# Patient Record
Sex: MALE | Race: WHITE | NOT HISPANIC OR LATINO | Employment: OTHER | URBAN - METROPOLITAN AREA
[De-identification: names, ages, dates, MRNs, and addresses within clinical notes are randomized per-mention and may not be internally consistent; named-entity substitution may affect disease eponyms.]

---

## 2018-08-10 ENCOUNTER — SURGERY - HEALTHEAST (OUTPATIENT)
Dept: CARDIOLOGY | Facility: CLINIC | Age: 73
End: 2018-08-10

## 2018-08-10 ASSESSMENT — MIFFLIN-ST. JEOR
SCORE: 2409.14
SCORE: 2405.96

## 2018-08-11 ASSESSMENT — MIFFLIN-ST. JEOR: SCORE: 2409.14

## 2018-08-12 ASSESSMENT — MIFFLIN-ST. JEOR: SCORE: 2404.15

## 2018-08-14 ASSESSMENT — MIFFLIN-ST. JEOR: SCORE: 2358.79

## 2018-08-15 ASSESSMENT — MIFFLIN-ST. JEOR: SCORE: 2360.15

## 2018-08-16 ENCOUNTER — ANESTHESIA - HEALTHEAST (OUTPATIENT)
Dept: CARDIOLOGY | Facility: CLINIC | Age: 73
End: 2018-08-16

## 2018-08-16 ASSESSMENT — MIFFLIN-ST. JEOR: SCORE: 2351.53

## 2018-08-17 ASSESSMENT — MIFFLIN-ST. JEOR: SCORE: 2347.9

## 2018-08-22 ENCOUNTER — OFFICE VISIT - HEALTHEAST (OUTPATIENT)
Dept: CARDIOLOGY | Facility: CLINIC | Age: 73
End: 2018-08-22

## 2018-08-22 DIAGNOSIS — I50.22 CHRONIC SYSTOLIC HEART FAILURE (H): ICD-10-CM

## 2018-08-22 DIAGNOSIS — E66.01 MORBID OBESITY (H): ICD-10-CM

## 2018-08-22 DIAGNOSIS — G47.33 OSA (OBSTRUCTIVE SLEEP APNEA): ICD-10-CM

## 2018-08-22 DIAGNOSIS — I48.92 PAROXYSMAL ATRIAL FLUTTER (H): ICD-10-CM

## 2018-08-22 DIAGNOSIS — I25.10 CORONARY ARTERY DISEASE INVOLVING NATIVE CORONARY ARTERY OF NATIVE HEART WITHOUT ANGINA PECTORIS: ICD-10-CM

## 2018-08-22 RX ORDER — PERPHENAZINE/AMITRIPTYLINE HCL 4MG-10MG
3 TABLET ORAL DAILY
Status: SHIPPED | COMMUNITY
Start: 2018-08-22 | End: 2023-11-10

## 2018-08-22 ASSESSMENT — MIFFLIN-ST. JEOR: SCORE: 2329.3

## 2018-08-29 ENCOUNTER — OFFICE VISIT - HEALTHEAST (OUTPATIENT)
Dept: CARDIOLOGY | Facility: CLINIC | Age: 73
End: 2018-08-29

## 2018-08-29 DIAGNOSIS — I50.22 CHRONIC SYSTOLIC HEART FAILURE (H): ICD-10-CM

## 2018-08-29 DIAGNOSIS — I50.21 ACUTE SYSTOLIC CHF (CONGESTIVE HEART FAILURE) (H): ICD-10-CM

## 2018-08-29 DIAGNOSIS — I48.92 NEW ONSET ATRIAL FLUTTER (H): ICD-10-CM

## 2018-08-29 DIAGNOSIS — I25.5 ISCHEMIC CARDIOMYOPATHY: ICD-10-CM

## 2018-08-29 DIAGNOSIS — G47.33 OSA ON CPAP: ICD-10-CM

## 2018-08-29 LAB
ANION GAP SERPL CALCULATED.3IONS-SCNC: 11 MMOL/L (ref 5–18)
BUN SERPL-MCNC: 32 MG/DL (ref 8–28)
CALCIUM SERPL-MCNC: 9.4 MG/DL (ref 8.5–10.5)
CHLORIDE BLD-SCNC: 102 MMOL/L (ref 98–107)
CO2 SERPL-SCNC: 23 MMOL/L (ref 22–31)
CREAT SERPL-MCNC: 1.34 MG/DL (ref 0.7–1.3)
GFR SERPL CREATININE-BSD FRML MDRD: 53 ML/MIN/1.73M2
GLUCOSE BLD-MCNC: 114 MG/DL (ref 70–125)
POTASSIUM BLD-SCNC: 5.3 MMOL/L (ref 3.5–5)
SODIUM SERPL-SCNC: 136 MMOL/L (ref 136–145)

## 2018-08-30 ENCOUNTER — COMMUNICATION - HEALTHEAST (OUTPATIENT)
Dept: CARDIOLOGY | Facility: CLINIC | Age: 73
End: 2018-08-30

## 2018-08-30 ENCOUNTER — AMBULATORY - HEALTHEAST (OUTPATIENT)
Dept: CARDIOLOGY | Facility: CLINIC | Age: 73
End: 2018-08-30

## 2018-08-30 DIAGNOSIS — I48.92 PAROXYSMAL ATRIAL FLUTTER (H): ICD-10-CM

## 2018-08-30 DIAGNOSIS — I47.10 SUPRAVENTRICULAR TACHYCARDIA (H): ICD-10-CM

## 2018-08-30 DIAGNOSIS — I25.5 ISCHEMIC CARDIOMYOPATHY: ICD-10-CM

## 2018-08-30 DIAGNOSIS — I25.10 CORONARY ARTERY DISEASE INVOLVING NATIVE CORONARY ARTERY OF NATIVE HEART WITHOUT ANGINA PECTORIS: ICD-10-CM

## 2018-08-30 DIAGNOSIS — I50.22 CHRONIC SYSTOLIC HEART FAILURE (H): ICD-10-CM

## 2018-08-31 ENCOUNTER — COMMUNICATION - HEALTHEAST (OUTPATIENT)
Dept: CARDIOLOGY | Facility: CLINIC | Age: 73
End: 2018-08-31

## 2018-09-05 ENCOUNTER — AMBULATORY - HEALTHEAST (OUTPATIENT)
Dept: CARDIOLOGY | Facility: CLINIC | Age: 73
End: 2018-09-05

## 2018-09-05 DIAGNOSIS — I25.5 ISCHEMIC CARDIOMYOPATHY: ICD-10-CM

## 2018-09-05 LAB
ANION GAP SERPL CALCULATED.3IONS-SCNC: 10 MMOL/L (ref 5–18)
BUN SERPL-MCNC: 39 MG/DL (ref 8–28)
CALCIUM SERPL-MCNC: 9.6 MG/DL (ref 8.5–10.5)
CHLORIDE BLD-SCNC: 107 MMOL/L (ref 98–107)
CO2 SERPL-SCNC: 21 MMOL/L (ref 22–31)
CREAT SERPL-MCNC: 1.37 MG/DL (ref 0.7–1.3)
GFR SERPL CREATININE-BSD FRML MDRD: 51 ML/MIN/1.73M2
GLUCOSE BLD-MCNC: 90 MG/DL (ref 70–125)
POTASSIUM BLD-SCNC: 5.1 MMOL/L (ref 3.5–5)
SODIUM SERPL-SCNC: 138 MMOL/L (ref 136–145)

## 2018-09-06 ENCOUNTER — AMBULATORY - HEALTHEAST (OUTPATIENT)
Dept: CARDIOLOGY | Facility: CLINIC | Age: 73
End: 2018-09-06

## 2018-09-11 ENCOUNTER — COMMUNICATION - HEALTHEAST (OUTPATIENT)
Dept: CARDIOLOGY | Facility: CLINIC | Age: 73
End: 2018-09-11

## 2018-09-14 ENCOUNTER — COMMUNICATION - HEALTHEAST (OUTPATIENT)
Dept: CARDIOLOGY | Facility: CLINIC | Age: 73
End: 2018-09-14

## 2018-09-19 ENCOUNTER — COMMUNICATION - HEALTHEAST (OUTPATIENT)
Dept: ADMINISTRATIVE | Facility: CLINIC | Age: 73
End: 2018-09-19

## 2018-09-20 ENCOUNTER — COMMUNICATION - HEALTHEAST (OUTPATIENT)
Dept: CARDIOLOGY | Facility: CLINIC | Age: 73
End: 2018-09-20

## 2018-09-24 ENCOUNTER — AMBULATORY - HEALTHEAST (OUTPATIENT)
Dept: CARDIOLOGY | Facility: CLINIC | Age: 73
End: 2018-09-24

## 2018-09-24 DIAGNOSIS — Z79.899 LONG TERM USE OF DRUG: ICD-10-CM

## 2018-10-08 ENCOUNTER — OFFICE VISIT - HEALTHEAST (OUTPATIENT)
Dept: CARDIOLOGY | Facility: CLINIC | Age: 73
End: 2018-10-08

## 2018-10-08 DIAGNOSIS — I47.10 SUPRAVENTRICULAR TACHYCARDIA (H): ICD-10-CM

## 2018-10-08 DIAGNOSIS — I48.92 ATRIAL FLUTTER (H): ICD-10-CM

## 2018-10-08 DIAGNOSIS — Z79.899 LONG TERM USE OF DRUG: ICD-10-CM

## 2018-10-08 DIAGNOSIS — I25.5 ISCHEMIC CARDIOMYOPATHY: ICD-10-CM

## 2018-10-08 DIAGNOSIS — I50.20 HEART FAILURE WITH REDUCED EJECTION FRACTION (H): ICD-10-CM

## 2018-10-08 LAB
ALT SERPL W P-5'-P-CCNC: 32 U/L (ref 0–45)
TSH SERPL DL<=0.005 MIU/L-ACNC: 0.88 UIU/ML (ref 0.3–5)

## 2018-10-08 ASSESSMENT — MIFFLIN-ST. JEOR: SCORE: 2243.12

## 2018-10-11 LAB
AMIODARONE SERPL-MCNC: 0.6 UG/ML (ref 0.5–2)
DESETHYLAMIODARONE SERPL-MCNC: 0.5 UG/ML

## 2018-11-02 ENCOUNTER — AMBULATORY - HEALTHEAST (OUTPATIENT)
Dept: CARDIOLOGY | Facility: CLINIC | Age: 73
End: 2018-11-02

## 2018-11-02 ENCOUNTER — OFFICE VISIT - HEALTHEAST (OUTPATIENT)
Dept: CARDIOLOGY | Facility: CLINIC | Age: 73
End: 2018-11-02

## 2018-11-02 DIAGNOSIS — G47.33 OSA ON CPAP: ICD-10-CM

## 2018-11-02 DIAGNOSIS — I50.20 HEART FAILURE WITH REDUCED EJECTION FRACTION (H): ICD-10-CM

## 2018-11-02 DIAGNOSIS — I48.92 ATRIAL FLUTTER (H): ICD-10-CM

## 2018-11-02 DIAGNOSIS — I50.21 ACUTE SYSTOLIC CHF (CONGESTIVE HEART FAILURE) (H): ICD-10-CM

## 2018-11-02 DIAGNOSIS — I25.5 ISCHEMIC CARDIOMYOPATHY: ICD-10-CM

## 2018-11-02 LAB
ANION GAP SERPL CALCULATED.3IONS-SCNC: 11 MMOL/L (ref 5–18)
BUN SERPL-MCNC: 21 MG/DL (ref 8–28)
CALCIUM SERPL-MCNC: 9.1 MG/DL (ref 8.5–10.5)
CHLORIDE BLD-SCNC: 102 MMOL/L (ref 98–107)
CO2 SERPL-SCNC: 26 MMOL/L (ref 22–31)
CREAT SERPL-MCNC: 1.08 MG/DL (ref 0.7–1.3)
GFR SERPL CREATININE-BSD FRML MDRD: >60 ML/MIN/1.73M2
GLUCOSE BLD-MCNC: 73 MG/DL (ref 70–125)
POTASSIUM BLD-SCNC: 4.7 MMOL/L (ref 3.5–5)
SODIUM SERPL-SCNC: 139 MMOL/L (ref 136–145)

## 2018-11-02 ASSESSMENT — MIFFLIN-ST. JEOR: SCORE: 2238.58

## 2018-11-05 ENCOUNTER — COMMUNICATION - HEALTHEAST (OUTPATIENT)
Dept: CARDIOLOGY | Facility: CLINIC | Age: 73
End: 2018-11-05

## 2018-11-15 ENCOUNTER — AMBULATORY - HEALTHEAST (OUTPATIENT)
Dept: CARDIOLOGY | Facility: CLINIC | Age: 73
End: 2018-11-15

## 2018-11-15 DIAGNOSIS — Z79.899 LONG TERM USE OF DRUG: ICD-10-CM

## 2018-11-30 ENCOUNTER — HOSPITAL ENCOUNTER (OUTPATIENT)
Dept: CARDIOLOGY | Facility: HOSPITAL | Age: 73
Discharge: HOME | End: 2018-11-30
Attending: INTERNAL MEDICINE

## 2018-11-30 DIAGNOSIS — I50.22 CHRONIC SYSTOLIC HEART FAILURE (H): ICD-10-CM

## 2018-11-30 DIAGNOSIS — I25.10 CORONARY ARTERY DISEASE INVOLVING NATIVE CORONARY ARTERY OF NATIVE HEART WITHOUT ANGINA PECTORIS: ICD-10-CM

## 2018-11-30 LAB
AORTIC ROOT: 4.2 CM
AORTIC VALVE MEAN VELOCITY: 113 CM/S
AV DIMENSIONLESS INDEX VTI: 0.7
AV MEAN GRADIENT: 6 MMHG
AV PEAK GRADIENT: 11.2 MMHG
AV VALVE AREA: 4 CM2
AV VELOCITY RATIO: 0.6
BSA FOR ECHO PROCEDURE: 2.71 M2
CV ECHO HEIGHT: 70 IN
CV ECHO WEIGHT: 329 LBS
DOP CALC AO PEAK VEL: 167 CM/S
DOP CALC AO VTI: 31.3 CM
DOP CALC LVOT AREA: 5.72 CM2
DOP CALC LVOT DIAMETER: 2.7 CM
DOP CALC LVOT PEAK VEL: 99 CM/S
DOP CALC LVOT STROKE VOLUME: 125.3 CM3
DOP CALCLVOT PEAK VEL VTI: 21.9 CM
ECHO EJECTION FRACTION ESTIMATED: 40 %
EJECTION FRACTION: 53 % (ref 55–75)
FRACTIONAL SHORTENING: 15.2 % (ref 28–44)
INTERVENTRICULAR SEPTUM IN END DIASTOLE: 1.23 CM (ref 0.6–1)
IVS/PW RATIO: 0.8
LA AREA 1: 31 CM2
LA AREA 2: 33 CM2
LEFT ATRIUM LENGTH: 6.6 CM
LEFT ATRIUM SIZE: 4.7 CM
LEFT ATRIUM VOLUME INDEX: 48.6 ML/M2
LEFT ATRIUM VOLUME: 131.8 ML
LEFT VENTRICLE CARDIAC INDEX: 2.8 L/MIN/M2
LEFT VENTRICLE CARDIAC OUTPUT: 7.5 L/MIN
LEFT VENTRICLE DIASTOLIC VOLUME INDEX: 57.9 CM3/M2 (ref 34–74)
LEFT VENTRICLE DIASTOLIC VOLUME: 157 CM3 (ref 62–150)
LEFT VENTRICLE HEART RATE: 60 BPM
LEFT VENTRICLE MASS INDEX: 126 G/M2
LEFT VENTRICLE SYSTOLIC VOLUME INDEX: 27.1 CM3/M2 (ref 11–31)
LEFT VENTRICLE SYSTOLIC VOLUME: 73.5 CM3 (ref 21–61)
LEFT VENTRICULAR INTERNAL DIMENSION IN DIASTOLE: 5.72 CM (ref 4.2–5.8)
LEFT VENTRICULAR INTERNAL DIMENSION IN SYSTOLE: 4.85 CM (ref 2.5–4)
LEFT VENTRICULAR MASS: 341.6 G
LEFT VENTRICULAR OUTFLOW TRACT MEAN GRADIENT: 3 MMHG
LEFT VENTRICULAR OUTFLOW TRACT MEAN VELOCITY: 77.2 CM/S
LEFT VENTRICULAR OUTFLOW TRACT PEAK GRADIENT: 4 MMHG
LEFT VENTRICULAR POSTERIOR WALL IN END DIASTOLE: 1.47 CM (ref 0.6–1)
LV STROKE VOLUME INDEX: 46.2 ML/M2
MITRAL VALVE E/A RATIO: 0.7
MV AVERAGE E/E' RATIO: 10 CM/S
MV DECELERATION TIME: 254 MS
MV E'TISSUE VEL-LAT: 6.77 CM/S
MV E'TISSUE VEL-MED: 5.61 CM/S
MV LATERAL E/E' RATIO: 9.1
MV MEDIAL E/E' RATIO: 11
MV PEAK A VELOCITY: 85.4 CM/S
MV PEAK E VELOCITY: 61.6 CM/S
NUC REST DIASTOLIC VOLUME INDEX: 5264 LBS
NUC REST SYSTOLIC VOLUME INDEX: 70 IN
PR MAX PG: 4 MMHG
PR PEAK VELOCITY: 103 CM/S
TRICUSPID VALVE ANULAR PLANE SYSTOLIC EXCURSION: 2.3 CM

## 2018-11-30 ASSESSMENT — MIFFLIN-ST. JEOR: SCORE: 2238.58

## 2018-12-03 ENCOUNTER — OFFICE VISIT - HEALTHEAST (OUTPATIENT)
Dept: CARDIOLOGY | Facility: CLINIC | Age: 73
End: 2018-12-03

## 2018-12-03 DIAGNOSIS — I42.8 OTHER CARDIOMYOPATHY (H): ICD-10-CM

## 2018-12-03 DIAGNOSIS — I48.92 PAROXYSMAL ATRIAL FLUTTER (H): ICD-10-CM

## 2018-12-03 DIAGNOSIS — I50.22 CHRONIC SYSTOLIC HEART FAILURE (H): ICD-10-CM

## 2018-12-03 DIAGNOSIS — I25.10 CORONARY ARTERY DISEASE INVOLVING NATIVE CORONARY ARTERY OF NATIVE HEART WITHOUT ANGINA PECTORIS: ICD-10-CM

## 2018-12-03 DIAGNOSIS — I48.92 ATRIAL FLUTTER (H): ICD-10-CM

## 2018-12-03 DIAGNOSIS — E66.01 MORBID OBESITY (H): ICD-10-CM

## 2018-12-03 RX ORDER — LISINOPRIL 5 MG/1
5 TABLET ORAL AT BEDTIME
Qty: 90 TABLET | Refills: 3 | Status: SHIPPED | OUTPATIENT
Start: 2018-12-03 | End: 2023-09-04

## 2018-12-03 ASSESSMENT — MIFFLIN-ST. JEOR: SCORE: 2229.51

## 2018-12-04 ENCOUNTER — COMMUNICATION - HEALTHEAST (OUTPATIENT)
Dept: CARDIOLOGY | Facility: CLINIC | Age: 73
End: 2018-12-04

## 2018-12-04 ENCOUNTER — COMMUNICATION - HEALTHEAST (OUTPATIENT)
Dept: SLEEP MEDICINE | Facility: CLINIC | Age: 73
End: 2018-12-04

## 2018-12-04 ENCOUNTER — AMBULATORY - HEALTHEAST (OUTPATIENT)
Dept: SLEEP MEDICINE | Facility: CLINIC | Age: 73
End: 2018-12-04

## 2018-12-04 DIAGNOSIS — G47.33 OSA (OBSTRUCTIVE SLEEP APNEA): ICD-10-CM

## 2018-12-04 DIAGNOSIS — E66.9 OBESITY: ICD-10-CM

## 2018-12-06 ENCOUNTER — COMMUNICATION - HEALTHEAST (OUTPATIENT)
Dept: CARDIOLOGY | Facility: CLINIC | Age: 73
End: 2018-12-06

## 2018-12-11 ENCOUNTER — COMMUNICATION - HEALTHEAST (OUTPATIENT)
Dept: CARDIOLOGY | Facility: CLINIC | Age: 73
End: 2018-12-11

## 2018-12-11 ENCOUNTER — AMBULATORY - HEALTHEAST (OUTPATIENT)
Dept: SLEEP MEDICINE | Facility: CLINIC | Age: 73
End: 2018-12-11

## 2018-12-11 DIAGNOSIS — E66.9 OBESITY: ICD-10-CM

## 2018-12-11 DIAGNOSIS — R06.83 SNORING: ICD-10-CM

## 2018-12-11 DIAGNOSIS — G47.33 OSA (OBSTRUCTIVE SLEEP APNEA): ICD-10-CM

## 2019-01-18 ENCOUNTER — RECORDS - HEALTHEAST (OUTPATIENT)
Dept: SLEEP MEDICINE | Facility: CLINIC | Age: 74
End: 2019-01-18

## 2019-01-18 DIAGNOSIS — R06.83 SNORING: ICD-10-CM

## 2019-01-18 DIAGNOSIS — G47.33 OBSTRUCTIVE SLEEP APNEA (ADULT) (PEDIATRIC): ICD-10-CM

## 2019-01-18 DIAGNOSIS — E66.9 OBESITY, UNSPECIFIED: ICD-10-CM

## 2019-01-21 ENCOUNTER — COMMUNICATION - HEALTHEAST (OUTPATIENT)
Dept: SLEEP MEDICINE | Facility: CLINIC | Age: 74
End: 2019-01-21

## 2019-02-01 ENCOUNTER — COMMUNICATION - HEALTHEAST (OUTPATIENT)
Dept: SLEEP MEDICINE | Facility: CLINIC | Age: 74
End: 2019-02-01

## 2019-02-19 ENCOUNTER — OFFICE VISIT - HEALTHEAST (OUTPATIENT)
Dept: SLEEP MEDICINE | Facility: CLINIC | Age: 74
End: 2019-02-19

## 2019-02-19 ENCOUNTER — AMBULATORY - HEALTHEAST (OUTPATIENT)
Dept: SLEEP MEDICINE | Facility: CLINIC | Age: 74
End: 2019-02-19

## 2019-02-19 DIAGNOSIS — G47.10 HYPERSOMNIA: ICD-10-CM

## 2019-02-19 DIAGNOSIS — G47.8 SLEEP DYSFUNCTION WITH SLEEP STAGE DISTURBANCE: ICD-10-CM

## 2019-02-19 DIAGNOSIS — G47.33 OBSTRUCTIVE SLEEP APNEA: ICD-10-CM

## 2019-02-19 DIAGNOSIS — E66.813 CLASS 3 SEVERE OBESITY DUE TO EXCESS CALORIES WITH BODY MASS INDEX (BMI) OF 45.0 TO 49.9 IN ADULT, UNSPECIFIED WHETHER SERIOUS COMORBIDITY PRESENT (H): ICD-10-CM

## 2019-02-19 DIAGNOSIS — E66.01 CLASS 3 SEVERE OBESITY DUE TO EXCESS CALORIES WITH BODY MASS INDEX (BMI) OF 45.0 TO 49.9 IN ADULT, UNSPECIFIED WHETHER SERIOUS COMORBIDITY PRESENT (H): ICD-10-CM

## 2019-02-19 ASSESSMENT — MIFFLIN-ST. JEOR: SCORE: 2206.83

## 2019-03-05 ENCOUNTER — AMBULATORY - HEALTHEAST (OUTPATIENT)
Dept: SLEEP MEDICINE | Facility: CLINIC | Age: 74
End: 2019-03-05

## 2019-03-12 ENCOUNTER — COMMUNICATION - HEALTHEAST (OUTPATIENT)
Dept: CARDIOLOGY | Facility: CLINIC | Age: 74
End: 2019-03-12

## 2019-03-12 DIAGNOSIS — I48.92 PAROXYSMAL ATRIAL FLUTTER (H): ICD-10-CM

## 2019-03-12 DIAGNOSIS — I50.21 ACUTE SYSTOLIC CHF (CONGESTIVE HEART FAILURE) (H): ICD-10-CM

## 2019-03-12 DIAGNOSIS — I48.92 ATRIAL FLUTTER (H): ICD-10-CM

## 2019-03-21 ENCOUNTER — AMBULATORY - HEALTHEAST (OUTPATIENT)
Dept: CARDIOLOGY | Facility: CLINIC | Age: 74
End: 2019-03-21

## 2019-04-02 ENCOUNTER — COMMUNICATION - HEALTHEAST (OUTPATIENT)
Dept: CARDIOLOGY | Facility: CLINIC | Age: 74
End: 2019-04-02

## 2019-04-02 DIAGNOSIS — I25.10 CORONARY ARTERY DISEASE INVOLVING NATIVE CORONARY ARTERY OF NATIVE HEART WITHOUT ANGINA PECTORIS: ICD-10-CM

## 2019-04-02 DIAGNOSIS — I50.22 CHRONIC SYSTOLIC HEART FAILURE (H): ICD-10-CM

## 2019-04-02 RX ORDER — ATORVASTATIN CALCIUM 40 MG/1
40 TABLET, FILM COATED ORAL AT BEDTIME
Qty: 90 TABLET | Refills: 1 | Status: SHIPPED | OUTPATIENT
Start: 2019-04-02

## 2019-04-09 ENCOUNTER — COMMUNICATION - HEALTHEAST (OUTPATIENT)
Dept: ADMINISTRATIVE | Facility: CLINIC | Age: 74
End: 2019-04-09

## 2019-04-22 ENCOUNTER — OFFICE VISIT - HEALTHEAST (OUTPATIENT)
Dept: SLEEP MEDICINE | Facility: CLINIC | Age: 74
End: 2019-04-22

## 2019-04-22 DIAGNOSIS — G47.8 SLEEP DYSFUNCTION WITH SLEEP STAGE DISTURBANCE: ICD-10-CM

## 2019-04-22 DIAGNOSIS — G47.33 OBSTRUCTIVE SLEEP APNEA: ICD-10-CM

## 2019-04-22 ASSESSMENT — MIFFLIN-ST. JEOR: SCORE: 2202.3

## 2019-05-17 ENCOUNTER — AMBULATORY - HEALTHEAST (OUTPATIENT)
Dept: CARDIOLOGY | Facility: CLINIC | Age: 74
End: 2019-05-17

## 2019-05-17 DIAGNOSIS — Z79.899 LONG TERM USE OF DRUG: ICD-10-CM

## 2019-05-17 LAB
ALT SERPL W P-5'-P-CCNC: 23 U/L (ref 0–45)
TSH SERPL DL<=0.005 MIU/L-ACNC: 0.33 UIU/ML (ref 0.3–5)

## 2019-05-20 ENCOUNTER — AMBULATORY - HEALTHEAST (OUTPATIENT)
Dept: CARDIOLOGY | Facility: CLINIC | Age: 74
End: 2019-05-20

## 2019-05-20 LAB
AMIODARONE SERPL-MCNC: 0.6 UG/ML (ref 0.5–2)
DESETHYLAMIODARONE SERPL-MCNC: 0.6 UG/ML

## 2019-06-10 ENCOUNTER — COMMUNICATION - HEALTHEAST (OUTPATIENT)
Dept: CARDIOLOGY | Facility: CLINIC | Age: 74
End: 2019-06-10

## 2019-06-24 ENCOUNTER — OFFICE VISIT - HEALTHEAST (OUTPATIENT)
Dept: CARDIOLOGY | Facility: CLINIC | Age: 74
End: 2019-06-24

## 2019-06-24 DIAGNOSIS — G47.33 OSA (OBSTRUCTIVE SLEEP APNEA): ICD-10-CM

## 2019-06-24 DIAGNOSIS — Z79.899 ENCOUNTER FOR MONITORING AMIODARONE THERAPY: ICD-10-CM

## 2019-06-24 DIAGNOSIS — Z51.81 ENCOUNTER FOR MONITORING AMIODARONE THERAPY: ICD-10-CM

## 2019-06-24 DIAGNOSIS — I48.92 ATRIAL FLUTTER (H): ICD-10-CM

## 2019-06-24 DIAGNOSIS — I50.22 CHRONIC SYSTOLIC HEART FAILURE (H): ICD-10-CM

## 2019-06-24 DIAGNOSIS — E66.01 MORBID OBESITY (H): ICD-10-CM

## 2019-06-24 ASSESSMENT — MIFFLIN-ST. JEOR: SCORE: 2192.76

## 2019-06-25 ENCOUNTER — COMMUNICATION - HEALTHEAST (OUTPATIENT)
Dept: CARDIOLOGY | Facility: CLINIC | Age: 74
End: 2019-06-25

## 2019-06-25 DIAGNOSIS — I48.91 A-FIB (H): ICD-10-CM

## 2019-06-28 ENCOUNTER — COMMUNICATION - HEALTHEAST (OUTPATIENT)
Dept: CARDIOLOGY | Facility: CLINIC | Age: 74
End: 2019-06-28

## 2019-06-28 DIAGNOSIS — I50.22 CHRONIC SYSTOLIC HEART FAILURE (H): ICD-10-CM

## 2019-07-05 ENCOUNTER — COMMUNICATION - HEALTHEAST (OUTPATIENT)
Dept: CARDIOLOGY | Facility: CLINIC | Age: 74
End: 2019-07-05

## 2019-07-26 ENCOUNTER — COMMUNICATION - HEALTHEAST (OUTPATIENT)
Dept: CARDIOLOGY | Facility: CLINIC | Age: 74
End: 2019-07-26

## 2019-09-11 ENCOUNTER — COMMUNICATION - HEALTHEAST (OUTPATIENT)
Dept: CARDIOLOGY | Facility: CLINIC | Age: 74
End: 2019-09-11

## 2019-09-11 DIAGNOSIS — I50.21 ACUTE SYSTOLIC CHF (CONGESTIVE HEART FAILURE) (H): ICD-10-CM

## 2019-10-03 ENCOUNTER — COMMUNICATION - HEALTHEAST (OUTPATIENT)
Dept: CARDIOLOGY | Facility: CLINIC | Age: 74
End: 2019-10-03

## 2019-10-03 DIAGNOSIS — I50.22 CHRONIC SYSTOLIC HEART FAILURE (H): ICD-10-CM

## 2019-10-03 RX ORDER — METOPROLOL SUCCINATE 50 MG/1
25 TABLET, EXTENDED RELEASE ORAL DAILY
Qty: 90 TABLET | Refills: 1 | Status: ON HOLD | OUTPATIENT
Start: 2019-10-03 | End: 2023-12-01

## 2019-11-14 ENCOUNTER — AMBULATORY - HEALTHEAST (OUTPATIENT)
Dept: CARDIOLOGY | Facility: CLINIC | Age: 74
End: 2019-11-14

## 2019-11-14 ENCOUNTER — COMMUNICATION - HEALTHEAST (OUTPATIENT)
Dept: CARDIOLOGY | Facility: CLINIC | Age: 74
End: 2019-11-14

## 2019-11-14 DIAGNOSIS — I48.91 A-FIB (H): ICD-10-CM

## 2019-11-14 DIAGNOSIS — I50.21 ACUTE SYSTOLIC CHF (CONGESTIVE HEART FAILURE) (H): ICD-10-CM

## 2019-11-14 DIAGNOSIS — Z79.899 LONG TERM USE OF DRUG: ICD-10-CM

## 2019-11-14 RX ORDER — AMIODARONE HYDROCHLORIDE 200 MG/1
100 TABLET ORAL DAILY
Qty: 45 TABLET | Refills: 0 | Status: SHIPPED | OUTPATIENT
Start: 2019-11-14

## 2019-11-18 ENCOUNTER — AMBULATORY - HEALTHEAST (OUTPATIENT)
Dept: CARDIOLOGY | Facility: CLINIC | Age: 74
End: 2019-11-18

## 2019-12-03 ENCOUNTER — COMMUNICATION - HEALTHEAST (OUTPATIENT)
Dept: CARDIOLOGY | Facility: CLINIC | Age: 74
End: 2019-12-03

## 2019-12-03 DIAGNOSIS — I48.92 PAROXYSMAL ATRIAL FLUTTER (H): ICD-10-CM

## 2019-12-03 RX ORDER — APIXABAN 5 MG/1
TABLET, FILM COATED ORAL
Qty: 180 TABLET | Refills: 0 | Status: SHIPPED | OUTPATIENT
Start: 2019-12-03

## 2019-12-09 ENCOUNTER — AMBULATORY - HEALTHEAST (OUTPATIENT)
Dept: CARDIOLOGY | Facility: CLINIC | Age: 74
End: 2019-12-09

## 2019-12-09 DIAGNOSIS — Z79.899 LONG TERM USE OF DRUG: ICD-10-CM

## 2019-12-09 LAB
ALT SERPL W P-5'-P-CCNC: 23 U/L (ref 0–45)
TSH SERPL DL<=0.005 MIU/L-ACNC: <0.01 UIU/ML (ref 0.3–5)

## 2019-12-22 ENCOUNTER — COMMUNICATION - HEALTHEAST (OUTPATIENT)
Dept: CARDIOLOGY | Facility: CLINIC | Age: 74
End: 2019-12-22

## 2019-12-22 DIAGNOSIS — I50.21 ACUTE SYSTOLIC CHF (CONGESTIVE HEART FAILURE) (H): ICD-10-CM

## 2019-12-23 RX ORDER — FUROSEMIDE 20 MG
TABLET ORAL
Qty: 100 TABLET | Refills: 0 | Status: ON HOLD | OUTPATIENT
Start: 2019-12-23 | End: 2023-11-20

## 2020-01-27 ENCOUNTER — OFFICE VISIT - HEALTHEAST (OUTPATIENT)
Dept: CARDIOLOGY | Facility: CLINIC | Age: 75
End: 2020-01-27

## 2020-01-27 DIAGNOSIS — G47.33 OSA (OBSTRUCTIVE SLEEP APNEA): ICD-10-CM

## 2020-01-27 DIAGNOSIS — I42.8 OTHER CARDIOMYOPATHY (H): ICD-10-CM

## 2020-01-27 DIAGNOSIS — E66.01 MORBID OBESITY (H): ICD-10-CM

## 2020-01-27 DIAGNOSIS — I50.22 CHRONIC SYSTOLIC HEART FAILURE (H): ICD-10-CM

## 2020-01-27 DIAGNOSIS — I48.3 TYPICAL ATRIAL FLUTTER (H): ICD-10-CM

## 2020-01-27 DIAGNOSIS — I25.10 CORONARY ARTERY DISEASE INVOLVING NATIVE CORONARY ARTERY OF NATIVE HEART WITHOUT ANGINA PECTORIS: ICD-10-CM

## 2020-04-29 ENCOUNTER — AMBULATORY - HEALTHEAST (OUTPATIENT)
Dept: CARDIOLOGY | Facility: CLINIC | Age: 75
End: 2020-04-29

## 2020-07-14 ENCOUNTER — AMBULATORY - HEALTHEAST (OUTPATIENT)
Dept: CARDIOLOGY | Facility: CLINIC | Age: 75
End: 2020-07-14

## 2020-10-21 ENCOUNTER — RECORDS - HEALTHEAST (OUTPATIENT)
Dept: LAB | Facility: CLINIC | Age: 75
End: 2020-10-21

## 2020-10-22 ENCOUNTER — RECORDS - HEALTHEAST (OUTPATIENT)
Dept: LAB | Facility: CLINIC | Age: 75
End: 2020-10-22

## 2020-10-22 LAB
ANION GAP SERPL CALCULATED.3IONS-SCNC: 11 MMOL/L (ref 5–18)
BUN SERPL-MCNC: 28 MG/DL (ref 8–28)
CALCIUM SERPL-MCNC: 9.1 MG/DL (ref 8.5–10.5)
CHLORIDE BLD-SCNC: 102 MMOL/L (ref 98–107)
CO2 SERPL-SCNC: 24 MMOL/L (ref 22–31)
CREAT SERPL-MCNC: 1.08 MG/DL (ref 0.7–1.3)
GFR SERPL CREATININE-BSD FRML MDRD: >60 ML/MIN/1.73M2
GLUCOSE BLD-MCNC: 102 MG/DL (ref 70–125)
HGB BLD-MCNC: 12.1 G/DL (ref 14–18)
POTASSIUM BLD-SCNC: 4.8 MMOL/L (ref 3.5–5)
SODIUM SERPL-SCNC: 137 MMOL/L (ref 136–145)

## 2020-10-23 LAB
ANION GAP SERPL CALCULATED.3IONS-SCNC: 7 MMOL/L (ref 5–18)
BUN SERPL-MCNC: 26 MG/DL (ref 8–28)
CALCIUM SERPL-MCNC: 9 MG/DL (ref 8.5–10.5)
CHLORIDE BLD-SCNC: 103 MMOL/L (ref 98–107)
CO2 SERPL-SCNC: 29 MMOL/L (ref 22–31)
CREAT SERPL-MCNC: 1.09 MG/DL (ref 0.7–1.3)
ERYTHROCYTE [DISTWIDTH] IN BLOOD BY AUTOMATED COUNT: 13.5 % (ref 11–14.5)
GFR SERPL CREATININE-BSD FRML MDRD: >60 ML/MIN/1.73M2
GLUCOSE BLD-MCNC: 76 MG/DL (ref 70–125)
HCT VFR BLD AUTO: 37.4 % (ref 40–54)
HGB BLD-MCNC: 12 G/DL (ref 14–18)
MCH RBC QN AUTO: 33.1 PG (ref 27–34)
MCHC RBC AUTO-ENTMCNC: 32.1 G/DL (ref 32–36)
MCV RBC AUTO: 103 FL (ref 80–100)
PLATELET # BLD AUTO: 162 THOU/UL (ref 140–440)
PMV BLD AUTO: 10.9 FL (ref 8.5–12.5)
POTASSIUM BLD-SCNC: 4.3 MMOL/L (ref 3.5–5)
RBC # BLD AUTO: 3.63 MILL/UL (ref 4.4–6.2)
SODIUM SERPL-SCNC: 139 MMOL/L (ref 136–145)
WBC: 6.3 THOU/UL (ref 4–11)

## 2020-11-01 ENCOUNTER — RECORDS - HEALTHEAST (OUTPATIENT)
Dept: LAB | Facility: CLINIC | Age: 75
End: 2020-11-01

## 2020-11-02 LAB
ANION GAP SERPL CALCULATED.3IONS-SCNC: 8 MMOL/L (ref 5–18)
BUN SERPL-MCNC: 21 MG/DL (ref 8–28)
CALCIUM SERPL-MCNC: 9.4 MG/DL (ref 8.5–10.5)
CHLORIDE BLD-SCNC: 105 MMOL/L (ref 98–107)
CO2 SERPL-SCNC: 26 MMOL/L (ref 22–31)
CREAT SERPL-MCNC: 0.99 MG/DL (ref 0.7–1.3)
ERYTHROCYTE [DISTWIDTH] IN BLOOD BY AUTOMATED COUNT: 13.6 % (ref 11–14.5)
GFR SERPL CREATININE-BSD FRML MDRD: >60 ML/MIN/1.73M2
GLUCOSE BLD-MCNC: 96 MG/DL (ref 70–125)
HCT VFR BLD AUTO: 39.9 % (ref 40–54)
HGB BLD-MCNC: 12.8 G/DL (ref 14–18)
MCH RBC QN AUTO: 33 PG (ref 27–34)
MCHC RBC AUTO-ENTMCNC: 32.1 G/DL (ref 32–36)
MCV RBC AUTO: 103 FL (ref 80–100)
PLATELET # BLD AUTO: 212 THOU/UL (ref 140–440)
PMV BLD AUTO: 10.4 FL (ref 8.5–12.5)
POTASSIUM BLD-SCNC: 4.6 MMOL/L (ref 3.5–5)
RBC # BLD AUTO: 3.88 MILL/UL (ref 4.4–6.2)
SODIUM SERPL-SCNC: 139 MMOL/L (ref 136–145)
WBC: 4.8 THOU/UL (ref 4–11)

## 2020-12-07 ENCOUNTER — AMBULATORY - HEALTHEAST (OUTPATIENT)
Dept: CARDIOLOGY | Facility: CLINIC | Age: 75
End: 2020-12-07

## 2020-12-07 DIAGNOSIS — Z79.899 LONG TERM USE OF DRUG: ICD-10-CM

## 2021-02-12 ENCOUNTER — OFFICE VISIT - HEALTHEAST (OUTPATIENT)
Dept: CARDIOLOGY | Facility: CLINIC | Age: 76
End: 2021-02-12

## 2021-02-12 DIAGNOSIS — I50.22 CHRONIC SYSTOLIC HEART FAILURE (H): ICD-10-CM

## 2021-02-12 DIAGNOSIS — I89.0 LYMPHEDEMA OF BOTH LOWER EXTREMITIES: ICD-10-CM

## 2021-02-12 DIAGNOSIS — I48.3 TYPICAL ATRIAL FLUTTER (H): ICD-10-CM

## 2021-02-12 DIAGNOSIS — E66.01 MORBID OBESITY (H): ICD-10-CM

## 2021-02-12 RX ORDER — ACETAMINOPHEN 500 MG
1000 TABLET ORAL 3 TIMES DAILY
Status: SHIPPED | COMMUNITY
Start: 2020-10-19

## 2021-02-23 ENCOUNTER — COMMUNICATION - HEALTHEAST (OUTPATIENT)
Dept: CARDIOLOGY | Facility: CLINIC | Age: 76
End: 2021-02-23

## 2021-03-11 ENCOUNTER — AMBULATORY - HEALTHEAST (OUTPATIENT)
Dept: CARDIOLOGY | Facility: CLINIC | Age: 76
End: 2021-03-11

## 2021-03-18 ENCOUNTER — COMMUNICATION - HEALTHEAST (OUTPATIENT)
Dept: CARDIOLOGY | Facility: CLINIC | Age: 76
End: 2021-03-18

## 2021-03-19 ENCOUNTER — OFFICE VISIT - HEALTHEAST (OUTPATIENT)
Dept: CARDIOLOGY | Facility: CLINIC | Age: 76
End: 2021-03-19

## 2021-03-19 DIAGNOSIS — E66.01 MORBID OBESITY (H): ICD-10-CM

## 2021-03-19 DIAGNOSIS — I25.10 CORONARY ARTERY DISEASE INVOLVING NATIVE CORONARY ARTERY OF NATIVE HEART WITHOUT ANGINA PECTORIS: ICD-10-CM

## 2021-03-19 DIAGNOSIS — G47.33 OSA (OBSTRUCTIVE SLEEP APNEA): ICD-10-CM

## 2021-03-19 DIAGNOSIS — I89.0 LYMPHEDEMA OF BOTH LOWER EXTREMITIES: ICD-10-CM

## 2021-03-19 DIAGNOSIS — I50.22 CHRONIC SYSTOLIC HEART FAILURE (H): ICD-10-CM

## 2021-03-19 ASSESSMENT — MIFFLIN-ST. JEOR: SCORE: 2120.26

## 2021-04-27 ENCOUNTER — OFFICE VISIT - HEALTHEAST (OUTPATIENT)
Dept: GERIATRICS | Facility: CLINIC | Age: 76
End: 2021-04-27

## 2021-04-27 DIAGNOSIS — Z96.659 HISTORY OF REVISION OF TOTAL KNEE ARTHROPLASTY: ICD-10-CM

## 2021-04-27 DIAGNOSIS — I25.118 ATHEROSCLEROTIC HEART DISEASE OF NATIVE CORONARY ARTERY WITH OTHER FORMS OF ANGINA PECTORIS (H): ICD-10-CM

## 2021-04-27 DIAGNOSIS — I50.20 HEART FAILURE WITH REDUCED EJECTION FRACTION (H): ICD-10-CM

## 2021-04-27 DIAGNOSIS — E66.01 MORBID OBESITY (H): ICD-10-CM

## 2021-04-27 DIAGNOSIS — I48.3 TYPICAL ATRIAL FLUTTER (H): ICD-10-CM

## 2021-04-27 ASSESSMENT — MIFFLIN-ST. JEOR: SCORE: 2077.56

## 2021-04-30 ENCOUNTER — RECORDS - HEALTHEAST (OUTPATIENT)
Dept: LAB | Facility: CLINIC | Age: 76
End: 2021-04-30

## 2021-04-30 LAB
SARS-COV-2 PCR COMMENT: NORMAL
SARS-COV-2 RNA SPEC QL NAA+PROBE: NEGATIVE
SARS-COV-2 VIRUS SPECIMEN SOURCE: NORMAL

## 2021-05-03 RX ORDER — GABAPENTIN 100 MG/1
100 CAPSULE ORAL
Status: SHIPPED | COMMUNITY
Start: 2021-04-20 | End: 2023-02-27

## 2021-05-03 RX ORDER — PRAMIPEXOLE DIHYDROCHLORIDE 0.25 MG/1
0.25 TABLET ORAL AT BEDTIME
Status: SHIPPED | COMMUNITY
Start: 2021-04-24

## 2021-05-03 RX ORDER — POLYETHYLENE GLYCOL 3350 17 G/17G
17 POWDER, FOR SOLUTION ORAL DAILY PRN
Status: SHIPPED | COMMUNITY
Start: 2021-04-24

## 2021-05-04 ENCOUNTER — OFFICE VISIT - HEALTHEAST (OUTPATIENT)
Dept: GERIATRICS | Facility: CLINIC | Age: 76
End: 2021-05-04

## 2021-05-04 DIAGNOSIS — E66.01 MORBID OBESITY (H): ICD-10-CM

## 2021-05-04 DIAGNOSIS — Z96.659 HISTORY OF REVISION OF TOTAL KNEE ARTHROPLASTY: ICD-10-CM

## 2021-05-04 DIAGNOSIS — I50.20 HEART FAILURE WITH REDUCED EJECTION FRACTION (H): ICD-10-CM

## 2021-05-04 ASSESSMENT — MIFFLIN-ST. JEOR: SCORE: 2070.31

## 2021-05-06 ENCOUNTER — RECORDS - HEALTHEAST (OUTPATIENT)
Dept: LAB | Facility: CLINIC | Age: 76
End: 2021-05-06

## 2021-05-11 ENCOUNTER — OFFICE VISIT - HEALTHEAST (OUTPATIENT)
Dept: GERIATRICS | Facility: CLINIC | Age: 76
End: 2021-05-11

## 2021-05-11 ENCOUNTER — RECORDS - HEALTHEAST (OUTPATIENT)
Dept: ADMINISTRATIVE | Facility: OTHER | Age: 76
End: 2021-05-11

## 2021-05-11 DIAGNOSIS — E66.01 MORBID OBESITY (H): ICD-10-CM

## 2021-05-11 DIAGNOSIS — I47.10 SUPRAVENTRICULAR TACHYCARDIA (H): ICD-10-CM

## 2021-05-11 DIAGNOSIS — G47.33 OSA ON CPAP: ICD-10-CM

## 2021-05-11 DIAGNOSIS — I50.20 HEART FAILURE WITH REDUCED EJECTION FRACTION (H): ICD-10-CM

## 2021-05-11 DIAGNOSIS — I10 ESSENTIAL HYPERTENSION: ICD-10-CM

## 2021-05-11 DIAGNOSIS — G25.81 RLS (RESTLESS LEGS SYNDROME): ICD-10-CM

## 2021-05-11 DIAGNOSIS — R53.81 PHYSICAL DECONDITIONING: ICD-10-CM

## 2021-05-11 DIAGNOSIS — I25.5 ISCHEMIC CARDIOMYOPATHY: ICD-10-CM

## 2021-05-11 DIAGNOSIS — I48.3 TYPICAL ATRIAL FLUTTER (H): ICD-10-CM

## 2021-05-11 DIAGNOSIS — Z96.659 HISTORY OF REVISION OF TOTAL KNEE ARTHROPLASTY: ICD-10-CM

## 2021-05-11 DIAGNOSIS — I25.118 CORONARY ARTERY DISEASE OF NATIVE ARTERY OF NATIVE HEART WITH STABLE ANGINA PECTORIS (H): ICD-10-CM

## 2021-05-11 ASSESSMENT — MIFFLIN-ST. JEOR: SCORE: 2067.58

## 2021-05-14 ENCOUNTER — AMBULATORY - HEALTHEAST (OUTPATIENT)
Dept: GERIATRICS | Facility: CLINIC | Age: 76
End: 2021-05-14

## 2021-05-17 ENCOUNTER — MEDICAL CORRESPONDENCE (OUTPATIENT)
Dept: HEALTH INFORMATION MANAGEMENT | Facility: CLINIC | Age: 76
End: 2021-05-17

## 2021-05-27 VITALS
OXYGEN SATURATION: 97 % | TEMPERATURE: 98 F | RESPIRATION RATE: 18 BRPM | DIASTOLIC BLOOD PRESSURE: 57 MMHG | HEART RATE: 48 BPM | SYSTOLIC BLOOD PRESSURE: 92 MMHG

## 2021-05-27 VITALS — WEIGHT: 294.8 LBS | BODY MASS INDEX: 43.53 KG/M2

## 2021-05-27 VITALS — HEIGHT: 69 IN | BODY MASS INDEX: 43.62 KG/M2

## 2021-05-28 NOTE — PROGRESS NOTES
Dear Lisseth Dodson MD  [address] [place] [address],    Thank you for the opportunity to participate in the care of Gorge Berkowitz Jr..     He is a 74 y.o. y/o male patient who comes to the sleep medicine clinic for follow up.  This is the patient's first clinical visit since starting CPAP therapy.  He reports that his sleep quality and energy level have slightly increased.  He would like to talk with our durable medical  about mask options if possible the end of his visit with me.    Compliance Download data for 30 days:  Pressure setting: APAP 14-20 CWP  Residual AHI: 6.8 events per hour  Leak: Large  Compliance: 100%  Mask Tolerance: Good  Skin irritation: None      Past Medical History:   Diagnosis Date     Atrial fibrillation (H)      Cardiomyopathy (H)     ef 20%     CHF (congestive heart failure) (H)      Morbid obesity (H)      ROSALVA (obstructive sleep apnea)        Past Surgical History:   Procedure Laterality Date     CARDIAC CATHETERIZATION  08/10/2018    and right heart cath     CARDIOVERSION  08/16/2018    successful     CV CORONARY ANGIOGRAM N/A 8/10/2018    Procedure: Coronary Angiogram;  Surgeon: Ralph Powers MD;  Location: [facility];  Service:      CV LEFT HEART CATHETERIZATION WO LEFT VETRICULOGRAM Left 8/10/2018    Procedure: Left Heart Catheterization Without Left Ventriculogram;  Surgeon: Ralph Powers MD;  Location: [facility];  Service:      CV RIGHT HEART CATH N/A 8/10/2018    Procedure: Right Heart Catheterization;  Surgeon: Ralph Powers MD;  Location: [facility];  Service:        Social History     Socioeconomic History     Marital status:      Spouse name: Not on file     Number of children: Not on file     Years of education: Not on file     Highest education level: Not on file   Occupational History     Not on file   Social Needs     Financial resource strain: Not on file     Food  insecurity:     Worry: Not on file     Inability: Not on file     Transportation needs:     Medical: Not on file     Non-medical: Not on file   Tobacco Use     Smoking status: Former Smoker     Last attempt to quit: 2005     Years since quittin.3     Smokeless tobacco: Never Used   Substance and Sexual Activity     Alcohol use: No     Comment: former alcoholic- quit      Drug use: No     Sexual activity: Not on file   Lifestyle     Physical activity:     Days per week: Not on file     Minutes per session: Not on file     Stress: Not on file   Relationships     Social connections:     Talks on phone: Not on file     Gets together: Not on file     Attends Mosque service: Not on file     Active member of club or organization: Not on file     Attends meetings of clubs or organizations: Not on file     Relationship status: Not on file     Intimate partner violence:     Fear of current or ex partner: Not on file     Emotionally abused: Not on file     Physically abused: Not on file     Forced sexual activity: Not on file   Other Topics Concern     Not on file   Social History Narrative     Not on file         Current Outpatient Medications   Medication Sig Dispense Refill     amiodarone (PACERONE) 200 MG tablet Take 1 tablet (200 mg total) by mouth daily. 90 tablet 2     apixaban (ELIQUIS) 5 mg Tab tablet Take 1 tablet (5 mg total) by mouth 2 (two) times a day. 180 tablet 2     atorvastatin (LIPITOR) 40 MG tablet Take 1 tablet (40 mg total) by mouth at bedtime. 90 tablet 1     co-enzyme Q-10 30 mg capsule Take 60 mg by mouth 2 (two) times a day.        furosemide (LASIX) 20 MG tablet Take 20 mg daily and 20 mg as needed at night for any swelling 100 tablet 2     glucosamine-chondroitin 500-400 mg cap Take 1 capsule by mouth 3 (three) times a day.       krill-om3-dha-epa-om6-lip-astx (KRILL OIL, OMEGA 3 AND 6,) 1,500-165-67.5 mg cap Take 3 tablets by mouth daily.       lisinopril (PRINIVIL,ZESTRIL) 5 MG  "tablet Take 1 tablet (5 mg total) by mouth at bedtime. 90 tablet 3     metoprolol succinate (TOPROL-XL) 50 MG 24 hr tablet Take 1 tablet (50 mg total) by mouth daily. 90 tablet 1     No current facility-administered medications for this visit.        No Known Allergies    Epworths Sleepiness Scale 2/19/2019 4/22/2019   Sitting and reading 2 0   Watching TV 2 2   Sitting, inactive in a public place (e.g. a theatre or a meeting) 3 1   As a passenger in a car for an hour without a break 2 0   Lying down to rest in the afternoon when circumstances permit 2 3   Sitting and talking to someone 0 0   Sitting quietly after a lunch without alcohol 1 0   In a car, while stopped for a few minutes in traffic 1 0   Total score 13 6       Physical Exam:  /58   Pulse (!) 50   Ht 5' 10\" (1.778 m)   Wt (!) 321 lb (145.6 kg)   SpO2 97%   BMI 46.06 kg/m    BMI:Body mass index is 46.06 kg/m .   GEN: NAD, obese  Psych: normal mood, normal affect    Labs/Studies:     I reviewed the efficacy and compliance report from his device. Data summarized on the HPI and the CPAP compliance flow sheet.     Lab Results   Component Value Date    WBC 9.5 08/08/2018    HGB 14.8 08/08/2018    HCT 44.5 08/08/2018    MCV 96 08/08/2018     08/08/2018         Chemistry        Component Value Date/Time     11/02/2018 1408    K 4.7 11/02/2018 1408     11/02/2018 1408    CO2 26 11/02/2018 1408    BUN 21 11/02/2018 1408    CREATININE 1.08 11/02/2018 1408    GLU 73 11/02/2018 1408        Component Value Date/Time    CALCIUM 9.1 11/02/2018 1408    ALKPHOS 62 08/08/2018 0713    AST 26 08/08/2018 0713    ALT 32 10/08/2018 1551    BILITOT 2.2 (H) 08/08/2018 0713            No results found for: FERRITIN         Assessment and Plan:  In summary Gorge Berkowitz JrLinda is a 74 y.o. year old male who is here for follow-up.    1.  Obstructive sleep apnea on CPAP  I congratulated the patient on his excellent CPAP usage.  I will keep him on the " same pressure settings as per his request.  I will also refer him to speak with our durable medical  after his visit with me.  2.  Other sleep disturbance     Patient verbalized understanding of these issues, agrees with the plan and all questions were answered today. Patient was given an opportuntity to voice any other symptoms or concerns not listed above. Patient did not have any other symptoms or concerns.      Patient told to return in 24 months.     Sumeet Becker DO  Board Certified in Internal Medicine and Sleep Medicine  [facility].    (Note created with Dragon voice recognition and unintended spelling errors and word substitutions may occur)

## 2021-05-29 NOTE — TELEPHONE ENCOUNTER
Contacted patient today to discuss his question regarding Amiodarone renewal. He is at the point where he needs to get a refill  And did not want to get a 90 day supply if he is going to be taken off of it at his next appt.  Would like some insight to this if possible.     Advised that Dr. BOBBY Johansen would be asked to give his opinion on this matter      Dr. Johansen, thoughts?    sk/RN

## 2021-05-29 NOTE — TELEPHONE ENCOUNTER
Would continue the amiodarone, but if he strongly desires to stop then I can discuss it with him when he sees me later this month.    JESSIE

## 2021-05-29 NOTE — TELEPHONE ENCOUNTER
Patient notified of response by Dr. BOBBY Johansen regarding Amiodarone rx. He will discuss at his upcoming appointment if he is to continue or not.  sk/RN

## 2021-05-29 NOTE — PATIENT INSTRUCTIONS - HE
Below is a list of instructions we discussed today in clinic:   1. Decrease your amiodarone to 100 mg once daily  2. Check your pulse once per day and call me if you have a rapid pulse rate becomes fast (more than 100 beats per minute).  3. Continue all other medications as prescribed.  4. You should have pulmonary function tests to monitor lung function with the amiodarone. You can schedule this at your convenience.  5. Follow up in another 6 months or sooner if needed.    You should receive a phone call from this office informing you of test or procedure results within 3 business days of the test being performed.  If you do not hear from our office with the test results within 1 week please do not hesitate to call asking for these results.     It was a pleasure to meet with you today in clinic.  Please do not hesitate to call the [facility] clinic with any questions or concerns at [phone].    Sincerely,     Tra Johansen MD  Non-invasive Cardiology  [facility]

## 2021-05-29 NOTE — TELEPHONE ENCOUNTER
----- Message from Vinod Grimes sent at 6/10/2019  2:45 PM CDT -----  Contact: Lance Gutierrez phone call:    Caller: Lance   Primary cardiologist: Dr. BOBBY Johansen  Detailed reason for call: Patient is wanting to know if he will be taken off of Amiodarone before he refills it  New or active symptoms? Active  Best phone number: [phone]  Best time to contact: anytime  Ok to leave a detailed message? yes  Device? no    Additional Info:

## 2021-05-30 NOTE — TELEPHONE ENCOUNTER
Patient decreased his dose of Amiodarone to 100mg daily at last visit. He states that he obtained a pulse oximeter and now has been monitoring his heart rate.   It is running  Between 49-52 most times without acceleration as Dr. BOBBY Johansen was concerned that it might.   Patient was advised that the medication Amiodarone can stay in his system for up to several weeks after decreasing dose or stopping and to continue to monitor for changes and report any new concerns.        FYI only to Dr BOBBY ugalde/RN

## 2021-05-30 NOTE — TELEPHONE ENCOUNTER
----- Message from Jose Pollard sent at 7/5/2019  9:15 AM CDT -----  Contact: Pt  General phone call:    Caller: Pt    Primary cardiologist: Dr BOBBY Johansen    Detailed reason for call: Pt was in recently --- There was a Change in his AMIODARONE - Pt wanted a call back to discuss this -    Also - Pt wanted to have his AVS (after visit summary) from his recent Office Visit to be sent to his address - please    New or active symptoms? na  Best phone number: home  Best time to contact: any  Ok to leave a detailed message? yes  Device? no    Additional Info:

## 2021-05-30 NOTE — TELEPHONE ENCOUNTER
Received rejection letter from pt insurance for 100 mg Amiodarone tabs. Will change to 200 mg tab at 1/2 tab a day. Pharm notified and they will run it. Pt called and was made aware of tab size change and dose. Pt agrees with plan.

## 2021-05-30 NOTE — TELEPHONE ENCOUNTER
Reached out to pt regarding possible LAAO consult.  Pt would like a week to consider, ask for a call back next week,

## 2021-05-30 NOTE — TELEPHONE ENCOUNTER
Spoke with pt again at his request to call back after he had some time to think possible LAAO implant over.  He has decided that he is not interested in pursing at this time.

## 2021-06-01 VITALS — WEIGHT: 315 LBS | BODY MASS INDEX: 45.1 KG/M2 | HEIGHT: 70 IN

## 2021-06-02 VITALS — HEIGHT: 70 IN | WEIGHT: 315 LBS | BODY MASS INDEX: 45.1 KG/M2

## 2021-06-02 VITALS — WEIGHT: 315 LBS | BODY MASS INDEX: 49.22 KG/M2

## 2021-06-02 VITALS — WEIGHT: 315 LBS | BODY MASS INDEX: 45.1 KG/M2 | HEIGHT: 70 IN

## 2021-06-02 NOTE — TELEPHONE ENCOUNTER
----- Message from Anette Gunter sent at 10/3/2019  2:23 PM CDT -----  General phone call:  PATIENT HAS QUESTIONS REGARDING HIS MEDICATIONS AND PULSE AND BLOOD PRESSURE   AMIO WAS REDUSED, WAS TOLD TO CALL WHEN CHECKING PULSE IF IT LOWERS, TODAY IT WAS 47 AND 46.  ALSO BLOOD WORK WAS DONE, CREATININE WAS AT 1.26, A HAIR HIGH.  PLEASE CALL  Caller: PATIENT  Primary cardiologist: DR BOBBY NOBLES  Detailed reason for call: SEE ABOVE  New or active symptoms? SEE ABOVE  Best phone number: [phone]  Best time to contact: ANY TIME  Ok to leave a detailed message? YES  Device? NO    Additional Info:

## 2021-06-02 NOTE — TELEPHONE ENCOUNTER
Gorge was contacted today to discuss his concerns regarding his current HR and B/P readings.    Gorge reduced his Amiodarone to 100mg daily following his 6/24/19 clinic visit with Dr. BOBBY Johansen.   He was advised at that time to monitor for increases in his HR , noting that this medication takes a few weeks to filter out of his system and when it does he may have less suppression of the HR.    He has been monitoring for this, and to his surprise his HR is not going up, but rather it is going down to his measure. 46-49 bpm by pulse oximetry. He feels fine, not overly fatigued or tired, no dizziness noted.    His b/p has been 96/50 range and again no symptoms of syncope, lightheadedness or dizziness.     He was recently seen by PCP Dr. Scott at [facility], for his Physical exam and labs. He had lipids done and BMP, which he noted the Creat to be a hair higher at 1.26 concerning him.     He would like to reduce the Amiodarone further to 50mg daily at this time and would like your opinion. He knows that it would not be available in that dose tablet, but would need to get 100mg tabs and break them in half. But he prefers this option over stopping completely at this point.  Gorge has f/u in Dec /2019 with Dr. BOBBY Johansen, any new orders/recommendations at this time? sk/RN

## 2021-06-02 NOTE — TELEPHONE ENCOUNTER
Patient contacted with recommendations as per Dr. BOBBY Johansen to reduce Metoprolol Succinate to 25mg daily dose from 50mg daily. He verbalized understanding of this plan. He will f/u with me in 2 wks with an update on his HR and b/p readings in response to this change. sk/RN

## 2021-06-02 NOTE — TELEPHONE ENCOUNTER
Instead of decreasing amiodarone would decrease metoprolol succinate to 25 mg daily. This will have a greater impact on improving his heart rate and blood pressure.  BOBBY Johansen

## 2021-06-02 NOTE — TELEPHONE ENCOUNTER
Gorge was contacted today to review his b/p readings and HR trends over the past two weeks. Since reducing his Metoprolol to 25mg daily. He did not have specific records in his possession, but did state to me that his HR was much improved in the 59-61 range.  And the b/p is fine at 103/60 to 112/54   Ranges. He is feeling somewhat tired, feels that this is more due to the changes in weather not anything else.Denies dizziness.  His weight is going down, currently is at 313# and he is trying to keep going down with diet changes.     FYI to Dr.J Haily ugalde/RN

## 2021-06-03 VITALS — HEIGHT: 70 IN | BODY MASS INDEX: 45.1 KG/M2 | WEIGHT: 315 LBS

## 2021-06-03 VITALS — HEIGHT: 70 IN | WEIGHT: 315 LBS | BODY MASS INDEX: 45.1 KG/M2

## 2021-06-15 NOTE — TELEPHONE ENCOUNTER
Weight logs sent via US mail. Patient updated. -ejb    ----- Message from FRANCY Smith sent at 2/22/2021  1:14 PM CST -----  Regarding: EJSSIE PATIENT  General phone call:    Caller: PATIENT    Primary cardiologist: JESSIE     Detailed reason for call: PATIENT WOULD LIKE YOU TO CALL HIM, HE WANTS SOME WEIGHT LOG BOOKS.    Best phone number: [phone]    Best time to contact: ANY    Ok to leave a detailed message? YES    Device? NO    Additional Info:

## 2021-06-16 PROBLEM — S86.812A TRAUMATIC MEDIAL RETINACULAR TEAR OF LEFT KNEE: Status: ACTIVE | Noted: 2021-04-21

## 2021-06-16 PROBLEM — I48.92 ATRIAL FLUTTER (H): Status: ACTIVE | Noted: 2018-10-08

## 2021-06-16 PROBLEM — I25.5 ISCHEMIC CARDIOMYOPATHY: Status: ACTIVE | Noted: 2018-08-15

## 2021-06-16 PROBLEM — G25.81 RLS (RESTLESS LEGS SYNDROME): Status: ACTIVE | Noted: 2021-04-20

## 2021-06-16 PROBLEM — I47.10 SUPRAVENTRICULAR TACHYCARDIA (H): Status: ACTIVE | Noted: 2018-08-07

## 2021-06-16 PROBLEM — I25.118 CORONARY ARTERY DISEASE OF NATIVE ARTERY OF NATIVE HEART WITH STABLE ANGINA PECTORIS (H): Status: ACTIVE | Noted: 2018-08-15

## 2021-06-16 PROBLEM — I50.22 CHRONIC SYSTOLIC CONGESTIVE HEART FAILURE (H): Status: ACTIVE | Noted: 2018-10-08

## 2021-06-16 PROBLEM — E78.2 MIXED HYPERLIPIDEMIA: Status: ACTIVE | Noted: 2018-08-07

## 2021-06-16 PROBLEM — M25.361 INSTABILITY OF RIGHT KNEE JOINT: Status: ACTIVE | Noted: 2021-04-21

## 2021-06-16 PROBLEM — D53.9 MACROCYTIC ANEMIA: Status: ACTIVE | Noted: 2020-10-13

## 2021-06-16 PROBLEM — Z96.659 HISTORY OF REVISION OF TOTAL KNEE ARTHROPLASTY: Status: ACTIVE | Noted: 2021-05-03

## 2021-06-17 NOTE — PATIENT INSTRUCTIONS - HE
"Patient Instructions by Sumeet Becker DO at 4/22/2019  8:00 AM     Author: Sumeet Becker DO Service: -- Author Type: Physician    Filed: 4/22/2019  8:27 AM Encounter Date: 4/22/2019 Status: Signed    : Sumeet Becker DO (Physician)         SLEEP HYGIENE    Sleep only as much as you need to feel rested and then get out of bed   Keep a regular sleep schedule   Avoid forcing sleep   Exercise regularly for at least 20 minutes, preferably 4 to 5 hours before bedtime   Avoid caffeinated beverages after lunch   Avoid alcohol near bedtime: no \"night cap\"   Avoid smoking, especially in the evening   Do not go to bed hungry   Adjust bedroom environment   Avoid prolonged use of light-emitting screens before bedtime    Deal with your worries before bedtime              "

## 2021-06-17 NOTE — PROGRESS NOTES
"[facility]    Facility:   [facility] [[id number]]   Code Status: FULL CODE      CHIEF COMPLAINT/REASON FOR VISIT:  Chief Complaint   Patient presents with     Problem Visit     decrease pain meds, oxy        HISTORY:      HPI: Gorge is a 76 y.o. male with a history of recent total knee arthroplasty, was hospitalized at [facility] from 4/21 to 4/24 for left knee reticular tear repair.   Also with history of CHF with LVEF 40%, he was recently seen by an health cardiology in mid March with stable assessment and stable EF.  He does have presumed ROSALVA however he is unable to get his CPAP machine here because set up is too complicated for him to repeat he becomes agitated when I asked this question as he is \"already explained to all the nurses \".  He is using O2 via nasal cannula at night to help compensate for this.  He is anticoagulated on Eliquis for A. Fib/flutter.  Also on amiodarone, metoprolol 25 once a day, lisinopril 5 mg daily, furosemide.  We will put him on daily weights.  Atorvastatin for CAD/HLD.  He is complaining of constipation so we will start MiraLAX and senna S.   Restless leg syndrome he has Mirapex at bedtime.  Gabapentin at bedtime for osteoarthritis and radiating pain.     Today: Reporting some mild improvement in constipation. Also with adequate pain control; not using oxycodone so we'll discontinue that which he is okay with. Doing well with o2 via NC while unable to have CPAP in TCU. Attending therapy. No evidence of fluid overload. Denies chest pain, shortness of breath, dysuria.     Past Medical History:   Diagnosis Date     Atrial fibrillation (H)      Cardiomyopathy (H)     ef 20%     CHF (congestive heart failure) (H)      Morbid obesity (H)      ROSALVA (obstructive sleep apnea)              No family history on file.  Social History     Socioeconomic History     Marital status:      Spouse name: Not on file     Number of children: Not on " file     Years of education: Not on file     Highest education level: Not on file   Occupational History     Not on file   Social Needs     Financial resource strain: Not on file     Food insecurity     Worry: Not on file     Inability: Not on file     Transportation needs     Medical: Not on file     Non-medical: Not on file   Tobacco Use     Smoking status: Former Smoker     Quit date: 2005     Years since quittin.3     Smokeless tobacco: Never Used   Substance and Sexual Activity     Alcohol use: No     Comment: former alcoholic- quit      Drug use: No     Sexual activity: Not on file   Lifestyle     Physical activity     Days per week: Not on file     Minutes per session: Not on file     Stress: Not on file   Relationships     Social connections     Talks on phone: Not on file     Gets together: Not on file     Attends Orthodox service: Not on file     Active member of club or organization: Not on file     Attends meetings of clubs or organizations: Not on file     Relationship status: Not on file     Intimate partner violence     Fear of current or ex partner: Not on file     Emotionally abused: Not on file     Physically abused: Not on file     Forced sexual activity: Not on file   Other Topics Concern     Not on file   Social History Narrative     Not on file         Review of Systems   Constitutional: Negative.    HENT: Negative.    Respiratory: Negative for chest tightness, shortness of breath and wheezing.    Cardiovascular: Positive for leg swelling. Negative for chest pain.   Gastrointestinal: Negative.    Genitourinary: Negative.    Musculoskeletal: Positive for arthralgias and gait problem.   Skin: Negative.    Neurological: Negative for tremors, weakness and numbness.   Hematological: Negative.    Psychiatric/Behavioral: Positive for agitation. Negative for behavioral problems and decreased concentration.       Vitals:    21 1325   BP: 118/71   Pulse: (!) 56   Resp: 18   Temp: 97.2  " F (36.2  C)   SpO2: 97%   Weight: (!) 295 lb 6.4 oz (134 kg)   Height: 5' 9\" (1.753 m)       Physical Exam  Constitutional:       Appearance: He is obese. He is not ill-appearing.   HENT:      Head: Atraumatic.      Nose: No congestion.   Eyes:      General: No scleral icterus.  Cardiovascular:      Rate and Rhythm: Normal rate and regular rhythm.      Heart sounds: Murmur present.   Pulmonary:      Breath sounds: Normal breath sounds. No wheezing.   Abdominal:      General: There is no distension.      Palpations: Abdomen is soft.   Musculoskeletal:      Right lower leg: Edema present.      Left lower leg: Edema present.      Comments: Knee brace to left knee.  Elevated in wheelchair.     Neurological:      General: No focal deficit present.      Mental Status: He is alert and oriented to person, place, and time.      Gait: Gait abnormal.   Psychiatric:         Behavior: Behavior normal.           LABS:   REVIEWED    ASSESSMENT:       ICD-10-CM    1. History of revision of total knee arthroplasty  Z96.659    2. Morbid obesity (H)  E66.01    3. Heart failure with reduced ejection fraction (H)  I50.20        PLAN:      Left TKA: Hemoglobin at discharge 12.7.  -Discontinue oxycodone.   -Ice 20 minutes 4 times daily.  -Tylenol 4 times daily.  -Follow-up with surgeon as indicated.  -PT and OT.  -Gabapentin 100 milligrams p.o. nightly.    CAD  LVEF 40%  Hypertension   atrial flutter  CHF: lipids in March WNL  -elaquis 5mg   -Continue amiodarone.   -Continue metoprolol.  -Continue lisinopril 5 mg daily.  -Continue Lasix 20 mg daily.  -Daily weights in the TCU.    Restless leg syndrome:  -Mirapex.    Presumed ROSALVA on CPAP: Does not have CPAP level in the TCU due to difficulty transporting.  -Okay for O2 via nasal cannula 1 to 2 L at at bedtime.    Electronically signed by: Lynette Hayes CNP  "

## 2021-06-17 NOTE — PATIENT INSTRUCTIONS - HE
Patient Instructions by Tra Johansen MD at 1/27/2020  3:50 PM     Author: Tra Johansen MD Service: -- Author Type: Physician    Filed: 1/27/2020  4:19 PM Encounter Date: 1/27/2020 Status: Addendum    : Tra Johansen MD (Physician)    Related Notes: Original Note by Tra Johansen MD (Physician) filed at 1/27/2020  4:14 PM       Below is a list of instructions we discussed today in clinic:   1. I am very pleased to see that you are continuing to lose weight. Keep up the good work!  2. Continue to work on your weight loss with healthy diet. You will continue to have progress by adding in more exercise. The more you do, the better.  3. Continue all medications as prescribed.  4. Come back again in about a year or sooner if needed.     It was a pleasure to meet with you today in clinic.  Please do not hesitate to call the [facility] clinic with any questions or concerns at [phone].    Sincerely,

## 2021-06-17 NOTE — PATIENT INSTRUCTIONS - HE
"Patient Instructions by Sumeet Becker DO at 2/19/2019  8:00 AM     Author: Sumeet Becker DO Service: -- Author Type: Physician    Filed: 2/19/2019  8:25 AM Encounter Date: 2/19/2019 Status: Addendum    : Sumeet Becker DO (Physician)    Related Notes: Original Note by Sumeet Becker DO (Physician) filed at 2/19/2019  8:23 AM         What is sleep apnea?    Sleep apnea is a condition that makes you stop breathing for short periods while you are asleep. There are 2 types of sleep apnea. One is called \"obstructive sleep apnea,\" and the other is called \"central sleep apnea.\"  In obstructive sleep apnea, you stop breathing because your throat narrows or closes (figure 1). In central sleep apnea, you stop breathing because your brain does not send the right signals to your muscles to make you breathe. When people talk about sleep apnea, they are usually referring to obstructive sleep apnea, which is what this article is about.  People with sleep apnea do not know that they stop breathing when they are asleep. But they do sometimes wake up startled or gasping for breath. They also often hear from loved ones that they snore.  What are the symptoms of sleep apnea? -- The main symptoms of sleep apnea are loud snoring, tiredness, and daytime sleepiness. Other symptoms can include:  ?Restless sleep  ?Waking up choking or gasping  ?Morning headaches, dry mouth, or sore throat  ?Waking up often to urinate  ?Waking up feeling unrested or groggy  ?Trouble thinking clearly or remembering things  Some people with sleep apnea don't have symptoms, or they don't know they have them. They might figure that it's normal to be tired or to snore a lot.  Should I see a doctor or nurse? -- Yes. If you think you might have sleep apnea, see your doctor.  Is there a test for sleep apnea? -- Yes. If your doctor or nurse suspects you have sleep apnea, he or she might send you for a \"sleep study.\" Sleep studies can sometimes be " "done at home, but they are usually done in a sleep lab. For the study, you spend the night in the lab, and you are hooked up to different machines that monitor your heart rate, breathing, and other body functions. The results of the test will tell your doctor or nurse if you have the disorder.  Is there anything I can do on my own to help my sleep apnea? -- Yes. Here are some things that might help:  ?Stay off your back when sleeping. (This is not always practical, because people cannot control their position while asleep. Plus, it only helps some people.)  ?Lose weight, if you are overweight  ?Avoid alcohol, because it can make sleep apnea worse  How is sleep apnea treated? -- The most effective treatment for sleep apnea is a device that keeps your airway open while you sleep. Treatment with this device is called \"continuous positive airway pressure,\" or CPAP. People getting CPAP wear a face mask at night that keeps them breathing (figure 2).  If your doctor or nurse recommends a CPAP machine, try to be patient about using it. The mask might seem uncomfortable to wear at first, and the machine might seem noisy, but using the machine can really pay off. People with sleep apnea who use a CPAP machine feel more rested and generally feel better.  There is also another device that you wear in your mouth called an \"oral appliance\" or \"mandibular advancement device.\" It also helps keep your airway open while you sleep.  In rare cases, when nothing else helps, doctors recommend surgery to keep the airway open. Surgery to do this is not always effective, and even when it is, the problem can come back.  Is sleep apnea dangerous? -- It can be. People with sleep apnea do not get good-quality sleep, so they are often tired and not alert. This puts them at risk for car accidents and other types of accidents. Plus, studies show that people with sleep apnea are more likely than others to have high blood pressure, heart attacks, " and other serious heart problems. In people with severe sleep apnea, getting treated (for example, with a CPAP machine) can help prevent some of these problems.    GRAPHICS  Airway in a person with sleep apnea    Normally when a person sleeps, the airway remains open, and air can pass from the nose and mouth to the lungs. In a person with sleep apnea, parts of the throat and mouth drop into the airway and block off the flow of air. This can cause loud snoring and interrupt breathing for short periods.  Graphic 60421 Version 5.0    Continuous positive airway pressure (CPAP) for sleep apnea    The CPAP mask gently blows air into your nose while you sleep. It puts just enough pressure on your airway to keep it from closing. The mask in this picture fits over just the nose. Other CPAP devices have masks that fit over the nose and mouth.    Equipment Instructions    We will process your PAP order and send it to a Durable Medical Equipment (DME) provider.    The medical equipment company should call you within 7 days.  If you have not heard from the company, please contact them to see if they received your order and are planning to call you.    Please call us at [phone] if you are unable to contact the medical equipment company or if they do not have the order.    If you are starting a new PAP machine, please call us after you use it the first night to let us know how it went. This call also helps us know that you received your equipment and that everything is ready. Please use our central phone number [phone]    Contact information for [employer] company:    [employer] Tel: [phone]    Please bring your machine, mask, hose and power cord on the next clinical visit with me. Thank you.

## 2021-06-17 NOTE — PROGRESS NOTES
"[facility]    Facility:   [facility] [[id number]]   Code Status: FULL CODE      CHIEF COMPLAINT/REASON FOR VISIT:  Chief Complaint   Patient presents with     Problem Visit     constipation       HISTORY:      HPI: Gorge is a 76 y.o. male with a history of recent total knee arthroplasty, was hospitalized at [facility] from 4/21 to 4/24 for left knee reticular tear repair.   Also with history of CHF with LVEF 40%, he was recently seen by an health cardiology in mid March with stable assessment and stable EF.  He does have presumed ROSALVA however he is unable to get his CPAP machine here because set up is too complicated for him to repeat he becomes agitated when I asked this question as he is \"already explained to all the nurses \".  He is using O2 via nasal cannula at night to help compensate for this.  He is anticoagulated on Eliquis for A. Fib/flutter.  Also on amiodarone, metoprolol 25 once a day, lisinopril 5 mg daily, furosemide.  We will put him on daily weights.  Atorvastatin for CAD/HLD.  He is complaining of constipation today so we will start MiraLAX and senna S.   Restless leg syndrome he has Mirapex at bedtime.  Gabapentin at bedtime for osteoarthritis and radiating pain.    He is reporting some increased knee pain and is having difficulty getting comfortable in the chair, he did have therapy adjust his wheelchair.  I suggested ice and continue Tylenol.  He does not want to take any oxycodone if he does not have to due to history of alcoholism however he does want to have it available just in case the pain is unbearable.    Past Medical History:   Diagnosis Date     Atrial fibrillation (H)      Cardiomyopathy (H)     ef 20%     CHF (congestive heart failure) (H)      Morbid obesity (H)      ROSALVA (obstructive sleep apnea)              No family history on file.  Social History     Socioeconomic History     Marital status:      Spouse name: Not on file     " Number of children: Not on file     Years of education: Not on file     Highest education level: Not on file   Occupational History     Not on file   Social Needs     Financial resource strain: Not on file     Food insecurity     Worry: Not on file     Inability: Not on file     Transportation needs     Medical: Not on file     Non-medical: Not on file   Tobacco Use     Smoking status: Former Smoker     Quit date: 2005     Years since quittin.3     Smokeless tobacco: Never Used   Substance and Sexual Activity     Alcohol use: No     Comment: former alcoholic- quit      Drug use: No     Sexual activity: Not on file   Lifestyle     Physical activity     Days per week: Not on file     Minutes per session: Not on file     Stress: Not on file   Relationships     Social connections     Talks on phone: Not on file     Gets together: Not on file     Attends Congregation service: Not on file     Active member of club or organization: Not on file     Attends meetings of clubs or organizations: Not on file     Relationship status: Not on file     Intimate partner violence     Fear of current or ex partner: Not on file     Emotionally abused: Not on file     Physically abused: Not on file     Forced sexual activity: Not on file   Other Topics Concern     Not on file   Social History Narrative     Not on file         Review of Systems   Constitutional: Negative.    HENT: Negative.    Respiratory: Negative for chest tightness, shortness of breath and wheezing.    Cardiovascular: Positive for leg swelling. Negative for chest pain.   Gastrointestinal: Negative.    Genitourinary: Negative.    Musculoskeletal: Positive for arthralgias and gait problem.   Skin: Negative.    Neurological: Negative for tremors, weakness and numbness.   Hematological: Negative.    Psychiatric/Behavioral: Positive for agitation. Negative for behavioral problems and decreased concentration.       Vitals:    21 1307   BP: 109/66   Pulse: (!)  "58   Resp: 18   Temp: 98.4  F (36.9  C)   SpO2: 98%   Weight: (!) 297 lb (134.7 kg)   Height: 5' 9\" (1.753 m)       Physical Exam  Constitutional:       Appearance: He is obese. He is not ill-appearing.   HENT:      Head: Atraumatic.      Nose: No congestion.   Eyes:      General: No scleral icterus.  Cardiovascular:      Rate and Rhythm: Normal rate and regular rhythm.      Heart sounds: Murmur present.   Pulmonary:      Breath sounds: Normal breath sounds. No wheezing.   Abdominal:      General: There is no distension.      Palpations: Abdomen is soft.   Musculoskeletal:      Right lower leg: Edema present.      Left lower leg: Edema present.      Comments: Knee brace to left knee.  Elevated in wheelchair.     Neurological:      General: No focal deficit present.      Mental Status: He is alert and oriented to person, place, and time.      Gait: Gait abnormal.   Psychiatric:         Behavior: Behavior normal.           LABS:   REVIEWED    ASSESSMENT:       ICD-10-CM    1. Atherosclerotic heart disease of native coronary artery with other forms of angina pectoris (H)  I25.118    2. Morbid obesity (H)  E66.01    3. Heart failure with reduced ejection fraction (H)  I50.20    4. Typical atrial flutter (H)  I48.3    5. History of revision of total knee arthroplasty  Z96.659        PLAN:      Left TKA: Hemoglobin at discharge 12.7.  -Ice 20 minutes 4 times daily.  -Continue oxycodone as needed, using infrequently.  -Tylenol 4 times daily.  -Follow-up with surgeon as indicated.  -PT and OT.  -Gabapentin 10 milligrams p.o. nightly.    CAD  LVEF 40%  Hypertension   atrial flutter  CHF: lipids in March WNL  -elaquis 5mg   -Continue amiodarone.   -Continue metoprolol.  -Continue lisinopril 5 mg daily.  -Continue Lasix 20 mg daily.  -Daily weights in the TCU.    Restless leg syndrome:  -Mirapex.    Presumed ROSALVA on CPAP: Does not have CPAP level in the TCU due to difficulty transporting.  -Okay for O2 via nasal cannula 1 to 2 " L at at bedtime.    > 35 minutes with greater than 50% time spent face-to-face with patient and nursing discussing above plan, coordination of care for pain control, orthopedic follow-up, monitoring heart failure and TCU.    Electronically signed by: Lynette Hayes CNP

## 2021-06-18 NOTE — PATIENT INSTRUCTIONS - HE
Patient Instructions by Tra Johansen MD at 3/19/2021  1:30 PM     Author: Tra Johansen MD Service: -- Author Type: Physician    Filed: 3/19/2021  1:54 PM Encounter Date: 3/19/2021 Status: Addendum    : Tra Johansen MD (Physician)    Related Notes: Original Note by Tra Johansen MD (Physician) filed at 3/19/2021  1:49 PM       It was a pleasure to meet with you today.      Below is a summary of your visit.   1. Your heart function seems to be stable at this time.  2. Continue your medications as prescribed  3. Talk to Dr. Scott about your depression and whether you need to get treated for this.  4. Talk to your orthopedist about your chronic knee pain.   5. Follow up with me in 1 year or sooner if needed.    Please do not hesitate to call the [facility] clinic with any questions or concerns at [phone]. You can also reach my nurse, Nereida, during normal business hours at [phone].    Sincerely,

## 2021-06-19 NOTE — ANESTHESIA CARE TRANSFER NOTE
Last vitals:   Vitals:    08/16/18 1420   BP:    Pulse:    Resp:    Temp:    SpO2: 92%     Patient's level of consciousness is drowsy  Spontaneous respirations: yes  Maintains airway independently: yes  Dentition unchanged: yes  Oropharynx: oropharynx clear of all foreign objects    QCDR Measures:  ASA# 20 - Surgical No Data Recorded  PQRS# 430 - Adult PONV Prevention: 4558F - Pt received => 2 anti-emetic agents (different classes) preop & intraop  ASA# 8 - Peds PONV Prevention: NA - Not pediatric patient, not GA or 2 or more risk factors NOT present  PQRS# 424 - Jacquelyn-op Temp Management: NA - MAC anesthesia or case < 60 minutes  PQRS# 426 - PACU Transfer Protocol: - Transfer of care checklist used  ASA# 14 - Acute Post-op Pain: ASA14B - Patient did NOT experience pain >= 7 out of 10

## 2021-06-19 NOTE — ANESTHESIA PREPROCEDURE EVALUATION
Anesthesia Evaluation      Patient summary reviewed   No history of anesthetic complications     Airway   Mallampati: III  Neck ROM: full   Pulmonary - normal exam    breath sounds clear to auscultation  (+) sleep apnea on CPAP, ,   (-) not a smoker (Former)                         Cardiovascular   (+) CAD, dysrhythmias (a.flutter, a.fib), CHF, cardiomyopathy,     (-) angina, murmur  ECG reviewed  Rhythm: regular  Rate: abnormal,    no murmur      Neuro/Psych - negative ROS     Endo/Other    (+) obesity (BMI 53),      GI/Hepatic/Renal - negative ROS      Other findings: Echo 8/14/18  Summary    1. Limited echocardiogram.  2. The left ventricle is moderately enlarged. Left ventricular systolic performance is severely reduced. The ejection fraction is estimated to be 20%.   3. There is severe global reduction in left ventricular systolic performance.   4. There is mild right ventricular enlargement with moderate to severe reduction in right ventricular systolic performance.     When compared to the prior real-time echocardiogram dated 8 August 2018, there has been little appreciable interval change. Specifically, it is this reader's impression that left ventricular systolic performance appears similar on both studies (is this writer's impression that the ejection fraction may have been slightly t overestimated on the prior examination).      Measurements           Dental    (+) poor dentition, lower dentures and upper dentures    Comment: Upper and lower partials                       Anesthesia Plan  Planned anesthetic: total IV anesthesia and general mask  Preoxygenation with Ambu  Oral airway available  ASA 4   Induction: intravenous   Anesthetic plan and risks discussed with: patient    Post-op plan: routine recovery

## 2021-06-19 NOTE — LETTER
Letter by Tra Johansen MD at      Author: Tra Johansen MD Service: -- Author Type: --    Filed:  Encounter Date: 4/9/2019 Status: (Other)         Gorge Berkowitz Jr.  [address] [place] [address]      April 9, 2019      Dear Gorge,    This letter is to remind you that you will be due for your follow up appointment with Dr. Tra Johansen. To help ensure you are in the best health possible, a regular follow-up with your cardiologist is essential.     Please call our Patient Scheduling Line at [phone] to schedule your appointment at your earliest convenience.  If you have recently scheduled an appointment, please disregard this letter.    We look forward to seeing you again. As always, we are available at the number  above for any questions or concerns you may have.      Sincerely,     The Physicians and Staff of [facility]

## 2021-06-19 NOTE — ANESTHESIA POSTPROCEDURE EVALUATION
Patient: Gorge Berkowitz .  * No procedures listed *  Anesthesia type: general    [place] [facility] [place]  Last vitals:   Vitals:    08/16/18 1451   BP: (!) 84/54   Pulse:    Resp:    Temp:    SpO2:      Post vital signs: stable  Level of consciousness: awake and responds to simple questions  Post-anesthesia pain: pain controlled  Post-anesthesia nausea and vomiting: no  Pulmonary: unassisted  Cardiovascular: stable  Hydration: adequate  Anesthetic events: no    QCDR Measures:  ASA# 11 - Jacquelyn-op Cardiac Arrest: ASA11B - Patient did NOT experience unanticipated cardiac arrest  ASA# 12 - Jacquelyn-op Mortality Rate: ASA12B - Patient did NOT die  ASA# 13 - PACU Re-Intubation Rate: NA - No ETT / LMA used for case  ASA# 10 - Composite Anes Safety: ASA10A - No serious adverse event    Additional Notes:

## 2021-06-21 NOTE — LETTER
"Letter by Sherry Grover NP at      Author: Sherry Grover NP Service: -- Author Type: --    Filed:  Encounter Date: 5/11/2021 Status: (Other)         [facility]  [address] [place] [address]                                  May 20, 2021    Patient: Gorge Berkowitz Jr.   MR Number: [id number]   YOB: 1945   Date of Visit: 5/11/2021     Dear Dr. Welsh:    Thank you for referring Gorge Berkowitz to me for evaluation. Below are the relevant portions of my assessment and plan of care.    If you have questions, please do not hesitate to call me. I look forward to following Gorge along with you.    Sincerely,        Sherry Grover NP          CC  No Recipients  Sherry Grover NP  5/20/2021  4:21 PM  Sign when Signing Visit  [facility]    Facility:   [facility] [[id number]]   Code Status: FULL CODE  PCP: Lisseth Scott MD   Phone: [phone]   Fax: [phone]      CHIEF COMPLAINT/REASON FOR VISIT:  Chief Complaint   Patient presents with   ? Discharge Summary       HISTORY COURSE:  Per Admission visit by Mayte Hayes CNP on 4/27/21:   HPI: Gorge is a 76 y.o. male with a history of recent total knee arthroplasty, was hospitalized at [facility] from 4/21 to 4/24 for left knee reticular tear repair.   Also with history of CHF with LVEF 40%, he was recently seen by an health cardiology in mid March with stable assessment and stable EF.  He does have presumed ROSALVA however he is unable to get his CPAP machine here because set up is too complicated for him to repeat he becomes agitated when I asked this question as he is \"already explained to all the nurses \".  He is using O2 via nasal cannula at night to help compensate for this.  He is anticoagulated on Eliquis for A. Fib/flutter.  Also on amiodarone, metoprolol 25 once a day, lisinopril 5 mg daily, furosemide.  We will put him on daily weights.  Atorvastatin " for CAD/HLD.  He is complaining of constipation today so we will start MiraLAX and senna S.   Restless leg syndrome he has Mirapex at bedtime.  Gabapentin at bedtime for osteoarthritis and radiating pain.    He is reporting some increased knee pain and is having difficulty getting comfortable in the chair, he did have therapy adjust his wheelchair.  I suggested ice and continue Tylenol.  He does not want to take any oxycodone if he does not have to due to history of alcoholism however he does want to have it available just in case the pain is unbearable.      ICD-10-CM    1. History of revision of total knee arthroplasty  Z96.659 Acute - unstable, improving. Resident denies pain today. Pain managed on regimen of PRN Tylenol. Continue pain management as ordered - follow-up with Ortho per their recommendations.    2. Morbid obesity (H)  E66.01 Chronic - unstable. Patient continues to be overweight 2/2 lifestyle choices and comorbid conditions - ongoing education per PCP re:diet and weight loss.     Body mass index is 43.53 kg/m .     3. Heart failure with reduced ejection fraction (H)  I50.20 Chronic - stable. No active s/sx of CHF noted - managed on regimen of Metoprolol and Lasix. Current weight   Vitals:    05/11/21 0929   Weight: (!) 294 lb 12.8 oz (133.7 kg)   Admission weight - 297.0lbs; chronic edema remains present - he has been noted to be running low Bps and low Hrs as noted below. Will change Metoprolol today to Tartrate and decrease it to 12.5mg PO two times a day.     4. Coronary artery disease of native artery of native heart with stable angina pectoris (H)  I25.118 Chronic - stable. Managed on regimen of Lipitor and Krill Oil - continue medications as ordered. Follow-up with PCP/Cards for ongoing monitoring and management.   5. Ischemic cardiomyopathy  I25.5 Chronic - stable. No acute concerns. Managed on regimen as noted - continue medications as ordered with follow-up with cards per their  "recommendations   6. Typical atrial flutter (H)  I48.3 Chronic - unstable. Episodes of bradycardia as noted - on Metoprolol (with changes as noted    7. Supraventricular tachycardia (H)  I47.1 above) along with Amiodarone - continue medications as noted; follow-up with Cards per their recommendations.   8. Essential hypertension  I10 Chronic - over controlled. Patient with noted hypotension as below. Managed on Lisinopril, Lasix and Metoprolol - changes in Metoprolol as noted above; will also discontinue Lisinopril at this time as well. Follow-up with PCP/Cards for ongoing monitoring and management.     9. RLS (restless legs syndrome)  G25.81 Chronic - stable. Managed on regimen of Mirapex with no acute concerns or complaints noted today. Follow-up with PCP for ongoing monitoring/management.   10. ROSALVA on CPAP  G47.33 Chronic - stable. No acute concerns -     Z99.89 Continues to use CPAP as ordered. Follow-up PRN.   11. Physical deconditioning  R53.81 Acute - unstable, improving. PT/OT completed in TCU to safe baseline for discharge. Plan for patient to discharge to home with ongoing therapies per home care. Follow-up with PCP for future orders as needed.       ROS:  10 point ROS of systems including Constitutional, Eyes, Respiratory, Cardiovascular, Gastroenterology, Genitourinary, Integumentary, Musculoskeletal, Psychiatric were all negative except for pertinent positives noted in my HPI.    EXAM:  Vitals:    05/11/21 0929   BP: 92/57   Pulse: (!) 48   Resp: 18   Temp: 98  F (36.7  C)   SpO2: 97%   Weight: (!) 294 lb 12.8 oz (133.7 kg)   Height: 5' 9\" (1.753 m)     GENERAL APPEARANCE:  Alert, in no distress, appears healthy, oriented, morbidly obese, cooperative, middle-aged male resting in bed  EYES:  EOM, conjunctivae, lids, pupils and irises normal, wears glasses  RESP:  respiratory effort and palpation of chest normal, lungs clear to auscultation , no respiratory distress, diminished breath sounds throughout " 2/2 body habitus  CV:  Palpation and auscultation of heart done , regular rate and rhythm, no murmur, rub, or gallop, +2 pedal pulses, peripheral edema 1+ in BLE  ABDOMEN:  normal bowel sounds, soft, nontender, no hepatosplenomegaly or other masses  M/S:   Gait and station abnormal - JUANCARLOS 2/2 patient being in bed  Digits and nails abnormal - arthritic changes present  SKIN:  Inspection of skin and subcutaneous tissue baseline, Palpation of skin and subcutaneous tissue baseline  NEURO:   Cranial nerves 2-12 are normal tested and grossly at patient's baseline  PSYCH:  oriented X 3, normal insight, judgement and memory, affect and mood normal    MEDICATION LIST:  Current Outpatient Medications   Medication Sig   ? acetaminophen (TYLENOL) 500 MG tablet Take 1,000 mg by mouth every 4 (four) hours as needed.   ? amiodarone (PACERONE) 200 MG tablet Take 0.5 tablets (100 mg total) by mouth daily.   ? atorvastatin (LIPITOR) 40 MG tablet Take 1 tablet (40 mg total) by mouth at bedtime.   ? co-enzyme Q-10 30 mg capsule Take 60 mg by mouth daily.    ? ELIQUIS 5 mg Tab tablet Take 1 tablet (5 mg total) by mouth 2 (two) times a day.   ? furosemide (LASIX) 20 MG tablet take one tablet by mouth daily and take one tablet by mouth as needed at night for any swelling (Patient taking differently: Take 20 mg by mouth 2 (two) times a day at 9am and 6pm. )   ? gabapentin (NEURONTIN) 100 MG capsule Take 100 mg by mouth.   ? glucosamine-chondroitin 500-400 mg cap Take 1 capsule by mouth daily.    ? krill-om3-dha-epa-om6-lip-astx (KRILL OIL, OMEGA 3 AND 6,) 1,500-165-67.5 mg cap Take 3 capsules by mouth daily.    ? lisinopril (PRINIVIL,ZESTRIL) 5 MG tablet Take 1 tablet (5 mg total) by mouth at bedtime.   ? metoprolol succinate (TOPROL-XL) 50 MG 24 hr tablet Take 0.5 tablets (25 mg total) by mouth daily.   ? omega-3 fatty acids-fish oil 360-1,200 mg cap Take 3 capsules by mouth.   ? polyethylene glycol (GLYCOLAX) 17 gram/dose powder Take 17 g  by mouth.   ? pramipexole (MIRAPEX) 0.25 MG tablet Take 0.25mg (1 tab) by mouth 2-3 hours before bedtime   ? senna (SENOKOT) 8.6 mg tablet Take 8.6 mg by mouth.       DISCHARGE DIAGNOSIS:  No diagnosis found.    MEDICAL EQUIPMENT NEEDS:  None    Documentation of Face-to-Face and Certification for Home Health Services   Patient: Gorge Berkowitz Jr.  YOB: 1945  MR Number: [id number]  Today's Date: 05/11/2021    I certify that patient: Gorge Berkowitz Jr. is under my care and that I, or a nurse practitioner or physician's assistant working with me, had a face-to-face encounter that meets the physician face-to-face encounter requirements with this patient on: 05/11/2021.    This encounter with the patient was in whole, or in part, for the following medical condition, which is the primary reason for home health care: The primary encounter diagnosis was History of revision of total knee arthroplasty. Diagnoses of Morbid obesity (H), Heart failure with reduced ejection fraction (H), Coronary artery disease of native artery of native heart with stable angina pectoris (H), Ischemic cardiomyopathy, Typical atrial flutter (H), Supraventricular tachycardia (H), Essential hypertension, RLS (restless legs syndrome), ROSALVA on CPAP, and Physical deconditioning were also pertinent to this visit.    I certify that, based on my findings, the following services are medically necessary home health services: Nursing, Occupational Therapy, and Physical Therapy    My clinical findings support the need for the above services because: Nurse is needed: For complex aftercare of surgical procedures because the patient needs instruction and cannot perform care on their own due to: medical complexity 2/2 above noted diagnoses; Occupational Therapy Services are needed to assess and treat cognitive ability and address ADL safety due to impairment in physical abilities 2/2 above noted diagnoses; and Physical Therapy Services are  needed to assess and treat the following functional impairments: physical limitations 2/2 above noted diagnoses.    Further, I certify that my clinical findings support that this patient is homebound (i.e. absences from home require considerable and taxing effort and are for medical reasons or Sabianist services or infrequently or of short duration when for other reasons) because: Is unable to walk greater than 175 feet without rest.    Based on the above findings. I certify that this patient is confined to the home and needs intermittent skilled nursing care, physical therapy and/or speech therapy.  The patient is under my care, and I have initiated the establishment of the plan of care.  This patient will be followed by a physician who will periodically review the plan of care.  Physician/Provider to provide follow up care: Lisseth Scott MD    Responsible Medicare certified PEC Physician: Dr Keri Conklin MD  Physician Signature: See electronic signature associated with these discharge orders.  Date: 05/11/2021    Schedule follow up visit with primary care provider within 7 days to reestablish care.      TOTAL DISCHARGE TIME:   Greater than 30 minutes    Electronically signed by:   MAGALYS Orr, [facility]  [place] Office: [address] [place] [address]   [place] Cell: [phone]  [place] Fax: [phone]    [facility] Office: [address] [place] [address]  [facility] Phone: [phone]  [facility] Voicemail: [phone]

## 2021-06-21 NOTE — LETTER
"Letter by Lynette Hayes CNP at      Author: Lynette Hayes CNP Service: -- Author Type: --    Filed:  Encounter Date: 5/4/2021 Status: (Other)         [facility]  [address] [place] [address]                                  May 14, 2021    Patient: Gorge Berkowitz Jr.   MR Number: [id number]   YOB: 1945   Date of Visit: 5/4/2021     Dear Dr. Welsh:    Thank you for referring Gorge Berkowitz to me for evaluation. Below are the relevant portions of my assessment and plan of care.    If you have questions, please do not hesitate to call me. I look forward to following Gorge along with you.    Sincerely,        Lynette Hayes CNP          CC  No Recipients  Lynette Hayes CNP  5/14/2021 10:03 AM  Sign when Signing Visit  [facility]    Facility:   [facility] [[id number]]   Code Status: FULL CODE      CHIEF COMPLAINT/REASON FOR VISIT:  Chief Complaint   Patient presents with   ? Problem Visit     decrease pain meds, oxy        HISTORY:      HPI: Gorge is a 76 y.o. male with a history of recent total knee arthroplasty, was hospitalized at [facility] from 4/21 to 4/24 for left knee reticular tear repair.   Also with history of CHF with LVEF 40%, he was recently seen by an health cardiology in mid March with stable assessment and stable EF.  He does have presumed ROSALVA however he is unable to get his CPAP machine here because set up is too complicated for him to repeat he becomes agitated when I asked this question as he is \"already explained to all the nurses \".  He is using O2 via nasal cannula at night to help compensate for this.  He is anticoagulated on Eliquis for A. Fib/flutter.  Also on amiodarone, metoprolol 25 once a day, lisinopril 5 mg daily, furosemide.  We will put him on daily weights.  Atorvastatin for CAD/HLD.  He is complaining of constipation so we will start MiraLAX " and senna S.   Restless leg syndrome he has Mirapex at bedtime.  Gabapentin at bedtime for osteoarthritis and radiating pain.     Today: Reporting some mild improvement in constipation. Also with adequate pain control; not using oxycodone so we'll discontinue that which he is okay with. Doing well with o2 via NC while unable to have CPAP in TCU. Attending therapy. No evidence of fluid overload. Denies chest pain, shortness of breath, dysuria.     Past Medical History:   Diagnosis Date   ? Atrial fibrillation (H)    ? Cardiomyopathy (H)     ef 20%   ? CHF (congestive heart failure) (H)    ? Morbid obesity (H)    ? ROSALVA (obstructive sleep apnea)              No family history on file.  Social History     Socioeconomic History   ? Marital status:      Spouse name: Not on file   ? Number of children: Not on file   ? Years of education: Not on file   ? Highest education level: Not on file   Occupational History   ? Not on file   Social Needs   ? Financial resource strain: Not on file   ? Food insecurity     Worry: Not on file     Inability: Not on file   ? Transportation needs     Medical: Not on file     Non-medical: Not on file   Tobacco Use   ? Smoking status: Former Smoker     Quit date: 2005     Years since quittin.3   ? Smokeless tobacco: Never Used   Substance and Sexual Activity   ? Alcohol use: No     Comment: former alcoholic- quit    ? Drug use: No   ? Sexual activity: Not on file   Lifestyle   ? Physical activity     Days per week: Not on file     Minutes per session: Not on file   ? Stress: Not on file   Relationships   ? Social connections     Talks on phone: Not on file     Gets together: Not on file     Attends Sikh service: Not on file     Active member of club or organization: Not on file     Attends meetings of clubs or organizations: Not on file     Relationship status: Not on file   ? Intimate partner violence     Fear of current or ex partner: Not on file     Emotionally  "abused: Not on file     Physically abused: Not on file     Forced sexual activity: Not on file   Other Topics Concern   ? Not on file   Social History Narrative   ? Not on file         Review of Systems   Constitutional: Negative.    HENT: Negative.    Respiratory: Negative for chest tightness, shortness of breath and wheezing.    Cardiovascular: Positive for leg swelling. Negative for chest pain.   Gastrointestinal: Negative.    Genitourinary: Negative.    Musculoskeletal: Positive for arthralgias and gait problem.   Skin: Negative.    Neurological: Negative for tremors, weakness and numbness.   Hematological: Negative.    Psychiatric/Behavioral: Positive for agitation. Negative for behavioral problems and decreased concentration.       Vitals:    05/04/21 1325   BP: 118/71   Pulse: (!) 56   Resp: 18   Temp: 97.2  F (36.2  C)   SpO2: 97%   Weight: (!) 295 lb 6.4 oz (134 kg)   Height: 5' 9\" (1.753 m)       Physical Exam  Constitutional:       Appearance: He is obese. He is not ill-appearing.   HENT:      Head: Atraumatic.      Nose: No congestion.   Eyes:      General: No scleral icterus.  Cardiovascular:      Rate and Rhythm: Normal rate and regular rhythm.      Heart sounds: Murmur present.   Pulmonary:      Breath sounds: Normal breath sounds. No wheezing.   Abdominal:      General: There is no distension.      Palpations: Abdomen is soft.   Musculoskeletal:      Right lower leg: Edema present.      Left lower leg: Edema present.      Comments: Knee brace to left knee.  Elevated in wheelchair.     Neurological:      General: No focal deficit present.      Mental Status: He is alert and oriented to person, place, and time.      Gait: Gait abnormal.   Psychiatric:         Behavior: Behavior normal.           LABS:   REVIEWED    ASSESSMENT:       ICD-10-CM    1. History of revision of total knee arthroplasty  Z96.659    2. Morbid obesity (H)  E66.01    3. Heart failure with reduced ejection fraction (H)  I50.20  "       PLAN:      Left TKA: Hemoglobin at discharge 12.7.  -Discontinue oxycodone.   -Ice 20 minutes 4 times daily.  -Tylenol 4 times daily.  -Follow-up with surgeon as indicated.  -PT and OT.  -Gabapentin 100 milligrams p.o. nightly.    CAD  LVEF 40%  Hypertension   atrial flutter  CHF: lipids in March WNL  -elaquis 5mg   -Continue amiodarone.   -Continue metoprolol.  -Continue lisinopril 5 mg daily.  -Continue Lasix 20 mg daily.  -Daily weights in the TCU.    Restless leg syndrome:  -Mirapex.    Presumed ROSALVA on CPAP: Does not have CPAP level in the TCU due to difficulty transporting.  -Okay for O2 via nasal cannula 1 to 2 L at at bedtime.    Electronically signed by: Lynette Hayes CNP

## 2021-06-21 NOTE — LETTER
"Letter by Lynette Hayes CNP at      Author: Lynette Hayes CNP Service: -- Author Type: --    Filed:  Encounter Date: 4/27/2021 Status: (Other)         [facility]  [address] [place] [address]                                  May 3, 2021    Patient: Gorge Berkowitz Jr.   MR Number: [id number]   YOB: 1945   Date of Visit: 4/27/2021     Dear Dr. Wlesh:    Thank you for referring Gorge Berkowitz to me for evaluation. Below are the relevant portions of my assessment and plan of care.    If you have questions, please do not hesitate to call me. I look forward to following Gorge along with you.    Sincerely,        Lynette Hayes CNP          CC  No Recipients  Lynette Hayes CNP  5/3/2021 11:12 AM  Sign when Signing Visit  [facility]    Facility:   [facility] [[id number]]   Code Status: FULL CODE      CHIEF COMPLAINT/REASON FOR VISIT:  Chief Complaint   Patient presents with   ? Problem Visit     constipation       HISTORY:      HPI: Gorge is a 76 y.o. male with a history of recent total knee arthroplasty, was hospitalized at [facility] from 4/21 to 4/24 for left knee reticular tear repair.   Also with history of CHF with LVEF 40%, he was recently seen by an health cardiology in mid March with stable assessment and stable EF.  He does have presumed ROSALVA however he is unable to get his CPAP machine here because set up is too complicated for him to repeat he becomes agitated when I asked this question as he is \"already explained to all the nurses \".  He is using O2 via nasal cannula at night to help compensate for this.  He is anticoagulated on Eliquis for A. Fib/flutter.  Also on amiodarone, metoprolol 25 once a day, lisinopril 5 mg daily, furosemide.  We will put him on daily weights.  Atorvastatin for CAD/HLD.  He is complaining of constipation today so we will start MiraLAX and " crescencio S.   Restless leg syndrome he has Mirapex at bedtime.  Gabapentin at bedtime for osteoarthritis and radiating pain.    He is reporting some increased knee pain and is having difficulty getting comfortable in the chair, he did have therapy adjust his wheelchair.  I suggested ice and continue Tylenol.  He does not want to take any oxycodone if he does not have to due to history of alcoholism however he does want to have it available just in case the pain is unbearable.    Past Medical History:   Diagnosis Date   ? Atrial fibrillation (H)    ? Cardiomyopathy (H)     ef 20%   ? CHF (congestive heart failure) (H)    ? Morbid obesity (H)    ? ROSALVA (obstructive sleep apnea)              No family history on file.  Social History     Socioeconomic History   ? Marital status:      Spouse name: Not on file   ? Number of children: Not on file   ? Years of education: Not on file   ? Highest education level: Not on file   Occupational History   ? Not on file   Social Needs   ? Financial resource strain: Not on file   ? Food insecurity     Worry: Not on file     Inability: Not on file   ? Transportation needs     Medical: Not on file     Non-medical: Not on file   Tobacco Use   ? Smoking status: Former Smoker     Quit date: 2005     Years since quittin.3   ? Smokeless tobacco: Never Used   Substance and Sexual Activity   ? Alcohol use: No     Comment: former alcoholic- quit    ? Drug use: No   ? Sexual activity: Not on file   Lifestyle   ? Physical activity     Days per week: Not on file     Minutes per session: Not on file   ? Stress: Not on file   Relationships   ? Social connections     Talks on phone: Not on file     Gets together: Not on file     Attends [employer] service: Not on file     Active member of club or organization: Not on file     Attends meetings of clubs or organizations: Not on file     Relationship status: Not on file   ? Intimate partner violence     Fear of current or ex partner:  "Not on file     Emotionally abused: Not on file     Physically abused: Not on file     Forced sexual activity: Not on file   Other Topics Concern   ? Not on file   Social History Narrative   ? Not on file         Review of Systems   Constitutional: Negative.    HENT: Negative.    Respiratory: Negative for chest tightness, shortness of breath and wheezing.    Cardiovascular: Positive for leg swelling. Negative for chest pain.   Gastrointestinal: Negative.    Genitourinary: Negative.    Musculoskeletal: Positive for arthralgias and gait problem.   Skin: Negative.    Neurological: Negative for tremors, weakness and numbness.   Hematological: Negative.    Psychiatric/Behavioral: Positive for agitation. Negative for behavioral problems and decreased concentration.       Vitals:    04/27/21 1307   BP: 109/66   Pulse: (!) 58   Resp: 18   Temp: 98.4  F (36.9  C)   SpO2: 98%   Weight: (!) 297 lb (134.7 kg)   Height: 5' 9\" (1.753 m)       Physical Exam  Constitutional:       Appearance: He is obese. He is not ill-appearing.   HENT:      Head: Atraumatic.      Nose: No congestion.   Eyes:      General: No scleral icterus.  Cardiovascular:      Rate and Rhythm: Normal rate and regular rhythm.      Heart sounds: Murmur present.   Pulmonary:      Breath sounds: Normal breath sounds. No wheezing.   Abdominal:      General: There is no distension.      Palpations: Abdomen is soft.   Musculoskeletal:      Right lower leg: Edema present.      Left lower leg: Edema present.      Comments: Knee brace to left knee.  Elevated in wheelchair.     Neurological:      General: No focal deficit present.      Mental Status: He is alert and oriented to person, place, and time.      Gait: Gait abnormal.   Psychiatric:         Behavior: Behavior normal.           LABS:   REVIEWED    ASSESSMENT:       ICD-10-CM    1. Atherosclerotic heart disease of native coronary artery with other forms of angina pectoris (H)  I25.118    2. Morbid obesity (H)  " E66.01    3. Heart failure with reduced ejection fraction (H)  I50.20    4. Typical atrial flutter (H)  I48.3    5. History of revision of total knee arthroplasty  Z96.659        PLAN:      Left TKA: Hemoglobin at discharge 12.7.  -Ice 20 minutes 4 times daily.  -Continue oxycodone as needed, using infrequently.  -Tylenol 4 times daily.  -Follow-up with surgeon as indicated.  -PT and OT.  -Gabapentin 10 milligrams p.o. nightly.    CAD  LVEF 40%  Hypertension   atrial flutter  CHF: lipids in March WNL  -elaquis 5mg   -Continue amiodarone.   -Continue metoprolol.  -Continue lisinopril 5 mg daily.  -Continue Lasix 20 mg daily.  -Daily weights in the TCU.    Restless leg syndrome:  -Mirapex.    Presumed ROSALVA on CPAP: Does not have CPAP level in the TCU due to difficulty transporting.  -Okay for O2 via nasal cannula 1 to 2 L at at bedtime.    > 35 minutes with greater than 50% time spent face-to-face with patient and nursing discussing above plan, coordination of care for pain control, orthopedic follow-up, monitoring heart failure and TCU.    Electronically signed by: Lynette Hayes, CNP

## 2021-06-22 NOTE — PROGRESS NOTES
Order for sleep study received and reviewed.    We will reach out to ordering provider to clarify order since a office visit note mentions that patient already has a CPAP device.

## 2021-06-22 NOTE — PROGRESS NOTES
Reviewed notes and information in chart.  Prior diagnosis of Obstructive Sleep Apnea with unknown severity and presumed on CPAP 19 cmH2O.  Most recent EF at 40% (11/30/2018) suggesting significant possibility of comorbid central sleep apnea.    Patient should stop CPAP 3 days prior to Polysomnography and he would benefit from standard split protocol to watch for central sleep apnea.      Not a candidate for Adaptive Servo-Ventilation.    Order for sleep study received, reviewed, and approved.   Luis Au MD  1:02 PM, 12/11/2018

## 2021-06-23 NOTE — TELEPHONE ENCOUNTER
Dr Becker,    Patient  Left voice mail today regarding his results for his Sleep Study on 1/18/19 .    He understood  Results would be given in about 2 weeks.      He stated he hasn't received them in the mail or a phone call.    Patient contact:  [phone]     Thank you.

## 2021-06-23 NOTE — TELEPHONE ENCOUNTER
Overnight polysomnography was reviewed.   The patient had severe obstructive sleep apnea and periodic limb movement sleep. I will wait until his upcoming appointment to discuss results and treatment options.     Thank you for allowing me to participate in the care of this patient. If you have any questions or concerns, please feel free to call me.

## 2021-06-23 NOTE — TELEPHONE ENCOUNTER
I spoke with the patient and reviewed the results of the sleep study with the patient. He would like to get the CPAP ASAP. He has not seen a sleep physician in our facility as of yet.     Please allow this patient to get schedule to see me or Dr. Au sooner than March. If you approve, please let the schedulers know. Thank you.

## 2021-06-23 NOTE — TELEPHONE ENCOUNTER
Dr.Chiang Pennington called back and he said that he is not able to wait until March to get a CPAP. I checked to see if there was availability before his consult and there wasn't any. He was wondering if you would be able to get him a prescription for a CPAP before he comes in for his consult. Would you be able to give him a call back at [phone]? Thank you.

## 2021-06-24 NOTE — PROGRESS NOTES
Dear Dr. Prescott, Key ARGUELLO, Cnp  [address] [place] [address]    Thank you for the opportunity to participate in the care of Mr. Gorge Berkowitz Jr..    He is a 74 y.o. male who comes to the clinic for the transfer of care of his obstructive sleep apnea.  The patient states that he was diagnosed with obstructive sleep apnea sometime ago in 1998.  He was prescribed a CPAP machine and have been doing well for a long time until the machine started breaking down.  He now has to use tape to secure the CPAP machine with the humidifier unit.  Patient was also recently diagnosed with heart failure.  The patient's cardiologist ordered a direct sleep study on 01/18/19 which revealed that he still has severe obstructive sleep apnea with an apnea hypopnea index of 110.7 events per hour with the lowest O2 sat of 82%.  The patient also had periodic limb movement sleep.  The patient also was recently diagnosed with heart failure and is interested in establishing care so he can get a new CPAP machine as soon as possible.  Without CPAP therapy he continues to feel tired upon awakening.  The patient's review of system is otherwise unremarkable.     Ideal Sleep-Wake Cycle(devoid of societal pressure):    Patient would try to initiate sleep at around midnight with a sleep latency of 20 minutes. The patient would have 3 nocturnal awakenings. Final wake up time is around 8 AM.    Past Medical History  Past Medical History:   Diagnosis Date     Atrial fibrillation (H)      Cardiomyopathy (H)     ef 20%     CHF (congestive heart failure) (H)      Morbid obesity (H)      ROSALVA (obstructive sleep apnea)         Past Surgical History  Past Surgical History:   Procedure Laterality Date     CARDIAC CATHETERIZATION  08/10/2018    and right heart cath     CARDIOVERSION  08/16/2018    successful     CV CORONARY ANGIOGRAM N/A 8/10/2018    Procedure: Coronary Angiogram;  Surgeon: Ralph Powers MD;  Location: [facility]  Pt calling to report a lymph node in her left armpit X 9 days. It started as a blackhead that was removed but lump persists. Big as a pencil eraser. Does not hurt. No fevers, night sweats, swelling, new pain.  She had imaging at [facility] last week. Report obtained. She will be seen in clinic later this morning with labs.    Lab;  Service:      CV LEFT HEART CATHETERIZATION WO LEFT VETRICULOGRAM Left 8/10/2018    Procedure: Left Heart Catheterization Without Left Ventriculogram;  Surgeon: Ralph Powers MD;  Location: [facility];  Service:      CV RIGHT HEART CATH N/A 8/10/2018    Procedure: Right Heart Catheterization;  Surgeon: Ralph Powers MD;  Location: [facility];  Service:         Meds  Current Outpatient Medications   Medication Sig Dispense Refill     amiodarone (PACERONE) 200 MG tablet Take 1 tablet (200 mg total) by mouth daily. 90 tablet 3     apixaban (ELIQUIS) 5 mg Tab tablet Take 1 tablet (5 mg total) by mouth 2 (two) times a day. 180 tablet 3     atorvastatin (LIPITOR) 40 MG tablet Take 1 tablet (40 mg total) by mouth at bedtime. 90 tablet 3     co-enzyme Q-10 30 mg capsule Take 60 mg by mouth 2 (two) times a day.        furosemide (LASIX) 20 MG tablet Take 20 mg daily and 20 mg as needed at night for any swelling 90 tablet 3     glucosamine-chondroitin 500-400 mg cap Take 1 capsule by mouth 3 (three) times a day.       krill-om3-dha-epa-om6-lip-astx (KRILL OIL, OMEGA 3 AND 6,) 1,500-165-67.5 mg cap Take 3 tablets by mouth daily.       lisinopril (PRINIVIL,ZESTRIL) 5 MG tablet Take 1 tablet (5 mg total) by mouth at bedtime. 90 tablet 3     metoprolol succinate (TOPROL-XL) 50 MG 24 hr tablet Take 1 tablet (50 mg total) by mouth daily. 90 tablet 3     No current facility-administered medications for this visit.         Allergies  Patient has no known allergies.     Social History  Social History     Socioeconomic History     Marital status:      Spouse name: Not on file     Number of children: Not on file     Years of education: Not on file     Highest education level: Not on file   Social Needs     Financial resource strain: Not on file     Food insecurity - worry: Not on file     Food insecurity - inability: Not on file     Transportation needs - medical: Not on file      Transportation needs - non-medical: Not on file   Occupational History     Not on file   Tobacco Use     Smoking status: Former Smoker     Last attempt to quit: 2005     Years since quittin.1     Smokeless tobacco: Never Used   Substance and Sexual Activity     Alcohol use: No     Comment: former alcoholic- quit      Drug use: No     Sexual activity: Not on file   Other Topics Concern     Not on file   Social History Narrative     Not on file        Family History  No family history on file.     Review of Systems:  Constitutional: Negative except as noted in HPI.   Eyes: Negative except as noted in HPI.   ENT: Negative except as noted in HPI.   Cardiovascular: Negative except as noted in HPI.   Respiratory: Negative except as noted in HPI.   Gastrointestinal: Negative except as noted in HPI.   Genitourinary: Negative except as noted in HPI.   Musculoskeletal: Negative except as noted in HPI.   Integumentary: Negative except as noted in HPI.   Neurological: Negative except as noted in HPI.   Psychiatric: Negative except as noted in HPI.   Endocrine: Negative except as noted in HPI.   Hematologic/Lymphatic: Negative except as noted in HPI.      STOP BANG 2019   Do you snore loudly (louder than talking or loud enough to be heard through closed doors)? 1   Do you often feel tired, fatigued, or sleepy during daytime? 1   Has anyone observed you stop breathing in your sleep? 1   Do you have or are you being treated for high blood pressure? 0   BMI more than 35 kg/m2 1   Age over 50 years old? 1   Neck circumference greater than 16 inches? 1   Gender male? 1   Total Score 7     Epworths Sleepiness Scale 2019   Sitting and reading 2   Watching TV 2   Sitting, inactive in a public place (e.g. a theatre or a meeting) 3   As a passenger in a car for an hour without a break 2   Lying down to rest in the afternoon when circumstances permit 2   Sitting and talking to someone 0   Sitting quietly after a  "lunch without alcohol 1   In a car, while stopped for a few minutes in traffic 1   Total score 13     Rooming 2/19/2019   Usual bedtime 11pm   Sleep Latency 15 minutes   Awakenings 3 times   Wake Up Time 7am   Weekends varies   Energy Drinks 0   Coffee 0   Cola 0   Difficulty falling asleep Yes   Difficulty staying asleep No   Excessive daytime tiredness Yes   Excessive daytime sleepiness Yes   Dozing off while driving No   Shift Worker No   Sleep Walking? No   Sleep Talking? No   Kicking or punching? Yes   Restless legs symptoms No       Physical Exam:  /66   Pulse 66   Ht 5' 10\" (1.778 m)   Wt (!) 322 lb (146.1 kg)   SpO2 96%   BMI 46.20 kg/m    BMI:Body mass index is 46.2 kg/m .   GEN: NAD, morbidly obese  Head: Normocephalic.  EYES: PERRLA, EOMI  ENT: Oropharynx is clear, mallampatti class 4+ airway.   Nasal mucosa is moist without erythema  Neck : Thyroid is within normal limits.  Neck circumference 19.75 inches  CV: Regular rate and rhythm, S1 & S2 positive.  LUNGS: Bilateral breathsounds heard.   ABDOMEN: Positive bowel sounds in all quadrants, soft, no rebound or guarding  MUSCULOSKELETAL: Bilateral trace leg swelling  SKIN: warm, dry, no rashes  Neurological: Alert, oriented to time, place, and person.  Psych: normal mood, normal affect     Labs/Studies:     Lab Results   Component Value Date    WBC 9.5 08/08/2018    HGB 14.8 08/08/2018    HCT 44.5 08/08/2018    MCV 96 08/08/2018     08/08/2018         Chemistry        Component Value Date/Time     11/02/2018 1408    K 4.7 11/02/2018 1408     11/02/2018 1408    CO2 26 11/02/2018 1408    BUN 21 11/02/2018 1408    CREATININE 1.08 11/02/2018 1408    GLU 73 11/02/2018 1408        Component Value Date/Time    CALCIUM 9.1 11/02/2018 1408    ALKPHOS 62 08/08/2018 0713    AST 26 08/08/2018 0713    ALT 32 10/08/2018 1551    BILITOT 2.2 (H) 08/08/2018 0713            No results found for: FERRITIN  Lab Results   Component Value Date    " TSH 0.88 10/08/2018         Assessment and Plan:  In summary Gorge Berkowitz Jr. is a 74 y.o. year old male here for review of his sleep study.  1.  Obstructive sleep apnea  I reviewed the results of sleep study with the patient in detail.  I also reviewed the results of sleep study with patient in detail.  I would prefer the patient return to the sleep center to get a titration study due to the fact that we ran out of time to find the optimal pressure setting.  However the patient declined and wanted to get another CPAP machine as soon as possible.  Since the patient does not have any preference with regards to durable medical equipment company will use [employer] as a [employer] medical equipment company.  2.  Hypersomnia  3.  Other sleep disturbance  4.  Morbid obesity    Patient verbalized understanding of these issues, agrees with the plan and all questions were answered today. Patient was given an opportuntity to voice any other symptoms or concerns not listed above. Patient did not have any other symptoms or concerns.      Return to clinic in 6 weeks and bring his new machine with him.     Sumeet Becker DO  Board Certified in Internal Medicine and Sleep Medicine  [facility].    (Note created with Dragon voice recognition and unintended spelling errors and word substitutions may occur)

## 2021-06-24 NOTE — PROGRESS NOTES
Patient was offered choice of vendor and chose [facility].  Patient Gorge Campbellcodey [employer]. was set up at [facility] on 3/4/19. Patient received a Resmed [id number] Auto. Pressures were set at 14-20.   Patient s ramp is 7 cm H2O for off and FLEX/EPR is EPR 1.  Patient received a Resmed Mask name: Airtouch F20  FFM Size Lg, heated tubing and heated humidifier.    Pacee Her

## 2021-06-24 NOTE — PROGRESS NOTES
Order for Durable Medical Equipment was processed and equipment ordered.     DME provider: [facility]    Date Faxed: 2/19/19    Ordering Provider: DR. DONOHUE    Equipment ordered: APAP    Fax Number: IN HOUSE [id number]

## 2021-06-26 NOTE — PROGRESS NOTES
Progress Notes by Tra Johansen MD at 8/22/2018  2:50 PM     Author: Tra Johansen MD Service: -- Author Type: Physician    Filed: 8/22/2018  3:23 PM Encounter Date: 8/22/2018 Status: Signed    : Tra Johansen MD (Physician)           Click to link to [facility] HEART CARE NOTE    Thank you, Dr. Conroy Primary Care Provider, for asking the [facility] Heart Care team to see Mr. Gorge Berkowitz Jr. to evaluate Follow-up cardiac issues.      Assessment/Recommendations   Assessment:    1. Atrial flutter - s/p cardioversion -   2. Cardiomyopathy - mixed etiology with contributions of ischemia and tachycardia from atrial flutter.  Most recent LVEF is 25%.  3. Chronic systolic congestive heart failure  4. Coronary artery disease  5. Morbid obesity  6. Obstructive sleep apnea - pt has not seen a sleep physician and is using a CPAP from 1992.    Plan:  1. Increase lisinopril to 5 mg daily  2. He can discontinue amiodarone after 1 month of therapy.  3. Continue eliquis  4. Continue metoprolol succinate, spironolactone, and atorvastatin  5. Continue furosemide  6. Continue dietary changes and efforts to lose weight.   7. Follow up in heart failure clinic on 8/29 as scheduled  8. Follow up with me in 3 months with an echo prior.           History of Present Illness   Mr. Gorge Berkowitz Jr. is a 73 y.o. male with a significant past history of atrial flutter, systolic heart failure, obesity who presents for follow-up of his cardiovascular disease.    I initially met this patient in the hospital when he presented with acute decapitated heart failure and atrial flutter with rapid ventricular response.  He was evaluated with a right and left heart catheterization which demonstrated elevated filling pressures on the right heart cath and coronary artery disease involving the LAD and RCA territories.  The RCA had proximal 100% stenosis with collaterals to a small PDA branch and the LAD  was mild to moderately stenosed in the proximal and mid segments with severe stenosis in the distal segment.  After an initial attempt at rate control he was ultimately cardioverted successfully back to normal sinus rhythm. He was dischargedon 8/17.    He is feeling well today. His weight continues to decline. He is following a lower calorie and 2000 mg Na restricted diet.  He has had no recurrent chest pain and his breathing has markedly improved since his hospitalization.  He has not had recurrent palpitations.    Other than noted above, Mr. Berkowitz denies any chest pain/pressure/tightness, shortness of breath at rest or with exertion, light headedness/dizziness, pre-syncope, syncope, lower extremity swelling, palpitations, paroxysmal nocturnal dyspnea (PND), or orthopnea.     Cardiac Problems and Cardiac Diagnostics     Most Recent Cardiac testing:  ECG dated 8/17/18 (personaly reviewed and interpreted): sinus rhythm with old anterior and inferior infarct patterns.    ECHO (report reviewed):  Echo results:   Results for orders placed during the hospital encounter of 08/10/18   Echo Transesophageal With Bubble Study [ECH12] 08/16/2018    Narrative 1.  No visualized thrombus is identified in left atrium appendage.  2.  Left atrium is moderately enlarged.  3.  Left ventricle is moderately enlarged with severe global hypokinesis.    The estimated left ventricular ejection fraction is 25%.  4.  Mildly dilated  right ventricle with moderately reduced right   ventricular systolic performance.  5.  Mild mitral valve and the tricuspid valve regurgitation.           Cardiac cath: from 8/9/18 demonstrated  RHC:   RA 15mmHg  PA mean 30mmHg  PCWP 24mmHg  LVEDP 27mmHg     CO 3.36 by thermo and 4.8 by santino  PA sat 61% and AO 94%     Angiography via left radial (noted loop left arm straightened with wire)  LM large and normal  LAD mid 40-50%; distal 60-70% and far distal 70-80%  Circ mild dz  RCA prox 100% with collaterals to  small PDA branch     Imp/plan:  1. CM due to both ischemia and tachycardia - stop diltiazem given CM, raise metoprolol 50mg two times a day (change to succinate) titrate up for rate control, addition sprironolactone/furosemide for gentle diuresis, Treat ROSALVA (presumed present)  2. Afib/flutter - rate control, anticoagulation given age and CHF.       Medications  Allergies   Current Outpatient Prescriptions   Medication Sig Dispense Refill   ? amiodarone (PACERONE) 200 MG tablet Take 1 tablet (200 mg total) by mouth 2 (two) times a day. 60 tablet 0   ? apixaban (ELIQUIS) 5 mg Tab tablet Take 1 tablet (5 mg total) by mouth 2 (two) times a day. 60 tablet 0   ? atorvastatin (LIPITOR) 40 MG tablet Take 1 tablet (40 mg total) by mouth at bedtime. 30 tablet 0   ? furosemide (LASIX) 20 MG tablet Take 1 tablet (20 mg total) by mouth daily. 30 tablet 0   ? glucosamine-chondroitin 500-400 mg cap Take 1 capsule by mouth 3 (three) times a day.     ? krill-om3-dha-epa-om6-lip-astx (KRILL OIL, OMEGA 3 AND 6,) 1,500-165-67.5 mg cap Take 3 tablets by mouth daily.     ? lisinopril (PRINIVIL,ZESTRIL) 2.5 MG tablet Take 1 tablet (2.5 mg total) by mouth at bedtime. 30 tablet 0   ? metoprolol succinate (TOPROL-XL) 25 MG Take 1 tablet (25 mg total) by mouth daily. 30 tablet 0   ? spironolactone (ALDACTONE) 25 MG tablet Take 0.5 tablets (12.5 mg total) by mouth daily. 30 tablet 0     No current facility-administered medications for this visit.       No Known Allergies     Physical Examination Review of Systems   Vitals:    08/22/18 1434   BP: 128/72   Pulse: 72   Resp: 20     Body mass index is 50.08 kg/(m^2).  Wt Readings from Last 3 Encounters:   08/22/18 (!) 349 lb (158.3 kg)   08/17/18 (!) 353 lb 1.6 oz (160.2 kg)   08/10/18 (!) 365 lb 9.6 oz (165.8 kg)       General Appearance:   Pleasant male, appears stated age. No acute distress, obese body habitus   ENT/Mouth: membranes moist, no apparent gingival bleeding.      EYES:  no scleral  icterus, normal conjunctivae   Neck: no carotid bruits. No anterior cervical lymphadenopaty   Respiratory:   lungs are clear to auscultation, no rales or wheezing, equal chest wall expansion    Cardiovascular:   Regular rhythm, normal rate. distant first and second heart sounds with no murmurs, rubs, or gallops; the carotid, radial and posterior tibial pulses are intact, Jugular venous pressure difficult to assess due to neck size, minimal lower exremity edema bilaterally    Abdomen/GI:  no organomegaly, masses, bruits, or tenderness; bowel sounds are present   Extremities: no cyanosis or clubbing   Skin: no xanthelasma, warm.    Heme/lymph/ Immunology No apparent bleeding noted.   Neurologic: Alert and oriented. normal gait, no tremors     Psychiatric: Pleasant, calm, appropriate affect.    A complete 10 system review of systems was performed and is negative except as mentioned in the HPI or below:  General: WNL  Eyes: WNL  Ears/Nose/Throat: WNL  Lungs: WNL  Heart: WNL  Stomach: WNL  Bladder: Frequent Urination at Night  Muscle/Joints: Joint Pain  Skin: WNL  Nervous System: WNL  Mental Health: WNL     Blood: WNL       Past History   Past Medical History:   Past Medical History:   Diagnosis Date   ? Atrial fibrillation (H)    ? Cardiomyopathy (H)     ef 20%   ? CHF (congestive heart failure) (H)    ? Morbid obesity (H)    ? ROSALVA (obstructive sleep apnea)        Past Surgical History:   Past Surgical History:   Procedure Laterality Date   ? CARDIAC CATHETERIZATION  08/10/2018    and right heart cath   ? CARDIOVERSION  08/16/2018    successful   ? CV CORONARY ANGIOGRAM N/A 8/10/2018    Procedure: Coronary Angiogram;  Surgeon: Ralph Powers MD;  Location: [facility] Cath Lab;  Service:    ? CV LEFT HEART CATHETERIZATION WO LEFT VETRICULOGRAM Left 8/10/2018    Procedure: Left Heart Catheterization Without Left Ventriculogram;  Surgeon: Ralph Powers MD;  Location: [facility] Cath Lab;  Service:    ?  CV RIGHT HEART CATH N/A 8/10/2018    Procedure: Right Heart Catheterization;  Surgeon: Ralph Powers MD;  Location: [facility] Cath Lab;  Service:        Family History:   Father may have had coronary artery disease.    Social History:   Social History     Social History   ? Marital status:      Spouse name: N/A   ? Number of children: N/A   ? Years of education: N/A     Occupational History   ? Not on file.     Social History Main Topics   ? Smoking status: Former Smoker     Quit date: 1/1/2005   ? Smokeless tobacco: Never Used   ? Alcohol use No      Comment: former alcoholic- quit 2003   ? Drug use: No   ? Sexual activity: Not on file     Other Topics Concern   ? Not on file     Social History Narrative              Lab Results    Chemistry/lipid CBC Cardiac Enzymes/BNP/TSH/INR   Lab Results   Component Value Date    CHOL 167 08/08/2018    HDL 45 08/08/2018    LDLCALC 110 08/08/2018    TRIG 59 08/08/2018    CREATININE 1.15 08/17/2018    BUN 29 (H) 08/17/2018    K 4.6 08/17/2018    K 4.5 08/17/2018     (L) 08/17/2018     08/17/2018    CO2 20 (L) 08/17/2018    Lab Results   Component Value Date    WBC 9.5 08/08/2018    HGB 14.8 08/08/2018    HCT 44.5 08/08/2018    MCV 96 08/08/2018     08/08/2018    Lab Results   Component Value Date    TROPONINI 0.02 08/08/2018     (H) 08/08/2018    TSH 1.07 08/07/2018    INR 1.16 (H) 08/07/2018          Tra Johansen MD  Non-invasive Cardiology  Critical access hospital

## 2021-06-26 NOTE — PROGRESS NOTES
Progress Notes by Key Prescott CNP at 8/29/2018  1:30 PM     Author: Key Prescott CNP Service: -- Author Type: Nurse Practitioner    Filed: 8/29/2018  2:41 PM Encounter Date: 8/29/2018 Status: Signed    : Key Prescott CNP (Nurse Practitioner)           Click to link to [facility] HEART CARE NOTE      Assessment/Recommendations   Assessment:    1. Ischemic cardiomyopathy with systolic dysfunction, NYHA class III: Well compensated. Likely tachy mediated also with new atrial flutter in the hospital.  He continues to have dyspnea on exertion and fatigue.  He continues to lose weight since discharge.  He states his lower extremity edema has improved.  We discussed monitoring heart failure symptoms, following a low-sodium diet, monitoring daily weights, and her failure treatment.  Dr. Johansen recommended an echocardiogram in December to reassess heart function.  He refused to meet with the nurse clinician for further heart failure education.    2.  Paroxysmal atrial flutter: Normal sinus rhythm today he had a cardioversion that was successful on August 16, 2018.  He is on Eliquis for anticoagulation.    3.  Obstructive sleep apnea: He states he had a sleep study in 1992 and has worn the same CPAP mask since then.  I recommended a sleep referral for follow-up.    Plan:  1.   Heart failure medications:  - Beta blocker therapy with metroprolol succinate increased to 37.5 mg daily  - ACEI therapy with lisinopril 5 mg daily  - Aldosterone blocker therapy with spironolactone 12.5 mg daily.    - Diuretic therapy with furosemide 20 mg daily  2.  BMP pending  3.  Continue daily weights and low-sodium diet  4.  Sleep referral made    Gorge Berkowitz Jr. will follow up with Dr. Johansen in December and in the heart failure clinic in 3 weeks.     History of Present Illness    Gorge Berkowitz Jr. is seen at [facility] heart failure clinic today for  post-hospitalization follow-up.  He was hospitalized at [facility] from August 7 - August 10 with atrial fibrillation with rapid ventricular response and dyspnea on exertion.  He was transferred to [facility] August 10 and discharged August 17.  He had a cardioversion on August 16, 2018 which was successful and converted to normal sinus rhythm.  He received IV diuretics during hospitalization and lost 13 pounds.  He also underwent a coronary angiogram on August 10, 2018 which showed 100% blockage in the RCA with collaterals.  He also had mild to moderate blockage in his LAD.  No intervention was performed.  The most recent evaluation of His ejection fraction was 25% from an  echo on 8/16/2018.  It is thought that his new cardiomyopathy was tachycardia mediated along with ischemic.  Past medical history is also significant for coronary artery disease, atrial fibrillation, atrial flutter, dyslipidemia and obstructive sleep apnea.      Since being discharged from the hospital, Gorge feels that he is improving. He continues to have fatigue, dyspnea on exertion and lower extremity edema. He denies orthopnea or PND.  He denies lightheadedness, shortness of breath, orthopnea, PND, palpitations, chest pain and abdominal fullness/bloating.      Gorge Campbellcodey Lux.s weight at discharge was 353 pounds.  He is monitoring home weights which have decreased from 355 to currently 346 pounds. He is following a low sodium diet.        ECHO 8/16/2018:   Summary   1.  No visualized thrombus is identified in left atrium appendage.  2.  Left atrium is moderately enlarged.  3.  Left ventricle is moderately enlarged with severe global hypokinesis.  The estimated left ventricular ejection fraction is 25%.  4.  Mildly dilated  right ventricle with moderately reduced right ventricular systolic performance.  5.  Mild mitral valve and the tricuspid valve regurgitation.          Physical Examination Review of Systems   Vitals:    08/29/18  1340   BP: 103/58   Pulse: 66   Resp: 16     Body mass index is 49.22 kg/(m^2).  Wt Readings from Last 3 Encounters:   08/29/18 (!) 343 lb (155.6 kg)   08/22/18 (!) 349 lb (158.3 kg)   08/17/18 (!) 353 lb 1.6 oz (160.2 kg)       General Appearance:     Alert, cooperative and in no acute distress.   ENT/Mouth: membranes moist, no oral lesions or bleeding gums.      EYES:  no scleral icterus, normal conjunctivae   Neck: no carotid bruits or thyromegaly   Chest/Lungs:   lungs are clear to auscultation, no rales or wheezing, respirations unlabored   Cardiovascular:   Regular. Distant heart tones. Normal first and second heart sounds with no murmurs, rubs, or gallops; the carotid, radial and posterior tibial pulses are intact, JVP is difficult to assess due to the patient's obesity and body habitus, mild to moderate edema bilateral lower extremities    Abdomen:  Soft, nontender, nondistended, bowel sounds present   Extremities: no cyanosis or clubbing   Skin: warm, dry.    Neurologic: mood and affect are appropriate, alert and oriented x3      General: Night Sweats, Weight Loss  Eyes: WNL  Ears/Nose/Throat: WNL  Lungs: WNL  Heart: WNL  Stomach: WNL  Bladder: Frequent Urination at Night  Muscle/Joints: Joint Pain  Skin: WNL  Nervous System: WNL  Mental Health: Confusion     Blood: WNL     Medical History  Surgical History Family History Social History   Past Medical History:   Diagnosis Date   ? Atrial fibrillation (H)    ? Cardiomyopathy (H)     ef 20%   ? CHF (congestive heart failure) (H)    ? Morbid obesity (H)    ? ROSALVA (obstructive sleep apnea)     Past Surgical History:   Procedure Laterality Date   ? CARDIAC CATHETERIZATION  08/10/2018    and right heart cath   ? CARDIOVERSION  08/16/2018    successful   ? CV CORONARY ANGIOGRAM N/A 8/10/2018    Procedure: Coronary Angiogram;  Surgeon: Ralph Powers MD;  Location: [facility] Cath Lab;  Service:    ? CV LEFT HEART CATHETERIZATION WO LEFT VETRICULOGRAM Left  8/10/2018    Procedure: Left Heart Catheterization Without Left Ventriculogram;  Surgeon: Ralph Powers MD;  Location: [facility] Cath Lab;  Service:    ? CV RIGHT HEART CATH N/A 8/10/2018    Procedure: Right Heart Catheterization;  Surgeon: Ralph Powers MD;  Location: [facility] Cath Lab;  Service:     History reviewed. No pertinent family history. Social History     Social History   ? Marital status:      Spouse name: N/A   ? Number of children: N/A   ? Years of education: N/A     Occupational History   ? Not on file.     Social History Main Topics   ? Smoking status: Former Smoker     Quit date: 1/1/2005   ? Smokeless tobacco: Never Used   ? Alcohol use No      Comment: former alcoholic- quit 2003   ? Drug use: No   ? Sexual activity: Not on file     Other Topics Concern   ? Not on file     Social History Narrative          Medications  Allergies   Current Outpatient Prescriptions   Medication Sig Dispense Refill   ? amiodarone (PACERONE) 200 MG tablet Take 1 tablet (200 mg total) by mouth 2 (two) times a day. 60 tablet 0   ? apixaban (ELIQUIS) 5 mg Tab tablet Take 1 tablet (5 mg total) by mouth 2 (two) times a day. 60 tablet 11   ? atorvastatin (LIPITOR) 40 MG tablet Take 1 tablet (40 mg total) by mouth at bedtime. 30 tablet 11   ? co-enzyme Q-10 30 mg capsule Take 30 mg by mouth 3 (three) times a day.     ? furosemide (LASIX) 20 MG tablet Take 1 tablet (20 mg total) by mouth daily. 90 tablet 3   ? glucosamine-chondroitin 500-400 mg cap Take 1 capsule by mouth 3 (three) times a day.     ? krill-om3-dha-epa-om6-lip-astx (KRILL OIL, OMEGA 3 AND 6,) 1,500-165-67.5 mg cap Take 3 tablets by mouth daily.     ? lisinopril (PRINIVIL,ZESTRIL) 5 MG tablet Take 1 tablet (5 mg total) by mouth at bedtime. 30 tablet 11   ? metoprolol succinate (TOPROL-XL) 25 MG Take 1.5 tablets (37.5 mg total) by mouth daily. 135 tablet 3   ? spironolactone (ALDACTONE) 25 MG tablet Take 0.5 tablets (12.5 mg total)  by mouth daily. 30 tablet 11     No current facility-administered medications for this visit.       No Known Allergies      Lab Results    Chemistry CBC BNP   Lab Results   Component Value Date    CREATININE 1.15 08/17/2018    BUN 29 (H) 08/17/2018     (L) 08/17/2018    K 4.6 08/17/2018    K 4.5 08/17/2018     08/17/2018    CO2 20 (L) 08/17/2018     Creatinine (mg/dL)   Date Value   08/17/2018 1.15   08/15/2018 0.94   08/14/2018 0.87   08/10/2018 1.18    Lab Results   Component Value Date    WBC 9.5 08/08/2018    HGB 14.8 08/08/2018    HCT 44.5 08/08/2018    MCV 96 08/08/2018     08/08/2018    Lab Results   Component Value Date     (H) 08/08/2018     BNP (pg/mL)   Date Value   08/08/2018 799 (H)   08/07/2018 469 (H)          30 minutes were spent with the patient with greater than 50% spent on education and counseling.      Key Prescott, [facility] [place] [facility]   Heart Failure Clinic

## 2021-06-26 NOTE — PROGRESS NOTES
Progress Notes by Key Prescott CNP at 11/2/2018  1:30 PM     Author: Key Prescott CNP Service: -- Author Type: Nurse Practitioner    Filed: 11/2/2018  2:16 PM Encounter Date: 11/2/2018 Status: Signed    : Key Prescott CNP (Nurse Practitioner)           Click to link to [facility] HEART [facility] NOTE      Assessment/Recommendations   Assessment:    1. Ischemic cardiomyopathy with systolic dysfunction, NYHA class II: Compensated.  He continues to have mild fatigue.  He denies dyspnea on exertion or lower extremity edema.  His weights have remained stable.  He continues to follow a low-sodium diet.    2.  Paroxysmal atrial flutter: He had a successful cardioversion on August 16, 2018.  He is a regular rhythm today.  He continues to take Eliquis.  He is taking amiodarone 200 mg daily.    Plan:  1.   Heart failure medications:  - Beta blocker therapy with metroprolol succinate 37.5 mg daily  - ACEI therapy with lisinopril 5 mg daily  - Diuretic therapy with furosemide 20 mg daily  2.  Unable to titrate medications due to hypotension.  3.  Scheduled for echocardiogram November 30 to reassess heart function  4.  Continue daily weights and low-sodium diet    Gorge Berkowitz Jr. will follow up with Dr Johansen December 3 and in the heart failure clinic as needed.     History of Present Illness    Mr. Gorge Berkowitz Jr. is a 73 y.o. male seen at [facility] heart failure clinic today for continued follow-up.  He follows up for ischemic cardiomyopathy with systolic dysfunction.  He had an echocardiogram August 16, 2018 which showed ejection fraction of 25%.  He has a past medical history significant for coronary artery disease, hyperlipidemia, paroxysmal atrial flutter, and obstructive sleep apnea.  He had a coronary angiogram August 10, 2018 which showed 100% blockage of the RCA with collaterals.  He had mild to moderate blockage in his LAD.  No intervention  was done.    Today, he states his insurance will be changing the beginning of the year.  He states that I am not part of his preferred providers on the list and will not be able to see me.  He will be able to continue to see Dr. Johansen.    Today, he continues to have mild fatigue.  He denies dyspnea exertion, orthopnea, or PND.  He denies any abdominal bloating or lower extremity edema.  He denies lightheadedness, shortness of breath, dyspnea on exertion, orthopnea, PND, palpitations, chest pain, abdominal fullness/bloating and lower extremity edema.      He is monitoring home weights which are stable between 325-328 pounds.  He is following a low sodium diet.         Physical Examination Review of Systems   Vitals:    11/02/18 1328   BP: 90/60   Pulse: 60   Resp: 16     Body mass index is 47.21 kg/(m^2).  Wt Readings from Last 3 Encounters:   11/02/18 (!) 329 lb (149.2 kg)   10/08/18 (!) 330 lb (149.7 kg)   08/29/18 (!) 343 lb (155.6 kg)       General Appearance:     Alert, cooperative and in no acute distress.   ENT/Mouth: membranes moist, no oral lesions or bleeding gums.      EYES:  no scleral icterus, normal conjunctivae   Neck: no carotid bruits or thyromegaly   Chest/Lungs:   lungs are clear to auscultation, no rales or wheezing, respirations unlabored   Cardiovascular:   Regular. Distant heart tones. Normal first and second heart sounds with no murmurs, rubs, or gallops; the carotid, radial and posterior tibial pulses are intact, JVP is difficult to assess due to the patient's obesity and body habitus, no edema    Abdomen:  Obese, soft, nontender, nondistended, bowel sounds present   Extremities: no cyanosis or clubbing   Skin: warm, dry.    Neurologic: mood and affect are appropriate, alert and oriented x3      General: WNL  Eyes: WNL  Ears/Nose/Throat: WNL  Lungs: WNL  Heart: WNL  Stomach: WNL  Bladder: WNL  Muscle/Joints: WNL  Skin: WNL  Nervous System: WNL  Mental Health: WNL     Blood: WNL     Medical  History  Surgical History Family History Social History   Past Medical History:   Diagnosis Date   ? Atrial fibrillation (H)    ? Cardiomyopathy (H)     ef 20%   ? CHF (congestive heart failure) (H)    ? Morbid obesity (H)    ? ROSALVA (obstructive sleep apnea)     Past Surgical History:   Procedure Laterality Date   ? CARDIAC CATHETERIZATION  08/10/2018    and right heart cath   ? CARDIOVERSION  08/16/2018    successful   ? CV CORONARY ANGIOGRAM N/A 8/10/2018    Procedure: Coronary Angiogram;  Surgeon: Ralph Powers MD;  Location: [facility] Cath Lab;  Service:    ? CV LEFT HEART CATHETERIZATION WO LEFT VETRICULOGRAM Left 8/10/2018    Procedure: Left Heart Catheterization Without Left Ventriculogram;  Surgeon: Ralph Powers MD;  Location: [facility] Cath Lab;  Service:    ? CV RIGHT HEART CATH N/A 8/10/2018    Procedure: Right Heart Catheterization;  Surgeon: Ralph Powers MD;  Location: [facility] Cath Lab;  Service:     History reviewed. No pertinent family history. Social History     Social History   ? Marital status:      Spouse name: N/A   ? Number of children: N/A   ? Years of education: N/A     Occupational History   ? Not on file.     Social History Main Topics   ? Smoking status: Former Smoker     Quit date: 1/1/2005   ? Smokeless tobacco: Never Used   ? Alcohol use No      Comment: former alcoholic- quit 2003   ? Drug use: No   ? Sexual activity: Not on file     Other Topics Concern   ? Not on file     Social History Narrative          Medications  Allergies   Current Outpatient Prescriptions   Medication Sig Dispense Refill   ? amiodarone (PACERONE) 200 MG tablet Take 1 tablet (200 mg total) by mouth daily. 180 tablet 3   ? apixaban (ELIQUIS) 5 mg Tab tablet Take 1 tablet (5 mg total) by mouth 2 (two) times a day. 180 tablet 3   ? atorvastatin (LIPITOR) 40 MG tablet Take 1 tablet (40 mg total) by mouth at bedtime. 90 tablet 3   ? co-enzyme Q-10 30 mg capsule Take 60 mg  by mouth 2 (two) times a day.      ? furosemide (LASIX) 20 MG tablet Take 20 mg daily and 20 mg as needed at night for any swelling 90 tablet 3   ? glucosamine-chondroitin 500-400 mg cap Take 1 capsule by mouth 3 (three) times a day.     ? krill-om3-dha-epa-om6-lip-astx (KRILL OIL, OMEGA 3 AND 6,) 1,500-165-67.5 mg cap Take 3 tablets by mouth daily.     ? lisinopril (PRINIVIL,ZESTRIL) 5 MG tablet Take 1 tablet (5 mg total) by mouth at bedtime. 90 tablet 3   ? metoprolol succinate (TOPROL-XL) 25 MG Take 1.5 tablets (37.5 mg total) by mouth daily. 135 tablet 3     No current facility-administered medications for this visit.       No Known Allergies      Lab Results    Chemistry CBC BNP   Lab Results   Component Value Date    CREATININE 1.37 (H) 09/05/2018    BUN 39 (H) 09/05/2018     09/05/2018    K 5.1 (H) 09/05/2018     09/05/2018    CO2 21 (L) 09/05/2018     Creatinine (mg/dL)   Date Value   09/05/2018 1.37 (H)   08/29/2018 1.34 (H)   08/17/2018 1.15   08/15/2018 0.94    Lab Results   Component Value Date    WBC 9.5 08/08/2018    HGB 14.8 08/08/2018    HCT 44.5 08/08/2018    MCV 96 08/08/2018     08/08/2018    Lab Results   Component Value Date     (H) 08/08/2018     BNP (pg/mL)   Date Value   08/08/2018 799 (H)   08/07/2018 469 (H)          25 minutes were spent with the patient with greater than 50% spent on education and counseling.      Key Prescott, [facility] Heart [facility]   Heart Failure Clinic

## 2021-06-26 NOTE — PROGRESS NOTES
Progress Notes by Tra Johansen MD at 12/3/2018  1:30 PM     Author: Tra Johansen MD Service: -- Author Type: Physician    Filed: 12/3/2018  2:10 PM Encounter Date: 12/3/2018 Status: Signed    : Tra Johansen MD (Physician)           Click to link to [facility] Heart [facility] HEART CARE NOTE    Thank you, Dr. Lisseth Scott MD, for asking the [facility] Heart Care team to see . Gorge Campbellcodey Lux. to evaluate Follow-up (heart failure, atrial flutter).      Assessment/Recommendations   Assessment:    1. Paroxysmal atrial flutter - in sinus rhythm today.  2. Chronic systolic heart failure - currently appears compensated today with stable weights.  3. Combined ischemic and tachycardia mediated cardiomyopathy - LVEF 40% on recent echo, improved from 25% previously.  4. Coronary artery disease with  of the proximal RCA filled via collaterals. Moderate disease throughout the LAD with >70% stenosis in the far distal LAD. Stable without angina.  5. Morbid obesity - uncontrolled.  6. Obstructive sleep apnea - pt is using 25 year-old CPAP equipment and is asking if he can have a sleep study arranged without having the pre-visit in the sleep clinic due to transportation issues.     [id number] Score    Date Calculated:  12/3/2018  1:14 PM   [id number] Score:  5        Plan:  1. Continue medications as prescribed. Metoprolol was increased to 50 mg daily.  2. Continue low sodium diet  3. Will inquire with a sleep lab to see if he can have a sleep study without the pre-study screen since he already has a diagnosis of ROSALVA, but hasn't been evaluated in many years.    Billing today was based on time. Total visit time of 40  minutes of direct patient contact with greater than 50% of this time devoted to counseling and/or coordinating care consisting of discussing diagnostic results, discussion about prognosis, risk and benefits of management options, importance of compliance with  treatment options, patient education, and discussing medications.           History of Present Illness   Mr. Gorge Berkowitz Jr. is a 73 y.o. male with a significant past history of morbid obesity, coronary artery disease, paroxysmal atrial flutter and tachycardia mediated cardiomyopathy with heart failrue who presents for follow-up of his cardiovascular disease.    I initially saw Mr. Berkowitz when he was hospitalized in August 2018 for new onset heart failure. He was noted to be in atrial flutter and with an LVEF of 25%. Coronary angiography was performed and demonstrated significant coronary artery disease, but not in a territory to explain his LV dysfunction.  He was cardioverted about a week later and started on amiodarone.  He has been seen in our heart failure and afib clinics and has been fairly stable overall. He recently had an echocardiogram repeated that demonstrated improvement in his LV function to 40%.    He is feeling generally well today. He reports that he is anticipating significant increases in his medical costs next year due to changes in his insurance plan. His weight has been stable. He has not had his sleep study and cites transportation issues. He is asking whether he can have a sleep study without seeing the sleep specialist first.   He denies recurrent symptoms of atrial flutter or fibrillation.    Other than noted above, Mr. Berkowitz denies any chest pain/pressure/tightness, shortness of breath at rest or with exertion, light headedness/dizziness, pre-syncope, syncope, lower extremity swelling, palpitations, paroxysmal nocturnal dyspnea (PND), or orthopnea.     Cardiac Problems and Cardiac Diagnostics   Cardiac Problem List:  1. 8/9/18 - Presented with atrial flutter and heart failure. LVEF 25%. Angiogram showed CAD but not significant enough to explain LVEF. Medical management pursued.  2. 8/16/18 - KEVIN guided cardioversion. Started on amiodarone.  3. 11/30/18 - LVEF improved to  40%.    Most Recent Cardiac testing:  ECG dated 8/17/18 (personaly reviewed and interpreted): sinus rhythm with old inferior infarct. Old anterior infarct. Low voltage.     ECHO (report reviewed):  Echo results:   Results for orders placed during the hospital encounter of 11/30/18   Echo Complete [ECH10] 11/30/2018    Narrative   When compared to the previous study dated 8/16/2018, there are changes   noted. Left ventricular systolic function has improved.    Left ventricle ejection fraction is moderately decreased. The estimated   left ventricular ejection fraction is 40%.    Normal left ventricular size.    Normal right ventricular size and systolic function.    Left atrial volume is moderately increased.    The aortic root is mildly dilated.        Cardiac cath: from 8/10/18 demonstrated    Prox RCA to Dist RCA lesion 100% stenosed.    Mid LAD lesion 45% stenosed.    Dist LAD lesion 65% stenosed.    Estimated blood loss was <20 ml.    The LM vessel was large.    The left circumflex was moderate.     Pt with BMI 52 with afib/flutter and EF 25%     RHC:   RA 15mmHg  PA mean 30mmHg  PCWP 24mmHg  LVEDP 27mmHg     CO 3.36 by thermo and 4.8 by santino  PA sat 61% and AO 94%     Angiography via left radial (noted loop left arm straightened with wire)  LM large and normal  LAD mid 40-50%; distal 60-70% and far distal 70-80%  Circ mild dz  RCA prox 100% with collaterals to small PDA branch     Imp/plan:  1. CM due to both ischemia and tachycardia - stop diltiazem given CM, raise metoprolol 50mg two times a day (change to succinate) titrate up for rate control, addition sprironolactone/furosemide for gentle diuresis, Treat ROSALVA (presumed present)  2. Afib/flutter - rate control, anticoagulation given age and CHF.              Medications  Allergies   Current Outpatient Medications   Medication Sig Dispense Refill   ? apixaban (ELIQUIS) 5 mg Tab tablet Take 1 tablet (5 mg total) by mouth 2 (two) times a day. 180 tablet 3   ?  atorvastatin (LIPITOR) 40 MG tablet Take 1 tablet (40 mg total) by mouth at bedtime. 90 tablet 3   ? co-enzyme Q-10 30 mg capsule Take 60 mg by mouth 2 (two) times a day.      ? furosemide (LASIX) 20 MG tablet Take 20 mg daily and 20 mg as needed at night for any swelling 90 tablet 3   ? glucosamine-chondroitin 500-400 mg cap Take 1 capsule by mouth 3 (three) times a day.     ? krill-om3-dha-epa-om6-lip-astx (KRILL OIL, OMEGA 3 AND 6,) 1,500-165-67.5 mg cap Take 3 tablets by mouth daily.     ? lisinopril (PRINIVIL,ZESTRIL) 5 MG tablet Take 1 tablet (5 mg total) by mouth at bedtime. 90 tablet 3   ? metoprolol succinate (TOPROL-XL) 25 MG Take 1.5 tablets (37.5 mg total) by mouth daily. 135 tablet 3   ? amiodarone (PACERONE) 200 MG tablet Take 1 tablet (200 mg total) by mouth daily. 180 tablet 3     No current facility-administered medications for this visit.       No Known Allergies     Physical Examination Review of Systems   There were no vitals filed for this visit.  Body mass index is 46.92 kg/m .  Wt Readings from Last 3 Encounters:   12/03/18 (!) 327 lb (148.3 kg)   11/30/18 (!) 329 lb (149.2 kg)   11/02/18 (!) 329 lb (149.2 kg)       General Appearance:   Pleasant male, appears stated age. no acute distress, obese body habitus   ENT/Mouth: membranes moist, no apparent gingival bleeding.      EYES:  no scleral icterus, normal conjunctivae   Neck: no carotid bruits. No anterior cervical lymphadenopaty   Respiratory:   lungs are clear to auscultation, no rales or wheezing, equal chest wall expansion    Cardiovascular:   Regular rhythm, normal rate. Normal first and second heart sounds with no murmurs, rubs, or gallops; the carotid, radial and posterior tibial pulses are intact, Jugular venous pressure normal, trace edema bilaterally    Abdomen/GI:  no organomegaly, masses, bruits, or tenderness; bowel sounds are present   Extremities: no cyanosis or clubbing   Skin: no xanthelasma, warm.    Heme/lymph/ Immunology  No apparent bleeding noted.   Neurologic: Alert and oriented. normal gait, no tremors     Psychiatric: Pleasant, calm, appropriate affect.    A complete 10 system review of systems was performed and is negative except as mentioned in the HPI or below:  General: Weight Gain  Eyes: WNL  Ears/Nose/Throat: WNL  Lungs: WNL  Heart: WNL  Stomach: Constipation  Bladder: Frequent Urination at Night  Muscle/Joints: WNL  Skin: WNL  Nervous System: WNL  Mental Health: WNL     Blood: WNL       Past History   Past Medical History:   Past Medical History:   Diagnosis Date   ? Atrial fibrillation (H)    ? Cardiomyopathy (H)     ef 20%   ? CHF (congestive heart failure) (H)    ? Morbid obesity (H)    ? ROSALVA (obstructive sleep apnea)        Past Surgical History:   Past Surgical History:   Procedure Laterality Date   ? CARDIAC CATHETERIZATION  08/10/2018    and right heart cath   ? CARDIOVERSION  08/16/2018    successful   ? CV CORONARY ANGIOGRAM N/A 8/10/2018    Procedure: Coronary Angiogram;  Surgeon: Ralph Powers MD;  Location: [facility] Cath Lab;  Service:    ? CV LEFT HEART CATHETERIZATION WO LEFT VETRICULOGRAM Left 8/10/2018    Procedure: Left Heart Catheterization Without Left Ventriculogram;  Surgeon: Ralph Powers MD;  Location: [facility] Cath Lab;  Service:    ? CV RIGHT HEART CATH N/A 8/10/2018    Procedure: Right Heart Catheterization;  Surgeon: Ralph Powers MD;  Location: [facility] Cath Lab;  Service:        Family History: History reviewed. No pertinent family history.     Social History:   Social History     Socioeconomic History   ? Marital status:      Spouse name: Not on file   ? Number of children: Not on file   ? Years of education: Not on file   ? Highest education level: Not on file   Social Needs   ? Financial resource strain: Not on file   ? Food insecurity - worry: Not on file   ? Food insecurity - inability: Not on file   ? Transportation needs - medical: Not on file    ? Transportation needs - non-medical: Not on file   Occupational History   ? Not on file   Tobacco Use   ? Smoking status: Former Smoker     Last attempt to quit: 2005     Years since quittin.9   ? Smokeless tobacco: Never Used   Substance and Sexual Activity   ? Alcohol use: No     Comment: former alcoholic- quit    ? Drug use: No   ? Sexual activity: Not on file   Other Topics Concern   ? Not on file   Social History Narrative   ? Not on file              Lab Results    Chemistry/lipid CBC Cardiac Enzymes/BNP/TSH/INR   Lab Results   Component Value Date    CHOL 167 2018    HDL 45 2018    LDLCALC 110 2018    TRIG 59 2018    CREATININE 1.08 2018    BUN 21 2018    K 4.7 2018     2018     2018    CO2 26 2018    Lab Results   Component Value Date    WBC 9.5 2018    HGB 14.8 2018    HCT 44.5 2018    MCV 96 2018     2018    Lab Results   Component Value Date    TROPONINI 0.02 2018     (H) 2018    TSH 0.88 10/08/2018    INR 1.16 (H) 2018          Tra Johansen MD  Non-invasive Cardiology  [facility]

## 2021-06-27 NOTE — PROGRESS NOTES
Progress Notes by Tra Johansen MD at 6/24/2019  4:10 PM     Author: Tra Johansen MD Service: -- Author Type: Physician    Filed: 6/24/2019  4:48 PM Encounter Date: 6/24/2019 Status: Signed    : Tra Johansen MD (Physician)           Click to link to [facility]     [facility] HEART CARE NOTE    Thank you, Lisseth Dodson MD, for asking the [facility] team to see Mr. Gorge Berkowitz Jr. to  Follow-up cardiomyopathy and atrial flutter.      Assessment/Recommendations   Assessment:    1. Paroxysmal atrial flutter - in sinus rhythm today.   2. Chronic systolic heart failure - currently appears compensated today with stable weights.  3. Combined ischemic and tachycardia mediated cardiomyopathy - LVEF 40% on recent echo, improved from 25% previously.  4. Coronary artery disease with  of the proximal RCA filled via collaterals. Moderate disease throughout the LAD with >70% stenosis in the far distal LAD. Stable without angina.  5. Morbid obesity - uncontrolled. Slowly losing some weight.  6. anticoagulation with Eliquis - tolerating but expensive and causing a financial burden. He is interested in discussing WatchmanLAA occlusion.  7. Obstructive sleep apnea - complaint with CPAP    [id number] Score    Date Calculated:  12/3/2018  1:14 PM   [id number] Score:  5        Plan:  1. Decrease amiodarone to 100 mg daily  2. Schedule PFTs  3. Refer to [facility] LAAO clinic  4. Discussed weight loss.  5. Follow up in 6 months or sooner if needed.           History of Present Illness   Mr. Gorge Berkowitz Jr. is a 74 y.o. male with a significant past history of morbid obesity, coronary artery disease, paroxysmal atrial flutter and tachycardia mediated cardiomyopathy with heart failure who presents for follow-up of his cardiovascular disease.    I initially saw Mr. Berkowitz when he was hospitalized in August 2018 for new onset heart failure. He was noted to be in atrial  flutter and with an LVEF of 25%. Coronary angiography was performed and demonstrated significant coronary artery disease, but not in a territory to explain his LV dysfunction.  He was cardioverted about a week later and started on amiodarone.  He has been seen in our heart failure and afib clinics and has been fairly stable overall. He recently had an echocardiogram repeated that demonstrated improvement in his LV function to 40%. He was referred to sleep medicine and received a new CPAP which he has been using regularly.    I last saw Mr. Berkowitz about 6 months ago. Today he reports feeling generally well. He tries to eat healthy and avoid salt. He is losing about 1lb per month. Has difficulties exercising due to arthritis. No new cardiorespiratory symptoms.    Other than noted above, Mr. Berkowitz denies any chest pain/pressure/tightness, shortness of breath at rest or with exertion, light headedness/dizziness, pre-syncope, syncope, lower extremity swelling, palpitations, paroxysmal nocturnal dyspnea (PND), or orthopnea.     Cardiac Problems and Cardiac Diagnostics   Cardiac Problem List:  1. 8/9/18 - Presented with atrial flutter and heart failure. LVEF 25%. Angiogram showed CAD but not significant enough to explain LVEF. Medical management pursued.  2. 8/16/18 - KEVIN guided cardioversion. Started on amiodarone.  3. 11/30/18 - LVEF improved to 40%.     Most Recent Cardiac testing:  ECG dated 8/17/18 (personaly reviewed and interpreted): sinus rhythm with old inferior infarct. Old anterior infarct. Low voltage.     ECHO (report reviewed):  Echo results:   Results for orders placed during the hospital encounter of 11/30/18   Echo Complete [ECH10] 11/30/2018    Narrative   When compared to the previous study dated 8/16/2018, there are changes   noted. Left ventricular systolic function has improved.    Left ventricle ejection fraction is moderately decreased. The estimated   left ventricular ejection fraction is  40%.    Normal left ventricular size.    Normal right ventricular size and systolic function.    Left atrial volume is moderately increased.    The aortic root is mildly dilated.        Cardiac cath: from 8/10/18 demonstrated    Prox RCA to Dist RCA lesion 100% stenosed.    Mid LAD lesion 45% stenosed.    Dist LAD lesion 65% stenosed.    Estimated blood loss was <20 ml.    The LM vessel was large.    The left circumflex was moderate.     Pt with BMI 52 with afib/flutter and EF 25%     RHC:   RA 15mmHg  PA mean 30mmHg  PCWP 24mmHg  LVEDP 27mmHg     CO 3.36 by thermo and 4.8 by santino  PA sat 61% and AO 94%     Angiography via left radial (noted loop left arm straightened with wire)  LM large and normal  LAD mid 40-50%; distal 60-70% and far distal 70-80%  Circ mild dz  RCA prox 100% with collaterals to small PDA branch     Imp/plan:  1. CM due to both ischemia and tachycardia - stop diltiazem given CM, raise metoprolol 50mg two times a day (change to succinate) titrate up for rate control, addition sprironolactone/furosemide for gentle diuresis, Treat ROSALVA (presumed present)  2. Afib/flutter - rate control, anticoagulation given age and CHF.              Medications  Allergies   Current Outpatient Medications   Medication Sig Dispense Refill   ? amiodarone (PACERONE) 200 MG tablet Take 1 tablet (200 mg total) by mouth daily. 90 tablet 2   ? apixaban (ELIQUIS) 5 mg Tab tablet Take 1 tablet (5 mg total) by mouth 2 (two) times a day. 180 tablet 2   ? atorvastatin (LIPITOR) 40 MG tablet Take 1 tablet (40 mg total) by mouth at bedtime. 90 tablet 1   ? co-enzyme Q-10 30 mg capsule Take 60 mg by mouth 2 (two) times a day.      ? furosemide (LASIX) 20 MG tablet Take 20 mg daily and 20 mg as needed at night for any swelling 100 tablet 2   ? glucosamine-chondroitin 500-400 mg cap Take 1 capsule by mouth 3 (three) times a day.     ? krill-om3-dha-epa-om6-lip-astx (KRILL OIL, OMEGA 3 AND 6,) 1,500-165-67.5 mg cap Take 3 tablets by  mouth daily.     ? lisinopril (PRINIVIL,ZESTRIL) 5 MG tablet Take 1 tablet (5 mg total) by mouth at bedtime. 90 tablet 3   ? metoprolol succinate (TOPROL-XL) 50 MG 24 hr tablet Take 1 tablet (50 mg total) by mouth daily. 90 tablet 1     No current facility-administered medications for this visit.       No Known Allergies     Physical Examination Review of Systems   Vitals:    06/24/19 1606   BP: (!) 86/58   Pulse: 60   Resp: 16     Body mass index is 46.33 kg/m .  Wt Readings from Last 3 Encounters:   06/24/19 (!) 320 lb (145.2 kg)   04/22/19 (!) 321 lb (145.6 kg)   02/19/19 (!) 322 lb (146.1 kg)       General Appearance:   Pleasant male, appears stated age. no acute distress, obese body habitus   ENT/Mouth: membranes moist, no apparent gingival bleeding.      EYES:  no scleral icterus, normal conjunctivae   Neck: supple   Respiratory:   lungs are clear to auscultation, no rales or wheezing, equal chest wall expansion    Cardiovascular:   Regular rhythm, normal rate. Normal first and second heart sounds with no murmurs, rubs, or gallops; Jugular venous pressure normal, trace edema bilaterally    Abdomen/GI:  Soft, non-tender   Extremities: no cyanosis or clubbing   Skin: no xanthelasma, warm.    Heme/lymph/ Immunology No apparent bleeding noted.   Neurologic: Alert and oriented. normal gait, no tremors     Psychiatric: Pleasant, calm, appropriate affect.    A complete 10 system review of systems was performed and is negative except as mentioned in the HPI or below:  General: WNL  Eyes: WNL  Ears/Nose/Throat: WNL  Lungs: WNL  Heart: WNL  Stomach: WNL  Bladder: WNL  Muscle/Joints: WNL  Skin: WNL  Nervous System: WNL  Mental Health: WNL     Blood: WNL       Past History   Past Medical History:   Past Medical History:   Diagnosis Date   ? Atrial fibrillation (H)    ? Cardiomyopathy (H)     ef 20%   ? CHF (congestive heart failure) (H)    ? Morbid obesity (H)    ? ROSALVA (obstructive sleep apnea)        Past Surgical  History:   Past Surgical History:   Procedure Laterality Date   ? CARDIAC CATHETERIZATION  08/10/2018    and right heart cath   ? CARDIOVERSION  2018    successful   ? CV CORONARY ANGIOGRAM N/A 8/10/2018    Procedure: Coronary Angiogram;  Surgeon: Ralph Powers MD;  Location: [facility] Cath Lab;  Service:    ? CV LEFT HEART CATHETERIZATION WO LEFT VETRICULOGRAM Left 8/10/2018    Procedure: Left Heart Catheterization Without Left Ventriculogram;  Surgeon: Ralph Powers MD;  Location: [facility] Cath Lab;  Service:    ? CV RIGHT HEART CATH N/A 8/10/2018    Procedure: Right Heart Catheterization;  Surgeon: Ralph Powers MD;  Location: [facility] Cath Lab;  Service:        Family History: No family history on file.     Social History:   Social History     Socioeconomic History   ? Marital status:      Spouse name: Not on file   ? Number of children: Not on file   ? Years of education: Not on file   ? Highest education level: Not on file   Occupational History   ? Not on file   Social Needs   ? Financial resource strain: Not on file   ? Food insecurity:     Worry: Not on file     Inability: Not on file   ? Transportation needs:     Medical: Not on file     Non-medical: Not on file   Tobacco Use   ? Smoking status: Former Smoker     Last attempt to quit: 2005     Years since quittin.4   ? Smokeless tobacco: Never Used   Substance and Sexual Activity   ? Alcohol use: No     Comment: former alcoholic- quit    ? Drug use: No   ? Sexual activity: Not on file   Lifestyle   ? Physical activity:     Days per week: Not on file     Minutes per session: Not on file   ? Stress: Not on file   Relationships   ? Social connections:     Talks on phone: Not on file     Gets together: Not on file     Attends Latter day service: Not on file     Active member of club or organization: Not on file     Attends meetings of clubs or organizations: Not on file     Relationship status: Not  on file   ? Intimate partner violence:     Fear of current or ex partner: Not on file     Emotionally abused: Not on file     Physically abused: Not on file     Forced sexual activity: Not on file   Other Topics Concern   ? Not on file   Social History Narrative   ? Not on file              Lab Results    Chemistry/lipid CBC Cardiac Enzymes/BNP/TSH/INR   Lab Results   Component Value Date    CHOL 167 08/08/2018    HDL 45 08/08/2018    LDLCALC 110 08/08/2018    TRIG 59 08/08/2018    CREATININE 1.08 11/02/2018    BUN 21 11/02/2018    K 4.7 11/02/2018     11/02/2018     11/02/2018    CO2 26 11/02/2018    Lab Results   Component Value Date    WBC 9.5 08/08/2018    HGB 14.8 08/08/2018    HCT 44.5 08/08/2018    MCV 96 08/08/2018     08/08/2018    Lab Results   Component Value Date    TROPONINI 0.02 08/08/2018     (H) 08/08/2018    TSH 0.33 05/17/2019    INR 1.16 (H) 08/07/2018          Tra Johansen MD  Non-invasive Cardiology  [facility]

## 2021-06-28 NOTE — PROGRESS NOTES
Progress Notes by Tra Johansen MD at 1/27/2020  3:50 PM     Author: Tra Johansen MD Service: -- Author Type: Physician    Filed: 1/27/2020  4:26 PM Encounter Date: 1/27/2020 Status: Signed    : Tra Johansen MD (Physician)           Thank you, Lisseth Dodson MD, for asking the [facility] team to see Mr. Gorge Berkowitz Jr. to Follow-up his cardiac disease.      Assessment/Recommendations   Assessment:    1. Paroxysmal atrial flutter - currently in sinus rhythm.  2. Chronic systolic heart failure - compensated on exam today.  3. Combined ischemic and tachycardia mediated cardiomyopathy - LVEF 40% on recent echo, improved from 25% previously.  4. Coronary artery disease with  of the proximal RCA filled via collaterals. Moderate disease throughout the LAD with >70% stenosis in the far distal LAD. Stable without angina.  5. Morbid obesity - working on losing weight and is having some success. I encouraged continued efforts at weight loss.  6. anticoagulation with Eliquis - tolerating Eliquis. He ultimately decided to not proceed with Watchman LUIS closure in July 2019  7. Obstructive sleep apnea - complaint with CPAP    Plan:  1. Continue medications as prescribed.  2. Encouraged continued efforts at weight loss through diet and advised he add in more exercise as tolerated.  3. Follow up in about a year or sooner if needed.         History of Present Illness   Mr. Gorge Berkowitz Jr. is a 74 y.o. male with a significant past history of morbid obesity, coronary artery disease, paroxysmal atrial flutter and tachycardia mediated cardiomyopathy with heart failure who presents for follow-up of his cardiovascular disease.     I initially saw Mr. Berkowitz when he was hospitalized in August 2018 for new onset heart failure. He was noted to be in atrial flutter and with an LVEF of 25%. Coronary angiography was performed and demonstrated significant coronary artery disease,  but not in a territory to explain his LV dysfunction.  He was cardioverted about a week later and started on amiodarone.  He has been seen in our heart failure and afib clinics and has been fairly stable overall. He recently had an echocardiogram repeated that demonstrated improvement in his LV function to 40%. He was referred to sleep medicine and received a new CPAP which he has been using regularly.    Today, Gorge reports feeling generally well. He has lost 20 lbs since I last saw him. He continues to attempt to lose weight. He does not get regular exercise but plans to start doing more in the near future. He does not report any new or worsening cardiorespiratory symptoms.    Other than noted above, Mr. Berkowitz denies any chest pain/pressure/tightness, shortness of breath at rest or with exertion, light headedness/dizziness, pre-syncope, syncope, lower extremity swelling, palpitations, paroxysmal nocturnal dyspnea (PND), or orthopnea.     Cardiac Problems and Cardiac Diagnostics     Cardiac Problem List:  1. 8/9/18 - Presented with atrial flutter and heart failure. LVEF 25%. Angiogram showed CAD but not significant enough to explain LVEF. Medical management pursued.  2. 8/16/18 - KEVIN guided cardioversion. Started on amiodarone.  3. 11/30/18 - LVEF improved to 40%.     Most Recent Cardiac testing:    ECHO (report reviewed):  Echo results:   Results for orders placed during the hospital encounter of 11/30/18   Echo Complete [ECH10] 11/30/2018    Narrative   When compared to the previous study dated 8/16/2018, there are changes   noted. Left ventricular systolic function has improved.    Left ventricle ejection fraction is moderately decreased. The estimated   left ventricular ejection fraction is 40%.    Normal left ventricular size.    Normal right ventricular size and systolic function.    Left atrial volume is moderately increased.    The aortic root is mildly dilated.        Cardiac cath: from 8/10/18  demonstrated    Prox RCA to Dist RCA lesion 100% stenosed.    Mid LAD lesion 45% stenosed.    Dist LAD lesion 65% stenosed.    Estimated blood loss was <20 ml.    The LM vessel was large.    The left circumflex was moderate.     Pt with BMI 52 with afib/flutter and EF 25%     RHC:   RA 15mmHg  PA mean 30mmHg  PCWP 24mmHg  LVEDP 27mmHg     CO 3.36 by thermo and 4.8 by santino  PA sat 61% and AO 94%     Angiography via left radial (noted loop left arm straightened with wire)  LM large and normal  LAD mid 40-50%; distal 60-70% and far distal 70-80%  Circ mild dz  RCA prox 100% with collaterals to small PDA branch     Imp/plan:  1. CM due to both ischemia and tachycardia - stop diltiazem given CM, raise metoprolol 50mg two times a day (change to succinate) titrate up for rate control, addition sprironolactone/furosemide for gentle diuresis, Treat ROSALVA (presumed present)  2. Afib/flutter - rate control, anticoagulation given age and CHF.         Medications  Allergies   Current Outpatient Medications   Medication Sig Dispense Refill   ? amiodarone (PACERONE) 200 MG tablet Take 0.5 tablets (100 mg total) by mouth daily. 45 tablet 0   ? atorvastatin (LIPITOR) 40 MG tablet Take 1 tablet (40 mg total) by mouth at bedtime. 90 tablet 1   ? co-enzyme Q-10 30 mg capsule Take 60 mg by mouth 2 (two) times a day.      ? ELIQUIS 5 mg Tab tablet Take 1 tablet (5 mg total) by mouth 2 (two) times a day. 180 tablet 0   ? furosemide (LASIX) 20 MG tablet take one tablet by mouth daily and take one tablet by mouth as needed at night for any swelling (Patient taking differently: 2 (two) times a day at 9am and 6pm. ) 100 tablet 0   ? glucosamine-chondroitin 500-400 mg cap Take 1 capsule by mouth 3 (three) times a day.     ? krill-om3-dha-epa-om6-lip-astx (KRILL OIL, OMEGA 3 AND 6,) 1,500-165-67.5 mg cap Take 3 tablets by mouth daily.     ? lisinopril (PRINIVIL,ZESTRIL) 5 MG tablet Take 1 tablet (5 mg total) by mouth at bedtime. 90 tablet 3   ?  metoprolol succinate (TOPROL-XL) 50 MG 24 hr tablet Take 0.5 tablets (25 mg total) by mouth daily. 90 tablet 1     No current facility-administered medications for this visit.       No Known Allergies     Physical Examination Review of Systems   Vitals:    01/27/20 1552   BP: 102/58   Pulse: 66   Resp: 20   SpO2: 96%     Body mass index is 45.21 kg/m .  Wt Readings from Last 3 Encounters:   01/27/20 (!) 312 lb 4 oz (141.6 kg)   06/24/19 (!) 320 lb (145.2 kg)   04/22/19 (!) 321 lb (145.6 kg)       General Appearance:   Pleasant male, appears stated age. no acute distress, obese body habitus   ENT/Mouth: membranes moist, no apparent gingival bleeding.      EYES:  no scleral icterus, normal conjunctivae   Neck: supple   Respiratory:   lungs are clear to auscultation, no rales or wheezing, equal chest wall expansion    Cardiovascular:   Regular rhythm, normal rate. Normal first and second heart sounds with no murmurs, rubs, or gallops; Jugular venous pressure normal, no edema bilaterally    Abdomen/GI:  Soft, non-tender   Extremities: no cyanosis or clubbing   Skin: Chronic venous stasis changes of BLE noted.    Heme/lymph/ Immunology No apparent bleeding noted.   Neurologic: Alert and oriented. normal gait, no tremors     Psychiatric: Pleasant, calm, appropriate affect.    A complete 10 system review of systems was performed and is negative except as mentioned in the HPI or below:  General: Weight Loss  Eyes: WNL  Ears/Nose/Throat: WNL  Lungs: WNL  Heart: WNL  Stomach: WNL  Bladder: Frequent Urination at Night  Muscle/Joints: WNL  Skin: WNL  Nervous System: WNL  Mental Health: WNL     Blood: WNL       Past History   Past Medical History:   Past Medical History:   Diagnosis Date   ? Atrial fibrillation (H)    ? Cardiomyopathy (H)     ef 20%   ? CHF (congestive heart failure) (H)    ? Morbid obesity (H)    ? ROSALVA (obstructive sleep apnea)        Past Surgical History:   Past Surgical History:   Procedure Laterality Date    ? CARDIAC CATHETERIZATION  08/10/2018    and right heart cath   ? CARDIOVERSION  08/16/2018    successful   ? CV CORONARY ANGIOGRAM N/A 8/10/2018    Procedure: Coronary Angiogram;  Surgeon: Ralph Powers MD;  Location: [facility] Cath Lab;  Service:    ? CV LEFT HEART CATHETERIZATION WO LEFT VETRICULOGRAM Left 8/10/2018    Procedure: Left Heart Catheterization Without Left Ventriculogram;  Surgeon: Ralph Powers MD;  Location: [facility] Cath Lab;  Service:    ? CV RIGHT HEART CATH N/A 8/10/2018    Procedure: Right Heart Catheterization;  Surgeon: Ralph Powers MD;  Location: [facility] Cath Lab;  Service:        Family History: History reviewed. No pertinent family history. reviewed. Family history not contributory to presenting chief complaint    Social History:   Social History     Socioeconomic History   ? Marital status:      Spouse name: Not on file   ? Number of children: Not on file   ? Years of education: Not on file   ? Highest education level: Not on file   Occupational History   ? Not on file   Social Needs   ? Financial resource strain: Not on file   ? Food insecurity:     Worry: Not on file     Inability: Not on file   ? Transportation needs:     Medical: Not on file     Non-medical: Not on file   Tobacco Use   ? Smoking status: Former Smoker     Last attempt to quit: 1/1/2005     Years since quitting: 15.0   ? Smokeless tobacco: Never Used   Substance and Sexual Activity   ? Alcohol use: No     Comment: former alcoholic- quit 2003   ? Drug use: No   ? Sexual activity: Not on file   Lifestyle   ? Physical activity:     Days per week: Not on file     Minutes per session: Not on file   ? Stress: Not on file   Relationships   ? Social connections:     Talks on phone: Not on file     Gets together: Not on file     Attends Bahai service: Not on file     Active member of club or organization: Not on file     Attends meetings of clubs or organizations: Not on file      Relationship status: Not on file   ? Intimate partner violence:     Fear of current or ex partner: Not on file     Emotionally abused: Not on file     Physically abused: Not on file     Forced sexual activity: Not on file   Other Topics Concern   ? Not on file   Social History Narrative   ? Not on file              Lab Results    Chemistry/lipid CBC Cardiac Enzymes/BNP/TSH/INR   Lab Results   Component Value Date    CHOL 167 08/08/2018    HDL 45 08/08/2018    LDLCALC 110 08/08/2018    TRIG 59 08/08/2018    CREATININE 1.08 11/02/2018    BUN 21 11/02/2018    K 4.7 11/02/2018     11/02/2018     11/02/2018    CO2 26 11/02/2018    Lab Results   Component Value Date    WBC 9.5 08/08/2018    HGB 14.8 08/08/2018    HCT 44.5 08/08/2018    MCV 96 08/08/2018     08/08/2018    Lab Results   Component Value Date    TROPONINI 0.02 08/08/2018     (H) 08/08/2018    TSH <0.01 (L) 12/09/2019    INR 1.16 (H) 08/07/2018

## 2021-06-30 NOTE — PROGRESS NOTES
Progress Notes by Tra Johansen MD at 3/19/2021  1:30 PM     Author: Tra Johansen MD Service: -- Author Type: Physician    Filed: 3/19/2021  1:59 PM Encounter Date: 3/19/2021 Status: Signed    : Tra Johansen MD (Physician)           Thank you, Lisseth Dodson MD, for asking the [facility] team to see Mr. Gorge Berkowitz Jr. to Follow-up his cardiac disease.      Assessment/Recommendations   Assessment:    Paroxysmal atrial flutter - currently in sinus rhythm.  Chronic systolic heart failure - appears compensated today.  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 Moderate disease throughout the LAD with >70% stenosis in the far distal LAD. Stable without angina.  Morbid obesity - uncontrolled. Activity is limited by arthritic pain.  anticoagulation with Eliquis - tolerating Eliquis. He ultimately decided to not proceed with Watchman LUIS closure in July 2019  Obstructive sleep apnea - complaint with CPAP    Plan:  Continue medications as prescribed and without changes  Encouraged activity as tolerated, understanding he has significant limitations with chronic knee pain.   Follow up in 1 year or sooner if needed.         History of Present Illness   Mr. Gorge Berkowitz Jr. is a 76 y.o. male with a significant past history of morbid obesity, coronary artery disease, paroxysmal atrial flutter and tachycardia mediated cardiomyopathy with heart failure who presents for follow-up of his cardiovascular disease.     I initially saw Mr. Berkowitz when he was hospitalized in August 2018 for new onset heart failure. He was noted to be in atrial flutter and with an LVEF of 25%. Coronary angiography was performed and demonstrated significant coronary artery disease, but not in a territory to explain his LV dysfunction.  He was cardioverted about a week later and started on  amiodarone. He had an echocardiogram repeated in 11/2018 that demonstrated improvement in his LV function to 40%. He was referred to sleep medicine and received a new CPAP which he has been using regularly.    About a month ago during a phone visit, Mr. Berkowitz reported progressive LE edema. He was referred for lymphedema management by PT. He reports having lost about 10 lbs in the past month. No shortness of breath. Wearing tubigrip stockings daily with improvement in LE edema. Most of his issues center around chronic knee pain from arthritis.    Other than noted above, Mr. Berkowitz denies any chest pain/pressure/tightness, shortness of breath at rest or with exertion, light headedness/dizziness, pre-syncope, syncope, lower extremity swelling, palpitations, paroxysmal nocturnal dyspnea (PND), or orthopnea.     Cardiac Problems and Cardiac Diagnostics     Cardiac Problem List:  8/9/18 - Presented with atrial flutter and heart failure. LVEF 25%. Angiogram showed CAD but not significant enough to explain LVEF. Medical management pursued.  8/16/18 - KEVIN guided cardioversion. Started on amiodarone.  11/30/18 - LVEF improved to 40%.     Most Recent Cardiac testing:    ECHO (report reviewed):  Echo results:   Results for orders placed during the hospital encounter of 11/30/18   Echo Complete [ECH10] 11/30/2018    Narrative   When compared to the previous study dated 8/16/2018, there are changes   noted. Left ventricular systolic function has improved.    Left ventricle ejection fraction is moderately decreased. The estimated   left ventricular ejection fraction is 40%.    Normal left ventricular size.    Normal right ventricular size and systolic function.    Left atrial volume is moderately increased.    The aortic root is mildly dilated.        Cardiac cath: from 8/10/18 demonstrated  Prox RCA to Dist RCA lesion 100% stenosed.  Mid LAD lesion 45% stenosed.  Dist LAD lesion 65% stenosed.  Estimated blood loss was <20  ml.  The LM vessel was large.  The left circumflex was moderate.     Pt with BMI 52 with afib/flutter and EF 25%     RHC:   RA 15mmHg  PA mean 30mmHg  PCWP 24mmHg  LVEDP 27mmHg     CO 3.36 by thermo and 4.8 by santino  PA sat 61% and AO 94%     Angiography via left radial (noted loop left arm straightened with wire)  LM large and normal  LAD mid 40-50%; distal 60-70% and far distal 70-80%  Circ mild dz  RCA prox 100% with collaterals to small PDA branch     Imp/plan:  1. CM due to both ischemia and tachycardia - stop diltiazem given CM, raise metoprolol 50mg two times a day (change to succinate) titrate up for rate control, addition sprironolactone/furosemide for gentle diuresis, Treat ROSALVA (presumed present)  2. Afib/flutter - rate control, anticoagulation given age and CHF.         Medications  Allergies   Current Outpatient Medications   Medication Sig Dispense Refill     acetaminophen (TYLENOL) 500 MG tablet Take 1,000 mg by mouth every 4 (four) hours as needed.       amiodarone (PACERONE) 200 MG tablet Take 0.5 tablets (100 mg total) by mouth daily. 45 tablet 0     atorvastatin (LIPITOR) 40 MG tablet Take 1 tablet (40 mg total) by mouth at bedtime. 90 tablet 1     co-enzyme Q-10 30 mg capsule Take 60 mg by mouth daily.        ELIQUIS 5 mg Tab tablet Take 1 tablet (5 mg total) by mouth 2 (two) times a day. 180 tablet 0     furosemide (LASIX) 20 MG tablet take one tablet by mouth daily and take one tablet by mouth as needed at night for any swelling (Patient taking differently: Take 20 mg by mouth 2 (two) times a day at 9am and 6pm. ) 100 tablet 0     glucosamine-chondroitin 500-400 mg cap Take 1 capsule by mouth daily.        krill-om3-dha-epa-om6-lip-astx (KRILL OIL, OMEGA 3 AND 6,) 1,500-165-67.5 mg cap Take 3 capsules by mouth daily.        lisinopril (PRINIVIL,ZESTRIL) 5 MG tablet Take 1 tablet (5 mg total) by mouth at bedtime. 90 tablet 3     metoprolol succinate (TOPROL-XL) 50 MG 24 hr tablet Take 0.5 tablets  (25 mg total) by mouth daily. 90 tablet 1     psyllium (METAMUCIL) Pack packet Take 1 packet by mouth daily as needed.       No current facility-administered medications for this visit.       No Known Allergies     Physical Examination Review of Systems   Vitals:    03/19/21 1335   BP: 100/50   Pulse: 66   Resp: 14     Body mass index is 44.01 kg/m .  Wt Readings from Last 3 Encounters:   03/19/21 (!) 304 lb (137.9 kg)   01/27/20 (!) 312 lb 4 oz (141.6 kg)   06/24/19 (!) 320 lb (145.2 kg)       General Appearance:   Pleasant male, appears stated age. no acute distress, obese body habitus   ENT/Mouth: membranes moist, no apparent gingival bleeding.      EYES:  no scleral icterus, normal conjunctivae   Neck: supple   Respiratory:   lungs are clear to auscultation, no rales or wheezing, equal chest wall expansion    Cardiovascular:   Regular rhythm, normal rate. Normal first and second heart sounds with no murmurs, rubs, or gallops; Jugular venous pressure normal, no edema bilaterally    Abdomen/GI:  Soft, non-tender   Extremities: no cyanosis or clubbing   Skin: Chronic venous stasis changes of BLE noted.    Heme/lymph/ Immunology No apparent bleeding noted.   Neurologic: Alert and oriented. normal gait, no tremors     Psychiatric: Pleasant, calm, appropriate affect.    A complete 10 system review of systems was performed and is negative except as mentioned in the HPI or below:  General: Night Sweats, Weight Loss  Eyes: WNL  Ears/Nose/Throat: WNL  Lungs: WNL  Heart: Leg Swelling  Stomach: WNL  Bladder: WNL  Muscle/Joints: WNL  Skin: WNL  Nervous System: Daytime Sleepiness  Mental Health: Depression     Blood: WNL       Past History   Past Medical History:   Past Medical History:   Diagnosis Date     Atrial fibrillation (H)      Cardiomyopathy (H)     ef 20%     CHF (congestive heart failure) (H)      Morbid obesity (H)      ROSALVA (obstructive sleep apnea)        Past Surgical History:   Past Surgical History:    Procedure Laterality Date     CARDIAC CATHETERIZATION  08/10/2018    and right heart cath     CARDIOVERSION  2018    successful     CV CORONARY ANGIOGRAM N/A 8/10/2018    Procedure: Coronary Angiogram;  Surgeon: Ralph Powers MD;  Location: [facility] Lab;  Service:      CV LEFT HEART CATHETERIZATION WO LEFT VETRICULOGRAM Left 8/10/2018    Procedure: Left Heart Catheterization Without Left Ventriculogram;  Surgeon: Ralph Powers MD;  Location: [facility] Cath Lab;  Service:      CV RIGHT HEART CATH N/A 8/10/2018    Procedure: Right Heart Catheterization;  Surgeon: Ralph Powers MD;  Location: [facility] Lab;  Service:        Family History: History reviewed. No pertinent family history. reviewed. Family history not contributory to presenting chief complaint    Social History:   Social History     Socioeconomic History     Marital status:      Spouse name: Not on file     Number of children: Not on file     Years of education: Not on file     Highest education level: Not on file   Occupational History     Not on file   Social Needs     Financial resource strain: Not on file     Food insecurity     Worry: Not on file     Inability: Not on file     Transportation needs     Medical: Not on file     Non-medical: Not on file   Tobacco Use     Smoking status: Former Smoker     Quit date: 2005     Years since quittin.2     Smokeless tobacco: Never Used   Substance and Sexual Activity     Alcohol use: No     Comment: former alcoholic- quit      Drug use: No     Sexual activity: Not on file   Lifestyle     Physical activity     Days per week: Not on file     Minutes per session: Not on file     Stress: Not on file   Relationships     Social connections     Talks on phone: Not on file     Gets together: Not on file     Attends Anabaptism service: Not on file     Active member of club or organization: Not on file     Attends meetings of clubs or organizations:  Not on file     Relationship status: Not on file     Intimate partner violence     Fear of current or ex partner: Not on file     Emotionally abused: Not on file     Physically abused: Not on file     Forced sexual activity: Not on file   Other Topics Concern     Not on file   Social History Narrative     Not on file              Lab Results    Chemistry/lipid CBC Cardiac Enzymes/BNP/TSH/INR   Lab Results   Component Value Date    CHOL 167 08/08/2018    HDL 45 08/08/2018    LDLCALC 110 08/08/2018    TRIG 59 08/08/2018    CREATININE 0.99 11/02/2020    BUN 21 11/02/2020    K 4.6 11/02/2020     11/02/2020     11/02/2020    CO2 26 11/02/2020    Lab Results   Component Value Date    WBC 4.8 11/02/2020    HGB 12.8 (L) 11/02/2020    HCT 39.9 (L) 11/02/2020     (H) 11/02/2020     11/02/2020    Lab Results   Component Value Date    TROPONINI 0.02 08/08/2018     (H) 08/08/2018    TSH <0.01 (L) 12/09/2019    INR 1.16 (H) 08/07/2018

## 2021-06-30 NOTE — PROGRESS NOTES
"Progress Notes by Tra Johansen MD at 2/12/2021  2:30 PM     Author: rTa Johansen MD Service: -- Author Type: Physician    Filed: 2/12/2021  4:36 PM Encounter Date: 2/12/2021 Status: Signed    : Tra Johansen MD (Physician)           The patient has been notified of following:     \"This telephone visit will be conducted via a call between you and your physician/provider. We have found that certain health care needs can be provided without the need for a physical exam.  This service lets us provide the care you need with a phone conversation.  If a prescription is necessary we can send it directly to your pharmacy.  If lab work is needed we can place an order for that and you can then stop by our lab to have the test done at a later time. If during the course of the call the physician/provider feels a telephone visit is not appropriate, you will not be charged for this service.\" Verbal consent has been obtained for this service by care team member:         HEART CARE PHONE ENCOUNTER        The patient has chosen to have the visit conducted as a telephone visit, to reduce risk of exposure given the current status of Coronavirus in our community. This telephone visit is being conducted via a call between the patient and physician/provider. Health care needs are being provided without a physical exam.     Assessment/Recommendations   Assessment:    Chronic systolic congestive heart failure. Might be a little decompensated due to the history of worsening LE edema, or the LE edema may be more due to chronic venous insufficiency and lymphedema from his obesity.  Lymphedema of both lower extremities with reported poor wound healing and occasional ulcer formation.  Coronary artery disease - stable without reported angina  Tachycardia mediated cardiomyopathy - last LVEF of 40% in 2019.  Morbid obesity - uncontrolled. Weight remains around 300-310 lbs.    Plan:  Continue medications as " prescribed  Refer to PT/OT for lymphedema management of his bilateral lower extremities.  Follow up in 4-6 weeks in HF clinic - in person to assess LE edema. He also likely needs surveillance lab work for chronic amiodarone therapy.  Follow up with me in 6 months.    I have reviewed the note as documented.  This accurately captures the substance of my conversation with the patient.    Total time of call between patient and provider was 14 minutes   Start Time: 1430   Stop Time: 1444       History of Present Illness/Subjective    Gorge Berkowitz Jr. is a 76 y.o. male who is being evaluated via a billable telephone visit.      Mr. Berkowitz developed new onset congestive heart failure in August 2018 which was due to atrial flutter.  He had a nonischemic cardiomyopathy with an LVEF 25%.  He does have coronary artery disease but not substantial enough to explain a decline in LV function.  He was cardioverted back to normal rhythm and is on amiodarone for rhythm control.  He is also taking Eliquis for stroke prevention for his A. fib.  He has chronic lower extremity edema that he says has worsened over the past several weeks to months.  He will occasionally get wounds that take quite a while to heal.  He finds it difficult to wrap his legs or have someone come in to change the wrapping for him because he is unable to do it himself.  He also is a difficult to elevate his legs to a point to try and mobilize the fluid to drain it from his lower extremities.  He does take furosemide twice daily and notes a robust urinary response with each dose.  He denies significant dyspnea on exertion or anginal chest pain.  No PND, orthopnea, or presyncope.  His weight has been stable around 306 pounds but has noticed an increase to 310 pounds today however he notes that he has not had a bowel movement in approximately 4 or 5 days.    I have reviewed and updated the patient's Past Medical History, Social History, Family History and  Medication List.     Physical Examination not performed given phone encounter Review of Systems                                                Medical History  Surgical History Family History Social History   Past Medical History:   Diagnosis Date     Atrial fibrillation (H)      Cardiomyopathy (H)     ef 20%     CHF (congestive heart failure) (H)      Morbid obesity (H)      ROSALVA (obstructive sleep apnea)     Past Surgical History:   Procedure Laterality Date     CARDIAC CATHETERIZATION  08/10/2018    and right heart cath     CARDIOVERSION  2018    successful     CV CORONARY ANGIOGRAM N/A 8/10/2018    Procedure: Coronary Angiogram;  Surgeon: Ralph Powers MD;  Location: [facility] Lab;  Service:      CV LEFT HEART CATHETERIZATION WO LEFT VETRICULOGRAM Left 8/10/2018    Procedure: Left Heart Catheterization Without Left Ventriculogram;  Surgeon: Ralph Powers MD;  Location: [facility] Lab;  Service:      CV RIGHT HEART CATH N/A 8/10/2018    Procedure: Right Heart Catheterization;  Surgeon: Ralph Powers MD;  Location: [facility] Lab;  Service:     No family history on file. Social History     Socioeconomic History     Marital status:      Spouse name: Not on file     Number of children: Not on file     Years of education: Not on file     Highest education level: Not on file   Occupational History     Not on file   Social Needs     Financial resource strain: Not on file     Food insecurity     Worry: Not on file     Inability: Not on file     Transportation needs     Medical: Not on file     Non-medical: Not on file   Tobacco Use     Smoking status: Former Smoker     Quit date: 2005     Years since quittin.1     Smokeless tobacco: Never Used   Substance and Sexual Activity     Alcohol use: No     Comment: former alcoholic- quit      Drug use: No     Sexual activity: Not on file   Lifestyle     Physical activity     Days per week: Not on file      Minutes per session: Not on file     Stress: Not on file   Relationships     Social connections     Talks on phone: Not on file     Gets together: Not on file     Attends Church service: Not on file     Active member of club or organization: Not on file     Attends meetings of clubs or organizations: Not on file     Relationship status: Not on file     Intimate partner violence     Fear of current or ex partner: Not on file     Emotionally abused: Not on file     Physically abused: Not on file     Forced sexual activity: Not on file   Other Topics Concern     Not on file   Social History Narrative     Not on file          Medications  Allergies   Current Outpatient Medications   Medication Sig Dispense Refill     acetaminophen (TYLENOL) 500 MG tablet Take 1,000 mg by mouth every 4 (four) hours as needed.       amiodarone (PACERONE) 200 MG tablet Take 0.5 tablets (100 mg total) by mouth daily. 45 tablet 0     atorvastatin (LIPITOR) 40 MG tablet Take 1 tablet (40 mg total) by mouth at bedtime. 90 tablet 1     co-enzyme Q-10 30 mg capsule Take 60 mg by mouth daily.        ELIQUIS 5 mg Tab tablet Take 1 tablet (5 mg total) by mouth 2 (two) times a day. 180 tablet 0     furosemide (LASIX) 20 MG tablet take one tablet by mouth daily and take one tablet by mouth as needed at night for any swelling (Patient taking differently: Take 20 mg by mouth 2 (two) times a day at 9am and 6pm. ) 100 tablet 0     glucosamine-chondroitin 500-400 mg cap Take 1 capsule by mouth daily.        krill-om3-dha-epa-om6-lip-astx (KRILL OIL, OMEGA 3 AND 6,) 1,500-165-67.5 mg cap Take 3 capsules by mouth daily.        lisinopril (PRINIVIL,ZESTRIL) 5 MG tablet Take 1 tablet (5 mg total) by mouth at bedtime. 90 tablet 3     metoprolol succinate (TOPROL-XL) 50 MG 24 hr tablet Take 0.5 tablets (25 mg total) by mouth daily. 90 tablet 1     psyllium (METAMUCIL) Pack packet Take 1 packet by mouth daily as needed.       No current facility-administered  medications for this visit.     No Known Allergies      Lab Results    Chemistry/lipid CBC Cardiac Enzymes/BNP/TSH/INR   Lab Results   Component Value Date    CHOL 167 08/08/2018    HDL 45 08/08/2018    LDLCALC 110 08/08/2018    TRIG 59 08/08/2018    CREATININE 0.99 11/02/2020    BUN 21 11/02/2020    K 4.6 11/02/2020     11/02/2020     11/02/2020    CO2 26 11/02/2020    Lab Results   Component Value Date    WBC 4.8 11/02/2020    HGB 12.8 (L) 11/02/2020    HCT 39.9 (L) 11/02/2020     (H) 11/02/2020     11/02/2020    Lab Results   Component Value Date    TROPONINI 0.02 08/08/2018     (H) 08/08/2018    TSH <0.01 (L) 12/09/2019    INR 1.16 (H) 08/07/2018

## 2022-01-18 VITALS
HEART RATE: 66 BPM | OXYGEN SATURATION: 96 % | DIASTOLIC BLOOD PRESSURE: 58 MMHG | WEIGHT: 312.25 LBS | SYSTOLIC BLOOD PRESSURE: 102 MMHG | RESPIRATION RATE: 20 BRPM | BODY MASS INDEX: 45.21 KG/M2

## 2022-01-18 VITALS
WEIGHT: 297 LBS | TEMPERATURE: 98.4 F | HEIGHT: 69 IN | OXYGEN SATURATION: 98 % | HEART RATE: 58 BPM | RESPIRATION RATE: 18 BRPM | DIASTOLIC BLOOD PRESSURE: 66 MMHG | SYSTOLIC BLOOD PRESSURE: 109 MMHG | BODY MASS INDEX: 43.99 KG/M2

## 2022-01-18 VITALS
OXYGEN SATURATION: 97 % | WEIGHT: 295.4 LBS | BODY MASS INDEX: 43.75 KG/M2 | RESPIRATION RATE: 18 BRPM | HEART RATE: 48 BPM | SYSTOLIC BLOOD PRESSURE: 92 MMHG | RESPIRATION RATE: 18 BRPM | DIASTOLIC BLOOD PRESSURE: 71 MMHG | TEMPERATURE: 98 F | BODY MASS INDEX: 43.66 KG/M2 | DIASTOLIC BLOOD PRESSURE: 57 MMHG | SYSTOLIC BLOOD PRESSURE: 118 MMHG | HEART RATE: 56 BPM | OXYGEN SATURATION: 97 % | WEIGHT: 294.8 LBS | TEMPERATURE: 97.2 F | HEIGHT: 69 IN | HEIGHT: 69 IN

## 2022-01-18 VITALS
DIASTOLIC BLOOD PRESSURE: 50 MMHG | RESPIRATION RATE: 14 BRPM | HEIGHT: 70 IN | SYSTOLIC BLOOD PRESSURE: 100 MMHG | BODY MASS INDEX: 43.52 KG/M2 | WEIGHT: 304 LBS | HEART RATE: 66 BPM

## 2022-01-27 ENCOUNTER — TELEPHONE (OUTPATIENT)
Dept: CARDIOLOGY | Facility: CLINIC | Age: 77
End: 2022-01-27
Payer: COMMERCIAL

## 2022-01-27 NOTE — TELEPHONE ENCOUNTER
Pt calling in received lab letter for his amiodarone  Pt calling telling me he cannot come in knees are bad will not get his lab work unless a doctor wants to come to his home to get it done  Also informed me he spoke to his own Dr Dr Scott who told pt he did not need labs are good   I called clinic and they told me no labs done  I reviewed our protocol for amio labs with pt told he I will check with him in late spring if he is feeling better and able I will send letter for them to be done     I

## 2022-03-21 DIAGNOSIS — G47.33 OBSTRUCTIVE SLEEP APNEA (ADULT) (PEDIATRIC): Primary | ICD-10-CM

## 2022-10-18 ENCOUNTER — APPOINTMENT (OUTPATIENT)
Dept: CT IMAGING | Facility: HOSPITAL | Age: 77
End: 2022-10-18
Attending: EMERGENCY MEDICINE
Payer: COMMERCIAL

## 2022-10-18 ENCOUNTER — HOSPITAL ENCOUNTER (EMERGENCY)
Facility: HOSPITAL | Age: 77
Discharge: HOME | End: 2022-10-19
Attending: EMERGENCY MEDICINE | Admitting: EMERGENCY MEDICINE
Payer: COMMERCIAL

## 2022-10-18 ENCOUNTER — APPOINTMENT (OUTPATIENT)
Dept: RADIOLOGY | Facility: HOSPITAL | Age: 77
End: 2022-10-18
Attending: EMERGENCY MEDICINE
Payer: COMMERCIAL

## 2022-10-18 VITALS
WEIGHT: 285 LBS | SYSTOLIC BLOOD PRESSURE: 111 MMHG | HEART RATE: 94 BPM | OXYGEN SATURATION: 98 % | BODY MASS INDEX: 42.09 KG/M2 | RESPIRATION RATE: 18 BRPM | TEMPERATURE: 98.1 F | DIASTOLIC BLOOD PRESSURE: 79 MMHG

## 2022-10-18 DIAGNOSIS — M54.50 BILATERAL LOW BACK PAIN WITHOUT SCIATICA, UNSPECIFIED CHRONICITY: ICD-10-CM

## 2022-10-18 DIAGNOSIS — M25.562 PAIN IN BOTH KNEES, UNSPECIFIED CHRONICITY: ICD-10-CM

## 2022-10-18 DIAGNOSIS — K64.9 HEMORRHOIDS, UNSPECIFIED HEMORRHOID TYPE: ICD-10-CM

## 2022-10-18 DIAGNOSIS — E27.8 ADRENAL MASS (H): ICD-10-CM

## 2022-10-18 DIAGNOSIS — I71.40 ABDOMINAL AORTIC ANEURYSM (H): ICD-10-CM
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
## 2022-10-18 DIAGNOSIS — M25.561 PAIN IN BOTH KNEES, UNSPECIFIED CHRONICITY: ICD-10-CM

## 2022-10-18 LAB
ABO/RH(D): NORMAL
ALBUMIN SERPL BCG-MCNC: 3.7 G/DL (ref 3.5–5.2)
ALP SERPL-CCNC: 90 U/L (ref 40–129)
ALT SERPL W P-5'-P-CCNC: 16 U/L (ref 10–50)
ANION GAP SERPL CALCULATED.3IONS-SCNC: 15 MMOL/L (ref 7–15)
ANTIBODY SCREEN: NEGATIVE
APTT PPP: 30 SECONDS (ref 22–38)
AST SERPL W P-5'-P-CCNC: 20 U/L (ref 10–50)
BILIRUB DIRECT SERPL-MCNC: <0.2 MG/DL (ref 0–0.3)
BILIRUB SERPL-MCNC: 0.5 MG/DL
BUN SERPL-MCNC: 22.2 MG/DL (ref 8–23)
CALCIUM SERPL-MCNC: 8.9 MG/DL (ref 8.8–10.2)
CHLORIDE SERPL-SCNC: 105 MMOL/L (ref 98–107)
CREAT SERPL-MCNC: 1.13 MG/DL (ref 0.67–1.17)
DEPRECATED HCO3 PLAS-SCNC: 21 MMOL/L (ref 22–29)
ERYTHROCYTE [DISTWIDTH] IN BLOOD BY AUTOMATED COUNT: 14.4 % (ref 10–15)
GFR SERPL CREATININE-BSD FRML MDRD: 68 ML/MIN/1.73M2
GLUCOSE SERPL-MCNC: 92 MG/DL (ref 70–99)
HCT VFR BLD AUTO: 38 % (ref 40–53)
HGB BLD-MCNC: 12.5 G/DL (ref 13.3–17.7)
INR PPP: 1.25 (ref 0.85–1.15)
MAGNESIUM SERPL-MCNC: 2 MG/DL (ref 1.7–2.3)
MCH RBC QN AUTO: 33.9 PG (ref 26.5–33)
MCHC RBC AUTO-ENTMCNC: 32.9 G/DL (ref 31.5–36.5)
MCV RBC AUTO: 103 FL (ref 78–100)
PLATELET # BLD AUTO: 138 10E3/UL (ref 150–450)
POTASSIUM SERPL-SCNC: 4 MMOL/L (ref 3.4–5.3)
PROT SERPL-MCNC: 7 G/DL (ref 6.4–8.3)
RBC # BLD AUTO: 3.69 10E6/UL (ref 4.4–5.9)
SODIUM SERPL-SCNC: 141 MMOL/L (ref 136–145)
SPECIMEN EXPIRATION DATE: NORMAL
WBC # BLD AUTO: 6.1 10E3/UL (ref 4–11)

## 2022-10-18 PROCEDURE — 86850 RBC ANTIBODY SCREEN: CPT | Performed by: EMERGENCY MEDICINE

## 2022-10-18 PROCEDURE — 258N000003 HC RX IP 258 OP 636: Performed by: EMERGENCY MEDICINE

## 2022-10-18 PROCEDURE — 250N000011 HC RX IP 250 OP 636: Performed by: EMERGENCY MEDICINE

## 2022-10-18 PROCEDURE — 73562 X-RAY EXAM OF KNEE 3: CPT | Mod: LT

## 2022-10-18 PROCEDURE — 80053 COMPREHEN METABOLIC PANEL: CPT | Performed by: EMERGENCY MEDICINE

## 2022-10-18 PROCEDURE — 85014 HEMATOCRIT: CPT | Performed by: EMERGENCY MEDICINE

## 2022-10-18 PROCEDURE — 83735 ASSAY OF MAGNESIUM: CPT | Performed by: EMERGENCY MEDICINE

## 2022-10-18 PROCEDURE — 99285 EMERGENCY DEPT VISIT HI MDM: CPT | Mod: 25

## 2022-10-18 PROCEDURE — 96361 HYDRATE IV INFUSION ADD-ON: CPT

## 2022-10-18 PROCEDURE — 85610 PROTHROMBIN TIME: CPT | Performed by: EMERGENCY MEDICINE

## 2022-10-18 PROCEDURE — 82248 BILIRUBIN DIRECT: CPT | Performed by: EMERGENCY MEDICINE

## 2022-10-18 PROCEDURE — 86901 BLOOD TYPING SEROLOGIC RH(D): CPT | Performed by: EMERGENCY MEDICINE

## 2022-10-18 PROCEDURE — 36415 COLL VENOUS BLD VENIPUNCTURE: CPT | Performed by: EMERGENCY MEDICINE

## 2022-10-18 PROCEDURE — 74177 CT ABD & PELVIS W/CONTRAST: CPT

## 2022-10-18 PROCEDURE — 250N000013 HC RX MED GY IP 250 OP 250 PS 637: Performed by: EMERGENCY MEDICINE

## 2022-10-18 PROCEDURE — 85730 THROMBOPLASTIN TIME PARTIAL: CPT | Performed by: EMERGENCY MEDICINE

## 2022-10-18 PROCEDURE — 96360 HYDRATION IV INFUSION INIT: CPT | Mod: 59

## 2022-10-18 PROCEDURE — 72131 CT LUMBAR SPINE W/O DYE: CPT

## 2022-10-18 RX ORDER — IOPAMIDOL 755 MG/ML
100 INJECTION, SOLUTION INTRAVASCULAR ONCE
Status: COMPLETED | OUTPATIENT
Start: 2022-10-18 | End: 2022-10-18

## 2022-10-18 RX ORDER — IBUPROFEN 600 MG/1
600 TABLET, FILM COATED ORAL ONCE
Status: COMPLETED | OUTPATIENT
Start: 2022-10-18 | End: 2022-10-18

## 2022-10-18 RX ORDER — ACETAMINOPHEN 325 MG/1
975 TABLET ORAL ONCE
Status: COMPLETED | OUTPATIENT
Start: 2022-10-18 | End: 2022-10-18

## 2022-10-18 RX ADMIN — IOPAMIDOL 100 ML: 755 INJECTION, SOLUTION INTRAVENOUS at 15:21

## 2022-10-18 RX ADMIN — SODIUM CHLORIDE 1000 ML: 9 INJECTION, SOLUTION INTRAVENOUS at 14:39

## 2022-10-18 RX ADMIN — IBUPROFEN 600 MG: 600 TABLET, FILM COATED ORAL at 16:26

## 2022-10-18 RX ADMIN — ACETAMINOPHEN 975 MG: 325 TABLET, FILM COATED ORAL at 16:26

## 2022-10-18 ASSESSMENT — ENCOUNTER SYMPTOMS
FACIAL ASYMMETRY: 0
COUGH: 0
SHORTNESS OF BREATH: 0
FEVER: 0
ARTHRALGIAS: 1
DIZZINESS: 0
ABDOMINAL PAIN: 0
ANAL BLEEDING: 1
HEMATURIA: 0
HEADACHES: 0
RECTAL PAIN: 0
DYSURIA: 0
WEAKNESS: 0
CONFUSION: 0
BACK PAIN: 1

## 2022-10-18 ASSESSMENT — ACTIVITIES OF DAILY LIVING (ADL)
ADLS_ACUITY_SCORE: 37

## 2022-10-18 NOTE — ED PROVIDER NOTES
Emergency Department Midlevel Supervisory Note     I personally saw the patient and performed a substantive portion of the visit including all aspects of the medical decision making.    Impression:  1. Fall, initial encounter    2. Pain in both knees, unspecified chronicity    3. Bilateral low back pain without sciatica, unspecified chronicity    4. Abdominal aortic aneurysm    5. Adrenal mass (H)    6. Hemorrhoids, unspecified hemorrhoid type            MDM / ED Course:  77-year-old male presented to the ED for evaluation of rectal bleeding that has been ongoing for last 10 days as well as a fall that occurred today where he injured his left knee and back.  The patient denies any associated abdominal pain or rectal pain with the bleeding.  In the ED the patient was noted to mildly hypotensive with a blood pressure of 94/50.  He was also slightly tachycardic with a heart rate of 112.  Reviewing the patient's record shows that his systolic pressures typically in the 90s at baseline.  The patient did not appear to be in any obvious distress or discomfort at the time of my evaluation.  The physical exam performed by the PA revealed a few internal hemorrhoids.    CBC including hemoglobin were normal.  CMP was also unremarkable.  CT scan of the abdomen shows an adrenal nodule for which an outpatient dedicated CT scan was recommended.  No other abnormalities were noted on the abdominal CT scan.      CT scan of the lumbar spine as well as x-rays of the left knee did not show evidence of acute traumatic injury.      After receiving ibuprofen and Tylenol here in the ED for pain control she was able to ambulate without difficulty or assistance.  The patient was then transferred back home by EMS.  The patient was instructed to follow-up with his primary care physician for reevaluation or to return back to ED sooner for any worsening rectal bleeding, repeat falls, abdominal pain, fevers, or any other new or concerning  symptoms      2:44 PM Enedina Zhou PA-C staffed patient with me. I agree with their assessment and plan of management, and I will see the patient.  3:30 PM I met with the patient to introduce myself, gather additional history, perform my initial exam, and discuss the plan.     PPE: Provider wore gloves, N95 mask, eye protection.    Brief HPI:     Gorge Berkowitz Jr. is a 77 year old male who presents for evaluation of rectal bleeding and mechanical fall.      Patient states being unable to lift his left leg this morning going up the stairs. Patient ambulates with the aid of a cane. He notes that after some trying to lift his left leg his hand eventually slipped on his  on the cane causing the patient to suffer a mechanical fall landing in a seated position. He denies having hit his head or suffered any loss of consciousness. He is on blood thinners. Patient states he was unable to get up on his own and needed assistance from EMS. Patient endorses left knee pain with movement but denies any other pain.      Per patient he additionally is concerned stating he has been experiencing rectal bleeding for the past 10 days. Patient notes the blood is bright red in color with occasional clots and he reports a history of internal hemorrhoids. Patient states the blood is not mixed with stool and he experienced some constipation a few days ago. He notes now being able to pass gas and some blood but denies any stool. He does report having had this in the past but states it has never bleed for this long. He denies any rectal pain or pain with bearing down.    I, Nadja Finley, am serving as a scribe to document services personally performed by Dr. Roverto Dye, based on my observations and the provider's statements to me.   I, Dr. Roverto Dye, attest that Nadja Finley was acting in a scribe capacity, has observed my performance of the services and has documented them in accordance with my direction.    Brief Physical  Exam:  Constitutional:  Alert, in no acute distress  GI: Soft, nondistended, nontender, no palpable masses, no rebound, no guarding.     Labs and Imaging:  Results for orders placed or performed during the hospital encounter of 10/18/22   Lumbar spine CT w/o contrast    Impression    IMPRESSION:  1.  No acute fracture or posttraumatic subluxation.  2.  Chronic appearing L2 superior endplate compression deformity.  3.  Moderate to severe spinal canal stenosis at L4-L5.  4.  No additional high-grade spinal canal or neural foraminal stenosis.   XR Knee Left 3 Views    Impression    IMPRESSION: Postoperative changes total knee arthroplasty and patellar resurfacing on the left. Components appear well-seated. No significant effusion. The right knee is negative for fracture but there is significant degenerative narrowing of the medial   compartment with near bone-on-bone contact. Additional degenerative change right patellofemoral compartment. No effusion.   CT Abdomen Pelvis w Contrast    Impression    IMPRESSION:     1.  No acute abnormality or specific cause of rectal bleeding identified.    2.  Incidental 2.1 cm right adrenal gland nodule. If prior imaging is not available for comparison, recommend adrenal CT for further evaluation per guidelines below, on a nonemergent basis.    3.  Bilobed infrarenal abdominal aortic aneurysm, measuring up to 3.3 cm. Follow-up guidelines below.    4.  See separately dictated lumbar spine CT for additional findings within the lumbar spine.    REFERENCE:  Management of Incidental Adrenal Masses: A White Paper of the ACR Incidental Findings Committee. J Am Stas Radiol 2017;14:8829-5081.    ? 1 cm and < 4 cm:     No prior imaging and no history of cancer:  1-2 cm: probably benign. Consider 12-month CT adrenal follow-up.  >2cm, <4 cm: Adrenal CT.    No prior imaging and history of cancer: Adrenal CT    Prior Imaging:  Stable for 1 year: Benign. No follow-up.  New or enlarging:  --Adrenal  CT or resection if no history of cancer.  --PET/CT or biopsy if positive history of cancer.    REFERENCE:  [facility] practice guidelines on the care of patients with an abdominal aortic aneurysm. Katie MUÑIZ. J Vasc Surg, 2018. PMID: [id number].    Aorta size: 3.0 cm to 3.9 cm: Surveillance imaging at 3-year intervals.    XR Knee Right 3 Views    Impression    IMPRESSION: Postoperative changes total knee arthroplasty and patellar resurfacing on the left. Components appear well-seated. No significant effusion. The right knee is negative for fracture but there is significant degenerative narrowing of the medial   compartment with near bone-on-bone contact. Additional degenerative change right patellofemoral compartment. No effusion.   CBC (+ platelets, no diff)   Result Value Ref Range    WBC Count 6.1 4.0 - 11.0 10e3/uL    RBC Count 3.69 (L) 4.40 - 5.90 10e6/uL    Hemoglobin 12.5 (L) 13.3 - 17.7 g/dL    Hematocrit 38.0 (L) 40.0 - 53.0 %     (H) 78 - 100 fL    MCH 33.9 (H) 26.5 - 33.0 pg    MCHC 32.9 31.5 - 36.5 g/dL    RDW 14.4 10.0 - 15.0 %    Platelet Count 138 (L) 150 - 450 10e3/uL   Basic metabolic panel   Result Value Ref Range    Sodium 141 136 - 145 mmol/L    Potassium 4.0 3.4 - 5.3 mmol/L    Chloride 105 98 - 107 mmol/L    Carbon Dioxide (CO2) 21 (L) 22 - 29 mmol/L    Anion Gap 15 7 - 15 mmol/L    Urea Nitrogen 22.2 8.0 - 23.0 mg/dL    Creatinine 1.13 0.67 - 1.17 mg/dL    Calcium 8.9 8.8 - 10.2 mg/dL    Glucose 92 70 - 99 mg/dL    GFR Estimate 68 >60 mL/min/1.73m2   Hepatic function panel   Result Value Ref Range    Protein Total 7.0 6.4 - 8.3 g/dL    Albumin 3.7 3.5 - 5.2 g/dL    Bilirubin Total 0.5 <=1.2 mg/dL    Alkaline Phosphatase 90 40 - 129 U/L    AST 20 10 - 50 U/L    ALT 16 10 - 50 U/L    Bilirubin Direct <0.20 0.00 - 0.30 mg/dL   Result Value Ref Range    Magnesium 2.0 1.7 - 2.3 mg/dL   Result Value Ref Range    aPTT 30 22 - 38 Seconds   Result Value Ref Range    INR 1.25 (H) 0.85 - 1.15   Adult Type  and Screen   Result Value Ref Range    ABO/RH(D) O POS     Antibody Screen Negative Negative    SPECIMEN EXPIRATION DATE [id number]      I have reviewed the relevant laboratory and radiology studies.      Dr. Roverto Dye  [facility] EMERGENCY DEPARTMENT  [address] [place] [id number]  [phone]     Roverto Dye DO  10/18/22 2010

## 2022-10-18 NOTE — ED TRIAGE NOTES
Pt presents to [facility] ED alone from home via ems for weakness. Pt was waiting for ride outside to go to clinic appt. Pt has been having BRBPR for the past 10 days and was going to see his doc today. Pt is on thinners. Pt's ride did not show up while going inside he felt weak and fell landing on his bottom denies hitting head or LOC.

## 2022-10-18 NOTE — ED PROVIDER NOTES
EMERGENCY DEPARTMENT ENCOUNTER      NAME: Gorge Berkowitz Jr.  AGE: 77 year old male  YOB: 1945  MRN: [id number]  EVALUATION DATE & TIME: 10/18/2022  1:32 PM    PCP: Lisseth Scott    ED PROVIDER: Enedina Zhou PA-C      Chief Complaint   Patient presents with     Generalized Weakness     Rectal Bleeding         FINAL IMPRESSION:  1. Fall, initial encounter    2. Pain in both knees, unspecified chronicity    3. Bilateral low back pain without sciatica, unspecified chronicity    4. Bright red blood per rectum        MEDICAL DECISION MAKING:    Medical Decision Making    Supplemental history from: N/A    External Record(s) Reviewed: N/A    Differential Diagnosis: See MDM charting for differential considered.     I performed an independent interpretation of the: N/A    Discussed with radiology regarding test interpretation: N/A    Discussion of management with another provider: N/A    The following testing was considered but ultimately not selected: None     I considered prescription management with: N/A    The patient's care impacted: Chronic Pain and Hyperlipidemia    Consideration of Admission/Observation: N/A - Patient admitted or discharged without consideration for admission    Care significantly affected by Social Determinants of Health including: N/A    Pertinent Labs & Imaging studies reviewed. (See chart for details)  77 year old male presents to the Emergency Department for evaluation after a fall.  The past 10 days the patient has had bright red blood per rectum and today was supposed to have an appointment with his primary care provider however, after he got out of his house and down to the driveway and waited there for over 20 minutes his ride never showed up.  As he was going back into his house he lifted his left leg into the house but had difficulty lifting his right leg into the house and ultimately ended up falling, twisting his left leg and landing on his buttocks.  He was unable  to get up, called EMS and he was brought to the emergency department.  On my evaluation, the patient was tachycardic at 112 but otherwise vitally stable.  Examination with tenderness to palpation of bilateral knees without any significant swelling, ecchymosis and knee joint was stable with varus/valgus testing and anterior/posterior drawer testing.  He did have 5 out of 5 strength and sensation in bilateral upper and lower extremities.  He did have some midline lumbar spine tenderness but no other back tenderness to palpation.  Differential diagnosis included GI bleed, hemorrhoids, anemia, electrolyte abnormalities, stroke, syncope.    The patient reports this was purely a mechanical fall.  He has bilateral knee pain and stiffness and has difficulty ambulating at home.  He reports that he does not go up and down his stairs very often or leave his house and that lifting his legs is difficult for him all the time.  He did not have any weakness, headache, vision changes, speech difficulties, facial droop syncope, chest pain, difficulty reading or any other concerning symptoms that would make me think that cardiac, pulmonary or neurologic cause of his fall.  He did complain of increased knee pain after the fall and I did obtain x-rays of the bilateral knees which did not show any significant effusions or acute fractures.  Is also having some increased lower back pain after falling onto his buttocks and CT of the lumbar spine was performed and did not show any acute fractures however, did show chronic appearing L2 superior endplate compression deformity and moderate to severe spinal stenosis of L4-L5.  With his complaint of right red blood per rectum I did obtain CBC, BMP, LFTs, magnesium, type and screen, PTT and INR.  CBC did show a slightly low hemoglobin of 12.5 but this was largely unchanged from 1 year ago at 12.8 and improved from another lab draw of 12 around that same time.  BMP without any significant  derangements.  LFTs without any significant derangements.  Magnesium was normal at 2.0.  PTT was normal at 30.  INR was slightly elevated at 1.25 which is not significantly changed from 4 years ago at 1.16.  With his bleeding and report of increased back pain I did obtain a CT of the abdomen pelvis with contrast which did not show any acute abnormality causing his rectal bleeding.  He did have an incidental 2.1 cm right adrenal gland nodule that should be evaluated on a nonemergent basis.  He also had a bilobed infrarenal abdominal aortic aneurysm measuring up to 3.3 cm and I recommend follow-up with vascular surgery for this and referral was placed.  Patient was informed of all results and the plan of care.  He was having some worsening of his knee pain and I did give him Tylenol and ibuprofen here in the emergency department.  He was able to ambulate with a walker here which does use a cane chronically at home.  He did feel comfortable going home and following up closely with his primary care provider.  All questions were answered to the best my ability and he was discharged  the emergency department via EMS transport in stable condition.      ED COURSE:  2:04 PM I met with the patient, obtained history, performed an initial exam, and discussed options and plan for diagnostics and treatment here in the ED.  2:43 PM Staffed the case with Dr. Dye.   2:56 PM Performed rectal exam chaperoned by RN. On exam there are some external and internal hemorrhoids visualized and small amount bright red blood on digit.   5:00 PM I discussed all results and plan of care with the patient and they were agreement understanding.  All questions been to the best my ability and they were discharged in the emergency department stable condition.    At the conclusion of the encounter I discussed the results of all of the tests and the disposition. The questions were answered. The patient or family acknowledged understanding and was  agreeable with the care plan.    MEDICATIONS GIVEN IN THE EMERGENCY:  Medications   0.9% sodium chloride BOLUS (1,000 mLs Intravenous New Bag 10/18/22 1439)   iopamidol (ISOVUE-370) solution 100 mL (100 mLs Intravenous Given 10/18/22 1521)   acetaminophen (TYLENOL) tablet 975 mg (975 mg Oral Given 10/18/22 1626)   ibuprofen (ADVIL/MOTRIN) tablet 600 mg (600 mg Oral Given 10/18/22 1626)       NEW PRESCRIPTIONS STARTED AT TODAY'S ER VISIT  New Prescriptions    No medications on file            =================================================================    HPI:    Patient information was obtained from: Patient    Use of Interpretor: N/A         Gorge Berkowitz Jr. is a 77 year old male with a pertinent history of OA of bilateral knees, s/p left knee arthroplasty, CHF, CAD, AF anticoagulated on eliquis, HLD and ROSALVA who presents to this ED via EMS for evaluation of rectal bleeding and mechanical fall.     Patient presents noting having been scheduled for a doctor's appointment today for evaluation of rectal bleeding. Patient notes having gone down the stairs of his apartment to meet his ride but states his ride never came. Upon going back to his apartment patient states being unable to lift his right leg. Patient ambulates with the aid of a cane. He notes that after some trying to lift his right leg his hand eventually slipped on his  on the cane causing the patient to suffer a mechanical fall landing in a seated position. He denies having hit his head or suffered any loss of consciousness. He is on blood thinners. Patient states he was unable to get up on his own and needed assistance from EMS. Patient endorses left knee pain with movement but denies any other pain. Patient notes he is normally sedentary and states he has not been up and down those stairs for some time. He also endorses some lower back pain which he notes is baseline.     Per patient he additionally is concerned stating he has been  experiencing rectal bleeding for the past 10 days. Patient notes the blood is bright red in color with occasional clots and he reports a history of internal hemorrhoids. Patient states the blood is not mixed with stool and he experienced some constipation a few days ago. He notes now being able to pass gas and some blood but denies any stool. Patient notes his hemoglobin has been low at baseline for some time and is concerned in regards to his rectal bleeding. He does report having had this in the past but states it has never bleed for this long. He denies any rectal pain or pain with bearing down. Patient denies any weakness, dizziness, syncope, fever, chest pain, shortness of breath, abdominal pain, headache, facial droop, confusion, cough, new back pain, dysuria, hematuria as well as any other complaints at the time.        REVIEW OF SYSTEMS:  Review of Systems   Constitutional: Negative for fever.   Respiratory: Negative for cough and shortness of breath.    Cardiovascular: Negative for chest pain.   Gastrointestinal: Positive for anal bleeding (bright red blood with some clots). Negative for abdominal pain and rectal pain.   Genitourinary: Negative for dysuria and hematuria.   Musculoskeletal: Positive for arthralgias (left knee pain), back pain (baseline) and gait problem (ambulates with cane at baseline).   Neurological: Negative for dizziness, syncope, facial asymmetry, weakness and headaches.   Psychiatric/Behavioral: Negative for confusion.   All other systems reviewed and are negative.         PAST MEDICAL HISTORY:  Past Medical History:   Diagnosis Date     Atrial fibrillation (H)      Cardiomyopathy (H)     ef 20%     CHF (congestive heart failure) (H)      Morbid obesity (H)      ROSALVA (obstructive sleep apnea)        PAST SURGICAL HISTORY:  Past Surgical History:   Procedure Laterality Date     CARDIAC CATHETERIZATION  08/10/2018    and right heart cath     CARDIOVERSION  08/16/2018    successful     CV  CORONARY ANGIOGRAM N/A 8/10/2018    Procedure: Coronary Angiogram;  Surgeon: Ralph Powers MD;  Location: [facility] Cath Lab;  Service:      CV LEFT HEART CATHETERIZATION WITHOUT LEFT VENTRICULOGRAM Left 8/10/2018    Procedure: Left Heart Catheterization Without Left Ventriculogram;  Surgeon: Ralph Powers MD;  Location: [facility] Cath Lab;  Service:      CV RIGHT HEART CATHETERIZATION N/A 8/10/2018    Procedure: Right Heart Catheterization;  Surgeon: Ralph Powers MD;  Location: [facility] Cath Lab;  Service:            CURRENT MEDICATIONS:    No current facility-administered medications for this encounter.    Current Outpatient Medications:      acetaminophen (TYLENOL) 500 MG tablet, [ACETAMINOPHEN (TYLENOL) 500 MG TABLET] Take 1,000 mg by mouth every 4 (four) hours as needed., Disp: , Rfl:      amiodarone (PACERONE) 200 MG tablet, [AMIODARONE (PACERONE) 200 MG TABLET] Take 0.5 tablets (100 mg total) by mouth daily., Disp: 45 tablet, Rfl: 0     atorvastatin (LIPITOR) 40 MG tablet, [ATORVASTATIN (LIPITOR) 40 MG TABLET] Take 1 tablet (40 mg total) by mouth at bedtime., Disp: 90 tablet, Rfl: 1     co-enzyme Q-10 30 mg capsule, [CO-ENZYME Q-10 30 MG CAPSULE] Take 60 mg by mouth daily. , Disp: , Rfl:      ELIQUIS 5 mg Tab tablet, [ELIQUIS 5 MG TAB TABLET] Take 1 tablet (5 mg total) by mouth 2 (two) times a day., Disp: 180 tablet, Rfl: 0     furosemide (LASIX) 20 MG tablet, [FUROSEMIDE (LASIX) 20 MG TABLET] take one tablet by mouth daily and take one tablet by mouth as needed at night for any swelling, Disp: 100 tablet, Rfl: 0     gabapentin (NEURONTIN) 100 MG capsule, [GABAPENTIN (NEURONTIN) 100 MG CAPSULE] Take 100 mg by mouth., Disp: , Rfl:      glucosamine-chondroitin 500-400 mg cap, [GLUCOSAMINE-CHONDROITIN 500-400 MG CAP] Take 1 capsule by mouth daily. , Disp: , Rfl:      krill-om3-dha-epa-om6-lip-astx (KRILL OIL, OMEGA 3 AND 6,) 1,500-165-67.5 mg cap,  [KRILL-OM3-DHA-EPA-OM6-LIP-ASTX (KRILL OIL, OMEGA 3 AND 6,) 1,500-165-67.5 MG CAP] Take 3 capsules by mouth daily. , Disp: , Rfl:      lisinopril (PRINIVIL,ZESTRIL) 5 MG tablet, [LISINOPRIL (PRINIVIL,ZESTRIL) 5 MG TABLET] Take 1 tablet (5 mg total) by mouth at bedtime., Disp: 90 tablet, Rfl: 3     metoprolol succinate (TOPROL-XL) 50 MG 24 hr tablet, [METOPROLOL SUCCINATE (TOPROL-XL) 50 MG 24 HR TABLET] Take 0.5 tablets (25 mg total) by mouth daily., Disp: 90 tablet, Rfl: 1     omega-3 fatty acids-fish oil 360-1,200 mg cap, [OMEGA-3 FATTY ACIDS-FISH -1,200 MG CAP] Take 3 capsules by mouth., Disp: , Rfl:      polyethylene glycol (GLYCOLAX) 17 gram/dose powder, [POLYETHYLENE GLYCOL (GLYCOLAX) 17 GRAM/DOSE POWDER] Take 17 g by mouth., Disp: , Rfl:      pramipexole (MIRAPEX) 0.25 MG tablet, [PRAMIPEXOLE (MIRAPEX) 0.25 MG TABLET] Take 0.25mg (1 tab) by mouth 2-3 hours before bedtime, Disp: , Rfl:      senna (SENOKOT) 8.6 mg tablet, [SENNA (SENOKOT) 8.6 MG TABLET] Take 8.6 mg by mouth., Disp: , Rfl:       ALLERGIES:  No Known Allergies    FAMILY HISTORY:  No family history on file.    SOCIAL HISTORY:   Social History     Socioeconomic History     Marital status:    Tobacco Use     Smoking status: Former     Types: Cigarettes     Quit date: 2005     Years since quittin.8     Smokeless tobacco: Never   Substance and Sexual Activity     Alcohol use: No     Comment: Alcoholic Drinks/day: former alcoholic- quit      Drug use: No       VITALS:  Patient Vitals for the past 24 hrs:   BP Temp Temp src Pulse Resp SpO2 Weight   10/18/22 1445 103/70 -- -- 89 -- 97 % --   10/18/22 1400 95/53 -- -- 104 -- 93 % --   10/18/22 1357 91/52 -- -- 108 -- 90 % --   10/18/22 1333 94/50 98.1  F (36.7  C) Oral 112 18 92 % 129.3 kg (285 lb)       PHYSICAL EXAM    Constitutional: Well developed, Well nourished, NAD  HENT: Normocephalic, Atraumatic, Bilateral external ears normal, mask in place.  Neck: Normal range of  motion, No tenderness, Supple, No stridor.  Eyes: PERRL, EOMI, Conjunctiva normal, No discharge.   Respiratory: Normal breath sounds, No respiratory distress, No wheezing, Speaks full sentences easily. No cough.  Cardiovascular: Normal heart rate, Regular rhythm, No murmurs, No rubs, No gallops. Chest wall nontender.  GI: Soft, No tenderness, No masses, No flank tenderness. No rebound or guarding.  Musculoskeletal: Tenderness to palpation of bilateral knees without significant swelling.  Knee joints stable without any varus/valgus instability or anterior/posterior drawer instability.  2+ DP pulses. No edema. No cyanosis, No clubbing. Good range of motion in all major joints. No tenderness to palpation or major deformities noted. No tenderness of the CTLS spine.   Integument: Warm, Dry, No erythema, No rash. No petechiae.  Neurologic: 5 out of 5 strength and sensation in bilateral upper and lower extremities.  Alert & oriented x 3, Normal motor function, Normal sensory function, No focal deficits noted. Normal gait.  Psychiatric: Affect normal, Judgment normal, Mood normal. Cooperative.    LAB:  All pertinent labs reviewed and interpreted.  Recent Results (from the past 24 hour(s))   CBC (+ platelets, no diff)    Collection Time: 10/18/22  1:46 PM   Result Value Ref Range    WBC Count 6.1 4.0 - 11.0 10e3/uL    RBC Count 3.69 (L) 4.40 - 5.90 10e6/uL    Hemoglobin 12.5 (L) 13.3 - 17.7 g/dL    Hematocrit 38.0 (L) 40.0 - 53.0 %     (H) 78 - 100 fL    MCH 33.9 (H) 26.5 - 33.0 pg    MCHC 32.9 31.5 - 36.5 g/dL    RDW 14.4 10.0 - 15.0 %    Platelet Count 138 (L) 150 - 450 10e3/uL   Basic metabolic panel    Collection Time: 10/18/22  1:46 PM   Result Value Ref Range    Sodium 141 136 - 145 mmol/L    Potassium 4.0 3.4 - 5.3 mmol/L    Chloride 105 98 - 107 mmol/L    Carbon Dioxide (CO2) 21 (L) 22 - 29 mmol/L    Anion Gap 15 7 - 15 mmol/L    Urea Nitrogen 22.2 8.0 - 23.0 mg/dL    Creatinine 1.13 0.67 - 1.17 mg/dL     Calcium 8.9 8.8 - 10.2 mg/dL    Glucose 92 70 - 99 mg/dL    GFR Estimate 68 >60 mL/min/1.73m2   Hepatic function panel    Collection Time: 10/18/22  1:46 PM   Result Value Ref Range    Protein Total 7.0 6.4 - 8.3 g/dL    Albumin 3.7 3.5 - 5.2 g/dL    Bilirubin Total 0.5 <=1.2 mg/dL    Alkaline Phosphatase 90 40 - 129 U/L    AST 20 10 - 50 U/L    ALT 16 10 - 50 U/L    Bilirubin Direct <0.20 0.00 - 0.30 mg/dL   Magnesium    Collection Time: 10/18/22  1:46 PM   Result Value Ref Range    Magnesium 2.0 1.7 - 2.3 mg/dL   PTT    Collection Time: 10/18/22  1:46 PM   Result Value Ref Range    aPTT 30 22 - 38 Seconds   INR    Collection Time: 10/18/22  1:46 PM   Result Value Ref Range    INR 1.25 (H) 0.85 - 1.15   Adult Type and Screen    Collection Time: 10/18/22  1:46 PM   Result Value Ref Range    ABO/RH(D) O POS     Antibody Screen Negative Negative    SPECIMEN EXPIRATION DATE [id number]          RADIOLOGY:  Reviewed all pertinent imaging. Please see official radiology report.  XR Knee Right 3 Views   Final Result   IMPRESSION: Postoperative changes total knee arthroplasty and patellar resurfacing on the left. Components appear well-seated. No significant effusion. The right knee is negative for fracture but there is significant degenerative narrowing of the medial    compartment with near bone-on-bone contact. Additional degenerative change right patellofemoral compartment. No effusion.      XR Knee Left 3 Views   Final Result   IMPRESSION: Postoperative changes total knee arthroplasty and patellar resurfacing on the left. Components appear well-seated. No significant effusion. The right knee is negative for fracture but there is significant degenerative narrowing of the medial    compartment with near bone-on-bone contact. Additional degenerative change right patellofemoral compartment. No effusion.      CT Abdomen Pelvis w Contrast   Final Result   IMPRESSION:       1.  No acute abnormality or specific cause of  rectal bleeding identified.      2.  Incidental 2.1 cm right adrenal gland nodule. If prior imaging is not available for comparison, recommend adrenal CT for further evaluation per guidelines below, on a nonemergent basis.      3.  Bilobed infrarenal abdominal aortic aneurysm, measuring up to 3.3 cm. Follow-up guidelines below.      4.  See separately dictated lumbar spine CT for additional findings within the lumbar spine.      REFERENCE:   Management of Incidental Adrenal Masses: A White Paper of the ACR Incidental Findings Committee. J Am Stas Radiol 2017;14:8107-9818.      ? 1 cm and < 4 cm:       No prior imaging and no history of cancer:   1-2 cm: probably benign. Consider 12-month CT adrenal follow-up.   >2cm, <4 cm: Adrenal CT.      No prior imaging and history of cancer: Adrenal CT      Prior Imaging:   Stable for 1 year: Benign. No follow-up.   New or enlarging:   --Adrenal CT or resection if no history of cancer.   --PET/CT or biopsy if positive history of cancer.      REFERENCE:   SVS practice guidelines on the care of patients with an abdominal aortic aneurysm. Katie MUÑIZ. J Vasc Surg, 2018. PMID: [id number].      Aorta size: 3.0 cm to 3.9 cm: Surveillance imaging at 3-year intervals.       Lumbar spine CT w/o contrast   Final Result   IMPRESSION:   1.  No acute fracture or posttraumatic subluxation.   2.  Chronic appearing L2 superior endplate compression deformity.   3.  Moderate to severe spinal canal stenosis at L4-L5.   4.  No additional high-grade spinal canal or neural foraminal stenosis.          PROCEDURES:   None.       I, Malgorzata Gilman, am serving as a scribe to document services personally performed by Enedina Zhou PA-C based on my observation and the provider's statements to me. I, Enedina Zhou PA-C attest that Malgorzata Gilman is acting in a scribe capacity, has observed my performance of the services and has documented them in accordance with my direction.    Enedina Zhou  PATTI  Emergency Medicine  [facility]  10/18/2022     Enedina Zhou PA-C  10/18/22 1725

## 2022-10-18 NOTE — ED NOTES
Bed: [place] [address]  Expected date:   Expected time:   Means of arrival: Ambulance  Comments:  MPLW: Unable to ambulate, weak

## 2022-10-18 NOTE — DISCHARGE INSTRUCTIONS
You were seen and evaluated here in the emergency department after having a mechanical fall and complaints of bright red blood per rectum for the last 10 days.  Lab work and imaging is reassuring today.  ET of the abdomen did find some incidental findings including a mass on your adrenal gland that should be followed up with with nonemergent CT scan of the adrenal gland.  We also found a bilobed abdominal aortic aneurysm measuring 3.3 cm that also does not require urgent follow-up however, I do recommend following up with vascular surgery and referral was placed.    Did have improvement of your pain with Tylenol and ibuprofen here in the emergency department.  Recommend taking Tylenol thaws milligrams and ibuprofen 600 mg every 6 hours as needed for pain.  You are on Eliquis and I do recommend only a few days of ibuprofen to help reduce the risk of bleeding.    Return to the emergency department with any loss control bowel or bladder, numbness/tingling in the groin, leg weakness, f dizziness, syncope or any other concerning symptoms.    Otherwise it, I recommend following up with your primary care provider for recheck of symptoms.  I have given you a referral for both colorectal and vascular surgery to follow-up on other symptoms.

## 2022-10-18 NOTE — ED NOTES
Pt ambulation w/ front wheel walker. Pt was able to stand and ambulate with supervision. Pt ambulated well but expressed concerns for pain in both knees and felt he was not as steady. Requesting to still go home but have medics assist with stairs at home.

## 2022-10-19 ENCOUNTER — PATIENT OUTREACH (OUTPATIENT)
Dept: SURGERY | Facility: CLINIC | Age: 77
End: 2022-10-19

## 2022-10-19 NOTE — PROGRESS NOTES
Received a message from our referral specialist that patient would like to be scheduled for hemorrhoids however he is unable to leave his house at this time due to a fall/bad knee. He is having his knee replaced soon.  He was looking to discuss types of management for hemorrhoids/rectal bleeding. He was having rectal bleeding for 10 days. Today he had a normal BM without blood because he decided to not take his Eliquis.  He states that the ER doctor told him about a medication that he could take but he was not able to confirm what this medication was.  I told him it was probably Anusol suppositories.  He is not interested in taking a suppository at this time.  I reviewed conservative management such as increasing water intake, fiber intake and not straining when having a bowel movement.  I also advised him to follow-up with his primary care provider as he should not abruptly stop his Eliquis.    He stated he would follow-up with his PCP and call us back when he is ready to discuss hemorrhoid surgery once he has had his knee replaced.

## 2022-10-21 ENCOUNTER — TELEPHONE (OUTPATIENT)
Dept: VASCULAR SURGERY | Facility: CLINIC | Age: 77
End: 2022-10-21

## 2022-10-21 NOTE — TELEPHONE ENCOUNTER
Referral received for 3.3cm AAA. Per guidelines, pt should follow-up with surveillance in 3 years. No need for vascular follow-up at this time.    I will update the referring provider.    REBA SinhaN, RN  [facility] - P Vascular Surgery  Ph: [phone]  Fax: [phone]

## 2022-11-22 DIAGNOSIS — I48.0 PAROXYSMAL ATRIAL FIBRILLATION (H): Primary | ICD-10-CM

## 2023-02-27 ENCOUNTER — VIRTUAL VISIT (OUTPATIENT)
Dept: CARDIOLOGY | Facility: CLINIC | Age: 78
End: 2023-02-27
Payer: COMMERCIAL

## 2023-02-27 DIAGNOSIS — I48.0 PAROXYSMAL ATRIAL FIBRILLATION (H): Primary | ICD-10-CM

## 2023-02-27 DIAGNOSIS — I50.22 CHRONIC SYSTOLIC HEART FAILURE (H): ICD-10-CM

## 2023-02-27 DIAGNOSIS — G47.33 OSA (OBSTRUCTIVE SLEEP APNEA): ICD-10-CM

## 2023-02-27 DIAGNOSIS — I25.10 CORONARY ARTERY DISEASE INVOLVING NATIVE CORONARY ARTERY OF NATIVE HEART WITHOUT ANGINA PECTORIS: ICD-10-CM

## 2023-02-27 DIAGNOSIS — E66.01 MORBID OBESITY (H): ICD-10-CM

## 2023-02-27 PROCEDURE — 99214 OFFICE O/P EST MOD 30 MIN: CPT | Mod: 95 | Performed by: INTERNAL MEDICINE

## 2023-02-27 NOTE — PROGRESS NOTES
"    The patient has been notified of following:     \"This telephone visit will be conducted via a call between you and your physician/provider. We have found that certain health care needs can be provided without the need for a physical exam.  This service lets us provide the care you need with a phone conversation.  If a prescription is necessary we can send it directly to your pharmacy.  If lab work is needed we can place an order for that and you can then stop by our lab to have the test done at a later time. If during the course of the call the physician/provider feels a telephone visit is not appropriate, you will not be charged for this service.\" Verbal consent has been obtained for this service by care team member:         HEART CARE PHONE ENCOUNTER        The patient has chosen to have the visit conducted as a telephone visit, to reduce risk of exposure given the current status of Coronavirus in our community. This telephone visit is being conducted via a call between the patient and physician/provider. Health care needs are being provided without a physical exam.     Assessment/Recommendations   Assessment:    1. Paroxysmal atrial flutter - no symptoms of recurrent arrhythmia. Rhythm controlled with amiodarone.  2. Chronic systolic heart failure - sounds to be  3. 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 Coronary artery disease with  of the proximal RCA filled via collaterals. Moderate disease throughout the LAD with >70% stenosis in the far distal LAD. Stable without angina.  5. Morbid obesity - uncontrolled, though weight had declined over the past 2 years. Activity is limited by arthritic pain.  6. anticoagulation with Eliquis - tolerating Eliquis. He ultimately decided to not proceed with [facility] closure in July 2019  7. Obstructive sleep apnea - complaint with CPAP    Plan:  1. Continue medications without changes  2. Recommend checking ALT and TSH every 6 " months. Will defer this to his home health program through [facility].  3. Follow up in 1 year or sooner if needed.      Follow Up Plan:   I have reviewed the note as documented.  This accurately captures the substance of my conversation with the patient.    Total time of call between patient and provider was 12 minutes   Start Time:1300   Stop Time:1312       History of Present Illness/Subjective    Gorge Berkowitz Jr. is a 78 year old male who is being evaluated via a billable telephone visit.      Mr. Berkowitz has known heart failure with reduced LV systolic function due to a nonischemic cardiomyopathy, along with coronary artery disease, morbid obesity, and atrial fibrillation controlled with amiodarone.  He is now homebound and it receives fairly extensive home health services through the [facility] system.   the services include visits by a physician, nurse practitioner, phlebotomist, therapists, and other services as needed.  He states that he has lost an additional 20 pounds since I last saw him in clinic 2 years ago.  He reports no new cardiac symptoms.  He is homebound primarily due to orthopedic issues.    I have reviewed and updated the patient's Past Medical History, Social History, Family History and Medication List.     Physical Examination not performed given phone encounter Review of Systems                                                Medical History  Surgical History Family History Social History   Past Medical History:   Diagnosis Date     Atrial fibrillation (H)      Cardiomyopathy (H)     ef 20%     CHF (congestive heart failure) (H)      Morbid obesity (H)      ROSALVA (obstructive sleep apnea)     Past Surgical History:   Procedure Laterality Date     CARDIAC CATHETERIZATION  08/10/2018    and right heart cath     CARDIOVERSION  08/16/2018    successful     CV CORONARY ANGIOGRAM N/A 8/10/2018    Procedure: Coronary Angiogram;  Surgeon: Ralph Powers MD;  Location: [facility] Cath Lab;   Service:      CV LEFT HEART CATHETERIZATION WITHOUT LEFT VENTRICULOGRAM Left 8/10/2018    Procedure: Left Heart Catheterization Without Left Ventriculogram;  Surgeon: Ralph Powers MD;  Location: [facility] Cath Lab;  Service:      CV RIGHT HEART CATHETERIZATION N/A 8/10/2018    Procedure: Right Heart Catheterization;  Surgeon: Ralph Powers MD;  Location: [facility] Cath Lab;  Service:     No family history on file. Social History     Socioeconomic History     Marital status: Single     Spouse name: Not on file     Number of children: Not on file     Years of education: Not on file     Highest education level: Not on file   Occupational History     Not on file   Tobacco Use     Smoking status: Former     Types: Cigarettes     Quit date: 2005     Years since quittin.1     Smokeless tobacco: Never   Substance and Sexual Activity     Alcohol use: No     Comment: Alcoholic Drinks/day: former alcoholic- quit      Drug use: No     Sexual activity: Not on file   Other Topics Concern     Not on file   Social History Narrative     Not on file     Social Determinants of Health     Financial Resource Strain: Not on file   Food Insecurity: Not on file   Transportation Needs: Not on file   Physical Activity: Not on file   Stress: Not on file   Social Connections: Not on file   Intimate Partner Violence: Not on file   Housing Stability: Not on file          Medications  Allergies   Current Outpatient Medications   Medication Sig Dispense Refill     acetaminophen (TYLENOL) 500 MG tablet [ACETAMINOPHEN (TYLENOL) 500 MG TABLET] Take 1,000 mg by mouth every 4 (four) hours as needed.       amiodarone (PACERONE) 200 MG tablet [AMIODARONE (PACERONE) 200 MG TABLET] Take 0.5 tablets (100 mg total) by mouth daily. 45 tablet 0     atorvastatin (LIPITOR) 40 MG tablet [ATORVASTATIN (LIPITOR) 40 MG TABLET] Take 1 tablet (40 mg total) by mouth at bedtime. 90 tablet 1     ELIQUIS 5 mg Tab tablet [ELIQUIS 5 MG  TAB TABLET] Take 1 tablet (5 mg total) by mouth 2 (two) times a day. 180 tablet 0     furosemide (LASIX) 20 MG tablet [FUROSEMIDE (LASIX) 20 MG TABLET] take one tablet by mouth daily and take one tablet by mouth as needed at night for any swelling (Patient taking differently: Take 20 mg by mouth daily) 100 tablet 0     glucosamine-chondroitin 500-400 mg cap [GLUCOSAMINE-CHONDROITIN 500-400 MG CAP] Take 1 capsule by mouth daily.        krill-om3-dha-epa-om6-lip-astx (KRILL OIL, OMEGA 3 AND 6,) 1,500-165-67.5 mg cap [KRILL-OM3-DHA-EPA-OM6-LIP-ASTX (KRILL OIL, OMEGA 3 AND 6,) 1,500-165-67.5 MG CAP] Take 3 capsules by mouth daily.        lisinopril (PRINIVIL,ZESTRIL) 5 MG tablet [LISINOPRIL (PRINIVIL,ZESTRIL) 5 MG TABLET] Take 1 tablet (5 mg total) by mouth at bedtime. 90 tablet 3     metoprolol succinate (TOPROL-XL) 50 MG 24 hr tablet [METOPROLOL SUCCINATE (TOPROL-XL) 50 MG 24 HR TABLET] Take 0.5 tablets (25 mg total) by mouth daily. 90 tablet 1     polyethylene glycol (GLYCOLAX) 17 gram/dose powder Take 17 g by mouth as needed       pramipexole (MIRAPEX) 0.25 MG tablet [PRAMIPEXOLE (MIRAPEX) 0.25 MG TABLET] Take 0.25mg (1 tab) by mouth 2-3 hours before bedtime      No Known Allergies      Lab Results    Chemistry/lipid CBC Cardiac Enzymes/BNP/TSH/INR   Lab Results   Component Value Date    CHOL 167 08/08/2018    HDL 45 08/08/2018    TRIG 59 08/08/2018    BUN 22.2 10/18/2022     10/18/2022    CO2 21 (L) 10/18/2022    Lab Results   Component Value Date    WBC 6.1 10/18/2022    HGB 12.5 (L) 10/18/2022    HCT 38.0 (L) 10/18/2022     (H) 10/18/2022     (L) 10/18/2022    Lab Results   Component Value Date    TROPONINI 0.02 08/08/2018     (H) 08/08/2018    TSH <0.01 (L) 12/09/2019    INR 1.25 (H) 10/18/2022

## 2023-02-27 NOTE — LETTER
"2/27/2023    Lisseth Scott MD  [address] [address]    RE: Gorge Berkowitz Jr.       Dear Colleague,     I had the pleasure of seeing Gorge Berkowitz Jr. in the [facility].      The patient has been notified of following:     \"This telephone visit will be conducted via a call between you and your physician/provider. We have found that certain health care needs can be provided without the need for a physical exam.  This service lets us provide the care you need with a phone conversation.  If a prescription is necessary we can send it directly to your pharmacy.  If lab work is needed we can place an order for that and you can then stop by our lab to have the test done at a later time. If during the course of the call the physician/provider feels a telephone visit is not appropriate, you will not be charged for this service.\" Verbal consent has been obtained for this service by care team member:         HEART CARE PHONE ENCOUNTER        The patient has chosen to have the visit conducted as a telephone visit, to reduce risk of exposure given the current status of Coronavirus in our community. This telephone visit is being conducted via a call between the patient and physician/provider. Health care needs are being provided without a physical exam.     Assessment/Recommendations   Assessment:    1. Paroxysmal atrial flutter - no symptoms of recurrent arrhythmia. Rhythm controlled with amiodarone.  2. Chronic systolic heart failure - sounds to be  3. 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 Coronary artery disease with  of the proximal RCA filled via collaterals. Moderate disease throughout the LAD with >70% stenosis in the far distal LAD. Stable without angina.  5. Morbid obesity - uncontrolled, though weight had declined over the past 2 years. Activity is limited by arthritic pain.  6. anticoagulation with Eliquis - tolerating Eliquis. He " ultimately decided to not proceed with [facility] closure in July 2019  7. Obstructive sleep apnea - complaint with CPAP    Plan:  1. Continue medications without changes  2. Recommend checking ALT and TSH every 6 months. Will defer this to his home health program through [facility].  3. Follow up in 1 year or sooner if needed.      Follow Up Plan:   I have reviewed the note as documented.  This accurately captures the substance of my conversation with the patient.    Total time of call between patient and provider was 12 minutes   Start Time:1300   Stop Time:1312       History of Present Illness/Subjective    Gorge Berkowitz Jr. is a 78 year old male who is being evaluated via a billable telephone visit.      Mr. Berkowitz has known heart failure with reduced LV systolic function due to a nonischemic cardiomyopathy, along with coronary artery disease, morbid obesity, and atrial fibrillation controlled with amiodarone.  He is now homebound and it receives fairly extensive home health services through the [facility] system.   the services include visits by a physician, nurse practitioner, phlebotomist, therapists, and other services as needed.  He states that he has lost an additional 20 pounds since I last saw him in clinic 2 years ago.  He reports no new cardiac symptoms.  He is homebound primarily due to orthopedic issues.    I have reviewed and updated the patient's Past Medical History, Social History, Family History and Medication List.     Physical Examination not performed given phone encounter Review of Systems                                                Medical History  Surgical History Family History Social History   Past Medical History:   Diagnosis Date     Atrial fibrillation (H)      Cardiomyopathy (H)     ef 20%     CHF (congestive heart failure) (H)      Morbid obesity (H)      ROSALVA (obstructive sleep apnea)     Past Surgical History:   Procedure Laterality Date     CARDIAC CATHETERIZATION  08/10/2018     and right heart cath     CARDIOVERSION  2018    successful     CV CORONARY ANGIOGRAM N/A 8/10/2018    Procedure: Coronary Angiogram;  Surgeon: Ralph Powers MD;  Location: [facility] Cath Lab;  Service:      CV LEFT HEART CATHETERIZATION WITHOUT LEFT VENTRICULOGRAM Left 8/10/2018    Procedure: Left Heart Catheterization Without Left Ventriculogram;  Surgeon: Ralph Powers MD;  Location: [facility] Cath Lab;  Service:      CV RIGHT HEART CATHETERIZATION N/A 8/10/2018    Procedure: Right Heart Catheterization;  Surgeon: Ralph Powers MD;  Location: [facility] Cath Lab;  Service:     No family history on file. Social History     Socioeconomic History     Marital status: Single     Spouse name: Not on file     Number of children: Not on file     Years of education: Not on file     Highest education level: Not on file   Occupational History     Not on file   Tobacco Use     Smoking status: Former     Types: Cigarettes     Quit date: 2005     Years since quittin.1     Smokeless tobacco: Never   Substance and Sexual Activity     Alcohol use: No     Comment: Alcoholic Drinks/day: former alcoholic- quit      Drug use: No     Sexual activity: Not on file   Other Topics Concern     Not on file   Social History Narrative     Not on file     Social Determinants of Health     Financial Resource Strain: Not on file   Food Insecurity: Not on file   Transportation Needs: Not on file   Physical Activity: Not on file   Stress: Not on file   Social Connections: Not on file   Intimate Partner Violence: Not on file   Housing Stability: Not on file          Medications  Allergies   Current Outpatient Medications   Medication Sig Dispense Refill     acetaminophen (TYLENOL) 500 MG tablet [ACETAMINOPHEN (TYLENOL) 500 MG TABLET] Take 1,000 mg by mouth every 4 (four) hours as needed.       amiodarone (PACERONE) 200 MG tablet [AMIODARONE (PACERONE) 200 MG TABLET] Take 0.5 tablets (100 mg total)  by mouth daily. 45 tablet 0     atorvastatin (LIPITOR) 40 MG tablet [ATORVASTATIN (LIPITOR) 40 MG TABLET] Take 1 tablet (40 mg total) by mouth at bedtime. 90 tablet 1     ELIQUIS 5 mg Tab tablet [ELIQUIS 5 MG TAB TABLET] Take 1 tablet (5 mg total) by mouth 2 (two) times a day. 180 tablet 0     furosemide (LASIX) 20 MG tablet [FUROSEMIDE (LASIX) 20 MG TABLET] take one tablet by mouth daily and take one tablet by mouth as needed at night for any swelling (Patient taking differently: Take 20 mg by mouth daily) 100 tablet 0     glucosamine-chondroitin 500-400 mg cap [GLUCOSAMINE-CHONDROITIN 500-400 MG CAP] Take 1 capsule by mouth daily.        krill-om3-dha-epa-om6-lip-astx (KRILL OIL, OMEGA 3 AND 6,) 1,500-165-67.5 mg cap [KRILL-OM3-DHA-EPA-OM6-LIP-ASTX (KRILL OIL, OMEGA 3 AND 6,) 1,500-165-67.5 MG CAP] Take 3 capsules by mouth daily.        lisinopril (PRINIVIL,ZESTRIL) 5 MG tablet [LISINOPRIL (PRINIVIL,ZESTRIL) 5 MG TABLET] Take 1 tablet (5 mg total) by mouth at bedtime. 90 tablet 3     metoprolol succinate (TOPROL-XL) 50 MG 24 hr tablet [METOPROLOL SUCCINATE (TOPROL-XL) 50 MG 24 HR TABLET] Take 0.5 tablets (25 mg total) by mouth daily. 90 tablet 1     polyethylene glycol (GLYCOLAX) 17 gram/dose powder Take 17 g by mouth as needed       pramipexole (MIRAPEX) 0.25 MG tablet [PRAMIPEXOLE (MIRAPEX) 0.25 MG TABLET] Take 0.25mg (1 tab) by mouth 2-3 hours before bedtime      No Known Allergies      Lab Results    Chemistry/lipid CBC Cardiac Enzymes/BNP/TSH/INR   Lab Results   Component Value Date    CHOL 167 08/08/2018    HDL 45 08/08/2018    TRIG 59 08/08/2018    BUN 22.2 10/18/2022     10/18/2022    CO2 21 (L) 10/18/2022    Lab Results   Component Value Date    WBC 6.1 10/18/2022    HGB 12.5 (L) 10/18/2022    HCT 38.0 (L) 10/18/2022     (H) 10/18/2022     (L) 10/18/2022    Lab Results   Component Value Date    TROPONINI 0.02 08/08/2018     (H) 08/08/2018    TSH <0.01 (L) 12/09/2019    INR 1.25  (H) 10/18/2022                   Thank you for allowing me to participate in the care of your patient.      Sincerely,     Tra Johansen MD     [facility]  cc:   No referring provider defined for this encounter.

## 2023-09-04 ENCOUNTER — APPOINTMENT (OUTPATIENT)
Dept: RADIOLOGY | Facility: HOSPITAL | Age: 78
End: 2023-09-04
Attending: INTERNAL MEDICINE
Payer: COMMERCIAL

## 2023-09-04 ENCOUNTER — APPOINTMENT (OUTPATIENT)
Dept: CT IMAGING | Facility: HOSPITAL | Age: 78
End: 2023-09-04
Attending: FAMILY MEDICINE
Payer: COMMERCIAL

## 2023-09-04 ENCOUNTER — HOSPITAL ENCOUNTER (OUTPATIENT)
Facility: HOSPITAL | Age: 78
Setting detail: OBSERVATION
Discharge: NURSING, REHAB OR LONG-TERM CARE | End: 2023-09-07
Attending: FAMILY MEDICINE | Admitting: FAMILY MEDICINE
Payer: COMMERCIAL

## 2023-09-04 DIAGNOSIS — M25.512 ACUTE PAIN OF LEFT SHOULDER: ICD-10-CM

## 2023-09-04 DIAGNOSIS — R21 RASH: Primary | ICD-10-CM

## 2023-09-04 DIAGNOSIS — G89.4 CHRONIC PAIN DISORDER: ICD-10-CM

## 2023-09-04 DIAGNOSIS — K59.01 SLOW TRANSIT CONSTIPATION: ICD-10-CM

## 2023-09-04 DIAGNOSIS — M17.0 PRIMARY OSTEOARTHRITIS OF BOTH KNEES: ICD-10-CM

## 2023-09-04 DIAGNOSIS — R53.1 WEAKNESS GENERALIZED: ICD-10-CM

## 2023-09-04 DIAGNOSIS — I48.92 ATRIAL FLUTTER, UNSPECIFIED TYPE (H): ICD-10-CM

## 2023-09-04 DIAGNOSIS — E66.01 MORBID OBESITY (H): ICD-10-CM

## 2023-09-04 LAB
ALBUMIN SERPL BCG-MCNC: 3.5 G/DL (ref 3.5–5.2)
ALBUMIN UR-MCNC: 10 MG/DL
ALP SERPL-CCNC: 82 U/L (ref 40–129)
ALT SERPL W P-5'-P-CCNC: 17 U/L (ref 0–70)
ANION GAP SERPL CALCULATED.3IONS-SCNC: 13 MMOL/L (ref 7–15)
APPEARANCE UR: CLEAR
AST SERPL W P-5'-P-CCNC: 33 U/L (ref 0–45)
BASOPHILS # BLD AUTO: 0 10E3/UL (ref 0–0.2)
BASOPHILS NFR BLD AUTO: 0 %
BILIRUB DIRECT SERPL-MCNC: 0.22 MG/DL (ref 0–0.3)
BILIRUB SERPL-MCNC: 0.8 MG/DL
BILIRUB UR QL STRIP: NEGATIVE
BUN SERPL-MCNC: 33.1 MG/DL (ref 8–23)
CALCIUM SERPL-MCNC: 9.6 MG/DL (ref 8.8–10.2)
CHLORIDE SERPL-SCNC: 107 MMOL/L (ref 98–107)
COLOR UR AUTO: YELLOW
CREAT SERPL-MCNC: 1.22 MG/DL (ref 0.67–1.17)
DEPRECATED HCO3 PLAS-SCNC: 23 MMOL/L (ref 22–29)
EOSINOPHIL # BLD AUTO: 0 10E3/UL (ref 0–0.7)
EOSINOPHIL NFR BLD AUTO: 0 %
ERYTHROCYTE [DISTWIDTH] IN BLOOD BY AUTOMATED COUNT: 14.4 % (ref 10–15)
GFR SERPL CREATININE-BSD FRML MDRD: 61 ML/MIN/1.73M2
GLUCOSE SERPL-MCNC: 90 MG/DL (ref 70–99)
GLUCOSE UR STRIP-MCNC: NEGATIVE MG/DL
HCT VFR BLD AUTO: 41.6 % (ref 40–53)
HGB BLD-MCNC: 13.9 G/DL (ref 13.3–17.7)
HGB UR QL STRIP: NEGATIVE
HOLD SPECIMEN: NORMAL
HYALINE CASTS: 2 /LPF
IMM GRANULOCYTES # BLD: 0 10E3/UL
IMM GRANULOCYTES NFR BLD: 0 %
KETONES UR STRIP-MCNC: 10 MG/DL
LEUKOCYTE ESTERASE UR QL STRIP: NEGATIVE
LYMPHOCYTES # BLD AUTO: 1 10E3/UL (ref 0.8–5.3)
LYMPHOCYTES NFR BLD AUTO: 14 %
MAGNESIUM SERPL-MCNC: 2 MG/DL (ref 1.7–2.3)
MCH RBC QN AUTO: 34 PG (ref 26.5–33)
MCHC RBC AUTO-ENTMCNC: 33.4 G/DL (ref 31.5–36.5)
MCV RBC AUTO: 102 FL (ref 78–100)
MONOCYTES # BLD AUTO: 0.6 10E3/UL (ref 0–1.3)
MONOCYTES NFR BLD AUTO: 9 %
MUCOUS THREADS #/AREA URNS LPF: PRESENT /LPF
NEUTROPHILS # BLD AUTO: 5.3 10E3/UL (ref 1.6–8.3)
NEUTROPHILS NFR BLD AUTO: 77 %
NITRATE UR QL: NEGATIVE
NRBC # BLD AUTO: 0 10E3/UL
NRBC BLD AUTO-RTO: 0 /100
PH UR STRIP: 5 [PH] (ref 5–7)
PLATELET # BLD AUTO: 148 10E3/UL (ref 150–450)
POTASSIUM SERPL-SCNC: 4.6 MMOL/L (ref 3.4–5.3)
PROT SERPL-MCNC: 6.4 G/DL (ref 6.4–8.3)
RBC # BLD AUTO: 4.09 10E6/UL (ref 4.4–5.9)
RBC URINE: 2 /HPF
SODIUM SERPL-SCNC: 143 MMOL/L (ref 136–145)
SP GR UR STRIP: 1.03 (ref 1–1.03)
TROPONIN T SERPL HS-MCNC: 32 NG/L
TROPONIN T SERPL HS-MCNC: 37 NG/L
TSH SERPL DL<=0.005 MIU/L-ACNC: 0.7 UIU/ML (ref 0.3–4.2)
UROBILINOGEN UR STRIP-MCNC: <2 MG/DL
WBC # BLD AUTO: 7 10E3/UL (ref 4–11)
WBC URINE: <1 /HPF

## 2023-09-04 PROCEDURE — 84484 ASSAY OF TROPONIN QUANT: CPT | Performed by: FAMILY MEDICINE

## 2023-09-04 PROCEDURE — 85025 COMPLETE CBC W/AUTO DIFF WBC: CPT | Performed by: FAMILY MEDICINE

## 2023-09-04 PROCEDURE — 250N000011 HC RX IP 250 OP 636: Performed by: FAMILY MEDICINE

## 2023-09-04 PROCEDURE — 250N000013 HC RX MED GY IP 250 OP 250 PS 637: Performed by: INTERNAL MEDICINE

## 2023-09-04 PROCEDURE — 81001 URINALYSIS AUTO W/SCOPE: CPT | Performed by: FAMILY MEDICINE

## 2023-09-04 PROCEDURE — 93005 ELECTROCARDIOGRAM TRACING: CPT | Performed by: FAMILY MEDICINE

## 2023-09-04 PROCEDURE — 94660 CPAP INITIATION&MGMT: CPT

## 2023-09-04 PROCEDURE — 84443 ASSAY THYROID STIM HORMONE: CPT | Performed by: INTERNAL MEDICINE

## 2023-09-04 PROCEDURE — 999N000157 HC STATISTIC RCP TIME EA 10 MIN

## 2023-09-04 PROCEDURE — 96374 THER/PROPH/DIAG INJ IV PUSH: CPT

## 2023-09-04 PROCEDURE — 80053 COMPREHEN METABOLIC PANEL: CPT | Performed by: FAMILY MEDICINE

## 2023-09-04 PROCEDURE — 250N000013 HC RX MED GY IP 250 OP 250 PS 637: Performed by: FAMILY MEDICINE

## 2023-09-04 PROCEDURE — 82248 BILIRUBIN DIRECT: CPT | Performed by: INTERNAL MEDICINE

## 2023-09-04 PROCEDURE — G0378 HOSPITAL OBSERVATION PER HR: HCPCS

## 2023-09-04 PROCEDURE — 72131 CT LUMBAR SPINE W/O DYE: CPT

## 2023-09-04 PROCEDURE — 83735 ASSAY OF MAGNESIUM: CPT | Performed by: FAMILY MEDICINE

## 2023-09-04 PROCEDURE — 73560 X-RAY EXAM OF KNEE 1 OR 2: CPT | Mod: RT

## 2023-09-04 PROCEDURE — 70450 CT HEAD/BRAIN W/O DYE: CPT

## 2023-09-04 PROCEDURE — 36415 COLL VENOUS BLD VENIPUNCTURE: CPT | Performed by: STUDENT IN AN ORGANIZED HEALTH CARE EDUCATION/TRAINING PROGRAM

## 2023-09-04 PROCEDURE — 99285 EMERGENCY DEPT VISIT HI MDM: CPT | Mod: 25

## 2023-09-04 PROCEDURE — 73562 X-RAY EXAM OF KNEE 3: CPT | Mod: LT

## 2023-09-04 PROCEDURE — 99223 1ST HOSP IP/OBS HIGH 75: CPT | Performed by: INTERNAL MEDICINE

## 2023-09-04 PROCEDURE — 36415 COLL VENOUS BLD VENIPUNCTURE: CPT | Performed by: FAMILY MEDICINE

## 2023-09-04 RX ORDER — AMIODARONE HYDROCHLORIDE 100 MG/1
100 TABLET ORAL DAILY
Status: DISCONTINUED | OUTPATIENT
Start: 2023-09-04 | End: 2023-09-07 | Stop reason: HOSPADM

## 2023-09-04 RX ORDER — ONDANSETRON 2 MG/ML
4 INJECTION INTRAMUSCULAR; INTRAVENOUS EVERY 6 HOURS PRN
Status: DISCONTINUED | OUTPATIENT
Start: 2023-09-04 | End: 2023-09-07 | Stop reason: HOSPADM

## 2023-09-04 RX ORDER — CALCIUM CARBONATE/VITAMIN D3 600 MG-10
1 TABLET ORAL DAILY
Status: ON HOLD | COMMUNITY
End: 2023-11-20

## 2023-09-04 RX ORDER — ATORVASTATIN CALCIUM 40 MG/1
40 TABLET, FILM COATED ORAL AT BEDTIME
Status: DISCONTINUED | OUTPATIENT
Start: 2023-09-04 | End: 2023-09-07 | Stop reason: HOSPADM

## 2023-09-04 RX ORDER — OXYCODONE HYDROCHLORIDE 5 MG/1
5 TABLET ORAL ONCE
Status: COMPLETED | OUTPATIENT
Start: 2023-09-04 | End: 2023-09-04

## 2023-09-04 RX ORDER — NALOXONE HYDROCHLORIDE 0.4 MG/ML
0.4 INJECTION, SOLUTION INTRAMUSCULAR; INTRAVENOUS; SUBCUTANEOUS
Status: DISCONTINUED | OUTPATIENT
Start: 2023-09-04 | End: 2023-09-07 | Stop reason: HOSPADM

## 2023-09-04 RX ORDER — CALCIUM CARBONATE/VITAMIN D3 600 MG-10
500 TABLET ORAL DAILY
Status: DISCONTINUED | OUTPATIENT
Start: 2023-09-04 | End: 2023-09-07 | Stop reason: HOSPADM

## 2023-09-04 RX ORDER — TRAMADOL HYDROCHLORIDE 50 MG/1
50 TABLET ORAL 2 TIMES DAILY
Status: ON HOLD | COMMUNITY
Start: 2023-08-29 | End: 2023-09-07

## 2023-09-04 RX ORDER — NALOXONE HYDROCHLORIDE 0.4 MG/ML
0.2 INJECTION, SOLUTION INTRAMUSCULAR; INTRAVENOUS; SUBCUTANEOUS
Status: DISCONTINUED | OUTPATIENT
Start: 2023-09-04 | End: 2023-09-07 | Stop reason: HOSPADM

## 2023-09-04 RX ORDER — LIDOCAINE 40 MG/G
CREAM TOPICAL
Status: DISCONTINUED | OUTPATIENT
Start: 2023-09-04 | End: 2023-09-07 | Stop reason: HOSPADM

## 2023-09-04 RX ORDER — METOPROLOL SUCCINATE 25 MG/1
25 TABLET, EXTENDED RELEASE ORAL DAILY
Status: DISCONTINUED | OUTPATIENT
Start: 2023-09-04 | End: 2023-09-07 | Stop reason: HOSPADM

## 2023-09-04 RX ORDER — OXYCODONE HYDROCHLORIDE 5 MG/1
5 TABLET ORAL EVERY 4 HOURS PRN
Status: DISCONTINUED | OUTPATIENT
Start: 2023-09-04 | End: 2023-09-07 | Stop reason: HOSPADM

## 2023-09-04 RX ORDER — PRAMIPEXOLE DIHYDROCHLORIDE 0.25 MG/1
0.25 TABLET ORAL AT BEDTIME
Status: DISCONTINUED | OUTPATIENT
Start: 2023-09-04 | End: 2023-09-07 | Stop reason: HOSPADM

## 2023-09-04 RX ORDER — TRAMADOL HYDROCHLORIDE 50 MG/1
50 TABLET ORAL 2 TIMES DAILY
Status: DISCONTINUED | OUTPATIENT
Start: 2023-09-04 | End: 2023-09-07 | Stop reason: HOSPADM

## 2023-09-04 RX ORDER — FERROUS SULFATE 325(65) MG
325 TABLET ORAL DAILY
Status: DISCONTINUED | OUTPATIENT
Start: 2023-09-04 | End: 2023-09-07 | Stop reason: HOSPADM

## 2023-09-04 RX ORDER — KETOROLAC TROMETHAMINE 15 MG/ML
15 INJECTION, SOLUTION INTRAMUSCULAR; INTRAVENOUS ONCE
Status: COMPLETED | OUTPATIENT
Start: 2023-09-04 | End: 2023-09-04

## 2023-09-04 RX ORDER — POLYETHYLENE GLYCOL 3350 17 G/17G
17 POWDER, FOR SOLUTION ORAL DAILY PRN
Status: DISCONTINUED | OUTPATIENT
Start: 2023-09-04 | End: 2023-09-07 | Stop reason: HOSPADM

## 2023-09-04 RX ORDER — AMOXICILLIN 250 MG
1 CAPSULE ORAL 2 TIMES DAILY
Status: DISCONTINUED | OUTPATIENT
Start: 2023-09-04 | End: 2023-09-07 | Stop reason: HOSPADM

## 2023-09-04 RX ORDER — ONDANSETRON 4 MG/1
4 TABLET, ORALLY DISINTEGRATING ORAL EVERY 6 HOURS PRN
Status: DISCONTINUED | OUTPATIENT
Start: 2023-09-04 | End: 2023-09-07 | Stop reason: HOSPADM

## 2023-09-04 RX ORDER — FERROUS SULFATE 325(65) MG
325 TABLET, DELAYED RELEASE (ENTERIC COATED) ORAL DAILY
Status: ON HOLD | COMMUNITY
End: 2023-11-20

## 2023-09-04 RX ORDER — ACETAMINOPHEN 500 MG
1000 TABLET ORAL 4 TIMES DAILY
Status: DISCONTINUED | OUTPATIENT
Start: 2023-09-04 | End: 2023-09-07 | Stop reason: HOSPADM

## 2023-09-04 RX ADMIN — ACETAMINOPHEN 1000 MG: 500 TABLET ORAL at 12:03

## 2023-09-04 RX ADMIN — SENNOSIDES AND DOCUSATE SODIUM 1 TABLET: 50; 8.6 TABLET ORAL at 09:55

## 2023-09-04 RX ADMIN — AMIODARONE HYDROCHLORIDE 100 MG: 100 TABLET ORAL at 09:54

## 2023-09-04 RX ADMIN — KETOROLAC TROMETHAMINE 15 MG: 15 INJECTION INTRAMUSCULAR; INTRAVENOUS at 04:20

## 2023-09-04 RX ADMIN — METOPROLOL SUCCINATE 25 MG: 25 TABLET, EXTENDED RELEASE ORAL at 09:54

## 2023-09-04 RX ADMIN — ACETAMINOPHEN 1000 MG: 500 TABLET ORAL at 16:08

## 2023-09-04 RX ADMIN — OXYCODONE HYDROCHLORIDE 5 MG: 5 TABLET ORAL at 06:46

## 2023-09-04 RX ADMIN — TRAMADOL HYDROCHLORIDE 50 MG: 50 TABLET, COATED ORAL at 20:48

## 2023-09-04 RX ADMIN — ATORVASTATIN CALCIUM 40 MG: 40 TABLET, FILM COATED ORAL at 21:02

## 2023-09-04 RX ADMIN — TRAMADOL HYDROCHLORIDE 50 MG: 50 TABLET, COATED ORAL at 09:54

## 2023-09-04 RX ADMIN — ACETAMINOPHEN 1000 MG: 500 TABLET ORAL at 09:54

## 2023-09-04 RX ADMIN — SENNOSIDES AND DOCUSATE SODIUM 1 TABLET: 50; 8.6 TABLET ORAL at 20:48

## 2023-09-04 RX ADMIN — APIXABAN 5 MG: 5 TABLET, FILM COATED ORAL at 20:48

## 2023-09-04 RX ADMIN — FERROUS SULFATE TAB 325 MG (65 MG ELEMENTAL FE) 325 MG: 325 (65 FE) TAB at 09:55

## 2023-09-04 RX ADMIN — ACETAMINOPHEN 1000 MG: 500 TABLET ORAL at 20:48

## 2023-09-04 RX ADMIN — PRAMIPEXOLE DIHYDROCHLORIDE 0.25 MG: 0.25 TABLET ORAL at 21:02

## 2023-09-04 RX ADMIN — Medication 500 MCG: at 09:54

## 2023-09-04 RX ADMIN — APIXABAN 5 MG: 5 TABLET, FILM COATED ORAL at 09:55

## 2023-09-04 ASSESSMENT — ENCOUNTER SYMPTOMS
FEVER: 0
LIGHT-HEADEDNESS: 0
SHORTNESS OF BREATH: 0
BACK PAIN: 1
DIZZINESS: 0
ANAL BLEEDING: 1

## 2023-09-04 ASSESSMENT — ACTIVITIES OF DAILY LIVING (ADL)
ADLS_ACUITY_SCORE: 37
ADLS_ACUITY_SCORE: 37
ADLS_ACUITY_SCORE: 45
ADLS_ACUITY_SCORE: 45
ADLS_ACUITY_SCORE: 35
ADLS_ACUITY_SCORE: 37
ADLS_ACUITY_SCORE: 35
ADLS_ACUITY_SCORE: 35
DEPENDENT_IADLS:: CLEANING;LAUNDRY;SHOPPING;TRANSPORTATION
ADLS_ACUITY_SCORE: 45
ADLS_ACUITY_SCORE: 35

## 2023-09-04 NOTE — ED TRIAGE NOTES
Patient arrives from home via [facility].   Well known patient to medic service d/t falls and lift assists.   Medics saw patient several hours ago for a fall and patient declined coming to ED at that time. Fell again approximately one hour ago.   States he has arthritis and knees locked up causing the fall.     Patient on blood thinners. Trauma alert called and Provider at bedside.

## 2023-09-04 NOTE — H&P
[facility]    History and Physical - Hospitalist Service       Date of Admission:  9/4/2023    Assessment & Plan      Gorge Berkowitz Jr. 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  Likely multifactorial due to intravascular volume depletion (as YOUNG and ketonuria), and severe DJD.  No clear evidence of infection.  PT/OT eval, might need TCU at discharge  Severe bilateral knee pain.  Does have history of DJD and left TKA.  X-ray without clear evidence of acute traumatic injuries, consider CT imaging if pain is persistent.  Resume PTA APAP, tramadol, will use oxycodone sparingly for severe pain.  Acute kidney injury.  Likely prerenal due to intravascular volume depletion.  We will hold furosemide today, encourage oral hydration.  Recheck renal function in a.m.  Acute fracture through the left superior osteophyte at L1 level, Chronic appearing L2 compression deformity.  Patient denies any back pain at this time.  Supportive management   Paroxysmal atrial flutter.  Asymptomatic.  Takes amiodarone and also on Eliquis for CVA prevention  Combined ischemic and tachycardia mediated cardiomyopathy LVEF 40% in 2018.  Chronic HFrEF.  As above we will hold Lasix as it seems to be intravascularly depleted  ROSALVA on CPAP  Morbid obesity.  Has lost over 100 pounds  CODE STATUS: Requests full code       Diet: Combination Diet Low Saturated Fat Na <2400mg Diet  DVT Prophylaxis: DOAC  Levy Catheter: Not present  Lines: None     Cardiac Monitoring: None  Code Status: Full Code    Clinically Significant Risk Factors Present on Admission     "             # Drug Induced Coagulation Defect: home medication list includes an anticoagulant medication         # Obesity: Estimated body mass index is 38.02 kg/m  as calculated from the following:    Height as of this encounter: 1.778 m (5' 10\").    Weight as of this encounter: 120.2 kg (265 lb).              Disposition Plan      Expected Discharge Date: 09/05/2023                  Migdalia Duncan MD  Hospitalist Service  [facility]  Securely message with Sm[employer] (more info)  Text page via [employer] Paging/Directory     ______________________________________________________________________    Chief Complaint   Severe bilateral knee pain, fall    History is obtained from the patient    History of Present Illness   Gorge Berkowitz Jr. 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  He has been taking acetaminophen and tramadol for the pain.  Patient reports last night he was on the toilet for 3 hours and could not get off the toilet so he called EMS.  He was transferred to the recliner where he slept for a while.  Then he had a difficult time standing up however he went to the kitchen and his legs suddenly gave away and he fell onto his buttocks.  Patient reports he has not left his house since October 2022 as he has 7 steps to come into the house.  He had total of 4 falls over the past 1-1/2 years.  His brother lives downstairs and he has a lot of care from complex care for seniors [facility].    Patient denies feeling " dizzy or lightheaded.  Denies hitting his head or loss of consciousness.  He was having severe bilateral knee pain when he first came and and has not been able to move his left leg off the bed.  Pain improved after oxycodone.  Patient denies any recent fevers, cough, nausea, vomiting or diarrhea.      On initial evaluation in the ER afebrile, heart rate 105, normotensive.  Labs are significant for creatinine 1.22, platelets 148, high-sensitivity troponin T 32 and 37.  EKG shows a flutter with variable AV block.  CT head without evidence of acute intracranial process, does show presumed chronic microvascular ischemic changes and brain atrophy.  CT lumbar spine shows acute nondisplaced fracture through the left superior osteophyte at L1, stable L2 compression fracture.        Past Medical History    Past Medical History:   Diagnosis Date    Atrial fibrillation (H)     Cardiomyopathy (H)     ef 20%    CHF (congestive heart failure) (H)     Morbid obesity (H)     ROSALVA (obstructive sleep apnea)        Past Surgical History   Past Surgical History:   Procedure Laterality Date    CARDIAC CATHETERIZATION  08/10/2018    and right heart cath    CARDIOVERSION  08/16/2018    successful    CV CORONARY ANGIOGRAM N/A 8/10/2018    Procedure: Coronary Angiogram;  Surgeon: Ralph Powers MD;  Location: [facility] Cath Lab;  Service:     CV LEFT HEART CATHETERIZATION WITHOUT LEFT VENTRICULOGRAM Left 8/10/2018    Procedure: Left Heart Catheterization Without Left Ventriculogram;  Surgeon: Ralph Powers MD;  Location: [facility] Cath Lab;  Service:     CV RIGHT HEART CATHETERIZATION N/A 8/10/2018    Procedure: Right Heart Catheterization;  Surgeon: Ralph Powers MD;  Location: [facility] Cath Lab;  Service:        Prior to Admission Medications   Prior to Admission Medications   Prescriptions Last Dose Informant Patient Reported? Taking?   ELIQUIS 5 mg Tab tablet 9/3/2023 at pm Self No Yes   Sig: [ELIQUIS 5  MG TAB TABLET] Take 1 tablet (5 mg total) by mouth 2 (two) times a day.   acetaminophen (TYLENOL) 500 MG tablet Unknown at prn Self Yes Yes   Sig: [ACETAMINOPHEN (TYLENOL) 500 MG TABLET] Take 1,000 mg by mouth every 4 (four) hours as needed.   amiodarone (PACERONE) 200 MG tablet 9/3/2023 at am Self No Yes   Sig: [AMIODARONE (PACERONE) 200 MG TABLET] Take 0.5 tablets (100 mg total) by mouth daily.   atorvastatin (LIPITOR) 40 MG tablet 9/3/2023 at hs Self No Yes   Sig: [ATORVASTATIN (LIPITOR) 40 MG TABLET] Take 1 tablet (40 mg total) by mouth at bedtime.   calcium carbonate-vitamin D (CALTRATE) 600-10 MG-MCG per tablet 9/3/2023 at am Self Yes Yes   Sig: Take 1 tablet by mouth daily   ferrous sulfate (FE TABS) 325 (65 Fe) MG EC tablet 9/3/2023 at am Self Yes Yes   Sig: Take 325 mg by mouth daily   furosemide (LASIX) 20 MG tablet 9/3/2023 at am Self No Yes   Sig: [FUROSEMIDE (LASIX) 20 MG TABLET] take one tablet by mouth daily and take one tablet by mouth as needed at night for any swelling   Patient taking differently: Take 20 mg by mouth daily   glucosamine-chondroitin 500-400 mg cap 9/3/2023 at am Self Yes Yes   Sig: [GLUCOSAMINE-CHONDROITIN 500-400 MG CAP] Take 1 capsule by mouth daily.    krill-om3-dha-epa-om6-lip-astx (KRILL OIL, OMEGA 3 AND 6,) 1,500-165-67.5 mg cap 9/3/2023 at am Self Yes Yes   Sig: [KRILL-OM3-DHA-EPA-OM6-LIP-ASTX (KRILL OIL, OMEGA 3 AND 6,) 1,500-165-67.5 MG CAP] Take 3 capsules by mouth daily.    metoprolol succinate (TOPROL-XL) 50 MG 24 hr tablet 9/3/2023 at am Self No Yes   Sig: [METOPROLOL SUCCINATE (TOPROL-XL) 50 MG 24 HR TABLET] Take 0.5 tablets (25 mg total) by mouth daily.   polyethylene glycol (GLYCOLAX) 17 gram/dose powder Unknown at prn Self Yes Yes   Sig: Take 17 g by mouth as needed   pramipexole (MIRAPEX) 0.25 MG tablet 9/2/2023 at hs Self Yes Yes   Sig: Take 0.25 mg by mouth At Bedtime   traMADol (ULTRAM) 50 MG tablet 9/3/2023 at pm Self Yes Yes   Sig: Take 50 mg by mouth 2 times  daily   vitamin B-12 (CYANOCOBALAMIN) 500 MCG tablet 9/3/2023 at am Self Yes Yes   Sig: Take 500 mcg by mouth daily      Facility-Administered Medications: None        Review of Systems    The 10 point Review of Systems is negative other than noted in the HPI or here.       Social History   I have reviewed this patient's social history and updated it with pertinent information if needed.  Social History     Tobacco Use    Smoking status: Former     Types: Cigarettes     Quit date: 2005     Years since quittin.6    Smokeless tobacco: Never   Substance Use Topics    Alcohol use: No     Comment: Alcoholic Drinks/day: former alcoholic- quit     Drug use: No        Physical Exam   Vital Signs: Temp: 98.8  F (37.1  C) Temp src: Oral BP: 138/68 Pulse: 75   Resp: 16 SpO2: 98 % O2 Device: None (Room air)    Weight: 265 lbs 0 oz    General Appearance: Obese male in no acute distress  Respiratory: Respirations unlabored, lungs are clear bilaterally  Cardiovascular: Regular.  Normal S1-S2  GI: Abdomen obese, soft, nontender  MSK: Bilateral knees without any significant swelling or erythema  Skin: Lower extremity venous stasis dermatitis bilaterally    Medical Decision Making       75 MINUTES SPENT BY ME on the date of service doing chart review, history, exam, documentation & further activities per the note.  MANAGEMENT DISCUSSED with the following over the past 24 hours: Patient       Data     I have personally reviewed the following data over the past 24 hrs:    7.0  \   13.9   / 148 (L)     143 107 33.1 (H) /  90   4.6 23 1.22 (H) \     ALT: 17 AST: 33 AP: 82 TBILI: 0.8   ALB: 3.5 TOT PROTEIN: 6.4 LIPASE: N/A     Trop: 37 (H) BNP: N/A     TSH: 0.70 T4: N/A A1C: N/A     Imaging results reviewed over the past 24 hrs:   Recent Results (from the past 24 hour(s))   Head CT w/o contrast    Narrative    EXAM: CT HEAD W/O CONTRAST  LOCATION: [facility]  DATE: 2023    INDICATION: Traumatic  injury. Fall. Anticoagulated.  COMPARISON: None.  TECHNIQUE: Routine CT Head without IV contrast. Multiplanar reformats. Dose reduction techniques were used.    FINDINGS:  INTRACRANIAL CONTENTS: No intracranial hemorrhage, extraaxial collection, or mass effect.  No CT evidence of acute infarct. Mild to moderate presumed chronic small vessel ischemic changes. Mild to moderate generalized volume loss. No hydrocephalus.     VISUALIZED ORBITS/SINUSES/MASTOIDS: No intraorbital abnormality. Mucosal thickening primarily involving the ethmoid air cells. No middle ear or mastoid effusion.    BONES/SOFT TISSUES: No acute abnormality.      Impression    IMPRESSION:  1.  No CT evidence for acute intracranial process.  2.  Brain atrophy and presumed chronic microvascular ischemic changes as above.   Lumbar spine CT w/o contrast    Narrative    EXAM: CT LUMBAR SPINE WITHOUT CONTRAST  LOCATION: [facility]  DATE: 09/04/2023    INDICATION: Back pain after fall.  COMPARISON: Lumbar spine CT and CT abdomen and pelvis dated 10/18/2022.  TECHNIQUE: Routine CT Lumbar Spine without IV contrast. Multiplanar reformats. Dose reduction techniques were used.     FINDINGS:  VERTEBRA: Grade 1 retrolisthesis of L1 on L2 and anterolisthesis of L4 on L5. Stable vertebral body heights with a chronic L2 compression deformity. Diffuse bony demineralization is suggestive of osteoporosis. Acute-appearing nondisplaced fracture   through left superior osteophyte at L1.     CANAL/FORAMINA: Multilevel bridging anterior osteophytes in the lower thoracic spine. Degenerative changes throughout the lumbar spine, with severe spinal canal stenosis at L4-L5. Bilateral neural foraminal narrowing is greatest on the right at L5-S1.    PARASPINAL: Mild prevertebral stranding along the fracture at L1. Scattered atherosclerotic calcification. Infrarenal abdominal aortic aneurysm is grossly stable in size.      Impression    IMPRESSION:  1.   Acute appearing nondisplaced fracture through the left superior osteophyte at L1.  2.  Stable L2 compression fracture.  3.  Degenerative changes, as described.       XR Knee Port Left 3 Views    Narrative    EXAM: XR KNEE PORT LEFT 3 VIEWS, XR KNEE PORT RIGHT 1/2 VIEWS  LOCATION: [facility]  DATE: 9/4/2023    INDICATION: Fall, severe knee bilateral knee pain  COMPARISON: 10 18/22.      Impression    IMPRESSION:    There is diffuse osseous demineralization. Vascular atherosclerotic calcifications are present.    LEFT KNEE: Status post left total knee arthroplasty, in standard alignment. No periprosthetic lucency or fracture. No sizable joint effusion.    RIGHT KNEE: No acute fracture or dislocation is identified. There is a small joint effusion. There is tricompartmental osteoarthritis, most pronounced in the medial compartment where it appears advanced.   XR Knee Port Right 1/2 Views    Narrative    EXAM: XR KNEE PORT LEFT 3 VIEWS, XR KNEE PORT RIGHT 1/2 VIEWS  LOCATION: [facility]  DATE: 9/4/2023    INDICATION: Fall, severe knee bilateral knee pain  COMPARISON: 10 18/22.      Impression    IMPRESSION:    There is diffuse osseous demineralization. Vascular atherosclerotic calcifications are present.    LEFT KNEE: Status post left total knee arthroplasty, in standard alignment. No periprosthetic lucency or fracture. No sizable joint effusion.    RIGHT KNEE: No acute fracture or dislocation is identified. There is a small joint effusion. There is tricompartmental osteoarthritis, most pronounced in the medial compartment where it appears advanced.

## 2023-09-04 NOTE — ED NOTES
Bed: JNED-03  Expected date: 9/4/23  Expected time: 3:45 AM  Means of arrival:   Comments:  Ryan 78yr. M Fall

## 2023-09-04 NOTE — CONSULTS
Care Management Initial Consult    General Information  Assessment completed with: Patient,    Type of CM/SW Visit: Initial Assessment    Primary Care Provider verified and updated as needed: Yes   Readmission within the last 30 days: no previous admission in last 30 days      Reason for Consult: discharge planning, utilization management concerns  Advance Care Planning: Advance Care Planning Reviewed: no concerns identified          Communication Assessment  Patient's communication style: spoken language (English or Bilingual)             Cognitive  Cognitive/Neuro/Behavioral: motor response                      Living Environment:   People in home: sibling(s)  Lance, brother Mohit  Current living Arrangements: house      Able to return to prior arrangements: yes  Living Arrangement Comments: Lives in split level home. Patient lives in upper level, brother lives in lower level    Family/Social Support:  Care provided by: self, other (see comments) ([facility] (MD, RN home visits). [facility] 2x/wk, Paramedic Program)  Provides care for: no one  Marital Status: Single  Sibling(s), Other (specify) (Friends)          Description of Support System: Supportive, Involved    Support Assessment: Patient communicates needs well met, Adequate family and caregiver support, Adequate social supports    Current Resources:   Patient receiving home care services: Yes  Skilled Home Care Services: Skilled Nursing  Community Resources: Home Care, Housekeeping/Chore Agency, Other (see comment) ([facility] services with [facility])  Equipment currently used at home:    Supplies currently used at home: Other (CPAP supplies)    Employment/Financial:  Employment Status: retired        Financial Concerns:             Does the patient's insurance plan have a 3 day qualifying hospital stay waiver?       Lifestyle & Psychosocial Needs:  Social Determinants of Health     Tobacco Use: Medium Risk (10/18/2022)     Patient History     Smoking Tobacco Use: Former     Smokeless Tobacco Use: Never     Passive Exposure: Not on file   Alcohol Use: Not on file   Financial Resource Strain: Not on file   Food Insecurity: Not on file   Transportation Needs: Not on file   Physical Activity: Not on file   Stress: Not on file   Social Connections: Not on file   Intimate Partner Violence: Not on file   Depression: Not on file   Housing Stability: Not on file       Functional Status:  Prior to admission patient needed assistance:   Dependent ADLs:: Ambulation-walker  Dependent IADLs:: Cleaning, Laundry, Shopping, Transportation  Assesssment of Functional Status: Not at baseline with mobility    Mental Health Status:          Chemical Dependency Status:                Values/Beliefs:  Spiritual, Cultural Beliefs, Mosque Practices, Values that affect care:                 Additional Information:  Met with patient at bedside, introduced self and care management role. Patient lives with his brother in his split level home. Patient lives on the top floor, brother lives in the lower level. Patient is independent with his ADLs, ambulates using a walker.     Patient has many programs in place to help him stay in his home including the [facility] program through [facility]. Patient reports that his MD comes to the home to see him. He also receives routine home visits from nurses, lab techs, care coordinators.   Patient receives home visits 2 times a week from [facility] for 2 hours/day, 2x/week.   Patient recently started the Paramedic Program.  Patient receives once a week visits from [facility].     HATTIE discussed. PT/OT eval pending. CM to follow hospital progression, treatment team recommendations and assist with discharge planning as needed.     Will need [facility] transport with a stair chair to help get him to the top level of his split level home (7 steps).    Katerina Lo RN

## 2023-09-04 NOTE — MEDICATION SCRIBE - ADMISSION MEDICATION HISTORY
Medication Scribe Admission Medication History    Admission medication history is complete. The information provided in this note is only as accurate as the sources available at the time of the update.    Medication reconciliation/reorder completed by provider prior to medication history? No    Information Source(s): Patient via in-person    Pertinent Information:     Changes made to PTA medication list:  Added: Tramadol 50 mg tablet, Vitamin B12 tablet, Ferrous sulfate 325 mg tablet, Calcium-D3 tablet (per patient)  Deleted: Lisinopril (per patient)  Changed: None    Medication Affordability:  Not including over the counter (OTC) medications, was there a time in the past 3 months when you did not take your medications as prescribed because of cost?: No    Allergies reviewed with patient and updates made in EHR: yes    Medication History Completed By: Shy Tristan 9/4/2023 7:06 AM    Prior to Admission medications    Medication Sig Last Dose Taking? Auth Provider Long Term End Date   acetaminophen (TYLENOL) 500 MG tablet [ACETAMINOPHEN (TYLENOL) 500 MG TABLET] Take 1,000 mg by mouth every 4 (four) hours as needed. Unknown at prn Yes Provider, Historical     amiodarone (PACERONE) 200 MG tablet [AMIODARONE (PACERONE) 200 MG TABLET] Take 0.5 tablets (100 mg total) by mouth daily. 9/3/2023 at am Yes Tra Johansen MD Yes    atorvastatin (LIPITOR) 40 MG tablet [ATORVASTATIN (LIPITOR) 40 MG TABLET] Take 1 tablet (40 mg total) by mouth at bedtime. 9/3/2023 at hs Yes Tra Johansen MD Yes    ELIQUIS 5 mg Tab tablet [ELIQUIS 5 MG TAB TABLET] Take 1 tablet (5 mg total) by mouth 2 (two) times a day. 9/3/2023 at pm Yes Tra Johansen MD Yes    furosemide (LASIX) 20 MG tablet [FUROSEMIDE (LASIX) 20 MG TABLET] take one tablet by mouth daily and take one tablet by mouth as needed at night for any swelling  Patient taking differently: Take 20 mg by mouth daily 9/3/2023 at am Yes Tra Johansen MD Yes     glucosamine-chondroitin 500-400 mg cap [GLUCOSAMINE-CHONDROITIN 500-400 MG CAP] Take 1 capsule by mouth daily.  9/3/2023 at am Yes Provider, Historical     krill-om3-dha-epa-om6-lip-astx (KRILL OIL, OMEGA 3 AND 6,) 1,500-165-67.5 mg cap [KRILL-OM3-DHA-EPA-OM6-LIP-ASTX (KRILL OIL, OMEGA 3 AND 6,) 1,500-165-67.5 MG CAP] Take 3 capsules by mouth daily.  9/3/2023 at am Yes Provider, Historical     metoprolol succinate (TOPROL-XL) 50 MG 24 hr tablet [METOPROLOL SUCCINATE (TOPROL-XL) 50 MG 24 HR TABLET] Take 0.5 tablets (25 mg total) by mouth daily. 9/3/2023 at am Yes Tra Johansen MD Yes    polyethylene glycol (GLYCOLAX) 17 gram/dose powder Take 17 g by mouth as needed Unknown at prn Yes Provider, Historical     pramipexole (MIRAPEX) 0.25 MG tablet Take 0.25 mg by mouth At Bedtime 9/2/2023 at hs Yes Provider, Historical Yes    traMADol (ULTRAM) 50 MG tablet Take 50 mg by mouth 2 times daily 9/3/2023 at pm Yes Reported, Patient     vitamin B-12 (CYANOCOBALAMIN) 500 MCG tablet Take 500 mcg by mouth daily 9/3/2023 at am Yes Reported, Patient

## 2023-09-04 NOTE — ED NOTES
[facility] who transported patient to hospital state they are attempting to place patient in their [facility] program d/t patients increased needs.   Patient has not left his home since October 2022. States he is having increased trouble doing things at home and is missing several needed appointments and medical services d/t not having transportation.

## 2023-09-04 NOTE — ED NOTES
"Patient refusing straight cath for urine sample.   Attempted to give patient urinal. Patient states, \"I can't do this by myself. I would like you to hold my penis for me.\"   Writer told patient his arms and hands work and he can attempt to void on his own when he is ready.   "

## 2023-09-04 NOTE — ED PROVIDER NOTES
EMERGENCY DEPARTMENT ENCOUNTER      NAME: Gorge Berkowitz Jr.  AGE: 78 year old male  YOB: 1945  MRN: [id number]  EVALUATION DATE & TIME: 9/4/2023  3:53 AM    PCP: Lisseth Scott    ED PROVIDER: Abelardo Singleton M.D.    Chief Complaint   Patient presents with    Fall       FINAL IMPRESSION:  1. Morbid obesity (H)    2. Atrial flutter, unspecified type (H)    3. Chronic pain disorder    4. Weakness generalized        ED COURSE & MEDICAL DECISION MAKING:    Pertinent Labs & Imaging studies independently interpreted by me. (See chart for details)  3:55 AM Patient seen and examined, reviewed most recent home care visit on August 16 for home care for seniors complex medical and psychosocial needs, at that time goal of aging in place and remain dependent.  Patient presents today with generalized pain and weakness.  EMS was called earlier as patient was unable to get off the bathroom, he reports he subsequently had a fall.  Says his knees hurt, back hurts although it sounds like that preceded the fall, shoulder pain.  Denies chest pain, nausea, vomiting, diarrhea, abdominal pain, fevers, chills, shortness of breath.  On exam, vitally stable.  Patient is largely unable to move himself and requires extensive assistance although when asked to sit up actually is pushing back against the examiner instead of assisting to sit.  No external signs of trauma, chronic venous stasis changes of the lower extremities bilaterally with a small abrasion on the left lower leg but no erythema or warmth to suggest infection.  Labs and CT scan are ordered.  4:56 AM labs independently interpreted by me with elevated troponin, repeat troponin is ordered at 2 hours but this likely is chronic as patient has no anginal equivalents and normal EKG for him.  Basic panel is reassuring with stable elevated creatinine, CBC with no leukocytosis or anemia.  CT scan of the lumbar spine independently interpreted by me with degenerative  changes and spondylolisthesis which is stable from prior of October 2022.  6:39 AM repeat troponin is stable.  Patient rechecked, he is complaining of pain in his left leg although able to move this.  He says he has chronic pain in both legs and the left one is usually worse.  Long conversation about disposition.  Patient called EMS twice in the last 24 hours due to not being able to get off the toilet and then due to the fall which brought him in today.  He is not willing to attempt to ambulate at this point and further has not left his house since prior emergency department visit almost a year ago.  He has what sounds like fairly maximized home care services and still failure to thrive at home, is very resistant to the idea of assisted living placement.  Plan for admission for reevaluation and pain management.  I did review note from October 2022 in the emergency department, at that time was noted that after pain management with Tylenol and ibuprofen, patient was able to ambulate independently in the department.  This is considerably more ambulatory than he would appear to be today.  6:57 AM care discussed with Dr. Najera for admission given ongoing weakness and inability to ambulate without assist.    At the conclusion of the encounter I discussed the results of all of the tests and the disposition. The questions were answered. The patient or family acknowledged understanding and was agreeable with the care plan.     Medical Decision Making    History:  Supplemental history from: Documented in chart, if applicable  External Record(s) reviewed: Documented in chart, if applicable.    Work Up:  Chart documentation includes differential considered and any EKGs or imaging independently interpreted by provider, where specified.  In additional to work up documented, I considered the following work up: Documented in chart, if applicable.    External consultation:  Discussion of management with another provider:  "Documented in chart, if applicable    Complicating factors:  Care impacted by chronic illness: Chronic Pain, Heart Disease, and Hypertension  Care affected by social determinants of health: Access to Medical Care, Medication Noncompliance, and No Transportation for Health Care    Disposition considerations: Admit.        EKG:    Performed at: 4:33 AM  Impression: Atrial flutter with variable block  Rate: 76  Rhythm: Atrial flutter  Axis: Left  QRS Interval: 108  QTc Interval: 475  ST Changes: No acute changes  Comparison: August 2018, atrial flutter has replaced sinus rhythm    Dr. Abelardo Singleton reviewed and independently interpreted the EKG(s) documented above.    PROCEDURES:       MEDICATIONS GIVEN IN THE EMERGENCY:  Medications   ketorolac (TORADOL) injection 15 mg (15 mg Intravenous $Given 9/4/23 8850)   oxyCODONE (ROXICODONE) tablet 5 mg (5 mg Oral $Given 9/4/23 0612)       NEW PRESCRIPTIONS STARTED AT TODAY'S ER VISIT  New Prescriptions    No medications on file       =================================================================    HPI    Patient information was obtained from: Patient       Gorge Berkowitz Jr. is a 78 year old male with a pertinent history of CHF,  CAD, HTN, HLD, atrial flutter, anticoagulated on Eliquis, ROSALVA on CPAP, and morbid obesity who presents to this ED EMS for evaluation of a fall and other complaints    The patient reports his \"legs has been failing\" him and he has had \"balance problems\" which resulted in him falling in his kitchen tonight. The patient denies hitting his head. The patient now reports lower back pain, leg pain, knee pain, and shoulder pain. The patient states \"everything hurts\". The patient has been taking acetaminophen and tramadol for the pain.    The patient also reports earlier today he was on the toilet for three hours and couldn't get off the toilet without help. The patient noted there was bright red blood in the toilet.  The patient also notes he has " been unable to change his compression socks in 3 or 4 days and is worried about an infection forming.  The patient denies lightheadedness, dizziness, shortness of breath, chest pain, loss of consciousness, fever, or any other symptoms or complaints.    The patient notes he is supposed to take his Eliquis twice a day, but instead he has been taking it once a day and aspirin at night.    The patient also reports he lives alone and has not left the house in a year and a half. He notes that he has caregivers that come to his house and a brother that helps care for him.     REVIEW OF SYSTEMS   Review of Systems   Constitutional:  Negative for fever.   Respiratory:  Negative for shortness of breath.    Cardiovascular:  Negative for chest pain.   Gastrointestinal:  Positive for anal bleeding.   Musculoskeletal:  Positive for back pain.        Leg pain, knee pain, shoulder pain   Neurological:  Negative for dizziness, syncope and light-headedness.   All other systems reviewed and are negative.     All other systems reviewed and negative    PAST MEDICAL HISTORY:  Past Medical History:   Diagnosis Date    Atrial fibrillation (H)     Cardiomyopathy (H)     ef 20%    CHF (congestive heart failure) (H)     Morbid obesity (H)     ROSALVA (obstructive sleep apnea)        PAST SURGICAL HISTORY:  Past Surgical History:   Procedure Laterality Date    CARDIAC CATHETERIZATION  08/10/2018    and right heart cath    CARDIOVERSION  08/16/2018    successful    CV CORONARY ANGIOGRAM N/A 8/10/2018    Procedure: Coronary Angiogram;  Surgeon: Ralph Powers MD;  Location: [facility];  Service:     CV LEFT HEART CATHETERIZATION WITHOUT LEFT VENTRICULOGRAM Left 8/10/2018    Procedure: Left Heart Catheterization Without Left Ventriculogram;  Surgeon: Ralph Powers MD;  Location: [facility];  Service:     CV RIGHT HEART CATHETERIZATION N/A 8/10/2018    Procedure: Right Heart Catheterization;  Surgeon: Ralph Wick  "MD Gio;  Location: [facility];  Service:        CURRENT MEDICATIONS:    No current facility-administered medications for this encounter.     Current Outpatient Medications   Medication    acetaminophen (TYLENOL) 500 MG tablet    amiodarone (PACERONE) 200 MG tablet    atorvastatin (LIPITOR) 40 MG tablet    calcium carbonate-vitamin D (CALTRATE) 600-10 MG-MCG per tablet    ELIQUIS 5 mg Tab tablet    ferrous sulfate (FE TABS) 325 (65 Fe) MG EC tablet    furosemide (LASIX) 20 MG tablet    glucosamine-chondroitin 500-400 mg cap    krill-om3-dha-epa-om6-lip-astx (KRILL OIL, OMEGA 3 AND 6,) 1,500-165-67.5 mg cap    metoprolol succinate (TOPROL-XL) 50 MG 24 hr tablet    polyethylene glycol (GLYCOLAX) 17 gram/dose powder    pramipexole (MIRAPEX) 0.25 MG tablet    traMADol (ULTRAM) 50 MG tablet    vitamin B-12 (CYANOCOBALAMIN) 500 MCG tablet       ALLERGIES:  No Known Allergies    FAMILY HISTORY:  No family history on file.    SOCIAL HISTORY:   Social History     Socioeconomic History    Marital status: Single   Tobacco Use    Smoking status: Former     Types: Cigarettes     Quit date: 2005     Years since quittin.6    Smokeless tobacco: Never   Substance and Sexual Activity    Alcohol use: No     Comment: Alcoholic Drinks/day: former alcoholic- quit     Drug use: No       VITALS:  /68   Pulse 105   Temp 98.8  F (37.1  C) (Oral)   Resp 16   Ht 1.778 m (5' 10\")   Wt 120.2 kg (265 lb)   SpO2 97%   BMI 38.02 kg/m      PHYSICAL EXAM:  Physical Exam  Vitals and nursing note reviewed.   Constitutional:       Appearance: Normal appearance.   HENT:      Head: Normocephalic and atraumatic.      Right Ear: External ear normal.      Left Ear: External ear normal.      Nose: Nose normal.      Mouth/Throat:      Mouth: Mucous membranes are moist.   Eyes:      Extraocular Movements: Extraocular movements intact.      Conjunctiva/sclera: Conjunctivae normal.      Pupils: Pupils are equal, round, and " reactive to light.   Cardiovascular:      Rate and Rhythm: Normal rate and regular rhythm.   Pulmonary:      Effort: Pulmonary effort is normal.      Breath sounds: Normal breath sounds. No wheezing or rales.   Abdominal:      General: Abdomen is flat. There is no distension.      Palpations: Abdomen is soft.      Tenderness: There is no abdominal tenderness. There is no guarding.   Musculoskeletal:         General: Normal range of motion.      Cervical back: Normal range of motion and neck supple.      Right lower leg: No edema.      Left lower leg: No edema.   Lymphadenopathy:      Cervical: No cervical adenopathy.   Skin:     General: Skin is warm and dry.      Findings: Abrasion present.      Comments: Superficial abrasion to left lateral lower left   Chronic venostasis changes to calves bilaterally    Neurological:      General: No focal deficit present.      Mental Status: He is alert and oriented to person, place, and time. Mental status is at baseline.      Comments: No gross focal neurologic deficits   Psychiatric:         Mood and Affect: Mood normal.         Behavior: Behavior normal.         Thought Content: Thought content normal.          LAB:  All pertinent labs reviewed and interpreted.  Results for orders placed or performed during the hospital encounter of 09/04/23   Head CT w/o contrast    Impression    IMPRESSION:  1.  No CT evidence for acute intracranial process.  2.  Brain atrophy and presumed chronic microvascular ischemic changes as above.   Lumbar spine CT w/o contrast    Impression    IMPRESSION:  1.  Acute appearing nondisplaced fracture through the left superior osteophyte at L1.  2.  Stable L2 compression fracture.  3.  Degenerative changes, as described.       Extra Blue Top Tube   Result Value Ref Range    Hold Specimen JIC    Extra Red Top Tube   Result Value Ref Range    Hold Specimen JIC    Extra Green Top (Lithium Heparin) Tube   Result Value Ref Range    Hold Specimen JIC     Extra Purple Top Tube   Result Value Ref Range    Hold Specimen [facility]    Basic metabolic panel   Result Value Ref Range    Sodium 143 136 - 145 mmol/L    Potassium 4.6 3.4 - 5.3 mmol/L    Chloride 107 98 - 107 mmol/L    Carbon Dioxide (CO2) 23 22 - 29 mmol/L    Anion Gap 13 7 - 15 mmol/L    Urea Nitrogen 33.1 (H) 8.0 - 23.0 mg/dL    Creatinine 1.22 (H) 0.67 - 1.17 mg/dL    Calcium 9.6 8.8 - 10.2 mg/dL    Glucose 90 70 - 99 mg/dL    GFR Estimate 61 >60 mL/min/1.73m2   Result Value Ref Range    Troponin T, High Sensitivity 32 (H) <=22 ng/L   Result Value Ref Range    Magnesium 2.0 1.7 - 2.3 mg/dL   UA with Microscopic reflex to Culture    Specimen: Urine, Clean Catch   Result Value Ref Range    Color Urine Yellow Colorless, Straw, Light Yellow, Yellow    Appearance Urine Clear Clear    Glucose Urine Negative Negative mg/dL    Bilirubin Urine Negative Negative    Ketones Urine 10 (A) Negative mg/dL    Specific Gravity Urine 1.033 (H) 1.001 - 1.030    Blood Urine Negative Negative    pH Urine 5.0 5.0 - 7.0    Protein Albumin Urine 10 (A) Negative mg/dL    Urobilinogen Urine <2.0 <2.0 mg/dL    Nitrite Urine Negative Negative    Leukocyte Esterase Urine Negative Negative    Mucus Urine Present (A) None Seen /LPF    RBC Urine 2 <=2 /HPF    WBC Urine <1 <=5 /HPF    Hyaline Casts Urine 2 <=2 /LPF   CBC with platelets and differential   Result Value Ref Range    WBC Count 7.0 4.0 - 11.0 10e3/uL    RBC Count 4.09 (L) 4.40 - 5.90 10e6/uL    Hemoglobin 13.9 13.3 - 17.7 g/dL    Hematocrit 41.6 40.0 - 53.0 %     (H) 78 - 100 fL    MCH 34.0 (H) 26.5 - 33.0 pg    MCHC 33.4 31.5 - 36.5 g/dL    RDW 14.4 10.0 - 15.0 %    Platelet Count 148 (L) 150 - 450 10e3/uL    % Neutrophils 77 %    % Lymphocytes 14 %    % Monocytes 9 %    % Eosinophils 0 %    % Basophils 0 %    % Immature Granulocytes 0 %    NRBCs per 100 WBC 0 <1 /100    Absolute Neutrophils 5.3 1.6 - 8.3 10e3/uL    Absolute Lymphocytes 1.0 0.8 - 5.3 10e3/uL    Absolute  Monocytes 0.6 0.0 - 1.3 10e3/uL    Absolute Eosinophils 0.0 0.0 - 0.7 10e3/uL    Absolute Basophils 0.0 0.0 - 0.2 10e3/uL    Absolute Immature Granulocytes 0.0 <=0.4 10e3/uL    Absolute NRBCs 0.0 10e3/uL   Result Value Ref Range    Troponin T, High Sensitivity 37 (H) <=22 ng/L       RADIOLOGY:  Reviewed all pertinent imaging. Please see official radiology report.  Lumbar spine CT w/o contrast   Final Result   IMPRESSION:   1.  Acute appearing nondisplaced fracture through the left superior osteophyte at L1.   2.  Stable L2 compression fracture.   3.  Degenerative changes, as described.            Head CT w/o contrast   Final Result   IMPRESSION:   1.  No CT evidence for acute intracranial process.   2.  Brain atrophy and presumed chronic microvascular ischemic changes as above.          I, Rachana Leal, am serving as a scribe to document services personally performed by Dr. Singleton based on my observation and the provider's statements to me. I, Abelardo Singleton MD attest that Rachana Leal is acting in a scribe capacity, has observed my performance of the services and has documented them in accordance with my direction.    Abelardo Singleton M.D.  Emergency Medicine  [facility] EMERGENCY DEPARTMENT  [address] [facility] [id number] [phone]  Dept: [phone]       Abelardo Singleton MD  09/04/23 0737

## 2023-09-04 NOTE — PLAN OF CARE
Problem: Plan of Care - These are the overarching goals to be used throughout the patient stay.    Goal: Plan of Care Review  Description: The Plan of Care Review/Shift note should be completed every shift.  The Outcome Evaluation is a brief statement about your assessment that the patient is improving, declining, or no change.  This information will be displayed automatically on your shift note.  Outcome: Progressing  Flowsheets (Taken 9/4/2023 3548)  Plan of Care Reviewed With: patient   Goal Outcome Evaluation:      Plan of Care Reviewed With: patient  Pt admitted for generalized weakness and frequent fall at home. XRAY bilateral knee negative for fracture. CT Spine chronic stable L 1 &2 fracture. PT/ OT consulted. Pt alert and oriented x 4, remained Bedrest until evaluated by PT.

## 2023-09-05 ENCOUNTER — APPOINTMENT (OUTPATIENT)
Dept: PHYSICAL THERAPY | Facility: HOSPITAL | Age: 78
End: 2023-09-05
Attending: INTERNAL MEDICINE
Payer: COMMERCIAL

## 2023-09-05 ENCOUNTER — APPOINTMENT (OUTPATIENT)
Dept: OCCUPATIONAL THERAPY | Facility: HOSPITAL | Age: 78
End: 2023-09-05
Attending: INTERNAL MEDICINE
Payer: COMMERCIAL

## 2023-09-05 LAB
ANION GAP SERPL CALCULATED.3IONS-SCNC: 7 MMOL/L (ref 7–15)
BUN SERPL-MCNC: 28.3 MG/DL (ref 8–23)
CALCIUM SERPL-MCNC: 8.8 MG/DL (ref 8.8–10.2)
CHLORIDE SERPL-SCNC: 104 MMOL/L (ref 98–107)
CREAT SERPL-MCNC: 1.15 MG/DL (ref 0.67–1.17)
DEPRECATED HCO3 PLAS-SCNC: 28 MMOL/L (ref 22–29)
GFR SERPL CREATININE-BSD FRML MDRD: 66 ML/MIN/1.73M2
GLUCOSE SERPL-MCNC: 80 MG/DL (ref 70–99)
POTASSIUM SERPL-SCNC: 5 MMOL/L (ref 3.4–5.3)
SODIUM SERPL-SCNC: 139 MMOL/L (ref 136–145)

## 2023-09-05 PROCEDURE — G0378 HOSPITAL OBSERVATION PER HR: HCPCS

## 2023-09-05 PROCEDURE — 97166 OT EVAL MOD COMPLEX 45 MIN: CPT | Mod: GO

## 2023-09-05 PROCEDURE — 94660 CPAP INITIATION&MGMT: CPT

## 2023-09-05 PROCEDURE — 36415 COLL VENOUS BLD VENIPUNCTURE: CPT | Performed by: INTERNAL MEDICINE

## 2023-09-05 PROCEDURE — 97530 THERAPEUTIC ACTIVITIES: CPT | Mod: GP

## 2023-09-05 PROCEDURE — 97161 PT EVAL LOW COMPLEX 20 MIN: CPT | Mod: GP

## 2023-09-05 PROCEDURE — 99232 SBSQ HOSP IP/OBS MODERATE 35: CPT | Performed by: INTERNAL MEDICINE

## 2023-09-05 PROCEDURE — 80048 BASIC METABOLIC PNL TOTAL CA: CPT | Performed by: INTERNAL MEDICINE

## 2023-09-05 PROCEDURE — 999N000157 HC STATISTIC RCP TIME EA 10 MIN

## 2023-09-05 PROCEDURE — 250N000013 HC RX MED GY IP 250 OP 250 PS 637: Performed by: INTERNAL MEDICINE

## 2023-09-05 RX ORDER — LIDOCAINE 4 G/G
1 PATCH TOPICAL
Status: DISCONTINUED | OUTPATIENT
Start: 2023-09-05 | End: 2023-09-07 | Stop reason: HOSPADM

## 2023-09-05 RX ADMIN — ACETAMINOPHEN 1000 MG: 500 TABLET ORAL at 11:55

## 2023-09-05 RX ADMIN — TRAMADOL HYDROCHLORIDE 50 MG: 50 TABLET, COATED ORAL at 19:03

## 2023-09-05 RX ADMIN — FERROUS SULFATE TAB 325 MG (65 MG ELEMENTAL FE) 325 MG: 325 (65 FE) TAB at 07:50

## 2023-09-05 RX ADMIN — ACETAMINOPHEN 1000 MG: 500 TABLET ORAL at 07:50

## 2023-09-05 RX ADMIN — OXYCODONE HYDROCHLORIDE 5 MG: 5 TABLET ORAL at 03:12

## 2023-09-05 RX ADMIN — TRAMADOL HYDROCHLORIDE 50 MG: 50 TABLET, COATED ORAL at 07:50

## 2023-09-05 RX ADMIN — SENNOSIDES AND DOCUSATE SODIUM 1 TABLET: 50; 8.6 TABLET ORAL at 19:01

## 2023-09-05 RX ADMIN — METOPROLOL SUCCINATE 25 MG: 25 TABLET, EXTENDED RELEASE ORAL at 07:50

## 2023-09-05 RX ADMIN — APIXABAN 5 MG: 5 TABLET, FILM COATED ORAL at 19:03

## 2023-09-05 RX ADMIN — APIXABAN 5 MG: 5 TABLET, FILM COATED ORAL at 07:50

## 2023-09-05 RX ADMIN — Medication 500 MCG: at 07:50

## 2023-09-05 RX ADMIN — ATORVASTATIN CALCIUM 40 MG: 40 TABLET, FILM COATED ORAL at 21:07

## 2023-09-05 RX ADMIN — PRAMIPEXOLE DIHYDROCHLORIDE 0.25 MG: 0.25 TABLET ORAL at 22:03

## 2023-09-05 RX ADMIN — OXYCODONE HYDROCHLORIDE 5 MG: 5 TABLET ORAL at 21:07

## 2023-09-05 RX ADMIN — LIDOCAINE 1 PATCH: 4 PATCH TOPICAL at 19:05

## 2023-09-05 RX ADMIN — ACETAMINOPHEN 1000 MG: 500 TABLET ORAL at 19:01

## 2023-09-05 RX ADMIN — SENNOSIDES AND DOCUSATE SODIUM 1 TABLET: 50; 8.6 TABLET ORAL at 07:50

## 2023-09-05 RX ADMIN — AMIODARONE HYDROCHLORIDE 100 MG: 100 TABLET ORAL at 07:50

## 2023-09-05 ASSESSMENT — ACTIVITIES OF DAILY LIVING (ADL)
ADLS_ACUITY_SCORE: 45
ADLS_ACUITY_SCORE: 45
ADLS_ACUITY_SCORE: 37
ADLS_ACUITY_SCORE: 45
ADLS_ACUITY_SCORE: 37
ADLS_ACUITY_SCORE: 45
ADLS_ACUITY_SCORE: 45
ADLS_ACUITY_SCORE: 41
ADLS_ACUITY_SCORE: 45

## 2023-09-05 NOTE — PROGRESS NOTES
Physical Therapy        09/05/23 0900   Appointment Info   Signing Clinician's Name / Credentials (PT) Alivia Rincon DPT   Rehab Comments (PT) pt states he has been in bed since 4am Sunday   Quick Adds   Quick Adds Certification   Living Environment   People in Home sibling(s)   Current Living Arrangements house   Home Accessibility stairs to enter home   Number of Stairs, Main Entrance 7   Stair Railings, Main Entrance railings safe and in good condition   Transportation Anticipated agency   Living Environment Comments raised toilet surrounded by sink and tub. lift chair.   Self-Care   Fall history within last six months yes   Number of times patient has fallen within last six months 3   Activity/Exercise/Self-Care Comment brother helps with laundry. has walk-in tub but doesn't get in for fear of falls. Uses bath wipes.   General Information   Onset of Illness/Injury or Date of Surgery 09/04/23   Referring Physician Migdalia Duncan MD   Patient/Family Therapy Goals Statement (PT) none stated   Pertinent History of Current Problem (include personal factors and/or comorbidities that impact the POC) generalized weakness, falls   Existing Precautions/Restrictions fall   Cognition   Cognitive Status Comments verbose, oriented   Pain Assessment   Patient Currently in Pain Yes, see Vital Sign flowsheet   Integumentary/Edema   Integumentary/Edema Comments darkening of skin on bilateral shins   Range of Motion (ROM)   Range of Motion ROM is WFL   Strength (Manual Muscle Testing)   Strength (Manual Muscle Testing) Deficits observed during functional mobility   Bed Mobility   Comment, (Bed Mobility) maxAx2 to roll to right and left   Transfers   Comment, (Transfers) modAx2 sit to stand   Gait/Stairs (Locomotion)   Comment, (Gait/Stairs) pt unable to initiate step forward safely   Balance   Balance Comments impaired   Sensory Examination   Sensory Perception patient reports no sensory changes   Coordination   Coordination  no deficits were identified   Muscle Tone   Muscle Tone no deficits were identified   Clinical Impression   Criteria for Skilled Therapeutic Intervention Yes, treatment indicated   PT Diagnosis (PT) gait instability   Influenced by the following impairments pain, weakness, impaired balance   Functional limitations due to impairments high fall risk, limited household mobility   Clinical Presentation (PT Evaluation Complexity) Stable/Uncomplicated   Clinical Presentation Rationale presents as medically diagnosed   Clinical Decision Making (Complexity) moderate complexity   Planned Therapy Interventions (PT) bed mobility training;balance training;gait training;patient/family education;transfer training;progressive activity/exercise;strengthening;stair training;ROM (range of motion)   Anticipated Equipment Needs at Discharge (PT) walker, rolling   Risk & Benefits of therapy have been explained evaluation/treatment results reviewed;patient   PT Total Evaluation Time   PT Eval, Low Complexity Minutes (36068) 15   Therapy Certification   Start of care date 09/05/23   Certification date from 09/05/23   Certification date to 09/12/23   Medical Diagnosis weakness generalized   Physical Therapy Goals   PT Frequency Daily   PT Predicted Duration/Target Date for Goal Attainment 09/12/23   PT Goals Gait;Transfers;Bed Mobility   PT: Bed Mobility Minimal assist;Supine to/from sit   PT: Transfers Minimal assist;Sit to/from stand   PT: Gait Minimal assist;25 feet;Rolling walker   PT Discharge Planning   PT Plan GT as able with close chair follow. transfers, TE   PT Discharge Recommendation (DC Rec) Transitional Care Facility   PT Rationale for DC Rec assist of 2, does not have caregiver at home. pt may need to consider retirement for long term solution following TCU   PT Brief overview of current status able to stand with assist of 2   Total Session Time   Total Session Time (sum of timed and untimed services) 15         Pt was educated on  the role of physical therapy in their care, and indicated understanding.      Alivia Rincon, MORGAN 9/5/2023                                                                                    [facility]      OUTPATIENT PHYSICAL THERAPY EVALUATION  PLAN OF TREATMENT FOR OUTPATIENT REHABILITATION  (COMPLETE FOR INITIAL CLAIMS ONLY)  Patient's Last Name, First Name, M.I.  YOB: 1945  Gorge Berkowitz                        Provider's Name  [facility] Medical Record No.  [id number]                               Onset Date:  09/04/23   Start of Care Date:  09/05/23      Type:     _X_PT   ___OT   ___SLP Medical Diagnosis:  weakness generalized                        PT Diagnosis:  gait instability   Visits from SOC:  1   _________________________________________________________________________________  Plan of Treatment/Functional Goals    Planned Interventions: bed mobility training, balance training, gait training, patient/family education, transfer training, progressive activity/exercise, strengthening, stair training, ROM (range of motion)     Goals: See Physical Therapy Goals on Care Plan in [employer] electronic health record.    Therapy Frequency: Daily  Predicted Duration of Therapy Intervention: 09/12/23  _________________________________________________________________________________    I CERTIFY THE NEED FOR THESE SERVICES FURNISHED UNDER        THIS PLAN OF TREATMENT AND WHILE UNDER MY CARE .             Physician Signature               Date    X_____________________________________________________                  Certification date from: 09/05/23, Certification date to: 09/12/23    Referring Physician: Migdalia Duncan MD            Initial Assessment        See Physical Therapy evaluation dated 09/05/23 in Epic electronic health record.

## 2023-09-05 NOTE — PROGRESS NOTES
[facility]    Medicine Progress Note - Hospitalist Service    Date of Admission:  9/4/2023    Assessment & Plan      Gorge Berkowitz Jr. 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  Likely multifactorial due to intravascular volume depletion (has YOUNG and ketonuria), and severe DJD.  No clear evidence of infection.  PT/OT eval, would benefit from TCU   Severe bilateral knee pain.  Does have history of DJD and left TKA.  X-ray without clear evidence of acute traumatic injuries.  Continue PTA APAP, tramadol, will use oxycodone sparingly for severe pain.  Add lidocaine patch for the left shoulder  Acute kidney injury.  Likely prerenal due to intravascular volume depletion.  Improved after holding furosemide.  Continue to encourage oral hydration.    Acute fracture through the left superior osteophyte at L1 level, Chronic appearing L2 compression deformity.  Patient denies any back pain at this time.  Supportive management   Paroxysmal atrial flutter.  Asymptomatic.  Takes amiodarone and also on Eliquis for CVA prevention  Combined ischemic and tachycardia mediated cardiomyopathy LVEF 40% in 2018.  Chronic HFrEF.  As above we will hold Lasix as it seems to be intravascularly depleted  ROSALVA on CPAP  Morbid obesity.  Has lost over 100 pounds  CODE STATUS: Requests full code       Diet: Combination Diet Low Saturated Fat Na <2400mg Diet    DVT Prophylaxis: DOAC  Levy Catheter: Not present  Lines: None     Cardiac Monitoring: None  Code Status: Full Code      Clinically Significant Risk Factors Present on Admission              "  # Drug Induced Coagulation Defect: home medication list includes an anticoagulant medication      # Non-Invasive mechanical ventilation: current O2 Device: BiPAP/CPAP  # Acute hypoxic respiratory failure: continue supplemental O2 as needed     # Obesity: Estimated body mass index is 38.02 kg/m  as calculated from the following:    Height as of this encounter: 1.778 m (5' 10\").    Weight as of this encounter: 120.2 kg (265 lb).              Disposition Plan     Expected Discharge Date: 09/05/2023      Destination: home with help/services            Migdalia Duncan MD  Hospitalist Service  [facility]  Securely message with [employer] (more info)  Text page via [employer] Paging/Directory   ______________________________________________________________________    Interval History   Patient reports that knee pain is better when his legs are elevated on the pillows.  Today he complains of left shoulder pain that he fell onto yesterday.  He feels constipated however has not been able to have a bowel movement on a bedpan    Physical Exam   Vital Signs: Temp: 98  F (36.7  C) Temp src: Oral BP: 126/74 Pulse: 61   Resp: 18 SpO2: 100 % O2 Device: BiPAP/CPAP    Weight: 265 lbs 0 oz    General Appearance: Obese male in no acute distress  Respiratory: Respirations unlabored, lungs are clear  Cardiovascular: Irregular  GI: Abdomen soft and nontender  MSK: Shoulders and knees with no significant swelling or erythema      Medical Decision Making       45 MINUTES SPENT BY ME on the date of service doing chart review, history, exam, documentation & further activities per the note.  MANAGEMENT DISCUSSED with the following over the past 24 hours: Patient and RN CM       Data     I have personally reviewed the following data over the past 24 hrs:    N/A  \   N/A   / N/A     139 104 28.3 (H) /  80   5.0 28 1.15 \       Imaging results reviewed over the past 24 hrs:   No results found for this or any previous visit " (from the past 24 hour(s)).

## 2023-09-05 NOTE — PROGRESS NOTES
[facility]  OUTPATIENT OCCUPATIONAL THERAPY  EVALUATION  PLAN OF TREATMENT FOR OUTPATIENT REHABILITATION  (COMPLETE FOR INITIAL CLAIMS ONLY)  Patient's Last Name, First Name, M.I.  YOB: 1945  Gorge Berkowitz                          Provider's Name  [facility] Medical Record No.  [id number]                             Onset Date:  09/04/23   Start of Care Date:  09/05/23   Type:     ___PT   _X_OT   ___SLP Medical Diagnosis:  weakness                    OT Diagnosis:  decreased ADLs Visits from SOC:  1     See note for plan of treatment, functional goals and certification details    I CERTIFY THE NEED FOR THESE SERVICES FURNISHED UNDER        THIS PLAN OF TREATMENT AND WHILE UNDER MY CARE     (Physician co-signature of this document indicates review and certification of the therapy plan).                               09/05/23 0940   Appointment Info   Signing Clinician's Name / Credentials (OT) Mary Reyes,OTR/L   Living Environment:                         CERTIFICATION   People in Home sibling(s)  (brother)   Current Living Arrangements house   Home Accessibility stairs to enter home;stairs within home   Number of Stairs, Main Entrance 7   Stair Railings, Main Entrance railings on both sides of stairs   Number of Stairs, Within Home, Primary   (has steps to upstairs, unclear amt.)   Living Environment Comments pt lives upstairs, brother lives downstairs,   Self-Care   Current Activity Tolerance poor   Equipment Currently Used at Home other (see comments)  (has walker unclear which type, does not use shower-sponge bathes instead)   Fall history within last six months yes   Number of times patient has fallen within last six months 3   Instrumental Activities of Daily Living (IADL)   IADL Comments pt reports indep. ADLs, sponge bathes, does not drive   General Information   Onset of Illness/Injury or Date of Surgery 09/04/23,   Perla   Patient/Family Therapy Goal Statement (OT) less pain, better abilility to move/take care of ADLs   Additional Occupational Profile Info/Pertinent History of Current Problem Gorge Berkowitz Jr. is a 78 year old male with PMH of morbid obesity, paroxysmal a flutter, chronic systolic heart failure, combined ischemic and tachycardia,CAD  moderate LAD disease, ROSALVA  who presented for evaluation of lower extremity weakness and fall   Existing Precautions/Restrictions fall   Cognitive Status Examination   Orientation Status person;place;time   Visual Perception   Visual Impairment/Limitations corrective lenses full-time   Range of Motion Comprehensive   General Range of Motion   (AROM lifted in shldrs, painful,)   Strength Comprehensive (MMT)   General Manual Muscle Testing (MMT) Assessment   (WFL for ADLs)   Coordination   Upper Extremity Coordination No deficits were identified   Bed Mobility   Bed Mobility supine-sit;sit-supine   Supine-Sit Lucas (Bed Mobility) maximum assist (25% patient effort)   Sit-Supine Lucas (Bed Mobility) maximum assist (25% patient effort)   Transfers   Transfers sit-stand transfer   Sit-Stand Transfer   Sit-Stand Lucas (Transfers) minimum assist (75% patient effort)   Balance   Balance Assessment standing balance: dynamic   Balance Comments fair   Activities of Daily Living   BADL Assessment/Intervention lower body dressing   Lower Body Dressing Assessment/Training   Lucas Level (Lower Body Dressing) dependent (less than 25% patient effort)   Clinical Impression   Criteria for Skilled Therapeutic Interventions Met (OT) Yes, treatment indicated   OT Diagnosis decreased ADLs   OT Problem List-Impairments impacting ADL activity tolerance impaired;balance;pain;strength;mobility;flexibility   Assessment of Occupational Performance 3-5 Performance Deficits   Identified Performance Deficits drsg, trsfs, bed mobility   Planned Therapy Interventions (OT) ADL  retraining;strengthening;transfer training   Clinical Decision Making Complexity (OT) moderate complexity   Anticipated Equipment Needs Upon Discharge (OT)   (cont. to assess)   Risk & Benefits of therapy have been explained evaluation/treatment results reviewed;care plan/treatment goals reviewed;risks/benefits reviewed;patient;participants included   OT Total Evaluation Time   OT Eval, Moderate Complexity Minutes (36069) 8  (co eval)   OT Goals:                                           START OF CARE DATE:9/5/23, CERT FROM:9/5/23, CERT TO:912/23, DX:WEAKNESS    Therapy Frequency (OT) 5 times/wk   OT Predicted Duration/Target Date for Goal Attainment 09/12/23   OT Goals Lower Body Dressing;Transfers;Bed Mobility   OT: Lower Body Dressing Minimal assist   OT: Bed Mobility Minimal assist   OT: Transfer   (CGA)   Interventions   Interventions Quick Adds Self-Care/Home Management   Self-Care/Home Management   Self-Care/Home Mgmt/ADL, Compensatory, Meal Prep Minutes (25099) 6  (co tx)   Symptoms Noted During/After Treatment (Meal Preparation/Planning Training) increased pain;fatigue   Treatment Detail/Skilled Intervention pt agreeable to minimal activity d/t fear and pain, reassurance to pt  re: safe moving in prep for ADLs, supine>sit maxAx2 w/ multiple attempts and problem solving, vc for upright sitting at EOB, STS w/ min Ax2 w/ sidesteps to HOB w/ FWW, able to scoot toward back [facility] once sitting, rtn to supine maxAx2, pillows placed under LE's   Lower Body Dressing Training Assistance maximum assist (25% patient effort)  (to don socks)   Lower Body Dressing Training Assistance set-up required   OT Discharge Planning   OT Plan bed mobility, close trsfs, LE drsg w/ AE,   OT Discharge Recommendation (DC Rec) Transitional Care Facility   OT Rationale for DC Rec Ax2 for mobility and ADLs   OT Brief overview of current status maxAx2 bed mobility, min Ax2 standing,   Total Session Time   Timed Code Treatment  Minutes 6   Total Session Time (sum of timed and untimed services) 14

## 2023-09-05 NOTE — ED NOTES
"[facility] ED Handoff Report    ED Chief Complaint: Fall    ED Diagnosis:  (E66.01) Morbid obesity (H)    (I48.92) Atrial flutter, unspecified type (H)    (G89.4) Chronic pain disorder    (R53.1) Weakness generalized     PMH:    Past Medical History:   Diagnosis Date    Atrial fibrillation (H)     Cardiomyopathy (H)     ef 20%    CHF (congestive heart failure) (H)     Morbid obesity (H)     ROSALVA (obstructive sleep apnea)         Code Status:  Full Code     Falls Risk: Yes Band: Applied    Current Living Situation/Residence: lives in a house     Elimination Status: Continent: Yes     Activity Level: Total assist/lift    Patients Preferred Language:  English     Needed: No    Vital Signs:  BP (!) 143/88   Pulse 56   Temp 97.8  F (36.6  C) (Oral)   Resp 18   Ht 1.778 m (5' 10\")   Wt 120.2 kg (265 lb)   SpO2 97%   BMI 38.02 kg/m       Cardiac Rhythm: NA    Pain Score: 0/10    Is the Patient Confused:  No    Last Food or Drink: 09/05/23    Focused Assessment:  Pt had a fall at home when knees gave out.  Pt lives with brother on a split level.  Pt is morbidly obese with failure to thrive.    Tests Performed: Done: Labs and Imaging    Treatments Provided:  Medications and monitoring.    Family Dynamics/Concerns: No    Family Updated On Visitor Policy: Yes    Plan of Care Communicated to Family: Yes    Who Was Updated about Plan of Care: Brother over the phone.    Belongings Checklist Done and Signed by Patient: Yes    Medications sent with patient: Cpap machine    Additional Information: Pt uses bedpan to have a BM and urinal independently.  Pt calls appropriately.  ZAINA&Ox4.    RN: Latoya Marino RN   9/5/2023 5:17 PM       "

## 2023-09-05 NOTE — PROGRESS NOTES
Updated pt on plans for TCU Placement, pt is ok with plan. Pt states he hasn't left his house since Oct 2022 due to fear of falling and unable to use the 7 steps in or out of house, pt states his house is paid for and is custom made for him to live on one level. When asked about a ramp, he doesn't have one and doesn't know if he needs one since he hasn't tried using the stairs but states he did fall using the steps. CM suggested ramp or reconsideration for one level housing. Pt is ok with plan for TCU placement and asked if his insurance will cover it. CM awaiting TCU to respond.

## 2023-09-05 NOTE — PROGRESS NOTES
Update, rec'd VM from Sridevi Meade [phone], SW w/[facility] and pt is seen in his home by MD/NP and RN once monthly or every other month as needed for cares. Pt has limited in home cares and has recently hired help at home but is only a few hours a week, so he doesn't have daily support in home. He lives w/brother, independent of each other and brother does not provide personal cares. Pt lives in a split level w/stairs and has been weak and falling. Sridevi would like update and recommends TCU placement. FRIDA called Sridevi but no answer.

## 2023-09-06 ENCOUNTER — APPOINTMENT (OUTPATIENT)
Dept: PHYSICAL THERAPY | Facility: HOSPITAL | Age: 78
End: 2023-09-06
Payer: COMMERCIAL

## 2023-09-06 ENCOUNTER — APPOINTMENT (OUTPATIENT)
Dept: OCCUPATIONAL THERAPY | Facility: HOSPITAL | Age: 78
End: 2023-09-06
Payer: COMMERCIAL

## 2023-09-06 LAB
ANION GAP SERPL CALCULATED.3IONS-SCNC: 7 MMOL/L (ref 7–15)
BUN SERPL-MCNC: 19.1 MG/DL (ref 8–23)
CALCIUM SERPL-MCNC: 8.7 MG/DL (ref 8.8–10.2)
CHLORIDE SERPL-SCNC: 103 MMOL/L (ref 98–107)
CREAT SERPL-MCNC: 1.03 MG/DL (ref 0.67–1.17)
DEPRECATED HCO3 PLAS-SCNC: 27 MMOL/L (ref 22–29)
GFR SERPL CREATININE-BSD FRML MDRD: 75 ML/MIN/1.73M2
GLUCOSE SERPL-MCNC: 78 MG/DL (ref 70–99)
POTASSIUM SERPL-SCNC: 4.4 MMOL/L (ref 3.4–5.3)
SODIUM SERPL-SCNC: 137 MMOL/L (ref 136–145)

## 2023-09-06 PROCEDURE — 94660 CPAP INITIATION&MGMT: CPT

## 2023-09-06 PROCEDURE — G0378 HOSPITAL OBSERVATION PER HR: HCPCS

## 2023-09-06 PROCEDURE — 97530 THERAPEUTIC ACTIVITIES: CPT | Mod: GP

## 2023-09-06 PROCEDURE — 999N000157 HC STATISTIC RCP TIME EA 10 MIN

## 2023-09-06 PROCEDURE — 99232 SBSQ HOSP IP/OBS MODERATE 35: CPT | Performed by: INTERNAL MEDICINE

## 2023-09-06 PROCEDURE — 80048 BASIC METABOLIC PNL TOTAL CA: CPT | Performed by: INTERNAL MEDICINE

## 2023-09-06 PROCEDURE — 36415 COLL VENOUS BLD VENIPUNCTURE: CPT | Performed by: INTERNAL MEDICINE

## 2023-09-06 PROCEDURE — 250N000013 HC RX MED GY IP 250 OP 250 PS 637: Performed by: INTERNAL MEDICINE

## 2023-09-06 PROCEDURE — 97535 SELF CARE MNGMENT TRAINING: CPT | Mod: GO

## 2023-09-06 RX ORDER — FUROSEMIDE 20 MG
20 TABLET ORAL DAILY
Status: DISCONTINUED | OUTPATIENT
Start: 2023-09-07 | End: 2023-09-07 | Stop reason: HOSPADM

## 2023-09-06 RX ORDER — POLYETHYLENE GLYCOL 3350 17 G/17G
17 POWDER, FOR SOLUTION ORAL DAILY
Status: DISCONTINUED | OUTPATIENT
Start: 2023-09-06 | End: 2023-09-07 | Stop reason: HOSPADM

## 2023-09-06 RX ADMIN — LIDOCAINE 1 PATCH: 4 PATCH TOPICAL at 21:48

## 2023-09-06 RX ADMIN — PRAMIPEXOLE DIHYDROCHLORIDE 0.25 MG: 0.25 TABLET ORAL at 23:13

## 2023-09-06 RX ADMIN — SENNOSIDES AND DOCUSATE SODIUM 1 TABLET: 50; 8.6 TABLET ORAL at 08:53

## 2023-09-06 RX ADMIN — FERROUS SULFATE TAB 325 MG (65 MG ELEMENTAL FE) 325 MG: 325 (65 FE) TAB at 08:53

## 2023-09-06 RX ADMIN — AMIODARONE HYDROCHLORIDE 100 MG: 100 TABLET ORAL at 08:53

## 2023-09-06 RX ADMIN — APIXABAN 5 MG: 5 TABLET, FILM COATED ORAL at 08:53

## 2023-09-06 RX ADMIN — ACETAMINOPHEN 1000 MG: 500 TABLET ORAL at 21:48

## 2023-09-06 RX ADMIN — ACETAMINOPHEN 1000 MG: 500 TABLET ORAL at 16:26

## 2023-09-06 RX ADMIN — POLYETHYLENE GLYCOL 3350 17 G: 17 POWDER, FOR SOLUTION ORAL at 09:04

## 2023-09-06 RX ADMIN — Medication 500 MCG: at 09:04

## 2023-09-06 RX ADMIN — METOPROLOL SUCCINATE 25 MG: 25 TABLET, EXTENDED RELEASE ORAL at 08:53

## 2023-09-06 RX ADMIN — ACETAMINOPHEN 1000 MG: 500 TABLET ORAL at 11:58

## 2023-09-06 RX ADMIN — TRAMADOL HYDROCHLORIDE 50 MG: 50 TABLET, COATED ORAL at 08:51

## 2023-09-06 RX ADMIN — TRAMADOL HYDROCHLORIDE 50 MG: 50 TABLET, COATED ORAL at 21:48

## 2023-09-06 RX ADMIN — ACETAMINOPHEN 1000 MG: 500 TABLET ORAL at 08:53

## 2023-09-06 RX ADMIN — OXYCODONE HYDROCHLORIDE 5 MG: 5 TABLET ORAL at 23:01

## 2023-09-06 RX ADMIN — MICONAZOLE NITRATE: 20 POWDER TOPICAL at 11:58

## 2023-09-06 RX ADMIN — APIXABAN 5 MG: 5 TABLET, FILM COATED ORAL at 21:48

## 2023-09-06 RX ADMIN — ATORVASTATIN CALCIUM 40 MG: 40 TABLET, FILM COATED ORAL at 21:48

## 2023-09-06 ASSESSMENT — ACTIVITIES OF DAILY LIVING (ADL)
ADLS_ACUITY_SCORE: 37
ADLS_ACUITY_SCORE: 37
ADLS_ACUITY_SCORE: 38
ADLS_ACUITY_SCORE: 37
ADLS_ACUITY_SCORE: 38
ADLS_ACUITY_SCORE: 38
ADLS_ACUITY_SCORE: 37
ADLS_ACUITY_SCORE: 39
ADLS_ACUITY_SCORE: 37
ADLS_ACUITY_SCORE: 37

## 2023-09-06 NOTE — PLAN OF CARE
"Goal Outcome Evaluation:                  PRIMARY DIAGNOSIS: \"generalized pain    OUTPATIENT/OBSERVATION GOALS TO BE MET BEFORE DISCHARGE:  ADLs back to baseline: No    Activity and level of assistance: pt has been bedbound since admissions    Pain status: Pain free.    Return to near baseline physical activity: No     Discharge Planner Nurse   Safe discharge environment identified: No  Barriers to discharge: YesPT/OT eval and tcu placement at discharge       Entered by: Emelyn Kamara RN 09/06/2023 5:13 AM     Please review provider order for any additional goals.   Nurse to notify provider when observation goals have been met and patient is ready for discharge.      "

## 2023-09-06 NOTE — UTILIZATION REVIEW
Concurrent stay review; Secondary Review Determination     Under the authority of the Utilization Management Committee, the utilization review process indicated a secondary review on Gorge Berkowitz Jr..  The review outcome is based on review of the medical records, discussions with staff, and applying clinical experience noted on the date of the review.        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  Symptoms thought likely multifactorial due to volume depletion with mild YOUNG and severe DJD.  No clear evidence of infection or other pathologic etiology.  Does have acute fracture through the left superior osteophyte at L1 and chronic appearing L2 compression deformity but with no back pain.    Patient is clinically improving and there is no clear indication to change patient's status to inpatient. The severity of illness, intensity of service provided, expected LOS and risk for adverse outcome make the care appropriate for observation.    The information on this document is developed by the utilization review team in order for the business office to ensure compliance.  This only denotes the appropriateness of proper admission status and does not reflect the quality of care rendered.         The definitions of Inpatient Status and Observation Status used in making the determination above are those provided in the CMS Coverage Manual, Chapter 1 and Chapter 6, section 70.4.      Sincerely,   Yogesh Mcclendon MD  Utilization Review  Physician Advisor  [facility]

## 2023-09-06 NOTE — PLAN OF CARE
Goal Outcome Evaluation:             PRIMARY DIAGNOSIS: GENERALIZED WEAKNESS    OUTPATIENT/OBSERVATION GOALS TO BE MET BEFORE DISCHARGE  1. Orthostatic performed: N/A    2. Tolerating PO medications: Yes    3. Return to near baseline physical activity: No    4. Cleared for discharge by consultants (if involved): No    Discharge Planner Nurse   Safe discharge environment identified: No  Barriers to discharge: Yes, PT/OT to eval and tcu at discharge       Entered by: Emelyn Kamara RN 09/06/2023 2:33 AM     Please review provider order for any additional goals.   Nurse to notify provider when observation goals have been met and patient is ready for discharge.

## 2023-09-06 NOTE — PROGRESS NOTES
Care Management Follow Up    Length of Stay (days): 0    Expected Discharge Date: 09/07/2023     Concerns to be Addressed: discharge planning, utilization management     Patient plan of care discussed at interdisciplinary rounds: Yes    Anticipated Discharge Disposition: Transitional Care     Anticipated Discharge Services:    Anticipated Discharge DME:      Patient/family educated on Medicare website which has current facility and service quality ratings:    Education Provided on the Discharge Plan:    Patient/Family in Agreement with the Plan:      Referrals Placed by CM/SW:    Private pay costs discussed: Not applicable    Additional Information:  See below     Enedina Duran RN        [employer] auth submitted . Additional referral sent to TCUs    2:22 PM  Melisa from [place] accepted patient for tomorrow at [facility] pending [employer] auth     Checked [employer] portal, auth is still pending.     Attempted to update patient but he was on the bed pan and requesting assistance from the nurse

## 2023-09-06 NOTE — PROGRESS NOTES
PRIMARY DIAGNOSIS: GENERALIZED WEAKNESS    OUTPATIENT/OBSERVATION GOALS TO BE MET BEFORE DISCHARGE  1. Orthostatic performed: N/A    2. Tolerating PO medications: Yes    3. Return to near baseline physical activity: No    4. Cleared for discharge by consultants (if involved): No    Discharge Planner Nurse   Safe discharge environment identified: No  Barriers to discharge: Yes       Entered by: Carey Camacho RN 09/05/2023 10:54 PM     Please review provider order for any additional goals.   Nurse to notify provider when observation goals have been met and patient is ready for discharge.    Pts IV site was bleeding and pt also reported pain there. Writer removed pts IV access. Pt has not been receiving continuous fluids, abx's,  or IV narcotics through IV.

## 2023-09-06 NOTE — PLAN OF CARE
Problem: Plan of Care - These are the overarching goals to be used throughout the patient stay.    Goal: Plan of Care Review  Description: The Plan of Care Review/Shift note should be completed every shift.  The Outcome Evaluation is a brief statement about your assessment that the patient is improving, declining, or no change.  This information will be displayed automatically on your shift note.  Outcome: Progressing   Goal Outcome Evaluation:     Explained to pt that he would be working with PT and OT today to gain strength. Pt very anxious about getting up. Pt c/o constipation. Bowel regimen started along with prune juice.

## 2023-09-06 NOTE — PROGRESS NOTES
PRIMARY DIAGNOSIS: GENERALIZED WEAKNESS    OUTPATIENT/OBSERVATION GOALS TO BE MET BEFORE DISCHARGE  1. Orthostatic performed: N/A    2. Tolerating PO medications: Yes    3. Return to near baseline physical activity: No    4. Cleared for discharge by consultants (if involved): No    Discharge Planner Nurse   Safe discharge environment identified: No  Barriers to discharge: Yes       Entered by: Carey Camacho RN 09/05/2023 10:46 PM     Please review provider order for any additional goals.   Nurse to notify provider when observation goals have been met and patient is ready for discharge.    Pt c/o of bilateral knee pain 4 to 8/10 mostly in the left knee that radiates to Left shoulder. Lidocaine patch applied to left shoulder. Scheduled Tylenol given, Tramadol, and PRN Oxycodone. Bringing pain down to a 6/10 after the Oxycodone. Pt is bed bound and has been using the urinal and bedpan if needed. Pt reports feeling constipated with pressure and gas pain. Writer administered 1 tablet scheduled senna-docusate. A&O x4 VSS on RA.

## 2023-09-06 NOTE — PROGRESS NOTES
[facility]    Medicine Progress Note - Hospitalist Service    Date of Admission:  9/4/2023    Assessment & Plan   Gorge Berkowitz Jr. 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  Likely multifactorial due to intravascular volume depletion (had YOUNG and ketonuria on presentation), and severe DJD.  No clear evidence of infection.  PT/OT eval, would benefit from TCU   Severe bilateral knee pain.  Does have history of DJD and left TKA.  X-ray without clear evidence of acute traumatic injuries.  Continue PTA APAP, tramadol, will use oxycodone sparingly for severe pain.  Added lidocaine patch for the left shoulder which has been helpful   Acute kidney injury.  Likely prerenal due to intravascular volume depletion.  Resolved.  Resume furosemide in AM Continue to encourage oral hydration.    Acute fracture through the left superior osteophyte at L1 level, Chronic appearing L2 compression deformity.  Patient denies any back pain at this time.  Supportive management   Paroxysmal atrial flutter.  Asymptomatic.  Takes amiodarone and also on Eliquis for CVA prevention  Combined ischemic and tachycardia mediated cardiomyopathy LVEF 40% in 2018.  Chronic HFrEF.  Resume diuretics   ROSALVA on CPAP       Diet: Combination Diet Low Saturated Fat Na <2400mg Diet    DVT Prophylaxis: DOAC  Levy Catheter: Not present  Lines: None     Cardiac Monitoring: None  Code Status: Full Code      Clinically Significant Risk Factors Present on Admission               # Drug Induced Coagulation Defect: home medication list includes an anticoagulant  "medication         # Obesity: Estimated body mass index is 38.02 kg/m  as calculated from the following:    Height as of this encounter: 1.778 m (5' 10\").    Weight as of this encounter: 120.2 kg (265 lb).              Disposition Plan      Expected Discharge Date: 09/07/2023    Discharge Delays: *Medically Ready for Discharge  Insurance Authorization needed  Destination: home with help/services            Migdalia Duncan MD  Hospitalist Service  [facility]  Securely message with [employer] (more info)  Text page via [employer] Paging/Directory   ______________________________________________________________________    Interval History   No acute issues  Overall knee and left shoulder pain is better  No BM yet     Physical Exam   Vital Signs: Temp: 97.5  F (36.4  C) Temp src: Oral BP: 106/56 Pulse: 62   Resp: 19 SpO2: 97 % O2 Device: None (Room air)    Weight: 265 lbs 0 oz  General Appearance: NAD, laying in bed   Respiratory: Lungs Clear   Cardiovascular: RRR  GI: Abdomen soft and nontender, BS +    Medical Decision Making       40 MINUTES SPENT BY ME on the date of service doing chart review, history, exam, documentation & further activities per the note.  MANAGEMENT DISCUSSED with the following over the past 24 hours: patient, RN, RNCM        Data     I have personally reviewed the following data over the past 24 hrs:    N/A  \   N/A   / N/A     137 103 19.1 /  78   4.4 27 1.03 \       "

## 2023-09-07 VITALS
OXYGEN SATURATION: 97 % | TEMPERATURE: 97.9 F | BODY MASS INDEX: 37.94 KG/M2 | SYSTOLIC BLOOD PRESSURE: 113 MMHG | HEIGHT: 70 IN | RESPIRATION RATE: 20 BRPM | HEART RATE: 67 BPM | DIASTOLIC BLOOD PRESSURE: 75 MMHG | WEIGHT: 265 LBS

## 2023-09-07 PROCEDURE — G0378 HOSPITAL OBSERVATION PER HR: HCPCS

## 2023-09-07 PROCEDURE — 99239 HOSP IP/OBS DSCHRG MGMT >30: CPT | Performed by: INTERNAL MEDICINE

## 2023-09-07 PROCEDURE — 250N000013 HC RX MED GY IP 250 OP 250 PS 637: Performed by: INTERNAL MEDICINE

## 2023-09-07 RX ORDER — TRAMADOL HYDROCHLORIDE 50 MG/1
50 TABLET ORAL 2 TIMES DAILY
Qty: 30 TABLET | Refills: 0 | Status: SHIPPED | OUTPATIENT
Start: 2023-09-07 | End: 2023-11-10

## 2023-09-07 RX ORDER — LIDOCAINE 4 G/G
1 PATCH TOPICAL EVERY 24 HOURS
DISCHARGE
Start: 2023-09-07

## 2023-09-07 RX ORDER — AMOXICILLIN 250 MG
1 CAPSULE ORAL 2 TIMES DAILY
DISCHARGE
Start: 2023-09-07

## 2023-09-07 RX ADMIN — SENNOSIDES AND DOCUSATE SODIUM 1 TABLET: 50; 8.6 TABLET ORAL at 09:08

## 2023-09-07 RX ADMIN — Medication 500 MCG: at 09:13

## 2023-09-07 RX ADMIN — ACETAMINOPHEN 1000 MG: 500 TABLET ORAL at 12:59

## 2023-09-07 RX ADMIN — APIXABAN 5 MG: 5 TABLET, FILM COATED ORAL at 09:08

## 2023-09-07 RX ADMIN — FUROSEMIDE 20 MG: 20 TABLET ORAL at 09:08

## 2023-09-07 RX ADMIN — FERROUS SULFATE TAB 325 MG (65 MG ELEMENTAL FE) 325 MG: 325 (65 FE) TAB at 09:08

## 2023-09-07 RX ADMIN — ACETAMINOPHEN 1000 MG: 500 TABLET ORAL at 09:08

## 2023-09-07 RX ADMIN — MICONAZOLE NITRATE: 20 POWDER TOPICAL at 09:09

## 2023-09-07 RX ADMIN — AMIODARONE HYDROCHLORIDE 100 MG: 100 TABLET ORAL at 09:08

## 2023-09-07 RX ADMIN — METOPROLOL SUCCINATE 25 MG: 25 TABLET, EXTENDED RELEASE ORAL at 09:08

## 2023-09-07 RX ADMIN — TRAMADOL HYDROCHLORIDE 50 MG: 50 TABLET, COATED ORAL at 09:08

## 2023-09-07 RX ADMIN — POLYETHYLENE GLYCOL 3350 17 G: 17 POWDER, FOR SOLUTION ORAL at 09:08

## 2023-09-07 ASSESSMENT — ACTIVITIES OF DAILY LIVING (ADL)
ADLS_ACUITY_SCORE: 39
ADLS_ACUITY_SCORE: 39
ADLS_ACUITY_SCORE: 38
ADLS_ACUITY_SCORE: 39
ADLS_ACUITY_SCORE: 38
ADLS_ACUITY_SCORE: 39
ADLS_ACUITY_SCORE: 39

## 2023-09-07 NOTE — PROGRESS NOTES
"PRIMARY DIAGNOSIS: \"GENERIC\" NURSING  OUTPATIENT/OBSERVATION GOALS TO BE MET BEFORE DISCHARGE:  ADLs back to baseline: No    Activity and level of assistance: Up with maximum assistance. Consider SW and/or PT evaluation.     Pain status: Improved-controlled with oral pain medications.    Return to near baseline physical activity: No     Discharge Planner Nurse   Safe discharge environment identified: No  Barriers to discharge: Yes       Entered by: Anish Dasilva RN 09/07/2023 4:28 AM     Please review provider order for any additional goals.   Nurse to notify provider when observation goals have been met and patient is ready for discharge.  "

## 2023-09-07 NOTE — PROGRESS NOTES
"PRIMARY DIAGNOSIS: \"GENERIC\" NURSING  OUTPATIENT/OBSERVATION GOALS TO BE MET BEFORE DISCHARGE:  ADLs back to baseline: No    Activity and level of assistance: Up with maximum assistance. Consider SW and/or PT evaluation.     Pain status: Improved-controlled with oral pain medications.    Return to near baseline physical activity: No     Discharge Planner Nurse   Safe discharge environment identified: No  Barriers to discharge: Yes       Entered by: Anish Dasilva RN 09/07/2023 4:30 AM     Please review provider order for any additional goals.   Nurse to notify provider when observation goals have been met and patient is ready for discharge.  "

## 2023-09-07 NOTE — PROGRESS NOTES
"PRIMARY DIAGNOSIS: \"GENERIC\" NURSING  OUTPATIENT/OBSERVATION GOALS TO BE MET BEFORE DISCHARGE:  ADLs back to baseline: No    Activity and level of assistance: Up with maximum assistance. Consider SW and/or PT evaluation.     Pain status: Improved-controlled with oral pain medications.    Return to near baseline physical activity: No     Discharge Planner Nurse   Safe discharge environment identified: No  Barriers to discharge: Yes       Entered by: Anish Dasilva RN 09/07/2023 4:29 AM     Please review provider order for any additional goals.   Nurse to notify provider when observation goals have been met and patient is ready for discharge.  "

## 2023-09-07 NOTE — PLAN OF CARE
Physical Therapy Discharge Summary    Reason for therapy discharge:    Discharged to transitional care facility.    Progress towards therapy goal(s). See goals on Care Plan in [facility] electronic health record for goal details.  Goals not met.  Barriers to achieving goals:   discharge from facility.    Therapy recommendation(s):    Recommend continued therapies to improve overall strength, balance and mobility.       Demi Salmon, PT, DPT  9/7/2023

## 2023-09-07 NOTE — PLAN OF CARE
Occupational Therapy Discharge Summary    Reason for therapy discharge:    Discharged to TCU.    Progress towards therapy goal(s). See goals on Care Plan in [facility] electronic health record for goal details.  Progressing towards goals.    Therapy recommendation(s):    Recommend continued OT @ TCU.

## 2023-09-07 NOTE — DISCHARGE SUMMARY
"[facility]  Hospitalist Discharge Summary      Date of Admission:  9/4/2023  Date of Discharge:  9/7/2023  Discharging Provider: Migdalia Duncan MD  Discharge Service: Hospitalist Service    Discharge Diagnoses   Generalized weakness  Fall  Acute kidney injury  Acute left shoulder pain  Chronic bilateral knee pain  Acute fracture of the left superior osteophyte at L1  Chronic L2 compression deformity  Paroxysmal atrial flutter  Cardiomyopathy LVEF 40%  Chronic HFrEF  ROSALVA  Obesity Body mass index is 38.02 kg/m .      Clinically Significant Risk Factors     # Obesity: Estimated body mass index is 38.02 kg/m  as calculated from the following:    Height as of this encounter: 1.778 m (5' 10\").    Weight as of this encounter: 120.2 kg (265 lb).       Follow-ups Needed After Discharge   Follow-up Appointments     Follow Up and recommended labs and tests      Follow up with shelter physician.  The following labs/tests are   recommended: BMP in 1 week .            Unresulted Labs Ordered in the Past 30 Days of this Admission       No orders found from 8/5/2023 to 9/5/2023.            Discharge Disposition   Discharged to short-term care facility  Condition at discharge: Stable    Hospital Course   Gorge Berkowitz Jr. 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  Suspect fall was multifactorial due to intravascular volume depletion and DJD causing severe knee pain.  Patient was found to have acute fractures through the left superior L1 osteophyte however has not been complaining of back pain.  No acute traumatic knee injuries noted.  " Joint pain has been controlled with acetaminophen, tramadol and lidocaine patch to the left shoulder.  Acute kidney injury resolved after holding furosemide.    Patient will be discharged to TCU for ongoing PT/OT to work on regaining strength and mobility.    Consultations This Hospital Stay   PHYSICAL THERAPY ADULT IP CONSULT  OCCUPATIONAL THERAPY ADULT IP CONSULT  CARE MANAGEMENT / SOCIAL WORK IP CONSULT  PHYSICAL THERAPY ADULT IP CONSULT  OCCUPATIONAL THERAPY ADULT IP CONSULT    Code Status   Full Code    Time Spent on this Encounter   I, Migdalia Duncan MD, personally saw the patient today and spent greater than 30 minutes discharging this patient.       Migdalia Duncan MD  [address] [address]  Phone: [phone]  Fax: [phone]  ______________________________________________________________________    Physical Exam   Vital Signs: Temp: 97.9  F (36.6  C) Temp src: Oral BP: 113/75 Pulse: 67   Resp: 20 SpO2: 97 % O2 Device: None (Room air)    Weight: 265 lbs 0 oz  General Appearance: NAD, in bed  Respiratory: Patient is unlabored, lungs are clear bilaterally  Cardiovascular: Regular.  Normal S1-S2  GI: Abdomen soft, nontender  MSK: No significant joint swelling or tenderness         Primary Care Physician   Lisseth Scott    Discharge Orders      General info for SNF    Length of Stay Estimate: Short Term Care: Estimated # of Days <30  Condition at Discharge: Improving  Level of care:skilled   Rehabilitation Potential: Good  Admission H&P remains valid and up-to-date: Yes  Recent Chemotherapy: N/A  Use Nursing Home Standing Orders: Yes     Mantoux instructions    Give two-step Mantoux (PPD) Per Facility Policy Yes     Follow Up and recommended labs and tests    Follow up with senior living physician.  The following labs/tests are recommended: BMP in 1 week .     Reason for your hospital stay    Fall, bilateral knee pain, generalized  weakness, YOUNG     Activity - Up with nursing assistance     Activity - Up with assistive device     Full Code     Physical Therapy Adult Consult    Evaluate and treat as clinically indicated.    Reason:  weakness     Occupational Therapy Adult Consult    Evaluate and treat as clinically indicated.    Reason:  weakness     Fall precautions     Diet    Follow this diet upon discharge: Orders Placed This Encounter      Combination Diet Low Saturated Fat Na <2400mg Diet       Significant Results and Procedures   Most Recent 3 CBC's:  Recent Labs   Lab Test 09/04/23  0405 10/18/22  1346 11/02/20  0703   WBC 7.0 6.1 4.8   HGB 13.9 12.5* 12.8*   * 103* 103*   * 138* 212     Most Recent 3 BMP's:  Recent Labs   Lab Test 09/06/23  0525 09/05/23  0729 09/04/23  0405    139 143   POTASSIUM 4.4 5.0 4.6   CHLORIDE 103 104 107   CO2 27 28 23   BUN 19.1 28.3* 33.1*   CR 1.03 1.15 1.22*   ANIONGAP 7 7 13   LIZBET 8.7* 8.8 9.6   GLC 78 80 90   ,   Results for orders placed or performed during the hospital encounter of 09/04/23   Head CT w/o contrast    Narrative    EXAM: CT HEAD W/O CONTRAST  LOCATION: [facility]  DATE: 9/4/2023    INDICATION: Traumatic injury. Fall. Anticoagulated.  COMPARISON: None.  TECHNIQUE: Routine CT Head without IV contrast. Multiplanar reformats. Dose reduction techniques were used.    FINDINGS:  INTRACRANIAL CONTENTS: No intracranial hemorrhage, extraaxial collection, or mass effect.  No CT evidence of acute infarct. Mild to moderate presumed chronic small vessel ischemic changes. Mild to moderate generalized volume loss. No hydrocephalus.     VISUALIZED ORBITS/SINUSES/MASTOIDS: No intraorbital abnormality. Mucosal thickening primarily involving the ethmoid air cells. No middle ear or mastoid effusion.    BONES/SOFT TISSUES: No acute abnormality.      Impression    IMPRESSION:  1.  No CT evidence for acute intracranial process.  2.  Brain atrophy and presumed  chronic microvascular ischemic changes as above.   Lumbar spine CT w/o contrast    Narrative    EXAM: CT LUMBAR SPINE WITHOUT CONTRAST  LOCATION: [facility]  DATE: 09/04/2023    INDICATION: Back pain after fall.  COMPARISON: Lumbar spine CT and CT abdomen and pelvis dated 10/18/2022.  TECHNIQUE: Routine CT Lumbar Spine without IV contrast. Multiplanar reformats. Dose reduction techniques were used.     FINDINGS:  VERTEBRA: Grade 1 retrolisthesis of L1 on L2 and anterolisthesis of L4 on L5. Stable vertebral body heights with a chronic L2 compression deformity. Diffuse bony demineralization is suggestive of osteoporosis. Acute-appearing nondisplaced fracture   through left superior osteophyte at L1.     CANAL/FORAMINA: Multilevel bridging anterior osteophytes in the lower thoracic spine. Degenerative changes throughout the lumbar spine, with severe spinal canal stenosis at L4-L5. Bilateral neural foraminal narrowing is greatest on the right at L5-S1.    PARASPINAL: Mild prevertebral stranding along the fracture at L1. Scattered atherosclerotic calcification. Infrarenal abdominal aortic aneurysm is grossly stable in size.      Impression    IMPRESSION:  1.  Acute appearing nondisplaced fracture through the left superior osteophyte at L1.  2.  Stable L2 compression fracture.  3.  Degenerative changes, as described.       XR Knee Port Left 3 Views    Narrative    EXAM: XR KNEE PORT LEFT 3 VIEWS, XR KNEE PORT RIGHT 1/2 VIEWS  LOCATION: [facility]  DATE: 9/4/2023    INDICATION: Fall, severe knee bilateral knee pain  COMPARISON: 10 18/22.      Impression    IMPRESSION:    There is diffuse osseous demineralization. Vascular atherosclerotic calcifications are present.    LEFT KNEE: Status post left total knee arthroplasty, in standard alignment. No periprosthetic lucency or fracture. No sizable joint effusion.    RIGHT KNEE: No acute fracture or dislocation is identified.  There is a small joint effusion. There is tricompartmental osteoarthritis, most pronounced in the medial compartment where it appears advanced.   XR Knee Port Right 1/2 Views    Narrative    EXAM: XR KNEE PORT LEFT 3 VIEWS, XR KNEE PORT RIGHT 1/2 VIEWS  LOCATION: [facility]  DATE: 9/4/2023    INDICATION: Fall, severe knee bilateral knee pain  COMPARISON: 10 18/22.      Impression    IMPRESSION:    There is diffuse osseous demineralization. Vascular atherosclerotic calcifications are present.    LEFT KNEE: Status post left total knee arthroplasty, in standard alignment. No periprosthetic lucency or fracture. No sizable joint effusion.    RIGHT KNEE: No acute fracture or dislocation is identified. There is a small joint effusion. There is tricompartmental osteoarthritis, most pronounced in the medial compartment where it appears advanced.       Discharge Medications   Current Discharge Medication List        START taking these medications    Details   Lidocaine (LIDOCARE) 4 % Patch Place 1 patch onto the skin every 24 hours Place onto the left shoulder.   To prevent lidocaine toxicity, patient should be patch free for 12 hrs daily.    Associated Diagnoses: Acute pain of left shoulder      miconazole (MICATIN) 2 % external powder Apply topically 2 times daily To affected areas    Associated Diagnoses: Rash      senna-docusate (SENOKOT-S/PERICOLACE) 8.6-50 MG tablet Take 1 tablet by mouth 2 times daily    Associated Diagnoses: Slow transit constipation           CONTINUE these medications which have CHANGED    Details   traMADol (ULTRAM) 50 MG tablet Take 1 tablet (50 mg) by mouth 2 times daily  Qty: 30 tablet, Refills: 0    Associated Diagnoses: Primary osteoarthritis of both knees           CONTINUE these medications which have NOT CHANGED    Details   acetaminophen (TYLENOL) 500 MG tablet [ACETAMINOPHEN (TYLENOL) 500 MG TABLET] Take 1,000 mg by mouth every 4 (four) hours as needed.       amiodarone (PACERONE) 200 MG tablet [AMIODARONE (PACERONE) 200 MG TABLET] Take 0.5 tablets (100 mg total) by mouth daily.  Qty: 45 tablet, Refills: 0    Associated Diagnoses: A-fib (H)      atorvastatin (LIPITOR) 40 MG tablet [ATORVASTATIN (LIPITOR) 40 MG TABLET] Take 1 tablet (40 mg total) by mouth at bedtime.  Qty: 90 tablet, Refills: 1    Associated Diagnoses: Coronary artery disease involving native coronary artery of native heart without angina pectoris      calcium carbonate-vitamin D (CALTRATE) 600-10 MG-MCG per tablet Take 1 tablet by mouth daily      ELIQUIS 5 mg Tab tablet [ELIQUIS 5 MG TAB TABLET] Take 1 tablet (5 mg total) by mouth 2 (two) times a day.  Qty: 180 tablet, Refills: 0    Associated Diagnoses: Paroxysmal atrial flutter (H)      ferrous sulfate (FE TABS) 325 (65 Fe) MG EC tablet Take 325 mg by mouth daily      furosemide (LASIX) 20 MG tablet [FUROSEMIDE (LASIX) 20 MG TABLET] take one tablet by mouth daily and take one tablet by mouth as needed at night for any swelling  Qty: 100 tablet, Refills: 0    Associated Diagnoses: Acute systolic CHF (congestive heart failure) (H)      glucosamine-chondroitin 500-400 mg cap [GLUCOSAMINE-CHONDROITIN 500-400 MG CAP] Take 1 capsule by mouth daily.       krill-om3-dha-epa-om6-lip-astx (KRILL OIL, OMEGA 3 AND 6,) 1,500-165-67.5 mg cap [KRILL-OM3-DHA-EPA-OM6-LIP-ASTX (KRILL OIL, OMEGA 3 AND 6,) 1,500-165-67.5 MG CAP] Take 3 capsules by mouth daily.       metoprolol succinate (TOPROL-XL) 50 MG 24 hr tablet [METOPROLOL SUCCINATE (TOPROL-XL) 50 MG 24 HR TABLET] Take 0.5 tablets (25 mg total) by mouth daily.  Qty: 90 tablet, Refills: 1    Comments: Dose change effective 10/3/19 as per Dr. BOBBY Johansen  Associated Diagnoses: Chronic systolic heart failure (H)      polyethylene glycol (GLYCOLAX) 17 gram/dose powder Take 17 g by mouth as needed      pramipexole (MIRAPEX) 0.25 MG tablet Take 0.25 mg by mouth At Bedtime      vitamin B-12 (CYANOCOBALAMIN) 500 MCG tablet  Take 500 mcg by mouth daily           Allergies   No Known Allergies

## 2023-09-07 NOTE — PROGRESS NOTES
Care Management Discharge Note    Discharge Date: 09/07/2023       Discharge Disposition:  ([facility])    Discharge Services:  Per TCU    Discharge DME:  per therapy    Discharge Transportation: [facility] transport [phone]    Private pay costs discussed: transportation costs    Does the patient's insurance plan have a 3 day qualifying hospital stay waiver?  No    PAS Confirmation Code: [id number]  Patient/family educated on Medicare website which has current facility and service quality ratings:      Education Provided on the Discharge Plan:    Persons Notified of Discharge Plans: yes  Patient/Family in Agreement with the Plan:      Handoff Referral Completed: Yes    Additional Information:  Pt adequate for discharge to TCU. Prior auth has been approved and [employer] has been updated on the accepting facility. Pt is accepting of this discharge plan and asked CM to set up transport to the facility. CM discussed the possible out of pocket cost for transport and pt is accepting. Ride set up for [id number] today. Nursing and facility updated of time. Orders and prior auth approval letter has been faxed to facility. No further CM needs. CM will sign off.    Pinky Santana RN

## 2023-09-07 NOTE — PLAN OF CARE
Problem: Plan of Care - These are the overarching goals to be used throughout the patient stay.    Goal: Plan of Care Review  Description: The Plan of Care Review/Shift note should be completed every shift.  The Outcome Evaluation is a brief statement about your assessment that the patient is improving, declining, or no change.  This information will be displayed automatically on your shift note.  Outcome: Progressing   Goal Outcome Evaluation:

## 2023-09-07 NOTE — PLAN OF CARE
"Goal Outcome Evaluation:      Plan of Care Reviewed With: patient    Overall Patient Progress: improvingOverall Patient Progress: improving    Outcome Evaluation: Pt alert and oriented. Urinated frequently overnight with bedside urinal. CPAP mask on, RT assisted with positioning. Tolerated well. Had at least 2 BM yesterday, senna held. VSS. Slept between cares.    /75 (BP Location: Left arm)   Pulse 67   Temp 97.9  F (36.6  C) (Oral)   Resp 20   Ht 1.778 m (5' 10\")   Wt 120.2 kg (265 lb)   SpO2 97%   BMI 38.02 kg/m      Anish Dasilva RN    "

## 2023-09-10 LAB
ATRIAL RATE - MUSE: 228 BPM
DIASTOLIC BLOOD PRESSURE - MUSE: NORMAL MMHG
INTERPRETATION ECG - MUSE: NORMAL
P AXIS - MUSE: 59 DEGREES
PR INTERVAL - MUSE: NORMAL MS
QRS DURATION - MUSE: 108 MS
QT - MUSE: 334 MS
QTC - MUSE: 375 MS
R AXIS - MUSE: -35 DEGREES
SYSTOLIC BLOOD PRESSURE - MUSE: NORMAL MMHG
T AXIS - MUSE: -19 DEGREES
VENTRICULAR RATE- MUSE: 76 BPM

## 2023-09-12 ENCOUNTER — LAB REQUISITION (OUTPATIENT)
Dept: LAB | Facility: CLINIC | Age: 78
End: 2023-09-12
Payer: COMMERCIAL

## 2023-09-12 DIAGNOSIS — R79.9 ABNORMAL FINDING OF BLOOD CHEMISTRY, UNSPECIFIED: ICD-10-CM

## 2023-09-13 LAB
ANION GAP SERPL CALCULATED.3IONS-SCNC: 9 MMOL/L (ref 7–15)
BUN SERPL-MCNC: 20.6 MG/DL (ref 8–23)
CALCIUM SERPL-MCNC: 9.1 MG/DL (ref 8.8–10.2)
CHLORIDE SERPL-SCNC: 100 MMOL/L (ref 98–107)
CREAT SERPL-MCNC: 1.13 MG/DL (ref 0.67–1.17)
DEPRECATED HCO3 PLAS-SCNC: 26 MMOL/L (ref 22–29)
EGFRCR SERPLBLD CKD-EPI 2021: 67 ML/MIN/1.73M2
GLUCOSE SERPL-MCNC: 73 MG/DL (ref 70–99)
POTASSIUM SERPL-SCNC: 4.8 MMOL/L (ref 3.4–5.3)
SODIUM SERPL-SCNC: 135 MMOL/L (ref 136–145)

## 2023-09-13 PROCEDURE — 80048 BASIC METABOLIC PNL TOTAL CA: CPT | Mod: ORL | Performed by: FAMILY MEDICINE

## 2023-09-13 PROCEDURE — 36415 COLL VENOUS BLD VENIPUNCTURE: CPT | Mod: ORL | Performed by: FAMILY MEDICINE

## 2023-09-13 PROCEDURE — P9604 ONE-WAY ALLOW PRORATED TRIP: HCPCS | Mod: ORL | Performed by: FAMILY MEDICINE

## 2023-10-10 ENCOUNTER — LAB REQUISITION (OUTPATIENT)
Dept: LAB | Facility: CLINIC | Age: 78
End: 2023-10-10
Payer: COMMERCIAL

## 2023-10-10 DIAGNOSIS — I25.5 ISCHEMIC CARDIOMYOPATHY: ICD-10-CM

## 2023-10-11 LAB
ANION GAP SERPL CALCULATED.3IONS-SCNC: 11 MMOL/L (ref 7–15)
BUN SERPL-MCNC: 19.1 MG/DL (ref 8–23)
CALCIUM SERPL-MCNC: 9.1 MG/DL (ref 8.8–10.2)
CHLORIDE SERPL-SCNC: 104 MMOL/L (ref 98–107)
CREAT SERPL-MCNC: 1.03 MG/DL (ref 0.67–1.17)
DEPRECATED HCO3 PLAS-SCNC: 25 MMOL/L (ref 22–29)
EGFRCR SERPLBLD CKD-EPI 2021: 75 ML/MIN/1.73M2
GLUCOSE SERPL-MCNC: 72 MG/DL (ref 70–99)
POTASSIUM SERPL-SCNC: 4.8 MMOL/L (ref 3.4–5.3)
SODIUM SERPL-SCNC: 140 MMOL/L (ref 135–145)

## 2023-10-11 PROCEDURE — 80048 BASIC METABOLIC PNL TOTAL CA: CPT | Mod: ORL

## 2023-10-11 PROCEDURE — 36415 COLL VENOUS BLD VENIPUNCTURE: CPT | Mod: ORL

## 2023-10-11 PROCEDURE — P9604 ONE-WAY ALLOW PRORATED TRIP: HCPCS | Mod: ORL

## 2023-11-01 ENCOUNTER — MEDICAL CORRESPONDENCE (OUTPATIENT)
Dept: HEALTH INFORMATION MANAGEMENT | Facility: CLINIC | Age: 78
End: 2023-11-01

## 2023-11-07 DIAGNOSIS — Z79.899 ENCOUNTER FOR LONG-TERM (CURRENT) USE OF MEDICATIONS: Primary | ICD-10-CM

## 2023-11-10 ENCOUNTER — APPOINTMENT (OUTPATIENT)
Dept: RADIOLOGY | Facility: HOSPITAL | Age: 78
DRG: 602 | End: 2023-11-10
Attending: INTERNAL MEDICINE
Payer: COMMERCIAL

## 2023-11-10 ENCOUNTER — HOSPITAL ENCOUNTER (INPATIENT)
Facility: HOSPITAL | Age: 78
LOS: 24 days | Discharge: NURSING, REHAB OR LONG-TERM CARE | DRG: 602 | End: 2023-12-04
Attending: EMERGENCY MEDICINE | Admitting: INTERNAL MEDICINE
Payer: COMMERCIAL

## 2023-11-10 ENCOUNTER — APPOINTMENT (OUTPATIENT)
Dept: ULTRASOUND IMAGING | Facility: HOSPITAL | Age: 78
DRG: 602 | End: 2023-11-10
Attending: INTERNAL MEDICINE
Payer: COMMERCIAL

## 2023-11-10 DIAGNOSIS — I89.0 LYMPHEDEMA: ICD-10-CM

## 2023-11-10 DIAGNOSIS — L03.116 BILATERAL CELLULITIS OF LOWER LEG: ICD-10-CM

## 2023-11-10 DIAGNOSIS — G89.4 CHRONIC PAIN DISORDER: ICD-10-CM

## 2023-11-10 DIAGNOSIS — Z96.659 HISTORY OF REVISION OF TOTAL KNEE ARTHROPLASTY: ICD-10-CM

## 2023-11-10 DIAGNOSIS — I50.22 CHRONIC SYSTOLIC CONGESTIVE HEART FAILURE (H): Primary | ICD-10-CM

## 2023-11-10 DIAGNOSIS — I48.92 ATRIAL FLUTTER, UNSPECIFIED TYPE (H): ICD-10-CM

## 2023-11-10 DIAGNOSIS — L03.115 BILATERAL CELLULITIS OF LOWER LEG: ICD-10-CM

## 2023-11-10 DIAGNOSIS — D53.9 MACROCYTIC ANEMIA: ICD-10-CM

## 2023-11-10 LAB
ALBUMIN SERPL BCG-MCNC: 3.5 G/DL (ref 3.5–5.2)
ALP SERPL-CCNC: 130 U/L (ref 40–129)
ALT SERPL W P-5'-P-CCNC: 19 U/L (ref 0–70)
ANION GAP SERPL CALCULATED.3IONS-SCNC: 11 MMOL/L (ref 7–15)
AST SERPL W P-5'-P-CCNC: 31 U/L (ref 0–45)
BASOPHILS # BLD AUTO: 0 10E3/UL (ref 0–0.2)
BASOPHILS NFR BLD AUTO: 0 %
BILIRUB SERPL-MCNC: 0.6 MG/DL
BUN SERPL-MCNC: 23.6 MG/DL (ref 8–23)
CALCIUM SERPL-MCNC: 9.3 MG/DL (ref 8.8–10.2)
CHLORIDE SERPL-SCNC: 102 MMOL/L (ref 98–107)
CK SERPL-CCNC: 128 U/L (ref 39–308)
CREAT SERPL-MCNC: 0.94 MG/DL (ref 0.67–1.17)
CRP SERPL-MCNC: 68.7 MG/L
DEPRECATED HCO3 PLAS-SCNC: 25 MMOL/L (ref 22–29)
EGFRCR SERPLBLD CKD-EPI 2021: 84 ML/MIN/1.73M2
EOSINOPHIL # BLD AUTO: 0 10E3/UL (ref 0–0.7)
EOSINOPHIL NFR BLD AUTO: 1 %
ERYTHROCYTE [DISTWIDTH] IN BLOOD BY AUTOMATED COUNT: 14.4 % (ref 10–15)
ERYTHROCYTE [SEDIMENTATION RATE] IN BLOOD BY WESTERGREN METHOD: 41 MM/HR (ref 0–20)
GLUCOSE SERPL-MCNC: 87 MG/DL (ref 70–99)
HCT VFR BLD AUTO: 39 % (ref 40–53)
HGB BLD-MCNC: 12.7 G/DL (ref 13.3–17.7)
IMM GRANULOCYTES # BLD: 0 10E3/UL
IMM GRANULOCYTES NFR BLD: 1 %
LYMPHOCYTES # BLD AUTO: 0.6 10E3/UL (ref 0.8–5.3)
LYMPHOCYTES NFR BLD AUTO: 9 %
MAGNESIUM SERPL-MCNC: 2.3 MG/DL (ref 1.7–2.3)
MCH RBC QN AUTO: 33.2 PG (ref 26.5–33)
MCHC RBC AUTO-ENTMCNC: 32.6 G/DL (ref 31.5–36.5)
MCV RBC AUTO: 102 FL (ref 78–100)
MONOCYTES # BLD AUTO: 0.7 10E3/UL (ref 0–1.3)
MONOCYTES NFR BLD AUTO: 11 %
NEUTROPHILS # BLD AUTO: 5.2 10E3/UL (ref 1.6–8.3)
NEUTROPHILS NFR BLD AUTO: 78 %
NRBC # BLD AUTO: 0 10E3/UL
NRBC BLD AUTO-RTO: 0 /100
NT-PROBNP SERPL-MCNC: 1192 PG/ML (ref 0–1800)
PLATELET # BLD AUTO: 210 10E3/UL (ref 150–450)
POTASSIUM SERPL-SCNC: 4.8 MMOL/L (ref 3.4–5.3)
PROCALCITONIN SERPL IA-MCNC: 0.08 NG/ML
PROT SERPL-MCNC: 7.2 G/DL (ref 6.4–8.3)
RBC # BLD AUTO: 3.82 10E6/UL (ref 4.4–5.9)
SODIUM SERPL-SCNC: 138 MMOL/L (ref 135–145)
TROPONIN T SERPL HS-MCNC: 31 NG/L
WBC # BLD AUTO: 6.6 10E3/UL (ref 4–11)

## 2023-11-10 PROCEDURE — 82550 ASSAY OF CK (CPK): CPT | Performed by: INTERNAL MEDICINE

## 2023-11-10 PROCEDURE — 210N000001 HC R&B IMCU HEART CARE

## 2023-11-10 PROCEDURE — 99222 1ST HOSP IP/OBS MODERATE 55: CPT | Mod: 25 | Performed by: INTERNAL MEDICINE

## 2023-11-10 PROCEDURE — 36415 COLL VENOUS BLD VENIPUNCTURE: CPT | Performed by: INTERNAL MEDICINE

## 2023-11-10 PROCEDURE — 36415 COLL VENOUS BLD VENIPUNCTURE: CPT | Performed by: EMERGENCY MEDICINE

## 2023-11-10 PROCEDURE — 250N000011 HC RX IP 250 OP 636: Mod: JZ | Performed by: EMERGENCY MEDICINE

## 2023-11-10 PROCEDURE — 86140 C-REACTIVE PROTEIN: CPT | Performed by: EMERGENCY MEDICINE

## 2023-11-10 PROCEDURE — 93005 ELECTROCARDIOGRAM TRACING: CPT | Performed by: INTERNAL MEDICINE

## 2023-11-10 PROCEDURE — 83880 ASSAY OF NATRIURETIC PEPTIDE: CPT | Performed by: EMERGENCY MEDICINE

## 2023-11-10 PROCEDURE — 85025 COMPLETE CBC W/AUTO DIFF WBC: CPT | Performed by: EMERGENCY MEDICINE

## 2023-11-10 PROCEDURE — 85652 RBC SED RATE AUTOMATED: CPT | Performed by: EMERGENCY MEDICINE

## 2023-11-10 PROCEDURE — 84145 PROCALCITONIN (PCT): CPT | Performed by: INTERNAL MEDICINE

## 2023-11-10 PROCEDURE — 87040 BLOOD CULTURE FOR BACTERIA: CPT | Performed by: INTERNAL MEDICINE

## 2023-11-10 PROCEDURE — 99285 EMERGENCY DEPT VISIT HI MDM: CPT

## 2023-11-10 PROCEDURE — 71045 X-RAY EXAM CHEST 1 VIEW: CPT

## 2023-11-10 PROCEDURE — 96375 TX/PRO/DX INJ NEW DRUG ADDON: CPT

## 2023-11-10 PROCEDURE — 258N000003 HC RX IP 258 OP 636: Performed by: EMERGENCY MEDICINE

## 2023-11-10 PROCEDURE — 84484 ASSAY OF TROPONIN QUANT: CPT | Performed by: INTERNAL MEDICINE

## 2023-11-10 PROCEDURE — 99223 1ST HOSP IP/OBS HIGH 75: CPT | Performed by: INTERNAL MEDICINE

## 2023-11-10 PROCEDURE — 250N000011 HC RX IP 250 OP 636: Mod: JZ | Performed by: INTERNAL MEDICINE

## 2023-11-10 PROCEDURE — 82435 ASSAY OF BLOOD CHLORIDE: CPT | Performed by: EMERGENCY MEDICINE

## 2023-11-10 PROCEDURE — 83735 ASSAY OF MAGNESIUM: CPT | Performed by: INTERNAL MEDICINE

## 2023-11-10 PROCEDURE — 250N000013 HC RX MED GY IP 250 OP 250 PS 637: Performed by: INTERNAL MEDICINE

## 2023-11-10 PROCEDURE — 93970 EXTREMITY STUDY: CPT

## 2023-11-10 PROCEDURE — 82374 ASSAY BLOOD CARBON DIOXIDE: CPT | Performed by: EMERGENCY MEDICINE

## 2023-11-10 PROCEDURE — 96374 THER/PROPH/DIAG INJ IV PUSH: CPT

## 2023-11-10 RX ORDER — BUPRENORPHINE 10 UG/H
1 PATCH TRANSDERMAL WEEKLY
Status: DISCONTINUED | OUTPATIENT
Start: 2023-11-10 | End: 2023-11-10

## 2023-11-10 RX ORDER — NALOXONE HYDROCHLORIDE 0.4 MG/ML
0.2 INJECTION, SOLUTION INTRAMUSCULAR; INTRAVENOUS; SUBCUTANEOUS
Status: DISCONTINUED | OUTPATIENT
Start: 2023-11-10 | End: 2023-12-04 | Stop reason: HOSPADM

## 2023-11-10 RX ORDER — NALOXONE HYDROCHLORIDE 0.4 MG/ML
0.4 INJECTION, SOLUTION INTRAMUSCULAR; INTRAVENOUS; SUBCUTANEOUS
Status: DISCONTINUED | OUTPATIENT
Start: 2023-11-10 | End: 2023-12-04 | Stop reason: HOSPADM

## 2023-11-10 RX ORDER — LANOLIN ALCOHOL/MO/W.PET/CERES
3 CREAM (GRAM) TOPICAL AT BEDTIME
COMMUNITY

## 2023-11-10 RX ORDER — LIDOCAINE 4 G/G
1 PATCH TOPICAL EVERY 24 HOURS
Status: DISCONTINUED | OUTPATIENT
Start: 2023-11-11 | End: 2023-12-04 | Stop reason: HOSPADM

## 2023-11-10 RX ORDER — PRAMIPEXOLE DIHYDROCHLORIDE 0.25 MG/1
0.25 TABLET ORAL AT BEDTIME
Status: DISCONTINUED | OUTPATIENT
Start: 2023-11-10 | End: 2023-12-04 | Stop reason: HOSPADM

## 2023-11-10 RX ORDER — ONDANSETRON 2 MG/ML
4 INJECTION INTRAMUSCULAR; INTRAVENOUS ONCE
Status: COMPLETED | OUTPATIENT
Start: 2023-11-10 | End: 2023-11-10

## 2023-11-10 RX ORDER — BUPRENORPHINE 10 UG/H
1 PATCH TRANSDERMAL WEEKLY
Status: DISCONTINUED | OUTPATIENT
Start: 2023-11-15 | End: 2023-11-10

## 2023-11-10 RX ORDER — HYDROMORPHONE HYDROCHLORIDE 1 MG/ML
0.5 INJECTION, SOLUTION INTRAMUSCULAR; INTRAVENOUS; SUBCUTANEOUS ONCE
Status: DISCONTINUED | OUTPATIENT
Start: 2023-11-10 | End: 2023-11-10

## 2023-11-10 RX ORDER — ACETAMINOPHEN 500 MG
1000 TABLET ORAL 3 TIMES DAILY
Status: DISCONTINUED | OUTPATIENT
Start: 2023-11-10 | End: 2023-12-04 | Stop reason: HOSPADM

## 2023-11-10 RX ORDER — METHOCARBAMOL 500 MG/1
500 TABLET, FILM COATED ORAL 2 TIMES DAILY PRN
COMMUNITY

## 2023-11-10 RX ORDER — CEFAZOLIN SODIUM 1 G/50ML
2000 SOLUTION INTRAVENOUS ONCE
Status: COMPLETED | OUTPATIENT
Start: 2023-11-10 | End: 2023-11-10

## 2023-11-10 RX ORDER — AMIODARONE HYDROCHLORIDE 100 MG/1
100 TABLET ORAL DAILY
Status: DISCONTINUED | OUTPATIENT
Start: 2023-11-10 | End: 2023-12-04 | Stop reason: HOSPADM

## 2023-11-10 RX ORDER — PERPHENAZINE/AMITRIPTYLINE HCL 4MG-10MG
3 TABLET ORAL EVERY EVENING
COMMUNITY

## 2023-11-10 RX ORDER — MORPHINE SULFATE 4 MG/ML
4 INJECTION, SOLUTION INTRAMUSCULAR; INTRAVENOUS ONCE
Status: COMPLETED | OUTPATIENT
Start: 2023-11-10 | End: 2023-11-10

## 2023-11-10 RX ORDER — GABAPENTIN 100 MG/1
200 CAPSULE ORAL AT BEDTIME
Status: DISCONTINUED | OUTPATIENT
Start: 2023-11-10 | End: 2023-12-04 | Stop reason: HOSPADM

## 2023-11-10 RX ORDER — SODIUM PHOSPHATE,MONO-DIBASIC 19G-7G/118
1 ENEMA (ML) RECTAL DAILY
COMMUNITY

## 2023-11-10 RX ORDER — FUROSEMIDE 10 MG/ML
40 INJECTION INTRAMUSCULAR; INTRAVENOUS EVERY 8 HOURS
Status: DISCONTINUED | OUTPATIENT
Start: 2023-11-10 | End: 2023-11-11

## 2023-11-10 RX ORDER — ACETAMINOPHEN 325 MG/1
650 TABLET ORAL EVERY 4 HOURS PRN
COMMUNITY

## 2023-11-10 RX ORDER — AMOXICILLIN 250 MG
1 CAPSULE ORAL 2 TIMES DAILY
Status: DISCONTINUED | OUTPATIENT
Start: 2023-11-10 | End: 2023-12-04 | Stop reason: HOSPADM

## 2023-11-10 RX ORDER — ATORVASTATIN CALCIUM 40 MG/1
40 TABLET, FILM COATED ORAL AT BEDTIME
Status: DISCONTINUED | OUTPATIENT
Start: 2023-11-10 | End: 2023-12-04 | Stop reason: HOSPADM

## 2023-11-10 RX ORDER — CALCIUM CARBONATE/VITAMIN D3 600 MG-10
500 TABLET ORAL DAILY
Status: DISCONTINUED | OUTPATIENT
Start: 2023-11-11 | End: 2023-12-04 | Stop reason: HOSPADM

## 2023-11-10 RX ORDER — METOPROLOL SUCCINATE 25 MG/1
25 TABLET, EXTENDED RELEASE ORAL DAILY
Status: DISCONTINUED | OUTPATIENT
Start: 2023-11-10 | End: 2023-12-04 | Stop reason: HOSPADM

## 2023-11-10 RX ORDER — BUPRENORPHINE 10 UG/H
1 PATCH TRANSDERMAL
COMMUNITY

## 2023-11-10 RX ORDER — METHOCARBAMOL 500 MG/1
500 TABLET, FILM COATED ORAL 2 TIMES DAILY PRN
Status: DISCONTINUED | OUTPATIENT
Start: 2023-11-10 | End: 2023-12-04 | Stop reason: HOSPADM

## 2023-11-10 RX ORDER — CEFAZOLIN SODIUM 2 G/100ML
2 INJECTION, SOLUTION INTRAVENOUS EVERY 8 HOURS
Status: DISCONTINUED | OUTPATIENT
Start: 2023-11-10 | End: 2023-11-11

## 2023-11-10 RX ORDER — CEFAZOLIN SODIUM 1 G/3ML
1 INJECTION, POWDER, FOR SOLUTION INTRAMUSCULAR; INTRAVENOUS ONCE
Status: DISCONTINUED | OUTPATIENT
Start: 2023-11-10 | End: 2023-11-10

## 2023-11-10 RX ORDER — CEFAZOLIN SODIUM 1 G/3ML
1 INJECTION, POWDER, FOR SOLUTION INTRAMUSCULAR; INTRAVENOUS EVERY 8 HOURS
Status: DISCONTINUED | OUTPATIENT
Start: 2023-11-10 | End: 2023-11-10

## 2023-11-10 RX ORDER — BUPRENORPHINE 10 UG/H
1 PATCH TRANSDERMAL WEEKLY
Status: DISCONTINUED | OUTPATIENT
Start: 2023-11-15 | End: 2023-12-04 | Stop reason: HOSPADM

## 2023-11-10 RX ORDER — CEFAZOLIN SODIUM 1 G/50ML
1250 SOLUTION INTRAVENOUS
Status: DISCONTINUED | OUTPATIENT
Start: 2023-11-11 | End: 2023-11-11

## 2023-11-10 RX ORDER — POLYETHYLENE GLYCOL 3350 17 G/17G
17 POWDER, FOR SOLUTION ORAL DAILY PRN
Status: DISCONTINUED | OUTPATIENT
Start: 2023-11-11 | End: 2023-12-04 | Stop reason: HOSPADM

## 2023-11-10 RX ORDER — CEFAZOLIN SODIUM 2 G/100ML
2 INJECTION, SOLUTION INTRAVENOUS ONCE
Status: COMPLETED | OUTPATIENT
Start: 2023-11-10 | End: 2023-11-10

## 2023-11-10 RX ADMIN — SENNOSIDES AND DOCUSATE SODIUM 1 TABLET: 8.6; 5 TABLET ORAL at 19:41

## 2023-11-10 RX ADMIN — VANCOMYCIN HYDROCHLORIDE 2000 MG: 500 INJECTION, POWDER, LYOPHILIZED, FOR SOLUTION INTRAVENOUS at 15:41

## 2023-11-10 RX ADMIN — AMIODARONE HYDROCHLORIDE 100 MG: 100 TABLET ORAL at 15:48

## 2023-11-10 RX ADMIN — MORPHINE SULFATE 4 MG: 4 INJECTION, SOLUTION INTRAMUSCULAR; INTRAVENOUS at 13:38

## 2023-11-10 RX ADMIN — CEFAZOLIN SODIUM 2 G: 2 INJECTION, SOLUTION INTRAVENOUS at 15:01

## 2023-11-10 RX ADMIN — MICONAZOLE NITRATE: 20 POWDER TOPICAL at 19:42

## 2023-11-10 RX ADMIN — ATORVASTATIN CALCIUM 40 MG: 40 TABLET, FILM COATED ORAL at 23:06

## 2023-11-10 RX ADMIN — FUROSEMIDE 40 MG: 10 INJECTION, SOLUTION INTRAMUSCULAR; INTRAVENOUS at 15:38

## 2023-11-10 RX ADMIN — ONDANSETRON 4 MG: 2 INJECTION INTRAMUSCULAR; INTRAVENOUS at 13:39

## 2023-11-10 RX ADMIN — APIXABAN 5 MG: 5 TABLET, FILM COATED ORAL at 19:42

## 2023-11-10 RX ADMIN — FUROSEMIDE 40 MG: 10 INJECTION, SOLUTION INTRAMUSCULAR; INTRAVENOUS at 23:06

## 2023-11-10 RX ADMIN — CEFAZOLIN SODIUM 2 G: 2 INJECTION, SOLUTION INTRAVENOUS at 23:26

## 2023-11-10 RX ADMIN — PRAMIPEXOLE DIHYDROCHLORIDE 0.25 MG: 0.25 TABLET ORAL at 23:06

## 2023-11-10 RX ADMIN — ACETAMINOPHEN 1000 MG: 500 TABLET ORAL at 15:48

## 2023-11-10 RX ADMIN — METOPROLOL SUCCINATE 25 MG: 25 TABLET, EXTENDED RELEASE ORAL at 15:48

## 2023-11-10 RX ADMIN — GABAPENTIN 200 MG: 100 CAPSULE ORAL at 23:06

## 2023-11-10 RX ADMIN — ACETAMINOPHEN 1000 MG: 500 TABLET ORAL at 23:06

## 2023-11-10 ASSESSMENT — ACTIVITIES OF DAILY LIVING (ADL)
ADLS_ACUITY_SCORE: 35
ADLS_ACUITY_SCORE: 35
DEPENDENT_IADLS:: CLEANING;LAUNDRY;SHOPPING;TRANSPORTATION;MEDICATION MANAGEMENT
ADLS_ACUITY_SCORE: 35

## 2023-11-10 NOTE — PHARMACY-VANCOMYCIN DOSING SERVICE
Pharmacy Vancomycin Initial Note  Date of Service November 10, 2023  Patient's  1945  78 year old, male    Indication: Skin and Soft Tissue Infection    Current estimated CrCl = Estimated Creatinine Clearance: 90.7 mL/min (based on SCr of 0.94 mg/dL).    Creatinine for last 3 days  11/10/2023:  1:33 PM Creatinine 0.94 mg/dL    Recent Vancomycin Level(s) for last 3 days  No results found for requested labs within last 3 days.      Vancomycin IV Administrations (past 72 hours)        No vancomycin orders with administrations in past 72 hours.                    Nephrotoxins and other renal medications (From now, onward)      Start     Dose/Rate Route Frequency Ordered Stop    23 1000  vancomycin (VANCOCIN) 1,250 mg in sodium chloride 0.9 % 250 mL intermittent infusion         1,250 mg  over 90 Minutes Intravenous EVERY 18 HOURS 11/10/23 1511      11/10/23 1500  vancomycin (VANCOCIN) 2,000 mg in sodium chloride 0.9 % 500 mL intermittent infusion         2,000 mg  over 2 Hours Intravenous ONCE 11/10/23 1440      11/10/23 1500  furosemide (LASIX) injection 40 mg         40 mg  over 1-3 Minutes Intravenous EVERY 8 HOURS 11/10/23 1446              Contrast Orders - past 72 hours (72h ago, onward)      None            [employer] Prediction of Planned Initial Vancomycin Regimen    Loading dose: 2000 mg at 15:30 11/10/2023.  Regimen: 1250 mg IV every 18 hours.  Start time: 15:09 on 11/10/2023  Exposure target: AUC24 (range)400-600 mg/L.hr   AUC24,ss: 489 mg/L.hr  Probability of AUC24 > 400: 65 %  Ctrough,ss: 12.8 mg/L  Probability of Ctrough,ss > 20: 29 %  Probability of nephrotoxicity (Lodise NICOLE ): 8 %          Plan:  Start vancomycin  1250 mg IV q18h following the loading dose.   Vancomycin monitoring method: AUC  Vancomycin therapeutic monitoring goal: 400-600 mg*h/L  Pharmacy will check vancomycin levels as appropriate in 1-3 Days.    Serum creatinine levels will be ordered daily for the first week of  therapy and at least twice weekly for subsequent weeks.      Kimberley Varner, RP

## 2023-11-10 NOTE — CONSULTS
"  Thank you, DrLinda No ref. provider found, for asking the [facility] Heart Care team to see Mr. Gorge Berkowitz Jr. to evaluate       Assessment/Recommendations   Assessment/Plan:  Edema - related to cellutis primarily -noted normal Cr/BNP in setting of EF 40%, echo pending, possible can have stress CM or ischemia inf wall with , on iv furosemide, will add spironolactone 12.5mg bid for a day, then lower to daily  Ischemic CM - echo pending, cont metoprolol, addition of spironlactone, consider entresto/ARB as outpatient  CAD known  of RCA, cont atorvastatin goal LDL <70, metoprolol, noted trivial trop, related to , await echo  Aflutter - rate controlled with amiodarone and metoprolol, cont eliquis    Follow up with Dr. Johansen in Cardiology   Clinically Significant Risk Factors Present on Admission               # Drug Induced Coagulation Defect: home medication list includes an anticoagulant medication         # Severe Obesity: Estimated body mass index is 43.56 kg/m  as calculated from the following:    Height as of this encounter: 1.753 m (5' 9\").    Weight as of this encounter: 133.8 kg (295 lb).       # Financial/Environmental Concerns: none              History of Present Illness/Subjective    Mr. Gorge Berkowitz Jr. is a 78 year old male with hx of CAD  of RCA, ischemic CM (last EF 40% 2018 echo), afib/flutteron eliquis, morbid obesity, ROSALVA (not using CPAP) admitted with cellulitis both lower ext and edema possibly related to CHF on top of infection, no PND/orthopnea, dyspnea, chest pain/pressure, syncopal events, tolerating his mediccations    BNP 1,192 (<1,800 normal)  Elevated Esr/CRP  Cr 0.94  Hgb 12.7 plt 210  Trop 31  Ecg atrial flutter no change ST/T wave     Physical Examination Review of Systems   /71   Pulse 109   Temp 98.3  F (36.8  C) (Oral)   Resp 18   Ht 1.753 m (5' 9\")   Wt 133.8 kg (295 lb)   SpO2 97%   BMI 43.56 kg/m    Body mass index is 43.56 kg/m .  Wt " Readings from Last 3 Encounters:   11/10/23 133.8 kg (295 lb)   09/04/23 120.2 kg (265 lb)   10/18/22 129.3 kg (285 lb)     No intake or output data in the 24 hours ending 11/10/23 1716  General Appearance:   no distress, normal body habitus   ENT/Mouth: membranes moist, no oral lesions or bleeding gums.      EYES:  no scleral icterus, normal conjunctivae   Neck: no carotid bruits or thyromegaly   Chest/Lungs:   lungs are clear to auscultation, no rales or wheezing,  sternal scar, equal chest wall expansion    Cardiovascular:   Regular. Normal first and second heart sounds with no murmurs, rubs, or gallops; the carotid, radial and posterior tibial pulses are intact, Jugular venous pressure , edema bilaterally    Abdomen:  no organomegaly, masses, bruits, or tenderness; bowel sounds are present   Extremities: no cyanosis or clubbing   Skin: no xanthelasma, warm.    Neurologic: normal  bilateral, no tremors     Psychiatric: alert and oriented x3, calm     Review of Systems - 12 points nega other than above      Medical History  Surgical History Family History Social History   Past Medical History:   Diagnosis Date     Atrial fibrillation (H)      Cardiomyopathy (H)     ef 20%     CHF (congestive heart failure) (H)      Morbid obesity (H)      ROSALVA (obstructive sleep apnea)     Past Surgical History:   Procedure Laterality Date     CARDIAC CATHETERIZATION  08/10/2018    and right heart cath     CARDIOVERSION  08/16/2018    successful     CV CORONARY ANGIOGRAM N/A 8/10/2018    Procedure: Coronary Angiogram;  Surgeon: Ralph Powers MD;  Location: [facility] Cath Lab;  Service:      CV LEFT HEART CATHETERIZATION WITHOUT LEFT VENTRICULOGRAM Left 8/10/2018    Procedure: Left Heart Catheterization Without Left Ventriculogram;  Surgeon: Ralph Powers MD;  Location: [facility] Cath Lab;  Service:      CV RIGHT HEART CATHETERIZATION N/A 8/10/2018    Procedure: Right Heart Catheterization;  Surgeon:  Ralph Powers MD;  Location: [facility];  Service:     History reviewed. No pertinent family history. Social History     Socioeconomic History     Marital status: Single     Spouse name: Not on file     Number of children: Not on file     Years of education: Not on file     Highest education level: Not on file   Occupational History     Not on file   Tobacco Use     Smoking status: Former     Types: Cigarettes     Quit date: 2005     Years since quittin.8     Smokeless tobacco: Never   Substance and Sexual Activity     Alcohol use: No     Comment: Alcoholic Drinks/day: former alcoholic- quit      Drug use: No     Sexual activity: Not on file   Other Topics Concern     Not on file   Social History Narrative     Not on file     Social Determinants of Health     Financial Resource Strain: Not on file   Food Insecurity: Not on file   Transportation Needs: Not on file   Physical Activity: Not on file   Stress: Not on file   Social Connections: Not on file   Interpersonal Safety: Not on file   Housing Stability: Not on file          Medications  Allergies   Scheduled Meds:    acetaminophen  1,000 mg Oral TID     amiodarone  100 mg Oral Daily     apixaban ANTICOAGULANT  5 mg Oral BID     atorvastatin  40 mg Oral At Bedtime     buprenorphine  1 patch Transdermal Weekly     buprenorphine   Transdermal Q8H MARLENI     buprenorphine   Transdermal Once     ceFAZolin  2 g Intravenous Q8H     furosemide  40 mg Intravenous Q8H     gabapentin  200 mg Oral At Bedtime     [START ON 2023] Lidocaine  1 patch Transdermal Q24H     metoprolol succinate ER  25 mg Oral Daily     pramipexole  0.25 mg Oral At Bedtime     senna-docusate  1 tablet Oral BID     [START ON 2023] vancomycin  1,250 mg Intravenous Q18H     vancomycin  2,000 mg Intravenous Once     [START ON 2023] vitamin B-12  500 mcg Oral Daily     Continuous Infusions:    - MEDICATION INSTRUCTIONS -       PRN Meds:.methocarbamol,  naloxone **OR** naloxone **OR** naloxone **OR** naloxone, - MEDICATION INSTRUCTIONS -, [START ON 11/11/2023] polyethylene glycol No Known Allergies      Lab Results    Chemistry/lipid CBC Cardiac Enzymes/BNP/TSH/INR   Lab Results   Component Value Date    CHOL 167 08/08/2018    HDL 45 08/08/2018    TRIG 59 08/08/2018    BUN 23.6 (H) 11/10/2023     11/10/2023    CO2 25 11/10/2023    Lab Results   Component Value Date    WBC 6.6 11/10/2023    HGB 12.7 (L) 11/10/2023    HCT 39.0 (L) 11/10/2023     (H) 11/10/2023     11/10/2023    Lab Results   Component Value Date    TROPONINI 0.02 08/08/2018     (H) 08/08/2018    TSH 0.70 09/04/2023    INR 1.25 (H) 10/18/2022              Ralph Powers MD  Interventional Cardiology  [facility]

## 2023-11-10 NOTE — PHARMACY-VANCOMYCIN DOSING SERVICE
Pharmacy Vancomycin Initial Note  Date of Service November 10, 2023  Patient's  1945  78 year old, male    Indication: Skin and Soft Tissue Infection    Current estimated CrCl = Estimated Creatinine Clearance: 90.7 mL/min (based on SCr of 0.94 mg/dL).    Creatinine for last 3 days  11/10/2023:  1:33 PM Creatinine 0.94 mg/dL    Recent Vancomycin Level(s) for last 3 days  No results found for requested labs within last 3 days.      Vancomycin IV Administrations (past 72 hours)        No vancomycin orders with administrations in past 72 hours.                    Nephrotoxins and other renal medications (From now, onward)      Start     Dose/Rate Route Frequency Ordered Stop    11/10/23 1500  vancomycin (VANCOCIN) 2,000 mg in sodium chloride 0.9 % 500 mL intermittent infusion         2,000 mg  over 2 Hours Intravenous ONCE 11/10/23 1440              Contrast Orders - past 72 hours (72h ago, onward)      None                  Plan:  Start vancomycin  2000 mg IV once.     Demi Lemus, [facility]

## 2023-11-10 NOTE — ED PROVIDER NOTES
EMERGENCY DEPARTMENT ENCOUNTER      NAME: Gorge Berkowitz Jr.  AGE: 78 year old male  YOB: 1945  MRN: [id number]  EVALUATION DATE & TIME: 11/10/2023 12:11 PM    PCP: Lisseth Scott    ED PROVIDER: Mechelle Sofia MD      Chief Complaint   Patient presents with    Leg Swelling    Leg Pain         FINAL IMPRESSION:  1. Bilateral cellulitis of lower leg    2. Lymphedema          ED COURSE & MEDICAL DECISION MAKING:    Pertinent Labs & Imaging studies reviewed. (See chart for details)    12:19 PM I introduced myself to the patient, obtained patient history, performed a physical exam, and discussed plan for ED workup including potential diagnostic laboratory/imaging studies and interventions.  2:24 PM I discussed the case with hospitalist, Dr Saldaña, who accepts the patient .      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  Patient has known history of issues with lymphedema and presented from his care facility with worsening of bilateral lower extremity swelling as well as weeping from sores that are chronically present as well.  There is no bullae on exam and no crepitus.  The swelling is bilateral without any asymmetry making DVT less likely.  He is already anticoagulated on Eliquis as well.  This makes this very unlikely.  Do have concern for potential developing cellulitis with the erythema as he states he has had this before.  Clearly has lymphedema and potentially some volume overload however has no chest pain or shortness of breath and is not hypoxic here.  Thus felt that pulmonary edema was unlikely.  PE would also be unlikely with his Eliquis use.  No signs of necrotizing fasciitis  would be very unlikely to be bilateral as well.  Laboratory studies obtained.  CRP of 68 with a sed rate of 41.  White blood cell count 6.6.  CMP largely unremarkable with normal renal function.  BNP 1192.  Blood cultures obtained.  Do feel he warrants admission for further management of this.  Given IV Ancef as well as IV Vanco here.  Also given 4 mg of IV morphine and 4 mg of IV Zofran.  Did feel improved with this.  He will likely also need diuresis.  Spoke with the hospitalist who accepted the patient for admission.  Patient was in agreement with this plan.  He was admitted in stable condition.         At the conclusion of the encounter I discussed the results of all of the tests and the disposition. The questions were answered. The patient or family acknowledged understanding and was agreeable with the care plan.         Medical Decision Making    History:  Supplemental history from: Documented in chart, if applicable  External Record(s) reviewed: Documented in chart, if applicable.    Work Up:  Chart documentation includes differential considered and any EKGs or imaging independently interpreted by provider.  In additional to work up documented, I considered the following work up: Documented in chart, if applicable.    External consultation:  Discussion of management with another provider: Documented in chart, if applicable    Complicating factors:  Care impacted by chronic illness: Anticoagulated State, Chronic Lung Disease, and Heart Disease  Care affected by social determinants of health: N/A    Disposition considerations: Admit.      MEDICATIONS GIVEN IN THE EMERGENCY:  Medications   ceFAZolin (ANCEF) 2 g in 100 mL D5W intermittent infusion (has no administration in time range)   ondansetron (ZOFRAN) injection 4 mg (4 mg Intravenous $Given 11/10/23 1332)   morphine (PF) injection 4 mg (4 mg Intravenous $Given 11/10/23 1331)       NEW PRESCRIPTIONS STARTED AT TODAY'S ER VISIT  New Prescriptions    No  medications on file          =================================================================    \[facility]\""    Patient information was obtained from: patient     Use of : N/A          Gorge Berkowitz Jr. 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    Patient reports increased bilateral leg swelling, redness, and pain for ~4 days (11/6). He notes he has not been able to sleep and lay down due to prior lumbar compression fractures so he is not able to elevate his legs. Patient takes tylenol with no improvement of pian, denies use of antibiotics for infection. Endorse history of cellulitis and states this feels similar. Denies fever, chest pain, shortness of breath, abdominal pain, nausea, vomiting, diarrhea. No new numbness or focal weakness.     Patient reports he has been unable to walk since hospitalization in September.     REVIEW OF SYSTEMS   Pertinent positives and negatives are documented in the HPI. All other systems reviewed and are negative.      PAST MEDICAL HISTORY:  Past Medical History:   Diagnosis Date    Atrial fibrillation (H)     Cardiomyopathy (H)     ef 20%    CHF (congestive heart failure) (H)     Morbid obesity (H)     ROSALVA (obstructive sleep apnea)        PAST SURGICAL HISTORY:  Past Surgical History:   Procedure Laterality Date    CARDIAC CATHETERIZATION  08/10/2018    and right heart cath    CARDIOVERSION  08/16/2018    successful    CV CORONARY ANGIOGRAM N/A 8/10/2018    Procedure: Coronary Angiogram;  Surgeon: Ralph Powers MD;  Location: [facility] Cath Lab;  Service:     CV LEFT HEART CATHETERIZATION WITHOUT LEFT VENTRICULOGRAM Left 8/10/2018    Procedure: Left  "Heart Catheterization Without Left Ventriculogram;  Surgeon: Ralph Powers MD;  Location[facility];  Service:     CV RIGHT HEART CATHETERIZATION N/A 8/10/2018    Procedure: Right Heart Catheterization;  Surgeon: Ralph Powers MD;  Location: [facility] system Cath Lab;  Service:            CURRENT MEDICATIONS:    acetaminophen (TYLENOL) 500 MG tablet  amiodarone (PACERONE) 200 MG tablet  atorvastatin (LIPITOR) 40 MG tablet  calcium carbonate-vitamin D (CALTRATE) 600-10 MG-MCG per tablet  ELIQUIS 5 mg Tab tablet  ferrous sulfate (FE TABS) 325 (65 Fe) MG EC tablet  furosemide (LASIX) 20 MG tablet  glucosamine-chondroitin 500-400 mg cap  krill-om3-dha-epa-om6-lip-astx (KRILL OIL, OMEGA 3 AND 6,) 1,500-165-67.5 mg cap  Lidocaine (LIDOCARE) 4 % Patch  metoprolol succinate (TOPROL-XL) 50 MG 24 hr tablet  miconazole (MICATIN) 2 % external powder  polyethylene glycol (GLYCOLAX) 17 gram/dose powder  pramipexole (MIRAPEX) 0.25 MG tablet  senna-docusate (SENOKOT-S/PERICOLACE) 8.6-50 MG tablet  traMADol (ULTRAM) 50 MG tablet  vitamin B-12 (CYANOCOBALAMIN) 500 MCG tablet        ALLERGIES:  No Known Allergies    FAMILY HISTORY:  History reviewed. No pertinent family history.    SOCIAL HISTORY:   Social History     Socioeconomic History    Marital status: Single   Tobacco Use    Smoking status: Former     Types: Cigarettes     Quit date: 2005     Years since quittin.8    Smokeless tobacco: Never   Substance and Sexual Activity    Alcohol use: No     Comment: Alcoholic Drinks/day: former alcoholic- quit     Drug use: No       VITALS:  /84   Pulse 102   Temp 98.3  F (36.8  C) (Oral)   Resp 20   Ht 1.753 m (5' 9\")   Wt 133.8 kg (295 lb)   SpO2 100%   BMI 43.56 kg/m      PHYSICAL EXAM    Physical Exam  Constitutional: Well developed, Well nourished, NAD, GCS 15  HENT: Normocephalic, Atraumatic, Bilateral external ears normal, Oropharynx normal, mucous membranes moist, Nose normal. Neck- " Normal range of motion, No tenderness, Supple, No stridor.    Eyes: PERRL, EOMI, Conjunctiva normal, No discharge.   Respiratory: Normal breath sounds, No respiratory distress, No wheezing or crackles, Speaks in full sentences easily.    Cardiovascular: Mild tachycardia, Regular rhythm,  No murmurs, No rubs, No gallops. 2+ radial pulses bilaterally  GI: Bowel sounds normal, Soft, No tenderness, No masses, No rebound or guarding.   Musculoskeletal: 2+ DP pulses. Bilateral lower extremities with 3+ pitting edema and erythema with some weeping noted. No bullae. Hyperpigmentation noted as well. Good range of motion in all major joints.   Integument: Warm, Dry  Neurologic: Alert & oriented x 3, 5/5 strength in all 4 extremities bilaterally. Sensation intact to light touch in all 4 extremities and the face bilaterally. No focal deficits noted.   Psychiatric: Affect normal, Judgment normal, Mood normal. Cooperative.      LAB:  All pertinent labs reviewed and interpreted.  Results for orders placed or performed during the hospital encounter of 11/10/23   Comprehensive metabolic panel   Result Value Ref Range    Sodium 138 135 - 145 mmol/L    Potassium 4.8 3.4 - 5.3 mmol/L    Carbon Dioxide (CO2) 25 22 - 29 mmol/L    Anion Gap 11 7 - 15 mmol/L    Urea Nitrogen 23.6 (H) 8.0 - 23.0 mg/dL    Creatinine 0.94 0.67 - 1.17 mg/dL    GFR Estimate 84 >60 mL/min/1.73m2    Calcium 9.3 8.8 - 10.2 mg/dL    Chloride 102 98 - 107 mmol/L    Glucose 87 70 - 99 mg/dL    Alkaline Phosphatase 130 (H) 40 - 129 U/L    AST 31 0 - 45 U/L    ALT 19 0 - 70 U/L    Protein Total 7.2 6.4 - 8.3 g/dL    Albumin 3.5 3.5 - 5.2 g/dL    Bilirubin Total 0.6 <=1.2 mg/dL   Result Value Ref Range    CRP Inflammation 68.70 (H) <5.00 mg/L   Erythrocyte sedimentation rate auto   Result Value Ref Range    Erythrocyte Sedimentation Rate 41 (H) 0 - 20 mm/hr   Nt probnp inpatient (BNP)   Result Value Ref Range    N terminal Pro BNP Inpatient 1,192 0 - 1,800 pg/mL   CBC  with platelets and differential   Result Value Ref Range    WBC Count 6.6 4.0 - 11.0 10e3/uL    RBC Count 3.82 (L) 4.40 - 5.90 10e6/uL    Hemoglobin 12.7 (L) 13.3 - 17.7 g/dL    Hematocrit 39.0 (L) 40.0 - 53.0 %     (H) 78 - 100 fL    MCH 33.2 (H) 26.5 - 33.0 pg    MCHC 32.6 31.5 - 36.5 g/dL    RDW 14.4 10.0 - 15.0 %    Platelet Count 210 150 - 450 10e3/uL    % Neutrophils 78 %    % Lymphocytes 9 %    % Monocytes 11 %    % Eosinophils 1 %    % Basophils 0 %    % Immature Granulocytes 1 %    NRBCs per 100 WBC 0 <1 /100    Absolute Neutrophils 5.2 1.6 - 8.3 10e3/uL    Absolute Lymphocytes 0.6 (L) 0.8 - 5.3 10e3/uL    Absolute Monocytes 0.7 0.0 - 1.3 10e3/uL    Absolute Eosinophils 0.0 0.0 - 0.7 10e3/uL    Absolute Basophils 0.0 0.0 - 0.2 10e3/uL    Absolute Immature Granulocytes 0.0 <=0.4 10e3/uL    Absolute NRBCs 0.0 10e3/uL       RADIOLOGY:  Reviewed all pertinent imaging. Please see official radiology report.  No orders to display         PROCEDURES:   none      [facility] System Documentation:   CMS Diagnoses:               I, Arielle Corea, am serving as a scribe to document services personally performed by Mechelle Sofia MD based on my observation and the provider's statements to me. I, Mechelle Sofia MD, attest that Arielle Corea is acting in a scribe capacity, has observed my performance of the services and has documented them in accordance with my direction.    Mechelle Sofia MD  [facility] EMERGENCY DEPARTMENT  [address] [id number] [phone]     Mechelle Sofia MD  11/30/23 1149

## 2023-11-10 NOTE — PROGRESS NOTES
[facility] ACUTE PAIN SERVICE CONSULTATION     Date of Admission:  11/10/2023  Date of Consult (When I saw the patient): 11/10/23  Physician requesting consult: Dr. Saldaña   Reason for consult: 79 y/o with severe leg edema, venous stasis ulcers, leg pain , on multiple pain meds, chf   Primary Care Physician: Lisseth Scott MD     Assessment/Plan:     Gorge Berkowitz Jr. is a 78 year old male who was admitted on 11/10/2023.  Pain team was asked to see the patient for severe leg edema, venous stasis ulcers, leg pain , on multiple pain meds, chf . Admitted for bilateral cellulitis of lower leg, lymphedema. History of systolic CHF, Afib, CAD, obesity, chronic edema, wheel chair bound for months in care center here with more pain in legs, weeping and open sores. The patient is a former smoker and has chemical dependency history - etoh, quit 2003.     Opioid Induced Respiratory Depression Risk Assessment: Moderate due to the following risk factors:  CHF, Obesity, Age>60, concomitant CNS depressants    Patient feels that with antibiotics and pain meds his pain is gone during assessment. See recommendations below.     PLAN:   1) Pain is consistent with BL LE pain - secondary to cellulitis and lymphedema.  2)Multimodal Medication Therapy  Topical: lidocaine patch x 1 daily   NSAID'S: Crcl  90.7 ml/min, hgb 12.7, none on anticoagulation and with CHF  Muscle Relaxants: methocarbamol 500 mg po BID prn   Adjuvants: APAP 1000 mg po TID, trial gabapentin however can worsen LE edema - dose dependent so start with 200 mg po at bedtime and caution on dose escalations with LE edema not improving  Antidepressants/anxiolytics: none  Opioids: butrans 10 mcg/hr weekly - pts is placing patch on lower back where pain is located - described this isn't like lidocaine where you place on pain site - needs to go on left or right upper outer arm, upper chest, upper back, or the side of the chest - replace this patch now  IV Pain  medication: none  3)Non-medication interventions:   Acupuncture consult - if agreeable  Integrative consult - if agreeable  4)Constipation Prophylaxis: Scheduled and prn  5) Care Teams:    -[place]  pulled from system on 11/10/23. See fills history below.   Discharge Recommendations - We recommend prescribing the following at the time of discharge: TBD     History of Present Illness (HPI):       Gorge Berkowitz Jr. is a 78 year old male with PMH as above.     Per [place]  review, the patient does have an opioid tolerance.     Reviewed medical record, labs, imaging, ED note, and care everywhere.     Past pain treatments have included: diclofenac gel, was filling tramadol often prior to butrans patch - last filled tramadol 50 mg #60 (30 day supply) on 8/29 from Juana Stovall NP - most often was edmundo escobar MD prior to that    Home pain medications/psych medications/anticoagulation medications include: APAP 650 mg po q4h prn, APAP 1g po TID, butrans patch 10 mcg/hr q7 days - filled 11/7/23 #4 (28 day supply) from Joy Salmon, lidocaine 4% patch x 1 daily, methocarbamol 500 mg po BID prn - filled 11/7/23 #60 (20 day supply), eliquis 5 mg po BID      Lawrence Alarcon, PharmD, BCPS  Acute Care Pain Management Program [facility]   Page via online paging system or [employer]

## 2023-11-10 NOTE — PHARMACY-ADMISSION MEDICATION HISTORY
Pharmacist Admission Medication History    Admission medication history is complete. The information provided in this note is only as accurate as the sources available at the time of the update.    Information Source(s): Facility (U/NH/) medication list/MAR via N/A    Pertinent Information:     Changes made to PTA medication list:  Added: Buprenorphine patch  Deleted: tramadol  Changed: None    Medication Affordability:       Allergies reviewed with patient and updates made in EHR: unable to assess    Medication History Completed By: Lakia Sr [facility] 11/10/2023 2:41 PM        PTA Med List   Medication Sig Last Dose    acetaminophen (TYLENOL) 325 MG tablet Take 650 mg by mouth every 4 hours as needed (mild pain) Past Week at prn    acetaminophen (TYLENOL) 500 MG tablet Take 1,000 mg by mouth 3 times daily 11/10/2023 at am    amiodarone (PACERONE) 200 MG tablet [AMIODARONE (PACERONE) 200 MG TABLET] Take 0.5 tablets (100 mg total) by mouth daily. 11/9/2023 at 1600    atorvastatin (LIPITOR) 40 MG tablet [ATORVASTATIN (LIPITOR) 40 MG TABLET] Take 1 tablet (40 mg total) by mouth at bedtime. 11/9/2023 at hs    buprenorphine (BUTRANS) 10 MCG/HR WK patch Place 1 patch onto the skin every 7 days Wednesdays 11/8/2023 at am    calcium carbonate-vitamin D (CALTRATE) 600-10 MG-MCG per tablet Take 1 tablet by mouth daily 11/10/2023 at am    ELIQUIS 5 mg Tab tablet [ELIQUIS 5 MG TAB TABLET] Take 1 tablet (5 mg total) by mouth 2 (two) times a day. (Patient taking differently: Take 5 mg by mouth 2 times daily) 11/10/2023 at am    ferrous sulfate (FE TABS) 325 (65 Fe) MG EC tablet Take 325 mg by mouth daily 11/9/2023 at am    furosemide (LASIX) 20 MG tablet [FUROSEMIDE (LASIX) 20 MG TABLET] take one tablet by mouth daily and take one tablet by mouth as needed at night for any swelling (Patient taking differently: Take 20 mg by mouth daily) 11/10/2023 at am    glucosamine-chondroitin 500-400 MG CAPS per capsule Take 1 capsule  by mouth daily 11/10/2023 at am    Krill Oil Ultra Strength 1500 MG CAPS Take 3 capsules by mouth every evening 11/9/2023 at pm    Lidocaine (LIDOCARE) 4 % Patch Place 1 patch onto the skin every 24 hours Place onto the left shoulder.   To prevent lidocaine toxicity, patient should be patch free for 12 hrs daily. 11/10/2023 at am    melatonin 3 MG tablet Take 3 mg by mouth at bedtime 11/9/2023 at hs    methocarbamol (ROBAXIN) 500 MG tablet Take 500 mg by mouth 2 times daily as needed for muscle spasms 11/10/2023 at am    metoprolol succinate (TOPROL-XL) 50 MG 24 hr tablet [METOPROLOL SUCCINATE (TOPROL-XL) 50 MG 24 HR TABLET] Take 0.5 tablets (25 mg total) by mouth daily. 11/9/2023 at 1600    miconazole (MICATIN) 2 % external powder Apply topically 2 times daily To affected areas Unknown at prn    polyethylene glycol (GLYCOLAX) 17 gram/dose powder Take 17 g by mouth daily as needed for constipation Past Week at prn    pramipexole (MIRAPEX) 0.25 MG tablet Take 0.25 mg by mouth At Bedtime 11/9/2023 at hs    senna-docusate (SENOKOT-S/PERICOLACE) 8.6-50 MG tablet Take 1 tablet by mouth 2 times daily 11/10/2023 at am    vitamin B-12 (CYANOCOBALAMIN) 500 MCG tablet Take 500 mcg by mouth daily 11/10/2023 at am    [DISCONTINUED] krill-om3-dha-epa-om6-lip-astx (KRILL OIL, OMEGA 3 AND 6,) 1,500-165-67.5 mg cap [KRILL-OM3-DHA-EPA-OM6-LIP-ASTX (KRILL OIL, OMEGA 3 AND 6,) 1,500-165-67.5 MG CAP] Take 3 capsules by mouth daily.

## 2023-11-10 NOTE — CONSULTS
[facility] ACUTE PAIN SERVICE CONSULTATION     Date of Admission:  11/10/2023  Date of Consult (When I saw the patient): 11/10/23     Assessment/Plan:     Gorge Berkowitz Jr. is a 78 year old male who was admitted on 11/10/2023.  Pain team was asked to see the patient for severe leg edema, venous stasis ulcers, leg pain, on multiple pain meds, chf . Admitted from [facility] in [place] for bilateral cellulitis of lower leg, lymphedema, increased leg pain, weeping and and open sores. Facility stated they were unable to care for weeping in legs. History of systolic CHF, Afib, CAD, obesity, chronic edema, wheelchair bound living in care center. The patient is a former smoker and has chemical dependency history - etoh, quit 2003.     Patient reports ongoing BLE pain but pain well managed with current medications and antibiotics. He reports was able to sleep overnight. He states he was note sleeping well prior to admission due to pain. He lives in a care center. Denies nausea, vomiting, constipation, diarrhea, chest pain, shortness of breath.     Opioid Induced Respiratory Depression Risk Assessment: Moderate due to the following risk factors:  CHF, Obesity, Age>60, concomitant CNS depressants    Pain well controlled. Pain team will sign off.      PLAN:   1) Pain is consistent with BLE secondary to cellulitis and lymphedema. Labs and imaging indicated: I have personally reviewed pertinent notes, labs, tests, and radiologic imaging in patient's chart. Treatment plan includes multimodal pain approach, Hospital Medicine Service for medical management, , WOC, Cardiology. Patient educated regarding multimodal pain approach, medications as listed below. Patient is understanding of the plan. All questions and concerns addressed to patient's satisfaction.   Topical: lidocaine patch x 1 daily to shoulder   NSAID'S: Crcl  90.7 ml/min, hgb 12.7, none on anticoagulation and with CHF  Muscle Relaxants: methocarbamol 500 mg  po BID prn   Adjuvants: APAP 1000 mg po TID, trial gabapentin however can worsen LE edema - dose dependent so start with 200 mg po at bedtime and caution on dose escalations with LE edema not improving  Antidepressants/anxiolytics: none  Opioids: butrans 10 mcg/hr weekly - pts is placing patch on lower back where pain is located - discussed with patient that this patch isn't like lidocaine where you place on pain site - needs to go on left or right upper outer arm, upper chest, upper back, or the side of the chest  IV Pain medication: none  3)Non-medication interventions: rest, WOC    4)Constipation Prophylaxis: Scheduled and prn    -MN  pulled from system on 11/10/23. This indicates   Was filling tramadol often prior to butrans patch - last filled tramadol 50 mg #60 (30 day supply) on 8/29 from Juana Stovall NP - most often was Edmundo An MD prior to that  butrans patch 10 mcg/hr q7 days - filled 11/7/23 #4 (28 day supply) from Joy Salmon   Discharge Recommendations - We recommend prescribing the following at the time of discharge: likely home butrans patch, will need script for Gabapentin if helpful        History of Present Illness (HPI):       Gorge Berkowitz Jr. is a 78 year old male with PMH as above.      Per MN  review, the patient does have an opioid tolerance.      Reviewed medical record, labs, imaging, ED note, and care everywhere.      Past pain treatments have included: diclofenac gel, was filling tramadol often prior to butrans patch - last filled tramadol 50 mg #60 (30 day supply) on 8/29 from Juana Stovall NP - most often was edmundo an MD prior to that     Home pain medications/psych medications/anticoagulation medications include: APAP 650 mg po q4h prn, APAP 1g po TID, butrans patch 10 mcg/hr q7 days - filled 11/7/23 #4 (28 day supply) from Joy Salmon, lidocaine 4% patch x 1 daily, methocarbamol 500 mg po BID prn - filled 11/7/23 #60 (20 day supply), eliquis 5  mg po BID    Medical History   PAST MEDICAL HISTORY:   Past Medical History:   Diagnosis Date    Atrial fibrillation (H)     Cardiomyopathy (H)     ef 20%    CHF (congestive heart failure) (H)     Morbid obesity (H)     ROSALVA (obstructive sleep apnea)        PAST SURGICAL HISTORY:   Past Surgical History:   Procedure Laterality Date    CARDIAC CATHETERIZATION  08/10/2018    and right heart cath    CARDIOVERSION  2018    successful    CV CORONARY ANGIOGRAM N/A 8/10/2018    Procedure: Coronary Angiogram;  Surgeon: Ralph Powers MD;  Location: [facility] Cath Lab;  Service:     CV LEFT HEART CATHETERIZATION WITHOUT LEFT VENTRICULOGRAM Left 8/10/2018    Procedure: Left Heart Catheterization Without Left Ventriculogram;  Surgeon: Ralph Powers MD;  Location: [facility] Cath Lab;  Service:     CV RIGHT HEART CATHETERIZATION N/A 8/10/2018    Procedure: Right Heart Catheterization;  Surgeon: Ralph Powers MD;  Location: [facility] Cath Lab;  Service:        FAMILY HISTORY: History reviewed.     SOCIAL HISTORY:   Social History     Tobacco Use    Smoking status: Former     Types: Cigarettes     Quit date: 2005     Years since quittin.8    Smokeless tobacco: Never   Substance Use Topics    Alcohol use: No     Comment: Alcoholic Drinks/day: former alcoholic- quit         [employer]  Tobacco:  reports that he quit smoking about 18 years ago. He has never used smokeless tobacco.  Alcohol:  reports no history of alcohol use.  Illicit drugs:  reports no history of drug use.    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 04/21/2021     Priority: Medium    RLS (restless legs syndrome) 04/20/2021     Priority: Medium    Macrocytic anemia 10/13/2020     Priority: Medium    Atrial flutter (H) 10/08/2018     Priority: Medium    Chronic systolic congestive heart failure (H) 10/08/2018     Priority: Medium    Coronary artery disease of native artery of native heart with stable angina pectoris (H24) 08/15/2018     Priority: Medium    Ischemic cardiomyopathy 08/15/2018     Priority: Medium    Morbid obesity (H)      Priority: Medium    Supraventricular tachycardia 08/07/2018     Priority: Medium    Mixed hyperlipidemia 08/07/2018     Priority: Medium    ROSALVA on CPAP 04/03/2014     Priority: Medium       Prior to Admission Medications   (Not in a hospital admission)      Review of Systems  Complete ROS reviewed, unless noted in HPI, all other systems reviewed (with patient) and all others found to be negative.      Objective:     Physical Exam:  /71   Pulse 109   Temp 98.3  F (36.8  C) (Oral)   Resp 18   Ht 1.753 m (5' 9\")   Wt 133.8 kg (295 lb)   SpO2 97%   BMI 43.56 kg/m    Weight:   Vitals:    11/10/23 1212   Weight: 133.8 kg (295 lb)      Body mass index is 43.56 kg/m .    General Appearance:  Alert, cooperative, no distress, resting in bed   Head:  Normocephalic, without obvious abnormality, atraumatic   Eyes:  PERRL, conjunctiva/corneas clear, EOM's intact   ENT/Throat: Lips, mucosa, and tongue normal; teeth and gums normal   Lymph/Neck: Supple, symmetrical, trachea midline   Lungs:   Clear to auscultation bilaterally, respirations unlabored   Cardiovascular/Heart:  Regular rate and rhythm, S1, S2 normal,no murmur, rub or gallop.    Abdomen:   Soft, non-tender, bowel sounds active all four quadrants,  no masses, no " organomegaly   Musculoskeletal: Edema, erythema, open wounds noted, venous ulcers   Neurologic: Alert and oriented X 3, Moves all 4 extremities       Imaging: Reviewed I have personally reviewed pertinent labs, tests, and radiologic imaging in patient's chart.  No results found.    Labs: Reviewed I have personally reviewed pertinent labs, tests, and radiologic imaging in patient's chart.  Recent Results (from the past 24 hour(s))   Comprehensive metabolic panel    Collection Time: 11/10/23  1:33 PM   Result Value Ref Range    Sodium 138 135 - 145 mmol/L    Potassium 4.8 3.4 - 5.3 mmol/L    Carbon Dioxide (CO2) 25 22 - 29 mmol/L    Anion Gap 11 7 - 15 mmol/L    Urea Nitrogen 23.6 (H) 8.0 - 23.0 mg/dL    Creatinine 0.94 0.67 - 1.17 mg/dL    GFR Estimate 84 >60 mL/min/1.73m2    Calcium 9.3 8.8 - 10.2 mg/dL    Chloride 102 98 - 107 mmol/L    Glucose 87 70 - 99 mg/dL    Alkaline Phosphatase 130 (H) 40 - 129 U/L    AST 31 0 - 45 U/L    ALT 19 0 - 70 U/L    Protein Total 7.2 6.4 - 8.3 g/dL    Albumin 3.5 3.5 - 5.2 g/dL    Bilirubin Total 0.6 <=1.2 mg/dL   CRP inflammation    Collection Time: 11/10/23  1:33 PM   Result Value Ref Range    CRP Inflammation 68.70 (H) <5.00 mg/L   Erythrocyte sedimentation rate auto    Collection Time: 11/10/23  1:33 PM   Result Value Ref Range    Erythrocyte Sedimentation Rate 41 (H) 0 - 20 mm/hr   Nt probnp inpatient (BNP)    Collection Time: 11/10/23  1:33 PM   Result Value Ref Range    N terminal Pro BNP Inpatient 1,192 0 - 1,800 pg/mL   CBC with platelets and differential    Collection Time: 11/10/23  1:33 PM   Result Value Ref Range    WBC Count 6.6 4.0 - 11.0 10e3/uL    RBC Count 3.82 (L) 4.40 - 5.90 10e6/uL    Hemoglobin 12.7 (L) 13.3 - 17.7 g/dL    Hematocrit 39.0 (L) 40.0 - 53.0 %     (H) 78 - 100 fL    MCH 33.2 (H) 26.5 - 33.0 pg    MCHC 32.6 31.5 - 36.5 g/dL    RDW 14.4 10.0 - 15.0 %    Platelet Count 210 150 - 450 10e3/uL    % Neutrophils 78 %    % Lymphocytes 9 %    %  Monocytes 11 %    % Eosinophils 1 %    % Basophils 0 %    % Immature Granulocytes 1 %    NRBCs per 100 WBC 0 <1 /100    Absolute Neutrophils 5.2 1.6 - 8.3 10e3/uL    Absolute Lymphocytes 0.6 (L) 0.8 - 5.3 10e3/uL    Absolute Monocytes 0.7 0.0 - 1.3 10e3/uL    Absolute Eosinophils 0.0 0.0 - 0.7 10e3/uL    Absolute Basophils 0.0 0.0 - 0.2 10e3/uL    Absolute Immature Granulocytes 0.0 <=0.4 10e3/uL    Absolute NRBCs 0.0 10e3/uL   Troponin T, High Sensitivity    Collection Time: 11/10/23  1:33 PM   Result Value Ref Range    Troponin T, High Sensitivity 31 (H) <=22 ng/L   Procalcitonin    Collection Time: 11/10/23  1:33 PM   Result Value Ref Range    Procalcitonin 0.08 (H) <0.05 ng/mL   Magnesium    Collection Time: 11/10/23  1:33 PM   Result Value Ref Range    Magnesium 2.3 1.7 - 2.3 mg/dL   CK total    Collection Time: 11/10/23  1:33 PM   Result Value Ref Range     39 - 308 U/L       Total time spent 65 minutes with greater than 50% in consultation, education and coordination of care.   Also discussed with pharmacist.   Elements of Medical Decision Making as described above. High level of decision making required due to 1 or more chronic illness with severe exacerbation, progression, and side effects of treatment.  Acute or chronic illness or injury or surgery. High risk therapy including opioids, high risk drug therapy including oral and/or parenteral controlled substances    Thank you for this consultation.    CARLOTTA Rutledge  Acute Care Pain Management Program [facility]   Monday-Friday 8a-4p   Page via online paging system or [employer]

## 2023-11-10 NOTE — ED TRIAGE NOTES
Patient comes in by EMS from [facility] in [place]. Bilateral lower extremities are swollen, weeping and reddened.. Facility states they are unable to care for weeping in legs. Patient has not been sleeping ads well from the pain. Patient also has compression fx in back from previous fall which make mobility challenging.     Triage Assessment (Adult)       Row Name 11/10/23 1216          Triage Assessment    Airway WDL WDL        Respiratory WDL    Respiratory WDL WDL        Skin Circulation/Temperature WDL    Skin Circulation/Temperature WDL WDL        Cardiac WDL    Cardiac WDL WDL        Peripheral/Neurovascular WDL    Peripheral Neurovascular WDL WDL        Cognitive/Neuro/Behavioral WDL    Cognitive/Neuro/Behavioral WDL WDL        [place] Coma Scale    Best Eye Response 4-->(E4) spontaneous     Best Motor Response 6-->(M6) obeys commands     Best Verbal Response 5-->(V5) oriented     [place] Coma Scale Score 15

## 2023-11-10 NOTE — H&P
"[facility]    History and Physical - Hospitalist Service       Date of Admission:  11/10/2023    Assessment & Plan      Gorge Berkowitz Jr. is a 78 year old male admitted on 11/10/2023. He has a hx of systolic CHF, Afib, CAD, obesity, chronic edema, wheel chair bound for months in care center here with more pain in legs, weeping and open sores    1.Acute systolic HF/CAD  -wt is up, 10 pounds he thinks  -significant volume overload now  -iv lasix  -I and O  -tele  -check EKG, trop, echo  -cards to see    2.Severe venous stasis ulcers, ?cellulitis   -likely worse with edema and anasarca  -iv lasix  -erythema worse now  -check blood cx procal  -WOC to see  -cover with vanco and ancef now, but chance that this is just very severe venous stasis    3.Leg pain  -acute on chronic  -treat edema  -check doppler to be sure no clot, on doac so less risk, but with obesity make sure no clot    4.Acute on chronic pain syndrome  -treat above  -hx of complex pain  -on Butrans patch, will get pain team  -on scheduled tylenol  -on lidocaine patch(more for shoulder djd)  -on robaxin    5.ROSALVA  -has not been using CPAP of late some issues with it  -will see if RT here can get one set up for him  -concern non use of cpap not helping his CHF issues    6.hx of htn  -on metoprolol    7.hx of Afib/flutter  -Doac  -amiodarone  -check EKG now    8.Lipids  -on statin  -with leg pains and diffuse pains, will check add on ck    9.Code-full    10.DVT prevent-DOAC            Diet:  cardiac  DVT Prophylaxis: DOAC  Levy Catheter: Not present  Lines: None     Cardiac Monitoring: None  Code Status:  full    Clinically Significant Risk Factors Present on Admission               # Drug Induced Coagulation Defect: home medication list includes an anticoagulant medication         # Severe Obesity: Estimated body mass index is 43.56 kg/m  as calculated from the following:    Height as of this encounter: 1.753 m (5' 9\").    Weight " as of this encounter: 133.8 kg (295 lb).              Disposition Plan          Shanthi Saldaña MD  Hospitalist Service  [facility]  Securely message with [employer] (more info)  Text page via [employer] Paging/Directory     ______________________________________________________________________    Chief Complaint   Pain in legs, swelling weeping    History is obtained from the patient    History of Present Illness     Gorge Berkowitz Jr. is a 78 year old male admitted on 11/10/2023. He has a hx of systolic CHF, Afib, CAD, obesity, chronic edema, wheel chair bound for months in care center here with more pain in legs, weeping and open sores    He notes more weeping in legs and swelling over past 2 weeks and is not sleeping well due to pain.  Pain in both lower legs constant  He has been WC bound for months now  He is living in a care center  He thinks his wt is up 10 pounds  He is not sure if he is still taking his lasix or not  He denied any cp or sob at rest, once again cannot ambulate    He denied fever or chills  He feels he got some relief in ER with the iv morphine      Past Medical History    Past Medical History:   Diagnosis Date    Atrial fibrillation (H)     Cardiomyopathy (H)     ef 20%    CHF (congestive heart failure) (H)     Morbid obesity (H)     ROSALVA (obstructive sleep apnea)        Past Surgical History   Past Surgical History:   Procedure Laterality Date    CARDIAC CATHETERIZATION  08/10/2018    and right heart cath    CARDIOVERSION  08/16/2018    successful    CV CORONARY ANGIOGRAM N/A 8/10/2018    Procedure: Coronary Angiogram;  Surgeon: Ralph Powers MD;  Location: [facility] Cath Lab;  Service:     CV LEFT HEART CATHETERIZATION WITHOUT LEFT VENTRICULOGRAM Left 8/10/2018    Procedure: Left Heart Catheterization Without Left Ventriculogram;  Surgeon: Ralph Powers MD;  Location: [facility] Cath Lab;  Service:     CV RIGHT HEART CATHETERIZATION N/A 8/10/2018     Procedure: Right Heart Catheterization;  Surgeon: Ralph Powers MD;  Location: [facility] Cath Lab;  Service:        Prior to Admission Medications   Prior to Admission Medications   Prescriptions Last Dose Informant Patient Reported? Taking?   ELIQUIS 5 mg Tab tablet  Self No No   Sig: [ELIQUIS 5 MG TAB TABLET] Take 1 tablet (5 mg total) by mouth 2 (two) times a day.   Lidocaine (LIDOCARE) 4 % Patch   No No   Sig: Place 1 patch onto the skin every 24 hours Place onto the left shoulder.   To prevent lidocaine toxicity, patient should be patch free for 12 hrs daily.   acetaminophen (TYLENOL) 500 MG tablet  Self Yes No   Sig: [ACETAMINOPHEN (TYLENOL) 500 MG TABLET] Take 1,000 mg by mouth every 4 (four) hours as needed.   amiodarone (PACERONE) 200 MG tablet  Self No No   Sig: [AMIODARONE (PACERONE) 200 MG TABLET] Take 0.5 tablets (100 mg total) by mouth daily.   atorvastatin (LIPITOR) 40 MG tablet  Self No No   Sig: [ATORVASTATIN (LIPITOR) 40 MG TABLET] Take 1 tablet (40 mg total) by mouth at bedtime.   calcium carbonate-vitamin D (CALTRATE) 600-10 MG-MCG per tablet  Self Yes No   Sig: Take 1 tablet by mouth daily   ferrous sulfate (FE TABS) 325 (65 Fe) MG EC tablet  Self Yes No   Sig: Take 325 mg by mouth daily   furosemide (LASIX) 20 MG tablet  Self No No   Sig: [FUROSEMIDE (LASIX) 20 MG TABLET] take one tablet by mouth daily and take one tablet by mouth as needed at night for any swelling   Patient taking differently: Take 20 mg by mouth daily   glucosamine-chondroitin 500-400 mg cap  Self Yes No   Sig: [GLUCOSAMINE-CHONDROITIN 500-400 MG CAP] Take 1 capsule by mouth daily.    krill-om3-dha-epa-om6-lip-astx (KRILL OIL, OMEGA 3 AND 6,) 1,500-165-67.5 mg cap  Self Yes No   Sig: [KRILL-OM3-DHA-EPA-OM6-LIP-ASTX (KRILL OIL, OMEGA 3 AND 6,) 1,500-165-67.5 MG CAP] Take 3 capsules by mouth daily.    metoprolol succinate (TOPROL-XL) 50 MG 24 hr tablet  Self No No   Sig: [METOPROLOL SUCCINATE (TOPROL-XL) 50 MG 24  HR TABLET] Take 0.5 tablets (25 mg total) by mouth daily.   miconazole (MICATIN) 2 % external powder   No No   Sig: Apply topically 2 times daily To affected areas   polyethylene glycol (GLYCOLAX) 17 gram/dose powder  Self Yes No   Sig: Take 17 g by mouth as needed   pramipexole (MIRAPEX) 0.25 MG tablet  Self Yes No   Sig: Take 0.25 mg by mouth At Bedtime   senna-docusate (SENOKOT-S/PERICOLACE) 8.6-50 MG tablet   No No   Sig: Take 1 tablet by mouth 2 times daily   traMADol (ULTRAM) 50 MG tablet   No No   Sig: Take 1 tablet (50 mg) by mouth 2 times daily   vitamin B-12 (CYANOCOBALAMIN) 500 MCG tablet  Self Yes No   Sig: Take 500 mcg by mouth daily      Facility-Administered Medications: None        Review of Systems    CONSTITUTIONAL:  positive for  fatigue and wt gain 10 pounds  EYES:  negative  HEENT:  negative  RESPIRATORY:  negative  CARDIOVASCULAR:  negative  GASTROINTESTINAL:  negative  GENITOURINARY:  negative  INTEGUMENT/BREAST:  positive for skin lesion(s)  HEMATOLOGIC/LYMPHATIC:  negative  ALLERGIC/IMMUNOLOGIC:  negative  ENDOCRINE:  negative  MUSCULOSKELETAL:  positive for  pain in legs, edema  NEUROLOGICAL:  negative  BEHAVIOR/PSYCH:  negative    Social History   I have reviewed this patient's social history and updated it with pertinent information if needed.  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  Stopped smoking 17 years     Lives in care center      Family History     No DM      Allergies   No Known Allergies     Physical Exam   Vital Signs: Temp: 98.3  F (36.8  C) Temp src: Oral BP: 122/84 Pulse: 102   Resp: 20 SpO2: 100 % O2 Device: None (Room air)    Weight: 295 lbs 0 oz    Constitutional: awake, fatigued, alert,  cooperative, and no apparent distress  Eyes: sclera clear  ENT: normocepalic, without obvious abnormality  Respiratory: no increased work of breathing, good air exchange, no retractions, and diminished breath sounds right base and left base  Cardiovascular:irreg irregular rate and rhythm, normal S1 and S2, no S3 or S4, and no murmur noted  GI: normal bowel sounds, soft, non-distended, and non-tender  Skin: lower legs erythema in feet, shins, open wound, venous ulcers, tender calves and tender feet to knees, left knee well healed incision from TKA  Musculoskeletal: 3 + edema legs  Neurologic: Mental Status Exam:  Level of Alertness:   awake, speech fluent  Neuropsychiatric: General: normal, calm    Medical Decision Making       75 MINUTES SPENT BY ME on the date of service doing chart review, history, exam, documentation & further activities per the note.      Data     I have personally reviewed the following data over the past 24 hrs:    6.6  \   12.7 (L)   / 210     138 102 23.6 (H) /  87   4.8 25 0.94 \     ALT: 19 AST: 31 AP: 130 (H) TBILI: 0.6   ALB: 3.5 TOT PROTEIN: 7.2 LIPASE: N/A     Trop: N/A BNP: 1,192     Procal: N/A CRP: 68.70 (H) Lactic Acid: N/A         Imaging results reviewed over the past 24 hrs:   No results found for this or any previous visit (from the past 24 hour(s)).

## 2023-11-10 NOTE — CONSULTS
Care Management Initial Consult    General Information  Assessment completed with: Patient,    Type of CM/SW Visit: Initial Assessment    Primary Care Provider verified and updated as needed: Yes   Readmission within the last 30 days: no previous admission in last 30 days      Reason for Consult: discharge planning  Advance Care Planning:            Communication Assessment  Patient's communication style: spoken language (English or Bilingual)             Cognitive  Cognitive/Neuro/Behavioral: WDL                      Living Environment:   People in home: alone     Current living Arrangements:  (TCU)      Able to return to prior arrangements: yes       Family/Social Support:  Care provided by:  (TCU staff)  Provides care for: no one, unable/limited ability to care for self  Marital Status: Single  Sibling(s)          Description of Support System: Supportive, Involved    Support Assessment: Adequate family and caregiver support    Current Resources:   Patient receiving home care services: No     Community Resources: Skilled Nursing Facility  Equipment currently used at home:    Supplies currently used at home: Wound Care Supplies    Employment/Financial:  Employment Status: retired        Financial Concerns: none           Does the patient's insurance plan have a 3 day qualifying hospital stay waiver?  Yes     Which insurance plan 3 day waiver is available? Alternative insurance waiver    Will the waiver be used for post-acute placement? Yes    Lifestyle & Psychosocial Needs:  Social Determinants of Health     Food Insecurity: Not on file   Depression: Not on file   Housing Stability: Not on file   Tobacco Use: Medium Risk (11/10/2023)    Patient History     Smoking Tobacco Use: Former     Smokeless Tobacco Use: Never     Passive Exposure: Not on file   Financial Resource Strain: Not on file   Alcohol Use: Not on file   Transportation Needs: Not on file   Physical Activity: Not on file   Interpersonal Safety: Not on  file   Stress: Not on file   Social Connections: Not on file       Functional Status:  Prior to admission patient needed assistance:   Dependent ADLs:: Bathing, Dressing, Transfers, Wheelchair-with assist, Toileting  Dependent IADLs:: Cleaning, Laundry, Shopping, Transportation, Medication Management       Mental Health Status:  Mental Health Status: No Current Concerns       Chemical Dependency Status:  Chemical Dependency Status: No Current Concerns             Values/Beliefs:  Spiritual, Cultural Beliefs, Mosque Practices, Values that affect care: no               Additional Information:  CM met with patient in room to discuss discharge planning and complete initial assessment.     Patient came from [facility] and reports he has a bedhold. He states that he has been there since September and has not been able to walk since being there. He states he will need  Health transport and is agreeable to potential costs.     CM called TCU and confirmed bed hold.     CM submitted [facility] Auth. #[id number]    CM will continue to monitor progression of care, review team recommendations and provide discharge planning assist as needed.     Magdalene Pollock, REBAW

## 2023-11-11 ENCOUNTER — APPOINTMENT (OUTPATIENT)
Dept: CARDIOLOGY | Facility: HOSPITAL | Age: 78
DRG: 602 | End: 2023-11-11
Attending: INTERNAL MEDICINE
Payer: COMMERCIAL

## 2023-11-11 LAB
ANION GAP SERPL CALCULATED.3IONS-SCNC: 7 MMOL/L (ref 7–15)
BUN SERPL-MCNC: 20.2 MG/DL (ref 8–23)
CALCIUM SERPL-MCNC: 9.5 MG/DL (ref 8.8–10.2)
CHLORIDE SERPL-SCNC: 100 MMOL/L (ref 98–107)
CREAT SERPL-MCNC: 0.99 MG/DL (ref 0.67–1.17)
CRP SERPL-MCNC: 114.4 MG/L
DEPRECATED HCO3 PLAS-SCNC: 32 MMOL/L (ref 22–29)
EGFRCR SERPLBLD CKD-EPI 2021: 79 ML/MIN/1.73M2
ERYTHROCYTE [DISTWIDTH] IN BLOOD BY AUTOMATED COUNT: 14.3 % (ref 10–15)
GLUCOSE SERPL-MCNC: 87 MG/DL (ref 70–99)
HCT VFR BLD AUTO: 40.6 % (ref 40–53)
HGB BLD-MCNC: 13.1 G/DL (ref 13.3–17.7)
LVEF ECHO: NORMAL
MAGNESIUM SERPL-MCNC: 2.3 MG/DL (ref 1.7–2.3)
MCH RBC QN AUTO: 32.8 PG (ref 26.5–33)
MCHC RBC AUTO-ENTMCNC: 32.3 G/DL (ref 31.5–36.5)
MCV RBC AUTO: 102 FL (ref 78–100)
PLATELET # BLD AUTO: 195 10E3/UL (ref 150–450)
POTASSIUM SERPL-SCNC: 4.9 MMOL/L (ref 3.4–5.3)
RBC # BLD AUTO: 3.99 10E6/UL (ref 4.4–5.9)
SARS-COV-2 RNA RESP QL NAA+PROBE: NEGATIVE
SODIUM SERPL-SCNC: 139 MMOL/L (ref 135–145)
WBC # BLD AUTO: 5.9 10E3/UL (ref 4–11)

## 2023-11-11 PROCEDURE — 83735 ASSAY OF MAGNESIUM: CPT | Performed by: INTERNAL MEDICINE

## 2023-11-11 PROCEDURE — 87150 DNA/RNA AMPLIFIED PROBE: CPT | Performed by: HOSPITALIST

## 2023-11-11 PROCEDURE — 250N000013 HC RX MED GY IP 250 OP 250 PS 637: Performed by: INTERNAL MEDICINE

## 2023-11-11 PROCEDURE — 250N000011 HC RX IP 250 OP 636: Mod: JZ | Performed by: INTERNAL MEDICINE

## 2023-11-11 PROCEDURE — 210N000001 HC R&B IMCU HEART CARE

## 2023-11-11 PROCEDURE — 255N000002 HC RX 255 OP 636: Performed by: INTERNAL MEDICINE

## 2023-11-11 PROCEDURE — 86140 C-REACTIVE PROTEIN: CPT | Performed by: HOSPITALIST

## 2023-11-11 PROCEDURE — 99222 1ST HOSP IP/OBS MODERATE 55: CPT | Performed by: NURSE PRACTITIONER

## 2023-11-11 PROCEDURE — 80048 BASIC METABOLIC PNL TOTAL CA: CPT | Performed by: INTERNAL MEDICINE

## 2023-11-11 PROCEDURE — 250N000011 HC RX IP 250 OP 636: Mod: JZ | Performed by: HOSPITALIST

## 2023-11-11 PROCEDURE — 85027 COMPLETE CBC AUTOMATED: CPT | Performed by: INTERNAL MEDICINE

## 2023-11-11 PROCEDURE — 87077 CULTURE AEROBIC IDENTIFY: CPT | Performed by: HOSPITALIST

## 2023-11-11 PROCEDURE — 87635 SARS-COV-2 COVID-19 AMP PRB: CPT | Performed by: HOSPITALIST

## 2023-11-11 PROCEDURE — 999N000208 ECHOCARDIOGRAM COMPLETE

## 2023-11-11 PROCEDURE — 93306 TTE W/DOPPLER COMPLETE: CPT | Mod: 26 | Performed by: INTERNAL MEDICINE

## 2023-11-11 PROCEDURE — 99232 SBSQ HOSP IP/OBS MODERATE 35: CPT | Mod: 25 | Performed by: INTERNAL MEDICINE

## 2023-11-11 PROCEDURE — 250N000011 HC RX IP 250 OP 636: Performed by: INTERNAL MEDICINE

## 2023-11-11 PROCEDURE — 87205 SMEAR GRAM STAIN: CPT | Performed by: HOSPITALIST

## 2023-11-11 PROCEDURE — 258N000003 HC RX IP 258 OP 636: Performed by: INTERNAL MEDICINE

## 2023-11-11 PROCEDURE — 99232 SBSQ HOSP IP/OBS MODERATE 35: CPT | Performed by: HOSPITALIST

## 2023-11-11 PROCEDURE — 36415 COLL VENOUS BLD VENIPUNCTURE: CPT | Performed by: INTERNAL MEDICINE

## 2023-11-11 PROCEDURE — P9047 ALBUMIN (HUMAN), 25%, 50ML: HCPCS | Performed by: HOSPITALIST

## 2023-11-11 PROCEDURE — 999N000248 HC STATISTIC IV INSERT WITH US BY RN

## 2023-11-11 RX ORDER — PIPERACILLIN SODIUM, TAZOBACTAM SODIUM 3; .375 G/15ML; G/15ML
3.38 INJECTION, POWDER, LYOPHILIZED, FOR SOLUTION INTRAVENOUS EVERY 8 HOURS
Status: DISCONTINUED | OUTPATIENT
Start: 2023-11-11 | End: 2023-11-15

## 2023-11-11 RX ORDER — PIPERACILLIN SODIUM, TAZOBACTAM SODIUM 3; .375 G/15ML; G/15ML
3.38 INJECTION, POWDER, LYOPHILIZED, FOR SOLUTION INTRAVENOUS EVERY 8 HOURS
Status: DISCONTINUED | OUTPATIENT
Start: 2023-11-11 | End: 2023-11-11

## 2023-11-11 RX ORDER — FUROSEMIDE 10 MG/ML
20 INJECTION INTRAMUSCULAR; INTRAVENOUS EVERY 8 HOURS
Status: DISCONTINUED | OUTPATIENT
Start: 2023-11-11 | End: 2023-11-13

## 2023-11-11 RX ORDER — ALBUMIN (HUMAN) 12.5 G/50ML
25 SOLUTION INTRAVENOUS ONCE
Status: COMPLETED | OUTPATIENT
Start: 2023-11-11 | End: 2023-11-11

## 2023-11-11 RX ORDER — PIPERACILLIN SODIUM, TAZOBACTAM SODIUM 3; .375 G/15ML; G/15ML
3.38 INJECTION, POWDER, LYOPHILIZED, FOR SOLUTION INTRAVENOUS ONCE
Status: COMPLETED | OUTPATIENT
Start: 2023-11-11 | End: 2023-11-11

## 2023-11-11 RX ADMIN — CEFAZOLIN SODIUM 2 G: 2 INJECTION, SOLUTION INTRAVENOUS at 15:39

## 2023-11-11 RX ADMIN — APIXABAN 5 MG: 5 TABLET, FILM COATED ORAL at 20:30

## 2023-11-11 RX ADMIN — LIDOCAINE 1 PATCH: 4 PATCH TOPICAL at 07:59

## 2023-11-11 RX ADMIN — ACETAMINOPHEN 1000 MG: 500 TABLET ORAL at 08:00

## 2023-11-11 RX ADMIN — AMIODARONE HYDROCHLORIDE 100 MG: 100 TABLET ORAL at 15:50

## 2023-11-11 RX ADMIN — SENNOSIDES AND DOCUSATE SODIUM 1 TABLET: 8.6; 5 TABLET ORAL at 20:30

## 2023-11-11 RX ADMIN — FUROSEMIDE 40 MG: 10 INJECTION, SOLUTION INTRAMUSCULAR; INTRAVENOUS at 06:54

## 2023-11-11 RX ADMIN — METHOCARBAMOL 500 MG: 500 TABLET ORAL at 08:00

## 2023-11-11 RX ADMIN — ACETAMINOPHEN 1000 MG: 500 TABLET ORAL at 20:30

## 2023-11-11 RX ADMIN — FUROSEMIDE 40 MG: 10 INJECTION, SOLUTION INTRAMUSCULAR; INTRAVENOUS at 15:42

## 2023-11-11 RX ADMIN — CEFAZOLIN SODIUM 2 G: 2 INJECTION, SOLUTION INTRAVENOUS at 08:00

## 2023-11-11 RX ADMIN — GABAPENTIN 200 MG: 100 CAPSULE ORAL at 22:21

## 2023-11-11 RX ADMIN — PIPERACILLIN AND TAZOBACTAM 3.38 G: 3; .375 INJECTION, POWDER, LYOPHILIZED, FOR SOLUTION INTRAVENOUS at 22:21

## 2023-11-11 RX ADMIN — VANCOMYCIN HYDROCHLORIDE 1500 MG: 5 INJECTION, POWDER, LYOPHILIZED, FOR SOLUTION INTRAVENOUS at 12:25

## 2023-11-11 RX ADMIN — MICONAZOLE NITRATE: 20 POWDER TOPICAL at 20:30

## 2023-11-11 RX ADMIN — PIPERACILLIN AND TAZOBACTAM 3.38 G: 3; .375 INJECTION, POWDER, FOR SOLUTION INTRAVENOUS at 16:49

## 2023-11-11 RX ADMIN — PRAMIPEXOLE DIHYDROCHLORIDE 0.25 MG: 0.25 TABLET ORAL at 22:21

## 2023-11-11 RX ADMIN — Medication 500 MCG: at 08:00

## 2023-11-11 RX ADMIN — SENNOSIDES AND DOCUSATE SODIUM 1 TABLET: 8.6; 5 TABLET ORAL at 07:59

## 2023-11-11 RX ADMIN — FUROSEMIDE 20 MG: 10 INJECTION, SOLUTION INTRAMUSCULAR; INTRAVENOUS at 22:25

## 2023-11-11 RX ADMIN — ATORVASTATIN CALCIUM 40 MG: 40 TABLET, FILM COATED ORAL at 22:22

## 2023-11-11 RX ADMIN — ACETAMINOPHEN 1000 MG: 500 TABLET ORAL at 14:24

## 2023-11-11 RX ADMIN — PERFLUTREN 2.5 ML: 6.52 INJECTION, SUSPENSION INTRAVENOUS at 13:22

## 2023-11-11 RX ADMIN — ALBUMIN HUMAN 25 G: 0.25 SOLUTION INTRAVENOUS at 16:48

## 2023-11-11 RX ADMIN — APIXABAN 5 MG: 5 TABLET, FILM COATED ORAL at 07:59

## 2023-11-11 RX ADMIN — MICONAZOLE NITRATE: 20 POWDER TOPICAL at 08:01

## 2023-11-11 RX ADMIN — MICONAZOLE NITRATE: 20 POWDER TOPICAL at 13:43

## 2023-11-11 ASSESSMENT — ACTIVITIES OF DAILY LIVING (ADL)
ADLS_ACUITY_SCORE: 39
ADLS_ACUITY_SCORE: 35
ADLS_ACUITY_SCORE: 39
ADLS_ACUITY_SCORE: 35
ADLS_ACUITY_SCORE: 37
ADLS_ACUITY_SCORE: 35

## 2023-11-11 NOTE — PROVIDER NOTIFICATION
Text page to Dr. Beltran:   His blood pressure is 90/51. Do we want to hold his next lasix, at 1500? Has had 1,150mL output this shift and only 250mL input.

## 2023-11-11 NOTE — PLAN OF CARE
Problem: Adult Inpatient Plan of Care  Goal: Readiness for Transition of Care  Outcome: Progressing   Goal Outcome Evaluation:    Patient urinating often. Total of 1,800mL output, only 350mL input. BP soft, last was 96/63. IV vanco and ancef given. Legs swollen, scabs, and red. WOC to see, have not seen that patient yet. His groin is also very red, Miconazole powder applied. From 7812-0356 he was fairly lethargic, slept deeply. Alert and oriented x4.

## 2023-11-11 NOTE — PROGRESS NOTES
"[facility]  Cardiac Electrophysiology  [address]  [place] [address]   Office: [phone]  Fax: [phone]     Cardiology Progress Note    Patient: Gorge Berkowitz Jr.   : 1945       Assessment/Recommendations     Acute on chronic systolic heart failure related to ischemic cardiomyopathy - LVEF 40% by 2018 TTE  - continue lasix 40mg IV every 8hrs  - continue metoprolol XL 25mg daily  - follow-up TTE    CAD - known RCA   - continue metoprolol XL  - continue atorvastatin 40mg daily  - assess for adding aspirin 81mg daily    Atrial flutter - rate controlled  - continue metoprolol XL  - continue amiodarone 100mg daily  - continue apixaban 5mg twice daily    Cellulitis           Interval Events   Intake not recorded, output 3600, Cr 0.99 (0.94).  He notes generalized discomfort in bed.       Physical Examination  Review of Systems   VITALS: BP 96/71 (BP Location: Right arm)   Pulse 107   Temp 98  F (36.7  C) (Oral)   Resp 22   Ht 1.753 m (5' 9\")   Wt 117.5 kg (259 lb 0.7 oz)   SpO2 98%   BMI 38.25 kg/m    Wt Readings from Last 3 Encounters:   23 117.5 kg (259 lb 0.7 oz)   23 120.2 kg (265 lb)   10/18/22 129.3 kg (285 lb)       Intake/Output Summary (Last 24 hours) at 2023 0705  Last data filed at 2023 0521  Gross per 24 hour   Intake --   Output 3600 ml   Net -3600 ml     CONSTITUTIONAL: no distress  EYES:  Conjunctivae pink, sclerae clear.    E/N/T:  Oral mucosa pink, moist mucous membranes  RESPIRATORY:  Respiratory effort is normal  CARDIOVASCULAR:  normal S1 and S2, lower extremity edema  GASTROINTESTINAL:  Abdomen without masses or tenderness  EXTREMITIES:  No clubbing or cyanosis.    MUSCULOSKELETAL:  Overall grossly normal muscle strength  SKIN:  Overall, skin warm and dry, no lesions.  NEURO/PSYCH:  Oriented x 3 with normal affect.   Constitutional:  No weight loss or loss of appetite    Cardiovascular: As detailed " above  Respiratory: No cough  Genitourinary: No trouble urinating           Medical History  Surgical History   Past Medical History:   Diagnosis Date     Atrial fibrillation (H)      Cardiomyopathy (H)     ef 20%     CHF (congestive heart failure) (H)      Morbid obesity (H)      ROSALVA (obstructive sleep apnea)     Past Surgical History:   Procedure Laterality Date     CARDIAC CATHETERIZATION  08/10/2018    and right heart cath     CARDIOVERSION  2018    successful     CV CORONARY ANGIOGRAM N/A 8/10/2018    Procedure: Coronary Angiogram;  Surgeon: Ralph Powers MD;  Location: [facility] Cath Lab;  Service:      CV LEFT HEART CATHETERIZATION WITHOUT LEFT VENTRICULOGRAM Left 8/10/2018    Procedure: Left Heart Catheterization Without Left Ventriculogram;  Surgeon: Ralph Powers MD;  Location: [facility] Cath Lab;  Service:      CV RIGHT HEART CATHETERIZATION N/A 8/10/2018    Procedure: Right Heart Catheterization;  Surgeon: Ralph Powers MD;  Location: [facility] Cath Lab;  Service:          Family History Social History   History reviewed. No pertinent family history.     Social History     Tobacco Use     Smoking status: Former     Types: Cigarettes     Quit date: 2005     Years since quittin.8     Smokeless tobacco: Never   Substance Use Topics     Alcohol use: No     Comment: Alcoholic Drinks/day: former alcoholic- quit      Drug use: No         Medications  Allergies     Current Facility-Administered Medications:      acetaminophen (TYLENOL) tablet 1,000 mg, 1,000 mg, Oral, TID, Shanthi Saldaña MD, 1,000 mg at 11/10/23 2306     amiodarone (PACERONE) tablet 100 mg, 100 mg, Oral, Daily, Shanthi Saldaña MD, 100 mg at 11/10/23 154     apixaban ANTICOAGULANT (ELIQUIS) tablet 5 mg, 5 mg, Oral, BID, Shanthi Saldaña MD, 5 mg at 11/10/23 1942     atorvastatin (LIPITOR) tablet 40 mg, 40 mg, Oral, At Bedtime, Shanthi Saldaña MD, 40 mg at 11/10/23 2306      [START ON 11/15/2023] buprenorphine (BUTRANS) 10 MCG/HR WK patch 1 patch, 1 patch, Transdermal, Weekly, Shanthi Saldaña MD     [DISCONTINUED] buprenorphine (BUTRANS) 10 MCG/HR WK patch 1 patch, 1 patch, Transdermal, Weekly **AND** buprenorphine (BUTRANS) Patch in Place, , Transdermal, Q8H MARLENI, Shanthi Saldaña MD     ceFAZolin (ANCEF) 2 g in 100 mL D5W intermittent infusion, 2 g, Intravenous, Q8H, Shanthi Saldaña MD, Last Rate: 200 mL/hr at 11/10/23 2326, 2 g at 11/10/23 2326     furosemide (LASIX) injection 40 mg, 40 mg, Intravenous, Q8H, Shanthi Saldaña MD, 40 mg at 11/11/23 0654     gabapentin (NEURONTIN) capsule 200 mg, 200 mg, Oral, At Bedtime, Shanthi Saldaña MD, 200 mg at 11/10/23 2306     Lidocaine (LIDOCARE) 4 % Patch 1 patch, 1 patch, Transdermal, Q24H, Shanthi Saldaña MD     methocarbamol (ROBAXIN) tablet 500 mg, 500 mg, Oral, BID PRN, Shanthi Saldaña MD     metoprolol succinate ER (TOPROL XL) 24 hr tablet 25 mg, 25 mg, Oral, Daily, Shanthi Saldaña MD, 25 mg at 11/10/23 1548     miconazole (MICATIN) 2 % powder, , Topical, TID, Shanthi Saldaña MD     naloxone (NARCAN) injection 0.2 mg, 0.2 mg, Intravenous, Q2 Min PRN **OR** naloxone (NARCAN) injection 0.4 mg, 0.4 mg, Intravenous, Q2 Min PRN **OR** naloxone (NARCAN) injection 0.2 mg, 0.2 mg, Intramuscular, Q2 Min PRN **OR** naloxone (NARCAN) injection 0.4 mg, 0.4 mg, Intramuscular, Q2 Min PRN, Shanthi Saldaña MD     Patient is already receiving anticoagulation with heparin, enoxaparin (LOVENOX), warfarin (COUMADIN)  or other anticoagulant medication, , Does not apply, Continuous PRN, Shanthi Saldaña MD     polyethylene glycol (MIRALAX) powder 17 g, 17 g, Oral, Daily PRN, Shanthi Saldaña MD     pramipexole (MIRAPEX) tablet 0.25 mg, 0.25 mg, Oral, At Bedtime, Shanthi Saldaña MD, 0.25 mg at 11/10/23 2306     senna-docusate (SENOKOT-S/PERICOLACE) 8.6-50 MG per tablet 1 tablet, 1 tablet, Oral, BID, Piper  Shanthi MAHAJAN MD, 1 tablet at 11/10/23 1941     vancomycin (VANCOCIN) 1,250 mg in sodium chloride 0.9 % 250 mL intermittent infusion, 1,250 mg, Intravenous, Q18H, Shanthi Saldaña MD     vitamin B-12 (CYANOCOBALAMIN) tablet 500 mcg, 500 mcg, Oral, Daily, Shanthi Saldaña MD   No Known Allergies       Lab Results    Chemistry CBC Cardiac Enzymes/BNP/TSH/INR   Recent Labs   Lab Test 11/10/23  1333      POTASSIUM 4.8   CHLORIDE 102   CO2 25   GLC 87   BUN 23.6*   CR 0.94   GFRESTIMATED 84   LIZBET 9.3     Recent Labs   Lab Test 11/10/23  1333 10/11/23  0511 09/13/23  0441   CR 0.94 1.03 1.13          Recent Labs   Lab Test 11/10/23  1333   WBC 6.6   HGB 12.7*   HCT 39.0*   *        Recent Labs   Lab Test 11/10/23  1333 09/04/23  0405 10/18/22  1346   HGB 12.7* 13.9 12.5*    Recent Labs   Lab Test 08/08/18  0713 08/07/18  1349   TROPONINI 0.02 0.02     Recent Labs   Lab Test 11/10/23  1333 08/08/18  0713 08/07/18  1449   BNP  --  799* 469*   NTBNPI 1,192  --   --      Recent Labs   Lab Test 09/04/23  0601   TSH 0.70     Recent Labs   Lab Test 10/18/22  1346 08/07/18  1349   INR 1.25* 1.16*            Thelma Pennington MD  11/11/2023  7:05 AM

## 2023-11-11 NOTE — PROGRESS NOTES
"[facility] Hospitalist Progress Note  [facility]  Admission date: 11/10/2023    Summary:    78M with HFrEF, Afib, CAD, obesity, chronic edema, wheel chair bound for months in care center here with more pain in legs, weeping and open sores.     Assessment/Plan    #Acute on chronic systolic HF  -IV diuresis reduce dose and  albumin x 1 for soft BP in setting of brisk diuresis.  -ECHO pending.    #sore throat, congestion - check COVID     #bilateral venous stasis ulcers with purulent appearing drainage  #cellulitis  -zosyn+vanco in setting of rising CRP  -culture  -WOC    #acute on chronic pain syndrome with opiate dependence  -butrans patch, scheduled tylenol, lidocaine patch(more for shoulder djd), robaxin     #ROSALVA  -has not been using CPAP of late some issues with it  -will see if RT here can get one set up for him  -concern non use of cpap not helping his CHF issues    #hx of Afib/flutter  -amiodarone, lopressor, eliquis       Checklist:  Code Status: Full Code    Diet: Combination Diet Low Saturated Fat Na <2400mg Diet, No Caffeine Diet     DVT px:  DOAC    Disposition and Discharge Planning  Auto-populated from discharge tab:     Expected Discharge Date: 11/12/2023                 System Identified Risk Variables  Auto-populated based on system request - if needs to be addressed in treatment plan they will be addressed above:  \"  Clinically Significant Risk Factors Present on Admission               # Drug Induced Coagulation Defect: home medication list includes an anticoagulant medication         # Obesity: Estimated body mass index is 38.25 kg/m  as calculated from the following:    Height as of this encounter: 1.753 m (5' 9\").    Weight as of this encounter: 117.5 kg (259 lb 0.7 oz).       # Financial/Environmental Concerns: none         \"    Interval Events/Subjective/Notable results:    -3600.  Soft BP today.  Feels like no improvement in legs.  Worsenign over time    Reviewed: CBC, BMP, CRP " up, procal, trop, CXR, US, EKG, most recent ECHO 2018  Discussed with:    Objective    Vital signs in last 24 hours  Temp:  [97.7  F (36.5  C)-98.4  F (36.9  C)] 98  F (36.7  C)  Pulse:  [] 107  Resp:  [18-22] 22  BP: ()/(58-84) 96/71  SpO2:  [93 %-100 %] 98 % O2 Device: None (Room air)    Weight:   259 lbs .65 oz  Body mass index is 38.25 kg/m .    Intake/Output last 3 shifts  I/O last 3 completed shifts:  In: -   Out: 3600 [Urine:3600]    Physical Exam  General:  Alert, cooperative, no distress, generalized weakness, chronically ill appearing.    Neurologic:  oriented, facial symmetry preserved, fluent speech.   Psych: calm  HEENT:  Anicteric, MMM  CV: RRR no MRG, normal S1 and S2, no edema  Lungs: CTAB.  Easyrespirations  Abd: soft, obese, NT  Skin: venous stasis changes with 3 ulcers on L and 1-2 on R with drainage of purulent appearing material/lymphatic  Levy Catheter: Not present  Lines: None          Medical Decision Making               Yaya Beltran MD, [facility]  Internal Medicine Hospitalist

## 2023-11-11 NOTE — PROGRESS NOTES
Patient transferred to unit at approx 2200 from the ED 22.  External catheter in use and partially working.  Patient stated they could call if they need to urinate but does not.  Discussed the possibility of a castillo and patient stated they did not want that ever.   Skin in groin very red and powder applied.

## 2023-11-11 NOTE — PROGRESS NOTES
Pt declined CPAP. Pt states that he does not use one at home and does not want to use hospital machine.

## 2023-11-11 NOTE — ED NOTES
"Writer and RN provided estefanía care to patient. Pt stated that he tends to \"dribble\" when he urinates and the urge to urinate is very strong. Pt's pants were soaked in urine when responding to patient call light; pt denies any discomfort or irritation on and around estefanía area despite the presence of red, irritated skin; RN has been made aware. Bed linens were changed, absorbant pad under patient was replaced, and external male catheter was placed. Pt is resting comfortably with dinner tray, watching TV.   "

## 2023-11-12 LAB
ANION GAP SERPL CALCULATED.3IONS-SCNC: 12 MMOL/L (ref 7–15)
ATRIAL RATE - MUSE: 227 BPM
BUN SERPL-MCNC: 21.8 MG/DL (ref 8–23)
CALCIUM SERPL-MCNC: 9.3 MG/DL (ref 8.8–10.2)
CHLORIDE SERPL-SCNC: 99 MMOL/L (ref 98–107)
CREAT SERPL-MCNC: 0.99 MG/DL (ref 0.67–1.17)
CRP SERPL-MCNC: 103.9 MG/L
DEPRECATED HCO3 PLAS-SCNC: 27 MMOL/L (ref 22–29)
DIASTOLIC BLOOD PRESSURE - MUSE: 71 MMHG
EGFRCR SERPLBLD CKD-EPI 2021: 79 ML/MIN/1.73M2
GLUCOSE SERPL-MCNC: 82 MG/DL (ref 70–99)
HOLD SPECIMEN: NORMAL
INTERPRETATION ECG - MUSE: NORMAL
P AXIS - MUSE: 216 DEGREES
POTASSIUM SERPL-SCNC: 4.2 MMOL/L (ref 3.4–5.3)
PR INTERVAL - MUSE: NORMAL MS
QRS DURATION - MUSE: 110 MS
QT - MUSE: 372 MS
QTC - MUSE: 491 MS
R AXIS - MUSE: -9 DEGREES
SODIUM SERPL-SCNC: 138 MMOL/L (ref 135–145)
SYSTOLIC BLOOD PRESSURE - MUSE: 129 MMHG
T AXIS - MUSE: 25 DEGREES
VENTRICULAR RATE- MUSE: 105 BPM

## 2023-11-12 PROCEDURE — 86140 C-REACTIVE PROTEIN: CPT | Performed by: HOSPITALIST

## 2023-11-12 PROCEDURE — 80048 BASIC METABOLIC PNL TOTAL CA: CPT | Performed by: INTERNAL MEDICINE

## 2023-11-12 PROCEDURE — 99223 1ST HOSP IP/OBS HIGH 75: CPT | Performed by: INTERNAL MEDICINE

## 2023-11-12 PROCEDURE — 250N000011 HC RX IP 250 OP 636: Mod: JZ | Performed by: HOSPITALIST

## 2023-11-12 PROCEDURE — 99233 SBSQ HOSP IP/OBS HIGH 50: CPT | Performed by: HOSPITALIST

## 2023-11-12 PROCEDURE — 36415 COLL VENOUS BLD VENIPUNCTURE: CPT | Performed by: INTERNAL MEDICINE

## 2023-11-12 PROCEDURE — 250N000011 HC RX IP 250 OP 636: Performed by: INTERNAL MEDICINE

## 2023-11-12 PROCEDURE — 99232 SBSQ HOSP IP/OBS MODERATE 35: CPT | Performed by: INTERNAL MEDICINE

## 2023-11-12 PROCEDURE — 250N000013 HC RX MED GY IP 250 OP 250 PS 637: Performed by: INTERNAL MEDICINE

## 2023-11-12 PROCEDURE — 258N000003 HC RX IP 258 OP 636: Performed by: INTERNAL MEDICINE

## 2023-11-12 PROCEDURE — 210N000001 HC R&B IMCU HEART CARE

## 2023-11-12 RX ADMIN — PRAMIPEXOLE DIHYDROCHLORIDE 0.25 MG: 0.25 TABLET ORAL at 21:55

## 2023-11-12 RX ADMIN — APIXABAN 5 MG: 5 TABLET, FILM COATED ORAL at 08:33

## 2023-11-12 RX ADMIN — Medication 500 MCG: at 08:34

## 2023-11-12 RX ADMIN — VANCOMYCIN HYDROCHLORIDE 1500 MG: 5 INJECTION, POWDER, LYOPHILIZED, FOR SOLUTION INTRAVENOUS at 04:02

## 2023-11-12 RX ADMIN — ACETAMINOPHEN 1000 MG: 500 TABLET ORAL at 08:33

## 2023-11-12 RX ADMIN — LIDOCAINE 1 PATCH: 4 PATCH TOPICAL at 08:34

## 2023-11-12 RX ADMIN — PIPERACILLIN AND TAZOBACTAM 3.38 G: 3; .375 INJECTION, POWDER, LYOPHILIZED, FOR SOLUTION INTRAVENOUS at 14:57

## 2023-11-12 RX ADMIN — FUROSEMIDE 20 MG: 10 INJECTION, SOLUTION INTRAMUSCULAR; INTRAVENOUS at 06:12

## 2023-11-12 RX ADMIN — SENNOSIDES AND DOCUSATE SODIUM 1 TABLET: 8.6; 5 TABLET ORAL at 08:33

## 2023-11-12 RX ADMIN — FUROSEMIDE 20 MG: 10 INJECTION, SOLUTION INTRAMUSCULAR; INTRAVENOUS at 21:55

## 2023-11-12 RX ADMIN — METHOCARBAMOL 500 MG: 500 TABLET ORAL at 04:09

## 2023-11-12 RX ADMIN — SENNOSIDES AND DOCUSATE SODIUM 1 TABLET: 8.6; 5 TABLET ORAL at 20:38

## 2023-11-12 RX ADMIN — MICONAZOLE NITRATE: 20 POWDER TOPICAL at 20:39

## 2023-11-12 RX ADMIN — MICONAZOLE NITRATE: 20 POWDER TOPICAL at 14:57

## 2023-11-12 RX ADMIN — PIPERACILLIN AND TAZOBACTAM 3.38 G: 3; .375 INJECTION, POWDER, LYOPHILIZED, FOR SOLUTION INTRAVENOUS at 22:15

## 2023-11-12 RX ADMIN — METHOCARBAMOL 500 MG: 500 TABLET ORAL at 16:34

## 2023-11-12 RX ADMIN — ATORVASTATIN CALCIUM 40 MG: 40 TABLET, FILM COATED ORAL at 21:55

## 2023-11-12 RX ADMIN — APIXABAN 5 MG: 5 TABLET, FILM COATED ORAL at 20:38

## 2023-11-12 RX ADMIN — ACETAMINOPHEN 1000 MG: 500 TABLET ORAL at 20:38

## 2023-11-12 RX ADMIN — FUROSEMIDE 20 MG: 10 INJECTION, SOLUTION INTRAMUSCULAR; INTRAVENOUS at 14:57

## 2023-11-12 RX ADMIN — PIPERACILLIN AND TAZOBACTAM 3.38 G: 3; .375 INJECTION, POWDER, LYOPHILIZED, FOR SOLUTION INTRAVENOUS at 06:12

## 2023-11-12 RX ADMIN — ACETAMINOPHEN 1000 MG: 500 TABLET ORAL at 14:57

## 2023-11-12 RX ADMIN — MICONAZOLE NITRATE: 20 POWDER TOPICAL at 08:35

## 2023-11-12 RX ADMIN — AMIODARONE HYDROCHLORIDE 100 MG: 100 TABLET ORAL at 16:34

## 2023-11-12 RX ADMIN — POLYETHYLENE GLYCOL 3350 17 G: 17 POWDER, FOR SOLUTION ORAL at 08:34

## 2023-11-12 RX ADMIN — VANCOMYCIN HYDROCHLORIDE 1500 MG: 5 INJECTION, POWDER, LYOPHILIZED, FOR SOLUTION INTRAVENOUS at 21:55

## 2023-11-12 RX ADMIN — GABAPENTIN 200 MG: 100 CAPSULE ORAL at 21:55

## 2023-11-12 ASSESSMENT — ACTIVITIES OF DAILY LIVING (ADL)
ADLS_ACUITY_SCORE: 37
ADLS_ACUITY_SCORE: 45
ADLS_ACUITY_SCORE: 37

## 2023-11-12 NOTE — PLAN OF CARE
Problem: Infection  Goal: Absence of Infection Signs and Symptoms  Outcome: Progressing   BP soft. Albumin given x1. Denies dizziness nor lightheadedness. IV abx infusing as ordered.  Wound cultures sent. Wound bed weeping, red and tender.

## 2023-11-12 NOTE — PROGRESS NOTES
Care Management Follow Up    Length of Stay (days): 2    Expected Discharge Date: 11/15/2023     Concerns to be Addressed:  remains on IV ABX and IV lasix     Patient plan of care discussed at interdisciplinary rounds: Yes    Anticipated Discharge Disposition:       Anticipated Discharge Services:    Anticipated Discharge DME:      Patient/family educated on Medicare website which has current facility and service quality ratings:    Education Provided on the Discharge Plan:    Patient/Family in Agreement with the Plan:      Referrals Placed by CM/SW:    Private pay costs discussed: Not applicable    Additional Information:  Social History per previous CM note:  Patient came from [facility] at [facility] and reports he has a bedhold. CM did confirm the bedhold. He states that he has been there since September and has not been able to walk since being there. He states he will need  Health transport and is agreeable to potential costs.      [employer] Auth. #[id number]     Pt not medically ready. Remains on IV ABX and IV lasix.    Pinky Santana RN

## 2023-11-12 NOTE — CONSULTS
[facility]    Infectious Disease Consultation     Date of Admission:  11/10/2023  Date of Consult (When I saw the patient): 11/12/23    Assessment & Plan   Gorge Berkowitz Jr. is a 78 year old male who was admitted on 11/10/2023.     Impression:  Bilateral venous stasis ulcers, cellulitis, skin and soft tissue infection, polymicrobial infection--Serratia, Enterococcus  Heart failure with reduced ejection fraction, atrial fibrillation, coronary artery disease, obesity, chronic edema  Wheelchair-bound for several months  Admitted with leg pain weeping open sores  Volume overload/CHF exacerbation        Susceptibility data from last 90 days.  Collected Specimen Info Organism   11/11/23 Wound from Leg, Right Enterococcus faecalis     Serratia marcescens   11/11/23 Wound from Leg, left Enterococcus faecalis     Serratia marcescens      Recommendations    Vancomycin Zosyn  Follow culture results  Wound care, bullous lesions  Monitor CBC, CMP  Anticipate switching to oral antibiotics based on final culture results  Updated patient and patient's nurse  Thank you for consulting infectious disease.  Appreciate input from hospitalist.  ID will follow    Abigail Viramontes MD  [facility]  Answering Service: [phone]  On-Call ID provider: [phone], option: 9      Reason for Consult   Reason for consult: I was asked to evaluate this patient for     venous stasis ulcers, gram negative cellulitis   .    Primary Care Physician   Lisseth Scott    Chief Complaint   Leg edema, weeping legs, weakness    History is obtained from the patient and medical records    History of Present Illness   Gorge Berkowitz Jr. is a 78 year old male who presents with history of heart failure with reduced ejection fraction, chronic edema, wheelchair-bound, presented with venous stasis ulcers with purulent appearing drainage, elevated markers, edema and weeping sores  Improved today  Discussed  with hospitalist, patient and patient's nurse  No fevers or chills    Past Medical History   I have reviewed this patient's medical history and updated it with pertinent information if needed.   Past Medical History:   Diagnosis Date    Atrial fibrillation (H)     Cardiomyopathy (H)     ef 20%    CHF (congestive heart failure) (H)     Morbid obesity (H)     ROSALVA (obstructive sleep apnea)        Past Surgical History   I have reviewed this patient's surgical history and updated it with pertinent information if needed.  Past Surgical History:   Procedure Laterality Date    CARDIAC CATHETERIZATION  08/10/2018    and right heart cath    CARDIOVERSION  08/16/2018    successful    CV CORONARY ANGIOGRAM N/A 8/10/2018    Procedure: Coronary Angiogram;  Surgeon: Ralph Powers MD;  Location: [facility];  Service:     CV LEFT HEART CATHETERIZATION WITHOUT LEFT VENTRICULOGRAM Left 8/10/2018    Procedure: Left Heart Catheterization Without Left Ventriculogram;  Surgeon: Ralph Powers MD;  Location: [facility];  Service:     CV RIGHT HEART CATHETERIZATION N/A 8/10/2018    Procedure: Right Heart Catheterization;  Surgeon: Ralph Powers MD;  Location: [facility];  Service:        Prior to Admission Medications   Prior to Admission Medications   Prescriptions Last Dose Informant Patient Reported? Taking?   ELIQUIS 5 mg Tab tablet 11/10/2023 at am Self No Yes   Sig: [ELIQUIS 5 MG TAB TABLET] Take 1 tablet (5 mg total) by mouth 2 (two) times a day.   Patient taking differently: Take 5 mg by mouth 2 times daily   Krill Oil Ultra Strength 1500 MG CAPS 11/9/2023 at pm  Yes Yes   Sig: Take 3 capsules by mouth every evening   Lidocaine (LIDOCARE) 4 % Patch 11/10/2023 at am  No Yes   Sig: Place 1 patch onto the skin every 24 hours Place onto the left shoulder.   To prevent lidocaine toxicity, patient should be patch free for 12 hrs daily.   acetaminophen (TYLENOL) 325 MG tablet Past  Week at prn  Yes Yes   Sig: Take 650 mg by mouth every 4 hours as needed (mild pain)   acetaminophen (TYLENOL) 500 MG tablet 11/10/2023 at am Self Yes Yes   Sig: Take 1,000 mg by mouth 3 times daily   amiodarone (PACERONE) 200 MG tablet 11/9/2023 at 1600 Self No Yes   Sig: [AMIODARONE (PACERONE) 200 MG TABLET] Take 0.5 tablets (100 mg total) by mouth daily.   atorvastatin (LIPITOR) 40 MG tablet 11/9/2023 at hs Self No Yes   Sig: [ATORVASTATIN (LIPITOR) 40 MG TABLET] Take 1 tablet (40 mg total) by mouth at bedtime.   buprenorphine (BUTRANS) 10 MCG/HR WK patch 11/8/2023 at am  Yes Yes   Sig: Place 1 patch onto the skin every 7 days Wednesdays   calcium carbonate-vitamin D (CALTRATE) 600-10 MG-MCG per tablet 11/10/2023 at am Self Yes Yes   Sig: Take 1 tablet by mouth daily   ferrous sulfate (FE TABS) 325 (65 Fe) MG EC tablet 11/9/2023 at am Self Yes Yes   Sig: Take 325 mg by mouth daily   furosemide (LASIX) 20 MG tablet 11/10/2023 at am Self No Yes   Sig: [FUROSEMIDE (LASIX) 20 MG TABLET] take one tablet by mouth daily and take one tablet by mouth as needed at night for any swelling   Patient taking differently: Take 20 mg by mouth daily   glucosamine-chondroitin 500-400 MG CAPS per capsule 11/10/2023 at am  Yes Yes   Sig: Take 1 capsule by mouth daily   melatonin 3 MG tablet 11/9/2023 at hs  Yes Yes   Sig: Take 3 mg by mouth at bedtime   methocarbamol (ROBAXIN) 500 MG tablet 11/10/2023 at am  Yes Yes   Sig: Take 500 mg by mouth 2 times daily as needed for muscle spasms   metoprolol succinate (TOPROL-XL) 50 MG 24 hr tablet 11/9/2023 at 1600 Self No Yes   Sig: [METOPROLOL SUCCINATE (TOPROL-XL) 50 MG 24 HR TABLET] Take 0.5 tablets (25 mg total) by mouth daily.   miconazole (MICATIN) 2 % external powder Unknown at prn  No Yes   Sig: Apply topically 2 times daily To affected areas   polyethylene glycol (GLYCOLAX) 17 gram/dose powder Past Week at prn Self Yes Yes   Sig: Take 17 g by mouth daily as needed for constipation    pramipexole (MIRAPEX) 0.25 MG tablet 11/9/2023 at hs Self Yes Yes   Sig: Take 0.25 mg by mouth At Bedtime   senna-docusate (SENOKOT-S/PERICOLACE) 8.6-50 MG tablet 11/10/2023 at am  No Yes   Sig: Take 1 tablet by mouth 2 times daily   vitamin B-12 (CYANOCOBALAMIN) 500 MCG tablet 11/10/2023 at am Self Yes Yes   Sig: Take 500 mcg by mouth daily      Facility-Administered Medications: None     Allergies   No Known Allergies    Immunization History   Immunization History   Administered Date(s) Administered    COVID-19 Bivalent 12+ (Pfizer) 12/14/2022, 06/20/2023    COVID-19 MONOVALENT 12+ (Pfizer) 03/23/2021, 04/14/2021, 11/05/2021    COVID-19 Monovalent 12+ (Pfizer 2022) 04/10/2022    Flu 65+ Years 11/16/2017, 09/06/2018, 10/25/2019    Influenza (IIV3) PF 01/02/2013    Influenza Vaccine 65+ (Fluzone HD) 09/16/2020    Pneumo Conj 13-V (2010&after) 01/02/2013    Pneumococcal 23 valent 11/16/2017    TDAP (Adacel,Boostrix) 03/23/2012    Zoster recombinant adjuvanted (SHINGRIX) 09/06/2018, 02/14/2019    Zoster vaccine, live 12/20/2012       Social History   I have reviewed this patient's social history and updated it with pertinent information if needed. Gorge Berkowitz Jr.  reports that he quit smoking about 18 years ago. He has never used smokeless tobacco. He reports that he does not drink alcohol and does not use drugs.    Family History   I have reviewed this patient's family history and updated it with pertinent information if needed.   History reviewed. No pertinent family history.    Review of Systems   The 10 point Review of Systems is negative other than noted in the HPI or here.     Physical Exam   Temp: 98  F (36.7  C) Temp src: Oral BP: 108/66 Pulse: 84   Resp: 20 SpO2: 99 % O2 Device: None (Room air)    Vital Signs with Ranges  Temp:  [97.7  F (36.5  C)-98.3  F (36.8  C)] 98  F (36.7  C)  Pulse:  [65-87] 84  Resp:  [16-22] 20  BP: ()/(50-66) 108/66  SpO2:  [94 %-99 %] 99 %  260 lbs 2.28 oz  Body mass  index is 38.42 kg/m .    GENERAL APPEARANCE:  awake, NAD  EYES: Eyes grossly normal to inspection, conjunctivae and sclerae normal  HENT: mouth without ulcers or lesions  NECK: supple  RESP: Shortness of breath at rest  CV: Edema  LYMPHATICS: swelling  ABDOMEN: soft, nontender, nondistended  MS: Lymphedema  SKIN: Venous stasis, ulceration, bullous lesions  NEURO: coherent, deconditioned, wheelchair-bound      LABORATORY DATA:  Reviewed    CBC RESULTS:   Recent Labs   Lab Test 11/11/23  0642   WBC 5.9   RBC 3.99*   HGB 13.1*   HCT 40.6   *   MCH 32.8   MCHC 32.3   RDW 14.3           Last Comprehensive Metabolic Panel:  Sodium   Date Value Ref Range Status   11/12/2023 138 135 - 145 mmol/L Final     Comment:     Reference intervals for this test were updated on 09/26/2023 to more accurately reflect our healthy population. There may be differences in the flagging of prior results with similar values performed with this method. Interpretation of those prior results can be made in the context of the updated reference intervals.      Potassium   Date Value Ref Range Status   11/12/2023 4.2 3.4 - 5.3 mmol/L Final   11/02/2020 4.6 3.5 - 5.0 mmol/L Final     Chloride   Date Value Ref Range Status   11/12/2023 99 98 - 107 mmol/L Final   11/02/2020 105 98 - 107 mmol/L Final     Carbon Dioxide (CO2)   Date Value Ref Range Status   11/12/2023 27 22 - 29 mmol/L Final   11/02/2020 26 22 - 31 mmol/L Final     Anion Gap   Date Value Ref Range Status   11/12/2023 12 7 - 15 mmol/L Final   11/02/2020 8 5 - 18 mmol/L Final     Glucose   Date Value Ref Range Status   11/12/2023 82 70 - 99 mg/dL Final   11/02/2020 96 70 - 125 mg/dL Final     Urea Nitrogen   Date Value Ref Range Status   11/12/2023 21.8 8.0 - 23.0 mg/dL Final   11/02/2020 21 8 - 28 mg/dL Final     Creatinine   Date Value Ref Range Status   11/12/2023 0.99 0.67 - 1.17 mg/dL Final     GFR Estimate   Date Value Ref Range Status   11/12/2023 79 >60 mL/min/1.73m2  "Final   2020 >60 >60 mL/min/1.73m2 Final     Calcium   Date Value Ref Range Status   2023 9.3 8.8 - 10.2 mg/dL Final     Bilirubin Total   Date Value Ref Range Status   11/10/2023 0.6 <=1.2 mg/dL Final     Alkaline Phosphatase   Date Value Ref Range Status   11/10/2023 130 (H) 40 - 129 U/L Final     ALT   Date Value Ref Range Status   11/10/2023 19 0 - 70 U/L Final     Comment:     Reference intervals for this test were updated on 2023 to more accurately reflect our healthy population. There may be differences in the flagging of prior results with similar values performed with this method. Interpretation of those prior results can be made in the context of the updated reference intervals.       AST   Date Value Ref Range Status   11/10/2023 31 0 - 45 U/L Final     Comment:     Specimen is hemolyzed which can falsely elevate AST. Analysis of a non-hemolyzed specimen may result in a lower value.  Reference intervals for this test were updated on 2023 to more accurately reflect our healthy population. There may be differences in the flagging of prior results with similar values performed with this method. Interpretation of those prior results can be made in the context of the updated reference intervals.             No results found for: \"CRP\"         MICROBIOLOGY:    Reviewed    Blood cultures  Other Micro:  Susceptibility data from last 90 days.  Collected Specimen Info Organism   23 Wound from Leg, Right Enterococcus faecalis     Serratia marcescens   23 Wound from Leg, left Enterococcus faecalis     Serratia marcescens      RADIOLOGY:    Echocardiogram Complete    Result Date: 2023  [id number]^DOMINGO^CR[place]ST[place] [address]  Name: JOSEBRADLEY JR. MRN: [id number] : 1945 Study Date: 2023 10:19 AM Age: 78 yrs Gender: Male Patient Location: [facility] Reason For Study: CHF Ordering Physician: " CRYSTAL LANE Performed By: ABDIEL  BSA: 2.3 m2 Height: 69 in Weight: 259 lb HR: 57 BP: 115/62 mmHg ______________________________________________________________________________ Procedure Complete Portable Echo Adult. Definity (NDC #84713-996) given intravenously. Lot [id number], Exp 1 Nov 24. ______________________________________________________________________________ Interpretation Summary  Left ventricular function is decreased. The ejection fraction is 45-50% (mildly reduced). There is mild septal hypokinesis. There is mild to moderate inferior wall hypokinesis. The right ventricle is mildly dilated. Mildly decreased right ventricular systolic function Ascending Aorta dilatation is present. No hemodynamically significant valvular abnormalities on 2D or color flow imaging. ______________________________________________________________________________ Left Ventricle The left ventricle is normal in size. Left ventricular function is decreased. The ejection fraction is 45-50% (mildly reduced). There is normal left ventricular wall thickness. Diastolic function not assessed due to atrial fibrillation. There is mild septal hypokinesis. There is mild to moderate inferior wall hypokinesis.  Right Ventricle The right ventricle is mildly dilated. Mildly decreased right ventricular systolic function. TAPSE is normal, which is consistent with normal right ventricular systolic function.  Atria The left atrium is moderate to severely dilated. The right atrium is moderately dilated. There is no color Doppler evidence of an atrial shunt.  Mitral Valve Mitral valve leaflets appear normal. There is no evidence of mitral stenosis or clinically significant mitral regurgitation. There is trace to mild mitral regurgitation.  Tricuspid Valve The tricuspid valve is not well visualized, but is grossly normal. There is trace tricuspid regurgitation. Right ventricle systolic pressure estimate normal.  Aortic Valve Aortic valve leaflets  appear normal. There is no evidence of aortic stenosis or clinically significant aortic regurgitation.  Pulmonic Valve The pulmonic valve is not well seen, but is grossly normal.  Vessels The aorta root is normal. Ascending Aorta dilatation is present. Inferior vena cava not well visualized for estimation of right atrial pressure.  Pericardium There is no pericardial effusion.  Rhythm The rhythm was atrial fibrillation. ______________________________________________________________________________ MMode/2D Measurements & Calculations  IVSd: 1.1 cm LVIDd: 5.2 cm LVIDs: 4.3 cm LVPWd: 1.1 cm FS: 16.7 % LV mass(C)d: 206.6 grams LV mass(C)dI: 89.6 grams/m2 Ao root diam: 3.6 cm LA dimension: 5.1 cm asc Aorta Diam: 4.4 cm LA/Ao: 1.4 LVOT diam: 2.2 cm LVOT area: 3.8 cm2 Ao root diam index Ht(cm/m): 2.1 Ao root diam index BSA (cm/m2): 1.6 Asc Ao diam index BSA (cm/m2): 1.9 Asc Ao diam index Ht(cm/m): 2.5 LA Volume (BP): 82.4 ml  LA Volume Index (BP): 35.7 ml/m2 LA Volume Indexed (AL/bp): 38.0 ml/m2 RV Base: 5.1 cm RWT: 0.41 TAPSE: 1.9 cm  Time Measurements MM HR: 57.0 BPM  Doppler Measurements & Calculations MV E max bolivar: 100.5 cm/sec MV A max bolivar: 75.5 cm/sec MV E/A: 1.3 MV dec time: 0.23 sec Ao V2 max: 152.2 cm/sec Ao max P.0 mmHg Ao V2 mean: 117.0 cm/sec Ao mean P.0 mmHg Ao V2 VTI: 31.9 cm JUAN(I,D): 2.1 cm2 JUAN(V,D): 2.4 cm2 LV V1 max PG: 3.8 mmHg LV V1 max: 96.3 cm/sec LV V1 VTI: 17.3 cm SV(LVOT): 65.7 ml SI(LVOT): 28.5 ml/m2 PA acc time: 0.11 sec TR max bolivar: 281.0 cm/sec TR max P.6 mmHg AV Bolivar Ratio (DI): 0.63 JUAN Index (cm2/m2): 0.89 E/E': 6.2 E/E' av.5 Lateral E/e': 6.2 Medial E/e': 8.8  Peak E' Bolivar: 16.1 cm/sec RV S Bolivar: 13.7 cm/sec  ______________________________________________________________________________ Report approved by: Dharmesh Shine 2023 08:10 AM        Lower Extremity Venous Duplex Bilateral    Result Date: 11/10/2023  EXAM: US LOWER EXTREMITY VENOUS DUPLEX BILATERAL  LOCATION: [facility] DATE: 11/10/2023 INDICATION: Swelling. On DOAC pain. Make sure no clots. COMPARISON: Ultrasound venous bilateral lower extremity Doppler 08/07/2018. TECHNIQUE: Venous Duplex ultrasound of bilateral lower extremities with and without compression, augmentation and duplex. Color flow and spectral Doppler with waveform analysis performed. FINDINGS: Exam includes the common femoral, femoral, popliteal veins as well as segmentally visualized deep calf veins and greater saphenous vein. RIGHT: No deep vein thrombosis. No superficial thrombophlebitis. No popliteal cyst. Diffuse soft tissue swelling right calf. No significant interval change. LEFT: No deep vein thrombosis. No superficial thrombophlebitis. No popliteal cyst. Diffuse soft tissue swelling left calf. No significant interval change.     IMPRESSION: 1.  No deep venous thrombosis in the bilateral lower extremities. 2.  No popliteal cyst on either side.    XR Chest Port 1 View    Result Date: 11/10/2023  EXAM: XR CHEST PORT 1 VIEW LOCATION: [facility] DATE: 11/10/2023 INDICATION: Anasarca and edema. COMPARISON: Chest x-ray 8/7/2018.     IMPRESSION: Stable cardiomegaly. Bilateral mild vascular congestion noted. Mild pulmonary edema pattern. Stable mildly elevated right hemidiaphragm.

## 2023-11-12 NOTE — PROGRESS NOTES
[facility] Hospitalist Progress Note  [facility]  Admission date: 11/10/2023    Summary:    78M with HFrEF, Afib, CAD, obesity, chronic edema, wheel chair bound for months in care center here with more pain in legs, weeping and open sores.     Volume overload consistent with acute on chronic HFmEF and initiated on IV diuresis.    Also with bilateral venous stasis ulcers with purulent appearing drainage and elevated inflammatory markers.   Initially started on vanco/ancef and broadened to zosyn with rising CRP and persistent cellulitis.    Assessment/Plan    #Acute on chronic HFmEF  -IV diuresis reduced dose and  albumin x 1 for soft BP in setting of brisk diuresis.  -ECHO with stable EF.  Newly reported inferior WMA.  pending.    #newly noted inferior WMA - ?if truly new given known chronic RCA occlusion.  Defer to cards if further ischemic evaluation needed.    #bilateral venous stasis ulcers with purulent appearing drainage  #cellulitis  -zosyn+vanco in setting of rising CRP.   -now growing GNB and GPB on culture  -WOC  -needs outpatient wound care.    #sore throat, congestion - COVID negative     #acute on chronic pain syndrome with opiate dependence  -butrans patch, scheduled tylenol, lidocaine patch(more for shoulder djd), robaxin     #ROSALVA  -has not been using CPAP of late some issues with it  -will see if RT here can get one set up for him  -concern non use of cpap not helping his CHF issues    #hx of Afib/flutter  -amiodarone, lopressor, eliquis       Checklist:  Code Status: Full Code    Diet: Combination Diet Low Saturated Fat Na <2400mg Diet, No Caffeine Diet     DVT px:  DOAC    Disposition and Discharge Planning  Auto-populated from discharge tab:     Expected Discharge Date: 11/13/2023        Discharge Comments: IV abx  back to care facility         System Identified Risk Variables  Auto-populated based on system request - if needs to be addressed in treatment plan they will be addressed  "above:  \"  Clinically Significant Risk Factors                         # Obesity: Estimated body mass index is 38.42 kg/m  as calculated from the following:    Height as of this encounter: 1.753 m (5' 9\").    Weight as of this encounter: 118 kg (260 lb 2.3 oz)., PRESENT ON ADMISSION       # Financial/Environmental Concerns: none         \"    Interval Events/Subjective/Notable results:    -3400.  Legs look quite a bit less erythematous today    Reviewed: BMP, CRP plataued and hopefully trending down, COVID negative, ECHO EF stable with new IMI, GNB and GPB on culture    Objective    Vital signs in last 24 hours  Temp:  [97  F (36.1  C)-98.3  F (36.8  C)] 98.2  F (36.8  C)  Pulse:  [64-87] 75  Resp:  [16-22] 20  BP: ()/(50-63) 109/63  SpO2:  [94 %-99 %] 99 % O2 Device: None (Room air)    Weight:   260 lbs 2.28 oz  Body mass index is 38.42 kg/m .    Intake/Output last 3 shifts  I/O last 3 completed shifts:  In: 470 [P.O.:470]  Out: 3900 [Urine:3900]    Physical Exam  General:  Alert, cooperative, no distress, generalized weakness, chronically ill appearing.    Neurologic:  oriented, facial symmetry preserved, fluent speech.   Psych: calm  HEENT:  Anicteric, MMM  CV: RRR no MRG, normal S1 and S2, no edema  Lungs: CTAB.  Easyrespirations  Abd: soft, obese, NT  Skin: venous stasis changes with 3 ulcers on L and 1-2 on R with drainage of purulent appearing material/lymphatic.  Quite a bit less erythematous today.  Levy Catheter: Not present  Lines: None          Medical Decision Making               Yaya Beltran MD, [facility]  Internal Medicine Hospitalist    "

## 2023-11-12 NOTE — PROGRESS NOTES
"[facility]  Cardiac Electrophysiology  [address]  [place] [address]   Office: [phone]  Fax: [phone]     Cardiology Progress Note    Patient: Gorge Berkowitz Jr.   : 1945       Assessment/Recommendations     Acute on chronic systolic heart failure related to ischemic cardiomyopathy - LVEF 40% by 2018 TTE  - continue lasix 40mg IV every 8hrs  - continue metoprolol XL 25mg daily  - follow-up TTE     CAD - known RCA   - continue metoprolol XL  - continue atorvastatin 40mg daily  - assess for adding aspirin 81mg daily     Atrial flutter - rate controlled  - continue metoprolol XL  - continue amiodarone 100mg daily  - continue apixaban 5mg twice daily     Cellulitis         Interval Events   I/O 470/3900, -3.4L, -7.0L since admission, Cr 0.99 (0.99).  He notes generalized discomfort in bed.       Physical Examination  Review of Systems   VITALS: /63 (BP Location: Right arm)   Pulse 75   Temp 98.2  F (36.8  C) (Oral)   Resp 20   Ht 1.753 m (5' 9\")   Wt 118 kg (260 lb 2.3 oz)   SpO2 99%   BMI 38.42 kg/m    Wt Readings from Last 3 Encounters:   23 118 kg (260 lb 2.3 oz)   23 120.2 kg (265 lb)   10/18/22 129.3 kg (285 lb)       Intake/Output Summary (Last 24 hours) at 2023 0654  Last data filed at 2023 0424  Gross per 24 hour   Intake 470 ml   Output 3900 ml   Net -3430 ml     CONSTITUTIONAL: no distress  EYES:  Conjunctivae pink, sclerae clear.    E/N/T:  Oral mucosa pink, moist mucous membranes  RESPIRATORY:  Respiratory effort is normal  CARDIOVASCULAR:  normal S1 and S2, lower extremity edema  GASTROINTESTINAL:  Abdomen without masses or tenderness  EXTREMITIES:  No clubbing or cyanosis.    MUSCULOSKELETAL:  Overall grossly normal muscle strength  SKIN:  Lower leg erythema and warmth  NEURO/PSYCH:  Oriented x 3 with normal affect.   Constitutional:  No weight loss or loss of appetite    Cardiovascular: As detailed " above  Respiratory: No cough  Genitourinary: No trouble urinating           Medical History  Surgical History   Past Medical History:   Diagnosis Date     Atrial fibrillation (H)      Cardiomyopathy (H)     ef 20%     CHF (congestive heart failure) (H)      Morbid obesity (H)      ROSALVA (obstructive sleep apnea)     Past Surgical History:   Procedure Laterality Date     CARDIAC CATHETERIZATION  08/10/2018    and right heart cath     CARDIOVERSION  2018    successful     CV CORONARY ANGIOGRAM N/A 8/10/2018    Procedure: Coronary Angiogram;  Surgeon: Ralph Powers MD;  Location: [facility];  Service:      CV LEFT HEART CATHETERIZATION WITHOUT LEFT VENTRICULOGRAM Left 8/10/2018    Procedure: Left Heart Catheterization Without Left Ventriculogram;  Surgeon: Ralph Powers MD;  Location: [facility];  Service:      CV RIGHT HEART CATHETERIZATION N/A 8/10/2018    Procedure: Right Heart Catheterization;  Surgeon: Ralph Powers MD;  Location: [facility];  Service:          Family History Social History   History reviewed. No pertinent family history.     Social History     Tobacco Use     Smoking status: Former     Types: Cigarettes     Quit date: 2005     Years since quittin.8     Smokeless tobacco: Never   Substance Use Topics     Alcohol use: No     Comment: Alcoholic Drinks/day: former alcoholic- quit      Drug use: No         Medications  Allergies     Current Facility-Administered Medications:      acetaminophen (TYLENOL) tablet 1,000 mg, 1,000 mg, Oral, TID, Shanthi Saldaña MD, 1,000 mg at 23     amiodarone (PACERONE) tablet 100 mg, 100 mg, Oral, Daily, Shanthi Saldaña MD, 100 mg at 23     apixaban ANTICOAGULANT (ELIQUIS) tablet 5 mg, 5 mg, Oral, BID, Shanthi Saldaña MD, 5 mg at 23     atorvastatin (LIPITOR) tablet 40 mg, 40 mg, Oral, At Bedtime, Shanthi Saldaña MD, 40 mg at 23      [START ON 11/15/2023] buprenorphine (BUTRANS) 10 MCG/HR WK patch 1 patch, 1 patch, Transdermal, Weekly, Shanthi Saldaña MD     [DISCONTINUED] buprenorphine (BUTRANS) 10 MCG/HR WK patch 1 patch, 1 patch, Transdermal, Weekly **AND** buprenorphine (BUTRANS) Patch in Place, , Transdermal, Q8H MARLENI, Shanthi Saldaña MD     furosemide (LASIX) injection 20 mg, 20 mg, Intravenous, Q8H, Yaya Beltran MD, 20 mg at 11/12/23 0612     gabapentin (NEURONTIN) capsule 200 mg, 200 mg, Oral, At Bedtime, Shanthi Saldaña MD, 200 mg at 11/11/23 2221     Lidocaine (LIDOCARE) 4 % Patch 1 patch, 1 patch, Transdermal, Q24H, Shanthi Saldaña MD, 1 patch at 11/11/23 0759     methocarbamol (ROBAXIN) tablet 500 mg, 500 mg, Oral, BID PRN, Shanthi Saldaña MD, 500 mg at 11/12/23 0409     metoprolol succinate ER (TOPROL XL) 24 hr tablet 25 mg, 25 mg, Oral, Daily, Shanthi Saldaña MD, 25 mg at 11/10/23 1548     miconazole (MICATIN) 2 % powder, , Topical, TID, Shanthi Saldaña MD, Given at 11/11/23 2030     naloxone (NARCAN) injection 0.2 mg, 0.2 mg, Intravenous, Q2 Min PRN **OR** naloxone (NARCAN) injection 0.4 mg, 0.4 mg, Intravenous, Q2 Min PRN **OR** naloxone (NARCAN) injection 0.2 mg, 0.2 mg, Intramuscular, Q2 Min PRN **OR** naloxone (NARCAN) injection 0.4 mg, 0.4 mg, Intramuscular, Q2 Min PRN, Shanthi Saldaña MD     Patient is already receiving anticoagulation with heparin, enoxaparin (LOVENOX), warfarin (COUMADIN)  or other anticoagulant medication, , Does not apply, Continuous PRN, Shanthi Saldaña MD     piperacillin-tazobactam (ZOSYN) 3.375 g vial to attach to  mL bag, 3.375 g, Intravenous, Q8H, Yaya Beltran MD, 3.375 g at 11/12/23 0612     polyethylene glycol (MIRALAX) Packet 17 g, 17 g, Oral, Daily PRN, Shanthi Saldaña MD     pramipexole (MIRAPEX) tablet 0.25 mg, 0.25 mg, Oral, At Bedtime, Shanthi Saldaña MD, 0.25 mg at 11/11/23 2221     senna-docusate (SENOKOT-S/PERICOLACE) 8.6-50 MG per  tablet 1 tablet, 1 tablet, Oral, BID, Shanthi Saldaña MD, 1 tablet at 11/11/23 2030     vancomycin (VANCOCIN) 1,500 mg in sodium chloride 0.9 % 250 mL intermittent infusion, 1,500 mg, Intravenous, Q18H, Edward Quintanilla MD, 1,500 mg at 11/12/23 0402     vitamin B-12 (CYANOCOBALAMIN) tablet 500 mcg, 500 mcg, Oral, Daily, Shanthi Saldaña MD, 500 mcg at 11/11/23 0800   No Known Allergies       Lab Results    Chemistry CBC Cardiac Enzymes/BNP/TSH/INR   Recent Labs   Lab Test 11/12/23  0503      POTASSIUM 4.2   CHLORIDE 99   CO2 27   GLC 82   BUN 21.8   CR 0.99   GFRESTIMATED 79   LIZBET 9.3     Recent Labs   Lab Test 11/12/23  0503 11/11/23  0642 11/10/23  1333   CR 0.99 0.99 0.94          Recent Labs   Lab Test 11/11/23  0642   WBC 5.9   HGB 13.1*   HCT 40.6   *        Recent Labs   Lab Test 11/11/23  0642 11/10/23  1333 09/04/23  0405   HGB 13.1* 12.7* 13.9    Recent Labs   Lab Test 08/08/18  0713 08/07/18  1349   TROPONINI 0.02 0.02     Recent Labs   Lab Test 11/10/23  1333 08/08/18  0713 08/07/18  1449   BNP  --  799* 469*   NTBNPI 1,192  --   --      Recent Labs   Lab Test 09/04/23  0601   TSH 0.70     Recent Labs   Lab Test 10/18/22  1346 08/07/18  1349   INR 1.25* 1.16*            Thelma Pennington MD  11/12/2023  6:54 AM

## 2023-11-12 NOTE — PLAN OF CARE
Pt sleeping between cares and rounding.  Turned and repositioned.  Pain in back and LE's with repositioning; PRN robaxin given.  Bilateral LE weeping purulent/yellow drainage; MERRILL.  Left foot observed to have large intact blister.  +2 edema bilaterally.  IV ABX given per MD order  A. Fib on telemetry.    Lesly Keita RN

## 2023-11-13 ENCOUNTER — TELEPHONE (OUTPATIENT)
Dept: CARDIOLOGY | Facility: CLINIC | Age: 78
End: 2023-11-13
Payer: COMMERCIAL

## 2023-11-13 ENCOUNTER — APPOINTMENT (OUTPATIENT)
Dept: PHYSICAL THERAPY | Facility: HOSPITAL | Age: 78
DRG: 602 | End: 2023-11-13
Attending: INTERNAL MEDICINE
Payer: COMMERCIAL

## 2023-11-13 DIAGNOSIS — I48.91 A-FIB (H): Primary | ICD-10-CM

## 2023-11-13 DIAGNOSIS — I50.9 ACUTE DECOMPENSATED HEART FAILURE (H): Primary | ICD-10-CM

## 2023-11-13 LAB
ANION GAP SERPL CALCULATED.3IONS-SCNC: 12 MMOL/L (ref 7–15)
BACTERIA WND CULT: ABNORMAL
BUN SERPL-MCNC: 23.1 MG/DL (ref 8–23)
CALCIUM SERPL-MCNC: 9 MG/DL (ref 8.8–10.2)
CHLORIDE SERPL-SCNC: 98 MMOL/L (ref 98–107)
CREAT SERPL-MCNC: 1.09 MG/DL (ref 0.67–1.17)
DEPRECATED HCO3 PLAS-SCNC: 29 MMOL/L (ref 22–29)
EGFRCR SERPLBLD CKD-EPI 2021: 70 ML/MIN/1.73M2
GLUCOSE SERPL-MCNC: 93 MG/DL (ref 70–99)
HOLD SPECIMEN: NORMAL
POTASSIUM SERPL-SCNC: 4.1 MMOL/L (ref 3.4–5.3)
SODIUM SERPL-SCNC: 139 MMOL/L (ref 135–145)
VANCOMYCIN SERPL-MCNC: 16.3 UG/ML

## 2023-11-13 PROCEDURE — 36415 COLL VENOUS BLD VENIPUNCTURE: CPT | Performed by: INTERNAL MEDICINE

## 2023-11-13 PROCEDURE — 80048 BASIC METABOLIC PNL TOTAL CA: CPT | Performed by: INTERNAL MEDICINE

## 2023-11-13 PROCEDURE — 210N000001 HC R&B IMCU HEART CARE

## 2023-11-13 PROCEDURE — 250N000011 HC RX IP 250 OP 636: Mod: JZ | Performed by: HOSPITALIST

## 2023-11-13 PROCEDURE — 97162 PT EVAL MOD COMPLEX 30 MIN: CPT | Mod: GP

## 2023-11-13 PROCEDURE — 99233 SBSQ HOSP IP/OBS HIGH 50: CPT | Performed by: INTERNAL MEDICINE

## 2023-11-13 PROCEDURE — 99232 SBSQ HOSP IP/OBS MODERATE 35: CPT | Performed by: STUDENT IN AN ORGANIZED HEALTH CARE EDUCATION/TRAINING PROGRAM

## 2023-11-13 PROCEDURE — G0463 HOSPITAL OUTPT CLINIC VISIT: HCPCS

## 2023-11-13 PROCEDURE — 36415 COLL VENOUS BLD VENIPUNCTURE: CPT | Performed by: HOSPITALIST

## 2023-11-13 PROCEDURE — 250N000013 HC RX MED GY IP 250 OP 250 PS 637: Performed by: INTERNAL MEDICINE

## 2023-11-13 PROCEDURE — 80202 ASSAY OF VANCOMYCIN: CPT | Performed by: HOSPITALIST

## 2023-11-13 PROCEDURE — 99232 SBSQ HOSP IP/OBS MODERATE 35: CPT | Performed by: INTERNAL MEDICINE

## 2023-11-13 PROCEDURE — 97530 THERAPEUTIC ACTIVITIES: CPT | Mod: GP

## 2023-11-13 RX ORDER — BUMETANIDE 2 MG/1
2 TABLET ORAL
Status: DISCONTINUED | OUTPATIENT
Start: 2023-11-13 | End: 2023-11-16

## 2023-11-13 RX ADMIN — PIPERACILLIN AND TAZOBACTAM 3.38 G: 3; .375 INJECTION, POWDER, LYOPHILIZED, FOR SOLUTION INTRAVENOUS at 14:01

## 2023-11-13 RX ADMIN — LIDOCAINE 1 PATCH: 4 PATCH TOPICAL at 08:05

## 2023-11-13 RX ADMIN — ACETAMINOPHEN 1000 MG: 500 TABLET ORAL at 20:53

## 2023-11-13 RX ADMIN — APIXABAN 5 MG: 5 TABLET, FILM COATED ORAL at 20:53

## 2023-11-13 RX ADMIN — ACETAMINOPHEN 1000 MG: 500 TABLET ORAL at 08:05

## 2023-11-13 RX ADMIN — PRAMIPEXOLE DIHYDROCHLORIDE 0.25 MG: 0.25 TABLET ORAL at 21:05

## 2023-11-13 RX ADMIN — Medication 500 MCG: at 08:15

## 2023-11-13 RX ADMIN — ACETAMINOPHEN 1000 MG: 500 TABLET ORAL at 14:01

## 2023-11-13 RX ADMIN — MICONAZOLE NITRATE 1 APPLICATOR: 20 POWDER TOPICAL at 20:53

## 2023-11-13 RX ADMIN — ATORVASTATIN CALCIUM 40 MG: 40 TABLET, FILM COATED ORAL at 20:53

## 2023-11-13 RX ADMIN — PIPERACILLIN AND TAZOBACTAM 3.38 G: 3; .375 INJECTION, POWDER, LYOPHILIZED, FOR SOLUTION INTRAVENOUS at 06:41

## 2023-11-13 RX ADMIN — BUMETANIDE 2 MG: 2 TABLET ORAL at 18:14

## 2023-11-13 RX ADMIN — LOSARTAN POTASSIUM 12.5 MG: 25 TABLET, FILM COATED ORAL at 12:09

## 2023-11-13 RX ADMIN — SENNOSIDES AND DOCUSATE SODIUM 1 TABLET: 8.6; 5 TABLET ORAL at 20:53

## 2023-11-13 RX ADMIN — PIPERACILLIN AND TAZOBACTAM 3.38 G: 3; .375 INJECTION, POWDER, LYOPHILIZED, FOR SOLUTION INTRAVENOUS at 21:18

## 2023-11-13 RX ADMIN — SENNOSIDES AND DOCUSATE SODIUM 1 TABLET: 8.6; 5 TABLET ORAL at 08:05

## 2023-11-13 RX ADMIN — AMIODARONE HYDROCHLORIDE 100 MG: 100 TABLET ORAL at 16:41

## 2023-11-13 RX ADMIN — APIXABAN 5 MG: 5 TABLET, FILM COATED ORAL at 08:05

## 2023-11-13 RX ADMIN — GABAPENTIN 200 MG: 100 CAPSULE ORAL at 20:53

## 2023-11-13 RX ADMIN — METHOCARBAMOL 500 MG: 500 TABLET ORAL at 16:54

## 2023-11-13 RX ADMIN — FUROSEMIDE 20 MG: 10 INJECTION, SOLUTION INTRAMUSCULAR; INTRAVENOUS at 06:41

## 2023-11-13 RX ADMIN — MICONAZOLE NITRATE 1 APPLICATOR: 20 POWDER TOPICAL at 08:08

## 2023-11-13 RX ADMIN — MICONAZOLE NITRATE 1 APPLICATOR: 20 POWDER TOPICAL at 14:04

## 2023-11-13 ASSESSMENT — ACTIVITIES OF DAILY LIVING (ADL)
ADLS_ACUITY_SCORE: 49
ADLS_ACUITY_SCORE: 49
ADLS_ACUITY_SCORE: 47
ADLS_ACUITY_SCORE: 45
ADLS_ACUITY_SCORE: 51
ADLS_ACUITY_SCORE: 47
ADLS_ACUITY_SCORE: 49
ADLS_ACUITY_SCORE: 49
ADLS_ACUITY_SCORE: 51
ADLS_ACUITY_SCORE: 51

## 2023-11-13 NOTE — CONSULTS
[facility] Nurse Inpatient Assessment     Consulted for: leg ulcers    Patient History (according to provider note(s):      Per H+P:  78 year old male admitted on 11/10/2023. He has a hx of systolic CHF, Afib, CAD, obesity, chronic edema, wheel chair bound for months in care center here with more pain in legs, weeping and open sores     He notes more weeping in legs and swelling over past 2 weeks and is not sleeping well due to pain.  Pain in both lower legs constant  He has been WC bound for months now  He is living in a care center  He thinks his wt is up 10 pounds  He is not sure if he is still taking his lasix or not  He denied any cp or sob at rest, once again cannot ambulate     He denied fever or chills  He feels he got some relief in ER with the iv morphine    Assessment:      Areas visualized during today's visit: Lower extremities     Wound location: Bilateral lower extremities     Last photo: 11/13   Wound due to: Venous Ulcer  Wound history/plan of care: see above  Wound base: left lateral foot granular, all other areas scabs/dried drainage     Palpation of the wound bed: normal      Drainage: small     Description of drainage: serosanguinous     Tunneling: N/A     Undermining: N/A  Periwound skin: Edematous and Hemosiderin staining      Color: dusky      Temperature: normal   Odor: none  Pain: moderate and during dressing change  Pain interventions prior to dressing change: patient tolerated well and slow and gentle cares   Treatment goal: Infection control/prevention, Maintain (prevention of deterioration), and Protection  STATUS: initial assessment  Supplies ordered: gathered       Treatment Plan:     Bilateral lower legs:  1. Cleanse with wound cleanser, including estefanía wound skin, gently pat dry  2. Apply xeroform to all open and scabbed areas  3. Cover with abd pads, wrap with kerlix toes to knees  Change daily  If supplies needed from stores, PS# [id number]  Xeroform      Orders: Written    RECOMMEND PRIMARY TEAM ORDER: Lymphedema consult  Education provided: importance of repositioning and plan of care  Discussed plan of care with: Patient and Nurse  [facility] nurse follow-up plan: weekly  Notify WOC if wound(s) deteriorate.  Nursing to notify the Provider(s) and re-consult the [facility] Nurse if new skin concern.    DATA:     Current support surface: Standard  Standard gel/foam mattress (IsoFlex, Atmos air, etc)  Containment of urine/stool: Incontinent pad in bed  BMI: Body mass index is 36.98 kg/m .   Active diet order: Orders Placed This Encounter      Combination Diet Low Saturated Fat Na <2400mg Diet, No Caffeine Diet     Output: I/O last 3 completed shifts:  In: 240 [P.O.:240]  Out: 2000 [Urine:2000]     Labs:   Recent Labs   Lab 11/11/23  0642 11/10/23  1333   ALBUMIN  --  3.5   HGB 13.1* 12.7*   WBC 5.9 6.6     Pressure injury risk assessment:   Sensory Perception: 3-->slightly limited  Moisture: 3-->occasionally moist  Activity: 1-->bedfast  Mobility: 3-->slightly limited  Nutrition: 3-->adequate  Friction and Shear: 2-->potential problem  Josue Score: 15    REBA HuertaN, RN, PHN, HNB-BC, CWOCN  Pager no longer is use, please contact through [employer] group: [facility] [employer] [facility]

## 2023-11-13 NOTE — TELEPHONE ENCOUNTER
Contact patient to arrange consult with AFIB clinic, ( EP [facility]) in 2-3 weeks per Dr. Houser

## 2023-11-13 NOTE — PROGRESS NOTES
"[facility] ID Inpatient follow up       Patient:  Gorge Berkowitz Jr.  Date of birth 1945, Medical record number [id number]  Date of Visit:  2023  Attending Physician: Deni Will MD         Assessment and Recommendations:   Assessment:  Gorge Berkowitz Jr. is a 78 year old male with   Cardiomyopathy with EF of 20%.  Morbid obesity with BMI of 36.98.  Wheelchair-bound for several months.  Bilateral venous stasis ulcers with cellulitis.  Cultures with Serratia and Enterococcus faecalis.  On IV Zosyn and vancomycin.    Recommendations:  Continue IV Zosyn.  Discontinue IV vancomycin.  Elevation of the legs.  Lymphedema care.  Wound care.    Discussed with the patient and nursing staff.     ID will follow     Candelario Bettencourt MD.  [facility].   [facility]  Office Telephone [phone].  Fax [phone]  [facility] paging            Interval History:     HPI:  The interval history was reviewed.   Doing better today. Less leg pain.       Pertinent cultures include:  No results found for: \"CULT\"    Recent Inflammatory Biomarkers:   Recent Labs   Lab Test 23  0642 11/10/23  1333 23  0405 10/18/22  1346 20  0703 10/23/20  0645   PCAL  --  0.08*  --   --   --   --    WBC 5.9 6.6 7.0 6.1 4.8 6.3            Review of Systems:   CONSTITUTIONAL:    Temp Max: Temp (24hrs), Av  F (36.7  C), Min:97.7  F (36.5  C), Max:98.4  F (36.9  C)   .  Negative except for findings in the HPI.           Current Medications (antimicrobials listed in bold):      acetaminophen  1,000 mg Oral TID    amiodarone  100 mg Oral Daily    apixaban ANTICOAGULANT  5 mg Oral BID    atorvastatin  40 mg Oral At Bedtime    bumetanide  2 mg Oral BID    [START ON 11/15/2023] buprenorphine  1 patch Transdermal Weekly    buprenorphine   Transdermal Q8H MARLENI    gabapentin  200 mg Oral At Bedtime    Lidocaine  1 patch Transdermal Q24H    " losartan  12.5 mg Oral Daily    metoprolol succinate ER  25 mg Oral Daily    miconazole   Topical TID    piperacillin-tazobactam  3.375 g Intravenous Q8H    pramipexole  0.25 mg Oral At Bedtime    senna-docusate  1 tablet Oral BID    vancomycin  1,500 mg Intravenous Q18H    vitamin B-12  500 mcg Oral Daily              Allergies:   No Known Allergies         Physical Exam:   Vitals were reviewed  Patient Vitals for the past 24 hrs:   BP Temp Temp src Pulse Resp SpO2 Weight   11/13/23 1117 120/80 97.7  F (36.5  C) Axillary 101 20 96 % --   11/13/23 0730 129/71 97.7  F (36.5  C) Oral 91 20 97 % --   11/13/23 0645 112/67 -- -- -- -- -- --   11/13/23 0404 108/75 98  F (36.7  C) Oral 98 20 97 % 113.6 kg (250 lb 7.1 oz)   11/13/23 0001 110/64 98.3  F (36.8  C) Oral 84 19 96 % --   11/12/23 1941 107/72 98  F (36.7  C) Oral 94 18 96 % --   11/12/23 1630 108/69 -- -- 91 -- -- --   11/12/23 1536 95/67 98.4  F (36.9  C) Oral 100 18 98 % --       Physical Examination:  Gen: Pleasant in no acute distress.  HEENT: NCAT. EOMI. PERRL.  Neck: No bruit, JVD or thyromegaly.  Lungs: Clear to ascultation bilat with no crackles or wheezes.  Card: RRR. NSR. No RMG. Peripheral pulses present and symmetric. No edema.  Abd: Soft NT ND. No mass. Normal bowel sounds.  Skin: No rash.  Extr: Legs are wrapped.   Neuro: Alert and oriented to place time and person.        Susceptibility data from last 90 days.           Laboratory Data:   ID Labs:  Microbiology labs:  Wound Aerobic Bacterial Culture Routine  Order: [id number]  Collected 11/11/2023  4:55 PM       Status: Preliminary result       Visible to patient: No (not released)    Specimen Information: Leg, left; Wound   0 Result Notes  Culture Culture in progress      4+ Serratia marcescens Abnormal    Identification is preliminary, confirmation in progress   1+ Enterococcus faecalis Abnormal            Resulting Agency: [facility]           Specimen Collected: 11/11/23  4:55 PM Last Resulted:  11/12/23  2:30 PM           No lab results found.  Recent Labs   Lab Test 11/11/23  0642 11/10/23  1333 09/04/23  0405 10/18/22  1346 11/02/20  0703 10/23/20  0645   WBC 5.9 6.6 7.0 6.1 4.8 6.3     Recent Labs   Lab Test 11/13/23  0457 11/12/23  0503 11/11/23  0642 11/10/23  1333   CR 1.09 0.99 0.99 0.94   GFRESTIMATED 70 79 79 84       Hematology Studies  Recent Labs   Lab Test 11/11/23  0642 11/10/23  1333 09/04/23  0405 10/18/22  1346 11/02/20  0703 10/23/20  0645   WBC 5.9 6.6 7.0 6.1 4.8 6.3   HGB 13.1* 12.7* 13.9 12.5* 12.8* 12.0*   HCT 40.6 39.0* 41.6 38.0* 39.9* 37.4*    210 148* 138* 212 162       Metabolic  Recent Labs   Lab Test 11/13/23  0457 11/12/23  0503 11/11/23  0642    138 139   BUN 23.1* 21.8 20.2   CO2 29 27 32*   CR 1.09 0.99 0.99   GFRESTIMATED 70 79 79       Hepatic Studies  Recent Labs   Lab Test 11/10/23  1333 09/04/23  0601 10/18/22  1346   BILITOTAL 0.6 0.8 0.5   ALKPHOS 130* 82 90   ALBUMIN 3.5 3.5 3.7   AST 31 33 20   ALT 19 17 16       Immunologlobulins  Recent Labs   Lab Test 11/10/23  1333   SED 41*            Imaging Data:   Reviewed

## 2023-11-13 NOTE — PLAN OF CARE
Problem: Adult Inpatient Plan of Care  Goal: Readiness for Transition of Care  Outcome: Progressing     Problem: Skin Injury Risk Increased  Goal: Skin Health and Integrity  Outcome: Progressing   Goal Outcome Evaluation:    Pt Aox4, calm and cooperative with cares. Ongoing pain in back and BLE. Blister to left foot ruptured, skin flap intact, draped with gauze and lightly wrapped with Kerlix. WOC consult pending. ID consult this evening, continuing current regiment of IV abx until culture is complete. Vitally stable on RA, Afib on tele. Nursing staff will continue to monitor.

## 2023-11-13 NOTE — PROGRESS NOTES
HEART CARE NOTE          Assessment/Recommendations     1. HFmrEF-BiV c/b severe ADHF  Assessment / Plan  Transition to oral diuretic regimen and continue to monitor renal function/repeat BMP, UOP and hemodynamics closely   Patient is high risk of adverse cardiac events 2/2 advanced age, systolic dysfunction, CAD  GDMT as detailed below; mainstay of treatment for HFmrEF includes diuretics (class I) and SGLT2-I (class 2a); additional medical therapy demonstrated with less robust evidence for indication but should be considered per guideline recommendations (2b)    Current Pharmacotherapy AHA Guideline-Directed Medical Therapy   Losartan 12.5 mg daily Lisinopril 20 mg twice daily   Metoprolol succinate 25 mg daily Carvedilol 25 mg twice daily   Spironolactone not started Spironolactone 25 mg once daily   Hydralazine NA Hydralazine 100 mg three times daily   Isosorbide dinitrate NA Isosorbide dinitrate 40 mg three times daily   SGLT2 inhibitor:Dapagliflozin/Empagliflozin - not started Dapagliflozin or Empagliflozin 10 mg daily     2. Aflutter  Assessment / Plan  Rate controlled; currently on apixaban, amiodarone     3. CAD  Assessment / Plan  Hx of RCA ; Denies chest pain or anginal equivalents - continue atorvastatin, metoprolol    4. ROSALVA  Assessment / Plan  CPAP at bedtime    5. HLP  Assessment / Plan  Continue atorvastatin    55 minutes spent reviewing prior records (including documentation, laboratory studies, cardiac testing/imaging), history and physical exam, planning, and subsequent documentation.        History of Present Illness/Subjective    Mr. Gorge Berkowitz Jr. is a 78 year old male with a PMHx significant for (per Epic notation) systolic CHF, Afib, CAD, obesity, chronic edema, wheel chair bound for months in care center here with more pain in legs, weeping and open sores     Today, Mr. Otero is without acute cardiac events overnight or complaints; Management plan as detailed above    ECG:  "personally reviewed; atrial flutter with variable AV block.    ECHO (personnaly Reviewed on 11/13/23):  Left ventricular function is decreased. The ejection fraction is 45-50%  (mildly reduced).  There is mild septal hypokinesis.  There is mild to moderate inferior wall hypokinesis.  The right ventricle is mildly dilated.  Mildly decreased right ventricular systolic function  Ascending Aorta dilatation is present.  No hemodynamically significant valvular abnormalities on 2D or color flow  imaging.    Medical history and pertinent documents reviewed in Care Everywhere please where applicable see details above        Physical Examination Review of Systems   /75 (BP Location: Right arm)   Pulse 98   Temp 98  F (36.7  C) (Oral)   Resp 20   Ht 1.753 m (5' 9\")   Wt 113.6 kg (250 lb 7.1 oz)   SpO2 97%   BMI 36.98 kg/m    Body mass index is 36.98 kg/m .  Wt Readings from Last 3 Encounters:   11/13/23 113.6 kg (250 lb 7.1 oz)   09/04/23 120.2 kg (265 lb)   10/18/22 129.3 kg (285 lb)     General Appearance:   no distress   ENT/Mouth: membranes moist, no oral lesions or bleeding gums.      EYES:  no scleral icterus, normal conjunctivae   Neck: no carotid bruits or thyromegaly   Chest/Lungs:   lungs are clear to auscultation, no rales or wheezing, equal chest wall expansion    Cardiovascular:   Regular. Normal first and second heart sounds with no murmurs, rubs, or gallops; the carotid, radial and posterior tibial pulses are intact, no JVD and mild LE edema bilaterally    Abdomen:  no organomegaly, masses, bruits, or tenderness; bowel sounds are present   Extremities: no cyanosis or clubbing   Skin: no xanthelasma, warm.    Neurologic: NAD     Psychiatric: NAD     A complete 10 systems ROS was reviewed  And is negative except what is listed in the HPI.          Medical History  Surgical History Family History Social History   Past Medical History:   Diagnosis Date     Atrial fibrillation (H)      Cardiomyopathy (H) "     ef 20%     CHF (congestive heart failure) (H)      Morbid obesity (H)      ROSALVA (obstructive sleep apnea)     Past Surgical History:   Procedure Laterality Date     CARDIAC CATHETERIZATION  08/10/2018    and right heart cath     CARDIOVERSION  2018    successful     CV CORONARY ANGIOGRAM N/A 8/10/2018    Procedure: Coronary Angiogram;  Surgeon: Ralph Powers MD;  Location: [facility];  Service:      CV LEFT HEART CATHETERIZATION WITHOUT LEFT VENTRICULOGRAM Left 8/10/2018    Procedure: Left Heart Catheterization Without Left Ventriculogram;  Surgeon: Ralph Powers MD;  Location: [facility];  Service:      CV RIGHT HEART CATHETERIZATION N/A 8/10/2018    Procedure: Right Heart Catheterization;  Surgeon: Ralph Powers MD;  Location: [facility];  Service:     no family history of premature coronary artery disease Social History     Socioeconomic History     Marital status: Single     Spouse name: Not on file     Number of children: Not on file     Years of education: Not on file     Highest education level: Not on file   Occupational History     Not on file   Tobacco Use     Smoking status: Former     Types: Cigarettes     Quit date: 2005     Years since quittin.8     Smokeless tobacco: Never   Substance and Sexual Activity     Alcohol use: No     Comment: Alcoholic Drinks/day: former alcoholic- quit      Drug use: No     Sexual activity: Not on file   Other Topics Concern     Not on file   Social History Narrative     Not on file     Social Determinants of Health     Financial Resource Strain: Not on file   Food Insecurity: Not on file   Transportation Needs: Not on file   Physical Activity: Not on file   Stress: Not on file   Social Connections: Not on file   Interpersonal Safety: Not on file   Housing Stability: Not on file           Lab Results    Chemistry/lipid CBC Cardiac Enzymes/BNP/TSH/INR   Lab Results   Component Value Date    CHOL  167 08/08/2018    HDL 45 08/08/2018    TRIG 59 08/08/2018    BUN 23.1 (H) 11/13/2023     11/13/2023    CO2 29 11/13/2023    Lab Results   Component Value Date    WBC 5.9 11/11/2023    HGB 13.1 (L) 11/11/2023    HCT 40.6 11/11/2023     (H) 11/11/2023     11/11/2023    Lab Results   Component Value Date    TROPONINI 0.02 08/08/2018     (H) 08/08/2018    TSH 0.70 09/04/2023    INR 1.25 (H) 10/18/2022     Lab Results   Component Value Date    TROPONINI 0.02 08/08/2018          Weight:    Wt Readings from Last 3 Encounters:   11/13/23 113.6 kg (250 lb 7.1 oz)   09/04/23 120.2 kg (265 lb)   10/18/22 129.3 kg (285 lb)       Allergies  No Known Allergies      Surgical History  Past Surgical History:   Procedure Laterality Date     CARDIAC CATHETERIZATION  08/10/2018    and right heart cath     CARDIOVERSION  08/16/2018    successful     CV CORONARY ANGIOGRAM N/A 8/10/2018    Procedure: Coronary Angiogram;  Surgeon: Ralph Powers MD;  Location: [facility];  Service:      CV LEFT HEART CATHETERIZATION WITHOUT LEFT VENTRICULOGRAM Left 8/10/2018    Procedure: Left Heart Catheterization Without Left Ventriculogram;  Surgeon: Ralph Powers MD;  Location: [facility];  Service:      CV RIGHT HEART CATHETERIZATION N/A 8/10/2018    Procedure: Right Heart Catheterization;  Surgeon: Ralph Powers MD;  Location: [facility];  Service:        Social History  Tobacco:   History   Smoking Status     Former     Quit date: 1/1/2005   Smokeless Tobacco     Never    Alcohol:   Social History    Substance and Sexual Activity      Alcohol use: No        Comment: Alcoholic Drinks/day: former alcoholic- quit 2003   Illicit Drugs:   History   Drug Use No       Family History  History reviewed. No pertinent family history.       Sunita Houser MD on 11/13/2023      cc: Lisseth Scott

## 2023-11-13 NOTE — PROGRESS NOTES
"   11/13/23 1500   Appointment Info   Signing Clinician's Name / Credentials (PT) Lesly Srinivasan, PT, DPT   Living Environment   People in Home sibling(s)  (Brother)   Current Living Arrangements house   Home Accessibility stairs to enter home   Number of Stairs, Main Entrance 7   Stair Railings, Main Entrance railings on both sides of stairs   Transportation Anticipated family or friend will provide   Living Environment Comments Split-level entry. Patient lives on the upper level. Patient's brother lives on the lower level.   Self-Care   Usual Activity Tolerance moderate   Current Activity Tolerance fair   Equipment Currently Used at Home walker, rolling   Fall history within last six months yes   Number of times patient has fallen within last six months 3   Activity/Exercise/Self-Care Comment Patient was admitted from TCU.   General Information   Onset of Illness/Injury or Date of Surgery 11/10/23   Referring Physician Deni Will MD   Patient/Family Therapy Goals Statement (PT) Walk again   Pertinent History of Current Problem (include personal factors and/or comorbidities that impact the POC) Per chart, \"Gorge Berkowitz Jr. is a 78-year-old man with history of HFrEF, Afib, CAD, obesity, chronic edema, wheel chair bound for months in [facility] center admitted on 11/10/2023 with bilateral venous stasis ulcers with cellulitis and acute on chronic HFmrEF.\"   Existing Precautions/Restrictions fall   Range of Motion (ROM)   Range of Motion ROM deficits secondary to pain   Strength (Manual Muscle Testing)   Strength (Manual Muscle Testing) Deficits observed during functional mobility   Bed Mobility   Bed Mobility supine-sit   Supine-Sit [place] (Bed Mobility) moderate assist (50% patient effort);1 person assist;verbal cues   Bed Mobility Limitations decreased ability to use legs for bridging/pushing   Impairments Contributing to Impaired Bed Mobility pain;decreased strength   Assistive Device (Bed Mobility) " bed rails   Transfers   Transfers sit-stand transfer   Transfer Safety Concerns Noted decreased weight-shifting ability   Impairments Contributing to Impaired Transfers pain;decreased strength   Sit-Stand Transfer   Sit-Stand [place] (Transfers) moderate assist (50% patient effort);1 person assist;verbal cues   Assistive Device (Sit-Stand Transfers) walker, front-wheeled   Gait/Stairs (Locomotion)   [place] Level (Gait) unable to assess   Comment, (Gait/Stairs) Unable to assess gait due to instability with transfers.   Clinical Impression   Criteria for Skilled Therapeutic Intervention Yes, treatment indicated   PT Diagnosis (PT) Impaired functional mobility   Influenced by the following impairments Pain, fatigue, decreased strength   Functional limitations due to impairments Bed mobility, transfers, gait   Clinical Presentation (PT Evaluation Complexity) evolving   Clinical Presentation Rationale Patient presents as medically diagnosed.   Clinical Decision Making (Complexity) moderate complexity   Planned Therapy Interventions (PT) balance training;bed mobility training;gait training;home exercise program;patient/family education;ROM (range of motion);strengthening;transfer training;home program guidelines   Risk & Benefits of therapy have been explained evaluation/treatment results reviewed;care plan/treatment goals reviewed;patient   PT Total Evaluation Time   PT Mary, Moderate Complexity Minutes (91876) 15   Physical Therapy Goals   PT Frequency Daily   PT Predicted Duration/Target Date for Goal Attainment 11/20/23   PT Goals Bed Mobility;Transfers;Gait   PT: Bed Mobility Minimal assist;Supine to/from sit   PT: Transfers Minimal assist;Sit to/from stand;Bed to/from chair;Assistive device  (FWW)   PT: Gait Minimal assist;Assistive device;25 feet  (FWW)   Interventions   Interventions Quick Adds Therapeutic Activity   Therapeutic Activity   Therapeutic Activities: dynamic activities to improve functional  performance Minutes (66872) 15   Symptoms Noted During/After Treatment Fatigue;Shortness of breath   Treatment Detail/Skilled Intervention Patient required 2 attempts to complete supine>sit with Mod A for trunk control. Sit<>stand x 4 attempts from elevated EOB with Mod A to FWW. Patient attempted x 2 pushing up from EOB with BUE but was unsuccessful. Patient then attempted x 2 pushing up from EOB with RUE and pulling up on FWW with LUE. Patient was able to stand x 3 minutes each attempt and was also able to take steps toward HOB with CGA and FWW. Sit>supine with Mod A behind knees due to multiple wounds on lower legs.   PT Discharge Planning   PT Plan Progress bed mobility, transfer training, gait as able, LE strengthening   PT Discharge Recommendation (DC Rec) Transitional Care Facility   PT Rationale for DC Rec Patient requires assist of 1 for bed mobility and transfers. Patient is unable to ambulate functional distances at this time. Recommend TCU to improve mobility and strength.   PT Brief overview of current status Mod A of 1 for bed mobility and transfers.   PT Equipment Needed at Discharge walker, rolling  (FWW)   Total Session Time   Timed Code Treatment Minutes 15   Total Session Time (sum of timed and untimed services) 30

## 2023-11-13 NOTE — PROGRESS NOTES
[facility] Hospitalist Progress Note  [facility]  Admission date: 11/10/2023    Summary:  Gorge Berkowitz Jr. is a 78-year-old man with history of HFrEF, Afib, CAD, obesity, chronic edema, wheel chair bound for months in care center admitted on 11/10/2023 with bilateral venous stasis ulcers with cellulitis and acute on chronic HFmrEF    Wound culture grew Serratia and Enterococcus faecalis for which he received IV Zosyn and vancomycin briefly and was subsequently narrowed to Zosyn only per ID recommendation.  He was also placed on IV diuretics for acute on chronic HFmrEF.  Cardiologist is assisting with CHF management.      Assessment/Plan    #Acute on chronic HFmEF  -Echocardiogram revealed mildly reduced LV systolic function with LVEF of 45 to 50%, mild septal hypokinesis, mild to moderate inferior wall hypokinesis and mildly dilated right ventricle.  -Off IV diuretics  -Oral bumetanide 2 mg twice daily-started 11/30/2023  -Losartan 12.5 mg daily  -Metoprolol succinate 25 mg daily    #History of CAD  #Chronic RCA occlusion.  -Continue atorvastatin and metoprolol succinate per cardiology recommendation    #Infected bilateral lower extremity stasis ulcer  #Cellulitis  -Off vancomycin-discontinued 11/13/2023  -Continue Zosyn per ID recommendation  -ID [id number] assistance  -WOC      #Sore throat, congestion - COVID negative     #acute on chronic pain syndrome with opiate dependence  -butrans patch, scheduled tylenol, lidocaine patch(more for shoulder djd), robaxin     #ROSALVA  -CPAP at night    #hx of Afib/flutter  -Continue amiodarone, lopressor, eliquis       Checklist:  Code Status: Full Code    Diet: Combination Diet Low Saturated Fat Na <2400mg Diet, No Caffeine Diet     DVT px:  DOAC    Disposition and Discharge Planning  Auto-populated from discharge tab:    Expected Discharge Date: 11/15/2023    Discharge Delays: IV Medication - consider oral or Home Infusion  Insurance Authorization  "needed    Discharge Comments: IV abx  back to care facility         System Identified Risk Variables  Auto-populated based on system request - if needs to be addressed in treatment plan they will be addressed above:  \"  Clinically Significant Risk Factors                   # Heart failure, NOS: heart failure noted on the problem list and last echo with EF 40-50%       # Obesity: Estimated body mass index is 36.98 kg/m  as calculated from the following:    Height as of this encounter: 1.753 m (5' 9\").    Weight as of this encounter: 113.6 kg (250 lb 7.1 oz)., PRESENT ON ADMISSION       # Financial/Environmental Concerns: none         \"    Interval Events/Subjective/Notable results:  He complains of back pain attributed to prior spinal fracture sustained about 3 years ago.  He denies shortness of breath, chest pain, palpitation or dizziness.  He states he would prefer to go to a different TCU because of the way he was treated at his previous TCU.    Objective  Vital signs in last 24 hours  Temp:  [97.7  F (36.5  C)-98.4  F (36.9  C)] 97.7  F (36.5  C)  Pulse:  [] 101  Resp:  [18-20] 20  BP: ()/(64-80) 120/80  SpO2:  [96 %-98 %] 96 % O2 Device: None (Room air)    Weight:   250 lbs 7.08 oz  Body mass index is 36.98 kg/m .    Intake/Output last 3 shifts  I/O last 3 completed shifts:  In: 240 [P.O.:240]  Out: 2000 [Urine:2000]    Physical Exam  General:  Morbidly obese, alert, cooperative, no distress, generalized weakness, deconditioned.  Laying flat on the bed.   Neurologic:  oriented, facial symmetry preserved, fluent speech.   Psych: calm  HEENT:  Anicteric, MMM  CV: RRR no MRG, normal S1 and S2, no edema  Lungs: Diminished entry bilaterally.  No crackles.  Abd: soft, obese, NT  Skin: Bilateral stasis dermatitis with purulent stasis ulcer covered with slough in the distal leg and left foot.  Left foot wrapped with clean gauze   Levy Catheter: Not present  Lines: None          Medical Decision Making "       Deni Will MD

## 2023-11-13 NOTE — PLAN OF CARE
Problem: Adult Inpatient Plan of Care  Goal: Plan of Care Review  Description: The Plan of Care Review/Shift note should be completed every shift.  The Outcome Evaluation is a brief statement about your assessment that the patient is improving, declining, or no change.  This information will be displayed automatically on your shift  note.  Outcome: Progressing     Problem: Infection  Goal: Absence of Infection Signs and Symptoms  Outcome: Progressing     Problem: Skin Injury Risk Increased  Goal: Skin Health and Integrity  Outcome: Progressing  Intervention: Optimize Skin Protection  Recent Flowsheet Documentation  Taken 11/13/2023 0001 by Izabel Mosley, RN  Activity Management: activity adjusted per tolerance     Problem: Skin Injury Risk Increased  Goal: Skin Health and Integrity  Intervention: Optimize Skin Protection  Recent Flowsheet Documentation  Taken 11/13/2023 0001 by Izabel Mosley, RN  Activity Management: activity adjusted per tolerance     Problem: Wound  Goal: Optimal Coping  Outcome: Progressing   Goal Outcome Evaluation:       Patient here for bilateral Lymphedema, SOB/fluid overload. Awaiting WOC consult for bilateral lower below knees venous stasis ulcer/cellulitis. Bilateral groin's red rash from urine, primofit & purwik ineffective do to anatomy, encouraged patient to call for help with the urinal.  Rhythm=a-flutter controled rate, 02 sat's 96% on room air.

## 2023-11-13 NOTE — PROGRESS NOTES
Care Management Follow Up    Length of Stay (days): 3    Expected Discharge Date: 11/15/2023     Concerns to be Addressed: infection, determination for final antibiotic, IV Diuresing, placement TCU vs LTC, therapy eval and recs       Patient plan of care discussed at interdisciplinary rounds: Yes    Anticipated Discharge Disposition:  TBD     Anticipated Discharge Services:  TBD    Anticipated Discharge DME:  TBD    Patient/family educated on Medicare website which has current facility and service quality ratings:  yes  Education Provided on the Discharge Plan:  per are team  Patient/Family in Agreement with the Plan:  yes      Additional Information:  Patient with PMH of systolic CHF, Afib, CAD, obesity, chronic edema, presenting with more pain in legs, weeping and open sores.  ID consulted. Currently on IV antibiotics. Room air.    Therapy to eval.    Social History:  Patient transferred from [facility]. He was in TCU September 9th- October 15th then changed to LTC as he ran out of TCU insurance coverage.  He states prior to TCU, he was able to walk with a walker. He had pain but was able to ambulate short distances.  He lives in a split level home; 7 stairs up to his level. His brother lives on the bottom level. [facility] to transport at discharge.      11/13/23:  Met with patient who provided above information and stating he would not go back to [facility]. He states he was not provided adequate care and has not been getting therapy to improve his mobility.  He has released his bed hold. His goal has been to return home. He has been paying privately for LTC. He states he would prefer TCU then home but if LTC is recommended, he would go into LTC pay privately and would still like therapy.    Will have therapy eval for rehab potential.  If TCU recommended, he would need [employer] auth which was sent 11/10 and declined due to lack of information. Spoke with Brandy at [phone] regarding  original auth faxed to early with not enough supporting information for TCU. Will need therapy eval and final antibiotic recs.   Original [employer] auth number [id number].    Final discharge plan pending progression and recommendations.                   Dixie Lagunas RN

## 2023-11-13 NOTE — PHARMACY-VANCOMYCIN DOSING SERVICE
Pharmacy Vancomycin Note  Date of Service 2023  Patient's  1945   78 year old, male        Plan:  Vancomycin was discontinued today per ID, but the level drawn prior to discontinuation was therapeutic.         Indication: Skin and Soft Tissue Infection  Day of Therapy: 4  Current vancomycin regimen:  1500 mg IV q18h  Current vancomycin monitoring method: AUC  Current vancomycin therapeutic monitoring goal: 400-600 mg*h/L    Insight[employer] Prediction of Current Vancomycin Regimen    Loading dose: N/A  Regimen: 1500 mg IV every 18 hours.  Start time: 15:55 on 2023  Exposure target: AUC24 (range)400-600 mg/L.hr   AUC24,ss: 526 mg/L.hr  Probability of AUC24 > 400: 95 %  Ctrough,ss: 16.5 mg/L  Probability of Ctrough,ss > 20: 21 %  Probability of nephrotoxicity (Lodise NICOLE ): 12 %      Current estimated CrCl = Estimated Creatinine Clearance: 71.7 mL/min (based on SCr of 1.09 mg/dL).    Creatinine for last 3 days  2023:  6:42 AM Creatinine 0.99 mg/dL  2023:  5:03 AM Creatinine 0.99 mg/dL  2023:  4:57 AM Creatinine 1.09 mg/dL    Recent Vancomycin Levels (past 3 days)  2023: 11:57 AM Vancomycin 16.3 ug/mL    Vancomycin IV Administrations (past 72 hours)                     vancomycin (VANCOCIN) 1,500 mg in sodium chloride 0.9 % 250 mL intermittent infusion (mg) 1,500 mg New Bag 23 2155     1,500 mg New Bag  0402     1,500 mg New Bag 23 1225    vancomycin (VANCOCIN) 2,000 mg in sodium chloride 0.9 % 500 mL intermittent infusion (mg) 2,000 mg New Bag 11/10/23 1541                    Nephrotoxins and other renal medications (From now, onward)      Start     Dose/Rate Route Frequency Ordered Stop    23 1800  bumetanide (BUMEX) tablet 2 mg         2 mg Oral 2 TIMES DAILY (Diuretics and Nitrates) 23 1120      23 2200  piperacillin-tazobactam (ZOSYN) 3.375 g vial to attach to  mL bag        Note to Pharmacy: For [facility], [facility] and [facility]: For Zosyn-naive  "patients, use the \"Zosyn initial dose + extended infusion\" order panel.    3.375 g  over 240 Minutes Intravenous EVERY 8 HOURS 11/11/23 1559                 Contrast Orders - past 72 hours (72h ago, onward)      Start     Dose/Rate Route Frequency Stop    11/11/23 1330  perflutren lipid microsphere (DEFINITY) injection SUSP 2.5 mL         2.5 mL Intravenous ONCE 11/11/23 1322            Interpretation of levels and current regimen:  Vancomycin level is reflective of -600    Has serum creatinine changed greater than 50% in last 72 hours: No    Urine output:  good urine output    Renal Function: Stable      Kimberley Varner, [facility]   "

## 2023-11-13 NOTE — DISCHARGE INSTRUCTIONS
WOC DISCHARGE INSTRUCTIONS:  Bilateral lower legs:  1. Cleanse with wound cleanser, including estefanía wound skin, gently pat dry  2. Apply xeroform to all open and scabbed areas  3. Cover with abd pads, wrap with kerlix toes to knees  Change daily  
negative

## 2023-11-13 NOTE — PLAN OF CARE
Problem: Wound  Goal: Optimal Coping  Outcome: Progressing  Goal: Optimal Functional Ability  Outcome: Progressing  Intervention: Optimize Functional Ability  Recent Flowsheet Documentation  Taken 11/13/2023 1136 by Valencia Akins RN  Activity Management: activity adjusted per tolerance  Activity Assistance Provided: assistance, 2 people  Taken 11/13/2023 0900 by Valencia Akins, RN  Activity Management: activity adjusted per tolerance  Activity Assistance Provided: assistance, 2 people  Goal: Optimal Pain Control and Function  Outcome: Progressing  Goal: Skin Health and Integrity  Intervention: Optimize Skin Protection  Recent Flowsheet Documentation  Taken 11/13/2023 1136 by Valencia Akins, RN  Activity Management: activity adjusted per tolerance  Taken 11/13/2023 0900 by Valencia Akins RN  Activity Management: activity adjusted per tolerance   Goal Outcome Evaluation:    Vitals stable.  WOC assessed.  Placed zeroform, abd pads wrapped in Kerlix to both lower legs.  Left outer foot has open skin ulceration.  A & O.  RA 97%.  Afib HR controlled.  Uses urinal.  Used bed pan Bm x 1.  Pt said he is w/c bound.  Lays flat in bed due to back pain.  Groin rash improved.

## 2023-11-14 ENCOUNTER — APPOINTMENT (OUTPATIENT)
Dept: OCCUPATIONAL THERAPY | Facility: HOSPITAL | Age: 78
DRG: 602 | End: 2023-11-14
Attending: INTERNAL MEDICINE
Payer: COMMERCIAL

## 2023-11-14 ENCOUNTER — APPOINTMENT (OUTPATIENT)
Dept: PHYSICAL THERAPY | Facility: HOSPITAL | Age: 78
DRG: 602 | End: 2023-11-14
Payer: COMMERCIAL

## 2023-11-14 LAB
ANION GAP SERPL CALCULATED.3IONS-SCNC: 10 MMOL/L (ref 7–15)
BUN SERPL-MCNC: 22.1 MG/DL (ref 8–23)
CALCIUM SERPL-MCNC: 9.6 MG/DL (ref 8.8–10.2)
CHLORIDE SERPL-SCNC: 98 MMOL/L (ref 98–107)
CREAT SERPL-MCNC: 1 MG/DL (ref 0.67–1.17)
DEPRECATED HCO3 PLAS-SCNC: 29 MMOL/L (ref 22–29)
EGFRCR SERPLBLD CKD-EPI 2021: 78 ML/MIN/1.73M2
GLUCOSE SERPL-MCNC: 95 MG/DL (ref 70–99)
POTASSIUM SERPL-SCNC: 4 MMOL/L (ref 3.4–5.3)
SODIUM SERPL-SCNC: 137 MMOL/L (ref 135–145)

## 2023-11-14 PROCEDURE — 97165 OT EVAL LOW COMPLEX 30 MIN: CPT | Mod: GO

## 2023-11-14 PROCEDURE — 36415 COLL VENOUS BLD VENIPUNCTURE: CPT | Performed by: INTERNAL MEDICINE

## 2023-11-14 PROCEDURE — 99232 SBSQ HOSP IP/OBS MODERATE 35: CPT | Performed by: STUDENT IN AN ORGANIZED HEALTH CARE EDUCATION/TRAINING PROGRAM

## 2023-11-14 PROCEDURE — 97535 SELF CARE MNGMENT TRAINING: CPT | Mod: GO

## 2023-11-14 PROCEDURE — 250N000013 HC RX MED GY IP 250 OP 250 PS 637: Performed by: INTERNAL MEDICINE

## 2023-11-14 PROCEDURE — 97530 THERAPEUTIC ACTIVITIES: CPT | Mod: GP

## 2023-11-14 PROCEDURE — 250N000011 HC RX IP 250 OP 636: Mod: JZ | Performed by: HOSPITALIST

## 2023-11-14 PROCEDURE — 99233 SBSQ HOSP IP/OBS HIGH 50: CPT | Performed by: INTERNAL MEDICINE

## 2023-11-14 PROCEDURE — 80048 BASIC METABOLIC PNL TOTAL CA: CPT | Performed by: INTERNAL MEDICINE

## 2023-11-14 PROCEDURE — 99232 SBSQ HOSP IP/OBS MODERATE 35: CPT | Performed by: INTERNAL MEDICINE

## 2023-11-14 PROCEDURE — 97110 THERAPEUTIC EXERCISES: CPT | Mod: GP

## 2023-11-14 PROCEDURE — 210N000001 HC R&B IMCU HEART CARE

## 2023-11-14 RX ADMIN — SENNOSIDES AND DOCUSATE SODIUM 1 TABLET: 8.6; 5 TABLET ORAL at 08:38

## 2023-11-14 RX ADMIN — PIPERACILLIN AND TAZOBACTAM 3.38 G: 3; .375 INJECTION, POWDER, LYOPHILIZED, FOR SOLUTION INTRAVENOUS at 05:57

## 2023-11-14 RX ADMIN — MICONAZOLE NITRATE: 20 POWDER TOPICAL at 22:11

## 2023-11-14 RX ADMIN — SENNOSIDES AND DOCUSATE SODIUM 1 TABLET: 8.6; 5 TABLET ORAL at 21:49

## 2023-11-14 RX ADMIN — MICONAZOLE NITRATE 1 APPLICATOR: 20 POWDER TOPICAL at 08:40

## 2023-11-14 RX ADMIN — BUMETANIDE 2 MG: 2 TABLET ORAL at 08:38

## 2023-11-14 RX ADMIN — ACETAMINOPHEN 1000 MG: 500 TABLET ORAL at 08:38

## 2023-11-14 RX ADMIN — METOPROLOL SUCCINATE 25 MG: 25 TABLET, EXTENDED RELEASE ORAL at 16:30

## 2023-11-14 RX ADMIN — Medication 500 MCG: at 08:37

## 2023-11-14 RX ADMIN — LIDOCAINE 1 PATCH: 4 PATCH TOPICAL at 08:39

## 2023-11-14 RX ADMIN — PRAMIPEXOLE DIHYDROCHLORIDE 0.25 MG: 0.25 TABLET ORAL at 21:49

## 2023-11-14 RX ADMIN — MICONAZOLE NITRATE 1 APPLICATOR: 20 POWDER TOPICAL at 13:02

## 2023-11-14 RX ADMIN — BUMETANIDE 2 MG: 2 TABLET ORAL at 19:16

## 2023-11-14 RX ADMIN — ACETAMINOPHEN 1000 MG: 500 TABLET ORAL at 19:13

## 2023-11-14 RX ADMIN — PIPERACILLIN AND TAZOBACTAM 3.38 G: 3; .375 INJECTION, POWDER, LYOPHILIZED, FOR SOLUTION INTRAVENOUS at 21:49

## 2023-11-14 RX ADMIN — ACETAMINOPHEN 1000 MG: 500 TABLET ORAL at 13:02

## 2023-11-14 RX ADMIN — APIXABAN 5 MG: 5 TABLET, FILM COATED ORAL at 21:49

## 2023-11-14 RX ADMIN — APIXABAN 5 MG: 5 TABLET, FILM COATED ORAL at 08:38

## 2023-11-14 RX ADMIN — AMIODARONE HYDROCHLORIDE 100 MG: 100 TABLET ORAL at 16:30

## 2023-11-14 RX ADMIN — ATORVASTATIN CALCIUM 40 MG: 40 TABLET, FILM COATED ORAL at 21:49

## 2023-11-14 RX ADMIN — GABAPENTIN 200 MG: 100 CAPSULE ORAL at 21:49

## 2023-11-14 RX ADMIN — PIPERACILLIN AND TAZOBACTAM 3.38 G: 3; .375 INJECTION, POWDER, LYOPHILIZED, FOR SOLUTION INTRAVENOUS at 13:02

## 2023-11-14 RX ADMIN — EMPAGLIFLOZIN 10 MG: 10 TABLET, FILM COATED ORAL at 08:38

## 2023-11-14 ASSESSMENT — ACTIVITIES OF DAILY LIVING (ADL)
ADLS_ACUITY_SCORE: 47
ADLS_ACUITY_SCORE: 51
ADLS_ACUITY_SCORE: 47
ADLS_ACUITY_SCORE: 51
ADLS_ACUITY_SCORE: 47
ADLS_ACUITY_SCORE: 51
ADLS_ACUITY_SCORE: 47

## 2023-11-14 NOTE — PLAN OF CARE
Problem: Infection  Goal: Absence of Infection Signs and Symptoms  Outcome: Progressing     Problem: Skin Injury Risk Increased  Goal: Skin Health and Integrity  Intervention: Optimize Skin Protection  Recent Flowsheet Documentation  Taken 11/13/2023 1630 by Liseth Sykes RN  Pressure Reduction Techniques:   frequent weight shift encouraged   heels elevated off bed  Activity Management: activity adjusted per tolerance  Head of Bed (HOB) Positioning: HOB at 20-30 degrees     Problem: Wound  Goal: Optimal Coping  Outcome: Progressing  Goal: Optimal Functional Ability  Outcome: Progressing  Intervention: Optimize Functional Ability  Recent Flowsheet Documentation  Taken 11/13/2023 1630 by Liseth Sykes RN  Assistive Device Utilized: lift device  Activity Management: activity adjusted per tolerance  Activity Assistance Provided: assistance, 2 people  Goal: Absence of Infection Signs and Symptoms  Outcome: Progressing  Goal: Improved Oral Intake  Outcome: Progressing  Goal: Optimal Pain Control and Function  Outcome: Progressing  Intervention: Prevent or Manage Pain  Recent Flowsheet Documentation  Taken 11/13/2023 1654 by Liseth Sykes RN  Pain Management Interventions: medication (see MAR)  Goal: Skin Health and Integrity  Outcome: Progressing  Intervention: Optimize Skin Protection  Recent Flowsheet Documentation  Taken 11/13/2023 1630 by Liseth Sykes RN  Pressure Reduction Techniques:   frequent weight shift encouraged   heels elevated off bed  Activity Management: activity adjusted per tolerance  Head of Bed (HOB) Positioning: HOB at 20-30 degrees  Goal: Optimal Wound Healing  Outcome: Progressing     Problem: Adult Inpatient Plan of Care  Goal: Optimal Comfort and Wellbeing  Intervention: Monitor Pain and Promote Comfort  Recent Flowsheet Documentation  Taken 11/13/2023 1654 by Liseth Sykes RN  Pain Management Interventions: medication (see MAR)   Goal Outcome  Evaluation:       Pt is alert and oriented x 4, c/o chronic pain in the shoulders, back , and feet. He prefers laying on his back, he c/p pain whenever he moves or raised his legs. Robaxin given without relief, on Lidocaine patch and Butrans patch for pain. Lung sounds diminished, on RA. HR in the 80's to low 100's, afib/ afutter with variable AV block. Metoprolol held at the start of the shift per holding paramaters.

## 2023-11-14 NOTE — PLAN OF CARE
Problem: Infection  Goal: Absence of Infection Signs and Symptoms  Outcome: Progressing     Problem: Skin Injury Risk Increased  Goal: Skin Health and Integrity  Outcome: Progressing  Intervention: Optimize Skin Protection  Recent Flowsheet Documentation  Taken 11/14/2023 0900 by Valencia Akins, RN  Activity Management: activity adjusted per tolerance     Problem: Wound  Goal: Optimal Coping  Outcome: Progressing  Goal: Optimal Functional Ability  Intervention: Optimize Functional Ability  Recent Flowsheet Documentation  Taken 11/14/2023 0900 by Valencia Akins, RN  Activity Management: activity adjusted per tolerance  Activity Assistance Provided: assistance, 1 person  Goal: Skin Health and Integrity  Intervention: Optimize Skin Protection  Recent Flowsheet Documentation  Taken 11/14/2023 0900 by Valencia Akins RN  Activity Management: activity adjusted per tolerance   Goal Outcome Evaluation:    BP 98/68.  Held am losartan.  Afib HR 65.  Changed wound dressings. Up to bsc twice with assist of 2 but did not have a bm.  Did void.  LBM yesterday.  Up in chair most of shift.  BP 94/59 afternoon bp. Skin in estefanía area healing.  Pt using urinal.  Output 900.  BP at 1430 84/57.  Layed pt in bed and texted hospitalist.

## 2023-11-14 NOTE — PROGRESS NOTES
[facility] Hospitalist Progress Note  [facility]  Admission date: 11/10/2023    Summary:  Gorge Berkowitz Jr. is a 78-year-old man with history of HFrEF, Afib, CAD, obesity, chronic edema, wheel chair bound for months in care center admitted on 11/10/2023 with bilateral venous stasis ulcers with cellulitis and acute on chronic HFmrEF    Wound culture grew Serratia and Enterococcus faecalis for which he received IV Zosyn and vancomycin briefly and was subsequently narrowed to Zosyn only per ID recommendation.  He received IV diuretics briefly for acute on chronic HFmrEF and was subsequently transitioned to oral bumetanide.  Cardiologist assisted with CHF management.      Assessment/Plan    #Acute on chronic HFmEF  -Echocardiogram revealed mildly reduced LV systolic function with LVEF of 45 to 50%, mild septal hypokinesis, mild to moderate inferior wall hypokinesis and mildly dilated right ventricle.  -Off IV diuretics  -Oral bumetanide 2 mg twice daily-started 11/30/2023  -Losartan 12.5 mg daily  -Metoprolol succinate 25 mg daily  -Cardiology team signed off-11/14/2023    #History of CAD  #Chronic RCA occlusion.  -Continue atorvastatin and metoprolol succinate per cardiology recommendation    #Infected bilateral lower extremity stasis ulcer  #Cellulitis  -Off vancomycin-discontinued 11/13/2023  -Continue Zosyn per ID recommendation  -ID [id number] assistance  -WOC      #Sore throat, congestion - COVID negative     #acute on chronic pain syndrome with opiate dependence  -butrans patch, scheduled tylenol, lidocaine patch(more for shoulder djd), robaxin     #ROSALVA  -CPAP at night    #hx of Afib/flutter  -Continue amiodarone, lopressor, eliquis       Checklist:  Code Status: Full Code    Diet: Combination Diet Low Saturated Fat Na <2400mg Diet, No Caffeine Diet     DVT px:  DOAC    Disposition and Discharge Planning  Auto-populated from discharge tab:    Expected Discharge Date: 11/15/2023   "  Discharge Delays: IV Medication - consider oral or Home Infusion  Insurance Authorization needed    Discharge Comments: IV abx  back to care facility         System Identified Risk Variables  Auto-populated based on system request - if needs to be addressed in treatment plan they will be addressed above:  \"  Clinically Significant Risk Factors                   # Heart failure, NOS: heart failure noted on the problem list and last echo with EF 40-50%       # Obesity: Estimated body mass index is 36.01 kg/m  as calculated from the following:    Height as of this encounter: 1.753 m (5' 9\").    Weight as of this encounter: 110.6 kg (243 lb 13.3 oz).        # Financial/Environmental Concerns: none         \"    Interval Events/Subjective/Notable results:  No new complaints today and no acute events overnight.  He denies shortness of breath, chest pain, palpitation or dizziness. He endorsed occasional back pain and pain in his legs. He states he has not been able to stand up in several weeks, but stood up yesterday during PT session     Objective  Vital signs in last 24 hours  Temp:  [97.3  F (36.3  C)-97.8  F (36.6  C)] 97.8  F (36.6  C)  Pulse:  [74-95] 93  Resp:  [18-20] 20  BP: ()/(55-70) 84/57  SpO2:  [96 %-98 %] 98 % O2 Device: None (Room air)    Weight:   243 lbs 13.26 oz  Body mass index is 36.01 kg/m .    Intake/Output last 3 shifts  I/O last 3 completed shifts:  In: 860 [P.O.:760; I.V.:100]  Out: 2800 [Urine:2800]    Physical Exam  General:  Morbidly obese, alert, cooperative, no distress, generalized weakness, deconditioned.  Laying flat on the bed.   Neurologic:  oriented, facial symmetry preserved, fluent speech.   Psych: calm  HEENT:  Anicteric, MMM  CV: RRR no MRG, normal S1 and S2, no edema  Lungs: Diminished entry bilaterally.  No crackles.  Abd: soft, obese, NT  Skin: Bilateral stasis dermatitis with purulent stasis ulcer covered with slough in the distal leg and left foot.  Left foot wrapped " with clean gauze   Levy Catheter: Not present  Lines: None          Medical Decision Making       Deni Will MD

## 2023-11-14 NOTE — PROGRESS NOTES
HEART CARE NOTE          Assessment/Recommendations     1. HFmrEF-BiV c/b severe ADHF  Assessment / Plan  Tolerating oral diuretic regimen - no changes at this time; continue to monitor renal function/repeat BMP, UOP and hemodynamics closely   Patient is high risk of adverse cardiac events 2/2 advanced age, systolic dysfunction, CAD  GDMT as detailed below; mainstay of treatment for HFmrEF includes diuretics (class I) and SGLT2-I (class 2a); additional medical therapy demonstrated with less robust evidence for indication but should be considered per guideline recommendations (2b)     Current Pharmacotherapy AHA Guideline-Directed Medical Therapy   Losartan 12.5 mg daily Lisinopril 20 mg twice daily   Metoprolol succinate 25 mg daily Carvedilol 25 mg twice daily   Spironolactone not started Spironolactone 25 mg once daily   Hydralazine NA Hydralazine 100 mg three times daily   Isosorbide dinitrate NA Isosorbide dinitrate 40 mg three times daily   SGLT2 inhibitor:Empagliflozin - 10 mg daily Dapagliflozin or Empagliflozin 10 mg daily      2. Aflutter  Assessment / Plan  Rate controlled; currently on apixaban, amiodarone      3. CAD  Assessment / Plan  Hx of RCA ; Denies chest pain or anginal equivalents - continue atorvastatin, metoprolol     4. ROSALVA  Assessment / Plan  CPAP at bedtime     5. HLP  Assessment / Plan  Continue atorvastatin    Plan of care discussed on November 14, 2023 with patient at bedside, and primary team overseeing patient's care    50 minutes spent reviewing prior records (including documentation, laboratory studies, cardiac testing/imaging), history and physical exam, planning, and subsequent documentation.    Cardiology team will sign-off for now. Please do not hesitate to consult us again if new questions or concerns arise. Follow-up appointment will be arranged by CORE/HF clinic.       History of Present Illness/Subjective    Mr. Gorge Berkowitz . is a 78 year old male with a PMHx  "significant for (per Epic notation) systolic CHF, Afib, CAD, obesity, chronic edema, wheel chair bound for months in care center here with more pain in legs, weeping and open sores      Today, Mr. Otero denies any acute cardiac events or complaints; Management plan as detailed above     ECG: personally reviewed; atrial flutter with variable AV block.     ECHO (personnaly Reviewed on 11/13/23):  Left ventricular function is decreased. The ejection fraction is 45-50%  (mildly reduced).  There is mild septal hypokinesis.  There is mild to moderate inferior wall hypokinesis.  The right ventricle is mildly dilated.  Mildly decreased right ventricular systolic function  Ascending Aorta dilatation is present.  No hemodynamically significant valvular abnormalities on 2D or color flow  imaging.    Lab results: personally reviewed November 14, 2023; notable for stable renal function and electrolytes    Medical history and pertinent documents reviewed in Care Everywhere please where applicable see details above        Physical Examination Review of Systems   /70 (BP Location: Right arm)   Pulse 76   Temp 97.5  F (36.4  C) (Oral)   Resp 20   Ht 1.753 m (5' 9\")   Wt 110.6 kg (243 lb 13.3 oz)   SpO2 98%   BMI 36.01 kg/m    Body mass index is 36.01 kg/m .  Wt Readings from Last 3 Encounters:   11/14/23 110.6 kg (243 lb 13.3 oz)   09/04/23 120.2 kg (265 lb)   10/18/22 129.3 kg (285 lb)     General Appearance:   no distress, normal body habitus   ENT/Mouth: membranes moist, no oral lesions or bleeding gums.      EYES:  no scleral icterus, normal conjunctivae   Neck: no carotid bruits or thyromegaly   Chest/Lungs:   lungs are clear to auscultation, no rales or wheezing, equal chest wall expansion    Cardiovascular:   Regular. Normal first and second heart sounds with no murmurs, rubs, or gallops; the carotid, radial and posterior tibial pulses are intact, no JVD and trace LE edema bilaterally    Abdomen:  no " organomegaly, masses, bruits, or tenderness; bowel sounds are present   Extremities: no cyanosis or clubbing   Skin: no xanthelasma, warm.    Neurologic: NAD     Psychiatric: alert and oriented x3, calm     A complete 10 systems ROS was reviewed  And is negative except what is listed in the HPI.          Medical History  Surgical History Family History Social History   Past Medical History:   Diagnosis Date     Atrial fibrillation (H)      Cardiomyopathy (H)     ef 20%     CHF (congestive heart failure) (H)      Morbid obesity (H)      ROSALVA (obstructive sleep apnea)     Past Surgical History:   Procedure Laterality Date     CARDIAC CATHETERIZATION  08/10/2018    and right heart cath     CARDIOVERSION  2018    successful     CV CORONARY ANGIOGRAM N/A 8/10/2018    Procedure: Coronary Angiogram;  Surgeon: Ralph Powers MD;  Location: [facility] Cath Lab;  Service:      CV LEFT HEART CATHETERIZATION WITHOUT LEFT VENTRICULOGRAM Left 8/10/2018    Procedure: Left Heart Catheterization Without Left Ventriculogram;  Surgeon: Ralph Powers MD;  Location: [facility] Cath Lab;  Service:      CV RIGHT HEART CATHETERIZATION N/A 8/10/2018    Procedure: Right Heart Catheterization;  Surgeon: Ralph Powers MD;  Location: [facility] Cath Lab;  Service:     no family history of premature coronary artery disease Social History     Socioeconomic History     Marital status: Single     Spouse name: Not on file     Number of children: Not on file     Years of education: Not on file     Highest education level: Not on file   Occupational History     Not on file   Tobacco Use     Smoking status: Former     Types: Cigarettes     Quit date: 2005     Years since quittin.8     Smokeless tobacco: Never   Substance and Sexual Activity     Alcohol use: No     Comment: Alcoholic Drinks/day: former alcoholic- quit      Drug use: No     Sexual activity: Not on file   Other Topics Concern     Not on file    Social History Narrative     Not on file     Social Determinants of Health     Financial Resource Strain: Not on file   Food Insecurity: Not on file   Transportation Needs: Not on file   Physical Activity: Not on file   Stress: Not on file   Social Connections: Not on file   Interpersonal Safety: Not on file   Housing Stability: Not on file           Lab Results    Chemistry/lipid CBC Cardiac Enzymes/BNP/TSH/INR   Lab Results   Component Value Date    CHOL 167 08/08/2018    HDL 45 08/08/2018    TRIG 59 08/08/2018    BUN 22.1 11/14/2023     11/14/2023    CO2 29 11/14/2023    Lab Results   Component Value Date    WBC 5.9 11/11/2023    HGB 13.1 (L) 11/11/2023    HCT 40.6 11/11/2023     (H) 11/11/2023     11/11/2023    Lab Results   Component Value Date    TROPONINI 0.02 08/08/2018     (H) 08/08/2018    TSH 0.70 09/04/2023    INR 1.25 (H) 10/18/2022     Lab Results   Component Value Date    TROPONINI 0.02 08/08/2018          Weight:    Wt Readings from Last 3 Encounters:   11/14/23 110.6 kg (243 lb 13.3 oz)   09/04/23 120.2 kg (265 lb)   10/18/22 129.3 kg (285 lb)       Allergies  No Known Allergies      Surgical History  Past Surgical History:   Procedure Laterality Date     CARDIAC CATHETERIZATION  08/10/2018    and right heart cath     CARDIOVERSION  08/16/2018    successful     CV CORONARY ANGIOGRAM N/A 8/10/2018    Procedure: Coronary Angiogram;  Surgeon: Ralph Powers MD;  Location: [facility] Cath Lab;  Service:      CV LEFT HEART CATHETERIZATION WITHOUT LEFT VENTRICULOGRAM Left 8/10/2018    Procedure: Left Heart Catheterization Without Left Ventriculogram;  Surgeon: Ralph Powers MD;  Location: [facility] Cath Lab;  Service:      CV RIGHT HEART CATHETERIZATION N/A 8/10/2018    Procedure: Right Heart Catheterization;  Surgeon: Ralph Powers MD;  Location: [facility] Cath Lab;  Service:        Social History  Tobacco:   History   Smoking Status     Former      Quit date: 1/1/2005   Smokeless Tobacco     Never    Alcohol:   Social History    Substance and Sexual Activity      Alcohol use: No        Comment: Alcoholic Drinks/day: former alcoholic- quit 2003   Illicit Drugs:   History   Drug Use No       Family History  History reviewed. No pertinent family history.       Sunita Houser MD on 11/14/2023      cc: Lisseth Scott

## 2023-11-14 NOTE — PLAN OF CARE
Problem: Adult Inpatient Plan of Care  Goal: Plan of Care Review  Description: The Plan of Care Review/Shift note should be completed every shift.  The Outcome Evaluation is a brief statement about your assessment that the patient is improving, declining, or no change.  This information will be displayed automatically on your shift  note.  Outcome: Progressing     Problem: Infection  Goal: Absence of Infection Signs and Symptoms  Outcome: Progressing  Intervention: Prevent or Manage Infection  Recent Flowsheet Documentation  Taken 11/14/2023 0001 by Izabel Mosley RN  Infection Management: aseptic technique maintained     Problem: Infection  Goal: Absence of Infection Signs and Symptoms  Intervention: Prevent or Manage Infection  Recent Flowsheet Documentation  Taken 11/14/2023 0001 by Izabel Mosley RN  Infection Management: aseptic technique maintained     Problem: Skin Injury Risk Increased  Goal: Skin Health and Integrity  Outcome: Progressing  Intervention: Optimize Skin Protection  Recent Flowsheet Documentation  Taken 11/14/2023 0001 by Izabel Mosley RN  Pressure Reduction Techniques: frequent weight shift encouraged  Skin Protection: incontinence pads utilized  Activity Management: activity adjusted per tolerance     Problem: Skin Injury Risk Increased  Goal: Skin Health and Integrity  Intervention: Optimize Skin Protection  Recent Flowsheet Documentation  Taken 11/14/2023 0001 by Izabel Mosley RN  Pressure Reduction Techniques: frequent weight shift encouraged  Skin Protection: incontinence pads utilized  Activity Management: activity adjusted per tolerance     Problem: Wound  Goal: Optimal Coping  Outcome: Progressing     Problem: Wound  Goal: Optimal Functional Ability  Outcome: Progressing  Intervention: Optimize Functional Ability  Recent Flowsheet Documentation  Taken 11/14/2023 0001 by Izabel Mosley RN  Activity Management: activity adjusted per tolerance     Problem:  Wound  Goal: Optimal Functional Ability  Intervention: Optimize Functional Ability  Recent Flowsheet Documentation  Taken 11/14/2023 0001 by Izabel Mosley RN  Activity Management: activity adjusted per tolerance     Problem: Wound  Goal: Absence of Infection Signs and Symptoms  Outcome: Progressing  Intervention: Prevent or Manage Infection  Recent Flowsheet Documentation  Taken 11/14/2023 0001 by Izabel Mosley RN  Infection Management: aseptic technique maintained     Problem: Wound  Goal: Absence of Infection Signs and Symptoms  Intervention: Prevent or Manage Infection  Recent Flowsheet Documentation  Taken 11/14/2023 0001 by Izabel Mosley RN  Infection Management: aseptic technique maintained     Problem: Wound  Goal: Improved Oral Intake  Outcome: Progressing     Problem: Wound  Goal: Optimal Pain Control and Function  Outcome: Progressing     Problem: Wound  Goal: Skin Health and Integrity  Outcome: Progressing  Intervention: Optimize Skin Protection  Recent Flowsheet Documentation  Taken 11/14/2023 0001 by Izabel Mosley RN  Pressure Reduction Techniques: frequent weight shift encouraged  Skin Protection: incontinence pads utilized  Activity Management: activity adjusted per tolerance     Problem: Wound  Goal: Skin Health and Integrity  Intervention: Optimize Skin Protection  Recent Flowsheet Documentation  Taken 11/14/2023 0001 by Izabel Mosley RN  Pressure Reduction Techniques: frequent weight shift encouraged  Skin Protection: incontinence pads utilized  Activity Management: activity adjusted per tolerance     Problem: Wound  Goal: Optimal Wound Healing  Outcome: Progressing   Goal Outcome Evaluation:       Patient is A&Ox4,denies pain at this time at rest. Rhythm=A-flutter, HR=68, 02 sat's 97% onroom air.uses urinal at bed side and needs help at times.

## 2023-11-14 NOTE — PROGRESS NOTES
"[facility] ID Inpatient follow up       Patient:  Gorge Berkowitz Jr.  Date of birth 1945, Medical record number [id number]  Date of Visit:  2023  Attending Physician: Deni Will MD         Assessment and Recommendations:   Assessment:  Gorge Berkowitz Jr. is a 78 year old male with   Cardiomyopathy with EF of 20%.  Morbid obesity with BMI of 36.98.  Wheelchair-bound for several months.  Bilateral venous stasis ulcers with cellulitis.  Cultures with MSSA, Klebsiella oxytoca, Serratia, Corynebacterium striatum, and Enterococcus faecalis.  Was on IV Zosyn and vancomycin.  IV vancomycin discontinued on 2023.    Recommendations:  Continue IV Zosyn.  Elevation of the legs.  Lymphedema care.  Wound care.    Discussed with the patient and nursing staff.     ID will follow     Candelario Bettencourt MD.  [facility].   [facility]  Office Telephone [phone].  Fax [phone]  [facility] paging            Interval History:     HPI:  The interval history was reviewed.   Feels about the same today.  Pain although a little better.    Pertinent cultures include:  No results found for: \"CULT\"    Recent Inflammatory Biomarkers:   Recent Labs   Lab Test 23  0642 11/10/23  1333 23  0405 10/18/22  1346 20  0703 10/23/20  0645   PCAL  --  0.08*  --   --   --   --    WBC 5.9 6.6 7.0 6.1 4.8 6.3            Review of Systems:   CONSTITUTIONAL:    Temp Max: Temp (24hrs), Av  F (36.7  C), Min:97.7  F (36.5  C), Max:98.4  F (36.9  C)   .  Negative except for findings in the HPI.           Current Medications (antimicrobials listed in bold):      acetaminophen  1,000 mg Oral TID    amiodarone  100 mg Oral Daily    apixaban ANTICOAGULANT  5 mg Oral BID    atorvastatin  40 mg Oral At Bedtime    bumetanide  2 mg Oral BID    [START ON 11/15/2023] buprenorphine  1 patch Transdermal Weekly    buprenorphine   " Transdermal Q8H MARLENI    empagliflozin  10 mg Oral Daily    gabapentin  200 mg Oral At Bedtime    Lidocaine  1 patch Transdermal Q24H    losartan  12.5 mg Oral Daily    metoprolol succinate ER  25 mg Oral Daily    miconazole   Topical TID    piperacillin-tazobactam  3.375 g Intravenous Q8H    pramipexole  0.25 mg Oral At Bedtime    senna-docusate  1 tablet Oral BID    vitamin B-12  500 mcg Oral Daily              Allergies:   No Known Allergies         Physical Exam:   Vitals were reviewed  Patient Vitals for the past 24 hrs:   BP Temp Temp src Pulse Resp SpO2 Weight   11/14/23 1205 94/59 97.8  F (36.6  C) Oral 93 20 98 % --   11/14/23 0836 98/68 -- -- -- -- -- --   11/14/23 0813 91/55 97.6  F (36.4  C) Oral 83 18 96 % --   11/14/23 0332 104/70 97.5  F (36.4  C) Oral 76 20 98 % 110.6 kg (243 lb 13.3 oz)   11/14/23 0001 107/65 97.4  F (36.3  C) Oral 74 19 97 % --   11/13/23 1636 109/69 -- -- 89 -- -- --   11/13/23 1528 101/64 97.3  F (36.3  C) Oral 95 20 97 % --       Physical Examination:  Gen: Pleasant in no acute distress.  HEENT: NCAT. EOMI. PERRL.  Neck: No bruit, JVD or thyromegaly.  Lungs: Clear to ascultation bilat with no crackles or wheezes.  Card: RRR. NSR. No RMG. Peripheral pulses present and symmetric. No edema.  Abd: Soft NT ND. No mass. Normal bowel sounds.  Skin: No rash.  Extr: Legs are wrapped.   Neuro: Alert and oriented to place time and person.        Susceptibility data from last 90 days.           Laboratory Data:   ID Labs:  Microbiology labs:  Wound Aerobic Bacterial Culture Routine  Order: [id number]  Collected 11/11/2023  4:55 PM       Status: Preliminary result       Visible to patient: No (not released)    Specimen Information: Leg, left; Wound   0 Result Notes  Culture Culture in progress      4+ Serratia marcescens Abnormal    Identification is preliminary, confirmation in progress   1+ Enterococcus faecalis Abnormal            Resulting Agency: [facility]           Specimen Collected: 11/11/23   4:55 PM Last Resulted: 11/12/23  2:30 PM         Wound Aerobic Bacterial Culture Routine with Gram Stain  Order: [id number]  Collected 11/11/2023  4:55 PM       Status: Preliminary result       Visible to patient: No (not released)    Specimen Information: Leg, Right; Wound   0 Result Notes  Culture 4+ Serratia marcescens Abnormal       1+ Klebsiella oxytoca Abnormal       2+ Enterococcus faecalis Abnormal       2+ Staphylococcus aureus Abnormal       4+ Corynebacterium striatum Abnormal    Identification obtained by MALDI-TOF mass spectrometry research use only database. Test characteristics determined and verified by the [facility].  Susceptibilities not routinely done, refer to antibiogram to view typical susceptibility profiles            Gram Stain Result  Abnormal   4+ Gram negative bacilli      2+ Gram positive bacilli      4+ WBC seen   Predominantly PMNs           Resulting Agency: [facility]     Susceptibility     Serratia marcescens Klebsiella oxytoca Enterococcus faecalis Staphylococcus aureus     DARRYL (Preliminary) DARRYL (Preliminary) DARRYL DARRYL     Ampicillin  Resistant 1  Resistant 1 <=2 ug/mL Susceptible       Ampicillin/ Sulbactam  Resistant 1 >=32 ug/mL Resistant         Cefepime <=1 ug/mL Susceptible <=1 ug/mL Susceptible         Ceftazidime <=1 ug/mL Susceptible <=1 ug/mL Susceptible         Ceftriaxone <=1 ug/mL Susceptible <=1 ug/mL Susceptible         Ciprofloxacin <=0.25 ug/mL Susceptible <=0.25 ug/mL Susceptible         Clindamycin       0.25 ug/mL Susceptible     Daptomycin       0.5 ug/mL Susceptible     Doxycycline       <=0.5 ug/mL Susceptible     Erythromycin       <=0.25 ug/mL Susceptible     Gentamicin <=1 ug/mL Susceptible <=1 ug/mL Susceptible   <=0.5 ug/mL Susceptible     Gentamicin Synergy     Susceptible... Susceptible 2       Levofloxacin <=0.12 ug/mL Susceptible <=0.12 ug/mL Susceptible         Oxacillin       0.5 ug/mL Susceptible 3      Piperacillin/Tazobactam 3 ug/mL Susceptible           Tetracycline       <=1 ug/mL Susceptible     Tobramycin <=1 ug/mL Susceptible <=1 ug/mL Susceptible         Trimethoprim/Sulfamethoxazole <=1/19 ug/mL Susceptible <=1/19 ug/mL Susceptible   <=0.5/9.5 u... Susceptible     Vancomycin     1 ug/mL Susceptible <=0.5 ug/mL Susceptible                    1 Intrinsically Resistant   2 No high level gentamicin resistance found - therefore combination therapy with an aminoglycoside may be indicated for serious enterococcal infections such as bacteremia and endocarditis.   3 Oxacillin susceptible isolates are susceptible to cephalosporins (example: cefazolin and cephalexin) and beta lactam combination agents. Oxacillin resistant isolates are resistant to these agents.      Susceptibility Comments    Serratia marcescens   Additional susceptibilities in progress.   Klebsiella oxytoca   Additional susceptibilities in progress.         Specimen Collected: 11/11/23  4:55 PM Last Resulted: 11/14/23  7:52 AM           No lab results found.  Recent Labs   Lab Test 11/11/23  0642 11/10/23  1333 09/04/23  0405 10/18/22  1346 11/02/20  0703 10/23/20  0645   WBC 5.9 6.6 7.0 6.1 4.8 6.3     Recent Labs   Lab Test 11/14/23  0443 11/13/23  0457 11/12/23  0503 11/11/23  0642   CR 1.00 1.09 0.99 0.99   GFRESTIMATED 78 70 79 79       Hematology Studies  Recent Labs   Lab Test 11/11/23  0642 11/10/23  1333 09/04/23  0405 10/18/22  1346 11/02/20  0703 10/23/20  0645   WBC 5.9 6.6 7.0 6.1 4.8 6.3   HGB 13.1* 12.7* 13.9 12.5* 12.8* 12.0*   HCT 40.6 39.0* 41.6 38.0* 39.9* 37.4*    210 148* 138* 212 162       Metabolic  Recent Labs   Lab Test 11/14/23  0443 11/13/23  0457 11/12/23  0503    139 138   BUN 22.1 23.1* 21.8   CO2 29 29 27   CR 1.00 1.09 0.99   GFRESTIMATED 78 70 79       Hepatic Studies  Recent Labs   Lab Test 11/10/23  1333 09/04/23  0601 10/18/22  1346   BILITOTAL 0.6 0.8 0.5   ALKPHOS 130* 82 90   ALBUMIN 3.5 3.5 3.7    AST 31 33 20   ALT 19 17 16       Immunologlobulins  Recent Labs   Lab Test 11/10/23  1333   SED 41*            Imaging Data:   Reviewed

## 2023-11-15 ENCOUNTER — APPOINTMENT (OUTPATIENT)
Dept: OCCUPATIONAL THERAPY | Facility: HOSPITAL | Age: 78
DRG: 602 | End: 2023-11-15
Payer: COMMERCIAL

## 2023-11-15 ENCOUNTER — APPOINTMENT (OUTPATIENT)
Dept: PHYSICAL THERAPY | Facility: HOSPITAL | Age: 78
DRG: 602 | End: 2023-11-15
Payer: COMMERCIAL

## 2023-11-15 LAB
ANION GAP SERPL CALCULATED.3IONS-SCNC: 16 MMOL/L (ref 7–15)
BACTERIA BLD CULT: NO GROWTH
BACTERIA BLD CULT: NO GROWTH
BLAIMP ISLT/SPM QL: NOT DETECTED
BLAKPC ISLT/SPM QL: NOT DETECTED
BLAOXA-48 ISLT/SPM QL: NOT DETECTED
BLAVIM ISLT/SPM QL: NOT DETECTED
BUN SERPL-MCNC: 27.8 MG/DL (ref 8–23)
CALCIUM SERPL-MCNC: 9.4 MG/DL (ref 8.8–10.2)
CHLORIDE SERPL-SCNC: 99 MMOL/L (ref 98–107)
CREAT SERPL-MCNC: 1.09 MG/DL (ref 0.67–1.17)
CRP SERPL-MCNC: 40 MG/L
DEPRECATED HCO3 PLAS-SCNC: 20 MMOL/L (ref 22–29)
EGFRCR SERPLBLD CKD-EPI 2021: 70 ML/MIN/1.73M2
GLUCOSE SERPL-MCNC: 79 MG/DL (ref 70–99)
NDM TARGET DNA: NOT DETECTED
POTASSIUM SERPL-SCNC: 4.4 MMOL/L (ref 3.4–5.3)
POTASSIUM SERPL-SCNC: 5.6 MMOL/L (ref 3.4–5.3)
SODIUM SERPL-SCNC: 135 MMOL/L (ref 135–145)

## 2023-11-15 PROCEDURE — 99232 SBSQ HOSP IP/OBS MODERATE 35: CPT | Performed by: INTERNAL MEDICINE

## 2023-11-15 PROCEDURE — 36415 COLL VENOUS BLD VENIPUNCTURE: CPT | Performed by: INTERNAL MEDICINE

## 2023-11-15 PROCEDURE — 250N000013 HC RX MED GY IP 250 OP 250 PS 637: Performed by: INTERNAL MEDICINE

## 2023-11-15 PROCEDURE — 86140 C-REACTIVE PROTEIN: CPT | Performed by: STUDENT IN AN ORGANIZED HEALTH CARE EDUCATION/TRAINING PROGRAM

## 2023-11-15 PROCEDURE — 84132 ASSAY OF SERUM POTASSIUM: CPT | Performed by: INTERNAL MEDICINE

## 2023-11-15 PROCEDURE — 99232 SBSQ HOSP IP/OBS MODERATE 35: CPT | Performed by: STUDENT IN AN ORGANIZED HEALTH CARE EDUCATION/TRAINING PROGRAM

## 2023-11-15 PROCEDURE — 210N000001 HC R&B IMCU HEART CARE

## 2023-11-15 PROCEDURE — 80048 BASIC METABOLIC PNL TOTAL CA: CPT | Performed by: INTERNAL MEDICINE

## 2023-11-15 PROCEDURE — 250N000011 HC RX IP 250 OP 636: Mod: JZ | Performed by: HOSPITALIST

## 2023-11-15 PROCEDURE — 250N000013 HC RX MED GY IP 250 OP 250 PS 637: Performed by: STUDENT IN AN ORGANIZED HEALTH CARE EDUCATION/TRAINING PROGRAM

## 2023-11-15 PROCEDURE — 97116 GAIT TRAINING THERAPY: CPT | Mod: GP

## 2023-11-15 PROCEDURE — 97110 THERAPEUTIC EXERCISES: CPT | Mod: GP

## 2023-11-15 PROCEDURE — 97535 SELF CARE MNGMENT TRAINING: CPT | Mod: GO

## 2023-11-15 RX ORDER — LEVOFLOXACIN 500 MG/1
500 TABLET, FILM COATED ORAL DAILY
Status: DISCONTINUED | OUTPATIENT
Start: 2023-11-15 | End: 2023-11-22

## 2023-11-15 RX ADMIN — PIPERACILLIN AND TAZOBACTAM 3.38 G: 3; .375 INJECTION, POWDER, LYOPHILIZED, FOR SOLUTION INTRAVENOUS at 06:27

## 2023-11-15 RX ADMIN — APIXABAN 5 MG: 5 TABLET, FILM COATED ORAL at 09:18

## 2023-11-15 RX ADMIN — SENNOSIDES AND DOCUSATE SODIUM 1 TABLET: 8.6; 5 TABLET ORAL at 09:18

## 2023-11-15 RX ADMIN — ACETAMINOPHEN 1000 MG: 500 TABLET ORAL at 13:19

## 2023-11-15 RX ADMIN — AMIODARONE HYDROCHLORIDE 100 MG: 100 TABLET ORAL at 16:01

## 2023-11-15 RX ADMIN — GABAPENTIN 200 MG: 100 CAPSULE ORAL at 21:24

## 2023-11-15 RX ADMIN — MICONAZOLE NITRATE: 20 POWDER TOPICAL at 13:19

## 2023-11-15 RX ADMIN — LEVOFLOXACIN 500 MG: 500 TABLET, FILM COATED ORAL at 13:15

## 2023-11-15 RX ADMIN — METOPROLOL SUCCINATE 25 MG: 25 TABLET, EXTENDED RELEASE ORAL at 16:02

## 2023-11-15 RX ADMIN — EMPAGLIFLOZIN 10 MG: 10 TABLET, FILM COATED ORAL at 09:28

## 2023-11-15 RX ADMIN — MICONAZOLE NITRATE 1 APPLICATOR: 20 POWDER TOPICAL at 21:25

## 2023-11-15 RX ADMIN — BUMETANIDE 2 MG: 2 TABLET ORAL at 17:35

## 2023-11-15 RX ADMIN — APIXABAN 5 MG: 5 TABLET, FILM COATED ORAL at 21:25

## 2023-11-15 RX ADMIN — PRAMIPEXOLE DIHYDROCHLORIDE 0.25 MG: 0.25 TABLET ORAL at 21:25

## 2023-11-15 RX ADMIN — Medication 500 MCG: at 16:08

## 2023-11-15 RX ADMIN — SENNOSIDES AND DOCUSATE SODIUM 1 TABLET: 8.6; 5 TABLET ORAL at 21:24

## 2023-11-15 RX ADMIN — ACETAMINOPHEN 1000 MG: 500 TABLET ORAL at 21:24

## 2023-11-15 RX ADMIN — BUMETANIDE 2 MG: 2 TABLET ORAL at 09:18

## 2023-11-15 RX ADMIN — ATORVASTATIN CALCIUM 40 MG: 40 TABLET, FILM COATED ORAL at 21:24

## 2023-11-15 RX ADMIN — MICONAZOLE NITRATE: 20 POWDER TOPICAL at 09:31

## 2023-11-15 RX ADMIN — ACETAMINOPHEN 1000 MG: 500 TABLET ORAL at 09:18

## 2023-11-15 RX ADMIN — LIDOCAINE 1 PATCH: 4 PATCH TOPICAL at 09:19

## 2023-11-15 ASSESSMENT — ACTIVITIES OF DAILY LIVING (ADL)
ADLS_ACUITY_SCORE: 47

## 2023-11-15 NOTE — PLAN OF CARE
Goal Outcome Evaluation:      Plan of Care Reviewed With: patient    Overall Patient Progress: improvingOverall Patient Progress: improving       AOx4, VSS on RA, denies CP, SOB, dizziness; c/o of pain in LE and shoulder, schedule tylenol given. IV zosyn administered. Able to make needs known, call light in reach.      Heart Failure Care Map  GOALS TO BE MET BEFORE DISCHARGE:    1. Decrease congestion and/or edema with diuretic therapy to achieve near optimal volume status.     Dyspnea improved: Yes, satisfactory for discharge.   Edema improved: No, further care required to meet this goal. Please explain +2 edema noted in BLE        Net I/O and Weights since admission:   10/16 0700 - 11/15 0659  In: 2865 [P.O.:2765; I.V.:100]  Out: 34308 [Urine:00566]  Net: -78502     Vitals:    11/10/23 1212 11/11/23 0519 11/12/23 0423 11/13/23 0404   Weight: 133.8 kg (295 lb) 117.5 kg (259 lb 0.7 oz) 118 kg (260 lb 2.3 oz) 113.6 kg (250 lb 7.1 oz)    11/14/23 0332   Weight: 110.6 kg (243 lb 13.3 oz)       2.  O2 sats > 92% on room air, or at prior home O2 therapy level.      Able to wean O2 this shift to keep sats above 92%?: Yes, satisfactory for discharge.   Does patient use Home O2? No          Current oxygenation status:   SpO2: 97 %     O2 Device: None (Room air),      3.  Tolerates ambulation and mobility near baseline.     Ambulation: No, further care required to meet this goal. Please explain n/a   Times patient ambulated with staff this shift: 0    Please review the Heart Failure Care Map for additional HF goal outcomes.    Mary Jane Rogers RN  11/14/2023

## 2023-11-15 NOTE — PLAN OF CARE
"  Problem: Adult Inpatient Plan of Care  Goal: Plan of Care Review  Description: The Plan of Care Review/Shift note should be completed every shift.  The Outcome Evaluation is a brief statement about your assessment that the patient is improving, declining, or no change.  This information will be displayed automatically on your shift  note.  Outcome: Progressing  Flowsheets (Taken 11/15/2023 1335)  Plan of Care Reviewed With:   patient   friend  Goal: Patient-Specific Goal (Individualized)  Description: You can add care plan individualizations to a care plan. Examples of Individualization might be:  \"Parent requests to be called daily at 9am for status\", \"I have a hard time hearing out of my right ear\", or \"Do not touch me to wake me up as it startles  me\".  Outcome: Progressing  Goal: Absence of Hospital-Acquired Illness or Injury  Outcome: Progressing  Intervention: Identify and Manage Fall Risk  Recent Flowsheet Documentation  Taken 11/15/2023 1230 by Marley Garner RN  Safety Promotion/Fall Prevention:   treat underlying cause   treat reversible contributory factors   supervised activity   safety round/check completed   room organization consistent   room door open   nonskid shoes/slippers when out of bed   clutter free environment maintained   activity supervised   lighting adjusted   patient and family education  Taken 11/15/2023 0915 by Marley Garner RN  Safety Promotion/Fall Prevention:   treat underlying cause   treat reversible contributory factors   supervised activity   safety round/check completed   room organization consistent   room door open   nonskid shoes/slippers when out of bed   clutter free environment maintained   activity supervised   lighting adjusted   patient and family education  Intervention: Prevent Skin Injury  Recent Flowsheet Documentation  Taken 11/15/2023 1230 by Marley Garner RN  Body Position: (refuses repositioning, pt log rolled without complications)   weight shifting   " "log-rolled  Skin Protection: incontinence pads utilized  Device Skin Pressure Protection: absorbent pad utilized/changed  Taken 11/15/2023 1106 by Marley Garner RN  Body Position: weight shifting  Taken 11/15/2023 0915 by Marley Garner RN  Body Position: (refuses repositioning, pt log rolled without complications)   weight shifting   log-rolled  Skin Protection: incontinence pads utilized  Device Skin Pressure Protection: absorbent pad utilized/changed  Intervention: Prevent Infection  Recent Flowsheet Documentation  Taken 11/15/2023 1230 by Marley Garner RN  Infection Prevention:   single patient room provided   rest/sleep promoted   hand hygiene promoted   environmental surveillance performed  Taken 11/15/2023 0915 by Marley Garner RN  Infection Prevention:   single patient room provided   rest/sleep promoted   hand hygiene promoted   environmental surveillance performed  Goal: Optimal Comfort and Wellbeing  Outcome: Progressing  Intervention: Monitor Pain and Promote Comfort  Recent Flowsheet Documentation  Taken 11/15/2023 0916 by Marley Garner RN  Pain Management Interventions:   medication (see MAR)   emotional support   pillow support provided   repositioned  Goal: Readiness for Transition of Care  Outcome: Progressing     Problem: Risk for Delirium  Goal: Optimal Coping  Outcome: Progressing  Intervention: Optimize Psychosocial Adjustment to Delirium  Recent Flowsheet Documentation  Taken 11/15/2023 1230 by Marley Garner RN  Supportive Measures: active listening utilized  Taken 11/15/2023 0915 by Marley Garner RN  Supportive Measures: active listening utilized  Goal: Improved Behavioral Control  Outcome: Progressing  Intervention: Prevent and Manage Agitation  Recent Flowsheet Documentation  Taken 11/15/2023 1230 by Marley Garner RN  Complementary Therapy: (pt enjoying watching \"The Hunger Games.\") other (see comments)  Taken 11/15/2023 0915 by Marley Garner RN  Complementary Therapy: (pt enjoying watching " "\"The Hunger Games.\") other (see comments)  Intervention: Minimize Safety Risk  Recent Flowsheet Documentation  Taken 11/15/2023 1230 by Marley Garner RN  Enhanced Safety Measures: room near unit station  Taken 11/15/2023 0915 by Marley Garner RN  Enhanced Safety Measures: room near unit station  Goal: Improved Attention and Thought Clarity  Outcome: Progressing  Goal: Improved Sleep  Outcome: Progressing     Problem: Infection  Goal: Absence of Infection Signs and Symptoms  Outcome: Progressing     Problem: Skin Injury Risk Increased  Goal: Skin Health and Integrity  Outcome: Progressing  Intervention: Optimize Skin Protection  Recent Flowsheet Documentation  Taken 11/15/2023 1230 by Marley Garner RN  Pressure Reduction Techniques:   frequent weight shift encouraged   heels elevated off bed   positioned off wounds  Skin Protection: incontinence pads utilized  Activity Management: activity adjusted per tolerance  Head of Bed (HOB) Positioning: HOB at 20-30 degrees  Taken 11/15/2023 0915 by Marley Garner RN  Pressure Reduction Techniques:   frequent weight shift encouraged   heels elevated off bed   positioned off wounds  Skin Protection: incontinence pads utilized  Activity Management: activity adjusted per tolerance  Head of Bed (HOB) Positioning: HOB at 20-30 degrees     Problem: Wound  Goal: Optimal Coping  Outcome: Progressing  Intervention: Support Patient and Family Response  Recent Flowsheet Documentation  Taken 11/15/2023 1230 by Marley Garner RN  Supportive Measures: active listening utilized  Taken 11/15/2023 0915 by Marley Garner RN  Supportive Measures: active listening utilized  Goal: Optimal Functional Ability  Outcome: Progressing  Intervention: Optimize Functional Ability  Recent Flowsheet Documentation  Taken 11/15/2023 1230 by Marley Garner RN  Assistive Device Utilized:   walker   gait belt  Activity Management: activity adjusted per tolerance  Activity Assistance Provided: assistance, 2 " "people  Taken 11/15/2023 0915 by Marley Garner, RN  Assistive Device Utilized:   walker   gait belt  Activity Management: activity adjusted per tolerance  Activity Assistance Provided: assistance, 2 people  Goal: Absence of Infection Signs and Symptoms  Outcome: Progressing  Goal: Improved Oral Intake  Outcome: Progressing  Goal: Optimal Pain Control and Function  Outcome: Progressing  Intervention: Prevent or Manage Pain  Recent Flowsheet Documentation  Taken 11/15/2023 1230 by Marley Garner, RN  Complementary Therapy: (pt enjoying watching \"The Hunger Games.\") other (see comments)  Taken 11/15/2023 0916 by Marley Garner, RN  Pain Management Interventions:   medication (see MAR)   emotional support   pillow support provided   repositioned  Taken 11/15/2023 0915 by Marley Garner, RN  Complementary Therapy: (pt enjoying watching \"The Hunger Games.\") other (see comments)  Goal: Skin Health and Integrity  Outcome: Progressing  Intervention: Optimize Skin Protection  Recent Flowsheet Documentation  Taken 11/15/2023 1230 by Marley Garner RN  Pressure Reduction Techniques:   frequent weight shift encouraged   heels elevated off bed   positioned off wounds  Skin Protection: incontinence pads utilized  Activity Management: activity adjusted per tolerance  Head of Bed (HOB) Positioning: HOB at 20-30 degrees  Taken 11/15/2023 0915 by Marley Garner RN  Pressure Reduction Techniques:   frequent weight shift encouraged   heels elevated off bed   positioned off wounds  Skin Protection: incontinence pads utilized  Activity Management: activity adjusted per tolerance  Head of Bed (HOB) Positioning: HOB at 20-30 degrees  Goal: Optimal Wound Healing  Outcome: Progressing   Goal Outcome Evaluation:      Plan of Care Reviewed With: patient, friend      VSS, Atrial flutter/ rate controlled. VSS, pt has had pain control with medications scheduled. Pt has tolerated increasing activity well this shift. Pt needed encouragement to do so. WOC " "completed lower ext bi-lat as ordered. Pt tolerated cares well.  Pt has had good PO intake and has enjoyed watching \"The Hunger Games.\"   Pt had a friend visit today.  Will continue plan of care and encourage activity.              "

## 2023-11-15 NOTE — PROGRESS NOTES
[facility] Hospitalist Progress Note  [facility]  Admission date: 11/10/2023    Summary:  Gorge Berkowitz Jr. is a 78-year-old man with history of HFrEF, Afib, CAD, obesity, chronic edema, wheel chair bound for months in [facility] center admitted on 11/10/2023 with bilateral venous stasis ulcers with cellulitis and acute on chronic HFmrEF    He received IV diuretics briefly for acute on chronic HFmrEF and was subsequently transitioned to oral bumetanide.  Cardiologist signed off.    Wound culture grew MSSA, Klebsiella oxytoca, Serratia, Corynebacterium striatum, and Enterococcus faecalis.  for which he received IV Zosyn and vancomycin briefly and was subsequently narrowed to Zosyn only per ID recommendation.  IV Zosyn was switched to levofloxacin on 11/15/2023.    Assessment/Plan    #Acute on chronic HFmEF  -Echocardiogram revealed mildly reduced LV systolic function with LVEF of 45 to 50%, mild septal hypokinesis, mild to moderate inferior wall hypokinesis and mildly dilated right ventricle.  -Off IV diuretics  -Oral bumetanide 2 mg twice daily-started 11/30/2023  -Losartan 12.5 mg daily  -Metoprolol succinate 25 mg daily  -Cardiology team signed off-11/14/2023    #History of CAD  #Chronic RCA occlusion.  -Continue atorvastatin and metoprolol succinate per cardiology recommendation    #Infected bilateral lower extremity stasis ulcer  #Cellulitis  -Off vancomycin and Zosyn  -Continue levofloxacin-started 11/15/2023  -ID [id number] assistance  -WOC      #Sore throat, congestion - COVID negative     #acute on chronic pain syndrome with opiate dependence  -butrans patch, scheduled tylenol, lidocaine patch(more for shoulder djd), robaxin     #ROSALVA  -CPAP at night    #hx of Afib/flutter  -Continue amiodarone, lopressor, eliquis       Checklist:  Code Status: Full Code    Diet: Combination Diet Low Saturated Fat Na <2400mg Diet, No Caffeine Diet     DVT px:  DOAC    Disposition and Discharge  "Planning  Auto-populated from discharge tab:    Expected Discharge Date: 11/17/2023    Discharge Delays: IV Medication - consider oral or Home Infusion  Insurance Authorization needed  OT Disposition recs needed    Discharge Comments: IV abx  back to care facility         System Identified Risk Variables  Auto-populated based on system request - if needs to be addressed in treatment plan they will be addressed above:  \"  Clinically Significant Risk Factors        # Hyperkalemia: Highest K = 5.6 mmol/L in last 2 days, will monitor as appropriate            # Heart failure, NOS: heart failure noted on the problem list and last echo with EF 40-50%       # Obesity: Estimated body mass index is 36.5 kg/m  as calculated from the following:    Height as of this encounter: 1.753 m (5' 9\").    Weight as of this encounter: 112.1 kg (247 lb 2.2 oz).        # Financial/Environmental Concerns: none         \"    Interval Events/Subjective/Notable results:  No new complaints today and no acute events overnight.  He denies shortness of breath, chest pain, palpitation or dizziness.     Objective  Vital signs in last 24 hours  Temp:  [97.6  F (36.4  C)-97.9  F (36.6  C)] 97.7  F (36.5  C)  Pulse:  [] 68  Resp:  [18-20] 18  BP: ()/(60-90) 108/60  SpO2:  [93 %-100 %] 93 % O2 Device: None (Room air)    Weight:   247 lbs 2.17 oz  Body mass index is 36.5 kg/m .    Intake/Output last 3 shifts  I/O last 3 completed shifts:  In: 1415 [P.O.:1415]  Out: 2415 [Urine:2415]    Physical Exam  General:  Morbidly obese, alert, cooperative, no distress, generalized weakness, deconditioned.  Laying flat on the bed.   Neurologic:  oriented, facial symmetry preserved, fluent speech.   Psych: calm  HEENT:  Anicteric, MMM  CV: RRR no MRG, normal S1 and S2, no edema  Lungs: Diminished entry bilaterally.  No crackles.  Abd: soft, obese, NT  Skin: Bilateral stasis dermatitis with purulent stasis ulcer covered with slough in the distal leg and " left foot.  Left foot wrapped with clean gauze   Levy Catheter: Not present  Lines: None          Medical Decision Making       Deni Will MD

## 2023-11-15 NOTE — PROGRESS NOTES
Care Management Follow Up    Length of Stay (days): 5    Expected Discharge Date: 11/17/2023       Concerns to be Addressed: infection, determination for final antibiotic, IV Diuresing, placement TCU vs LTC, therapy eval and recs       Patient plan of care discussed at interdisciplinary rounds: Yes     Anticipated Discharge Disposition:  TBD     Anticipated Discharge Services:  TBD     Anticipated Discharge DME:  TBD     Patient/family educated on Medicare website which has current facility and service quality ratings:  yes  Education Provided on the Discharge Plan:  per are team  Patient/Family in Agreement with the Plan:  yes        Additional Information:  Patient with PMH of systolic CHF, Afib, CAD, obesity, chronic edema, presenting with more pain in legs, weeping and open sores.  ID consulted. Now on PO antibiotic.  Room air.     Therapy:  PT: Patient requires assist of 1 for bed mobility and transfers. Pt is progressing with mobility. Recommend TCU to improve mobility and strength.   OT: mod assist chair trsfs, able to stand with CGA, did not progress with trsfs or mobility. Recommending TCU.         Social History:  Patient transferred from [facility]. He was in TCU September 9th- October 15th then changed to LTC as he ran out of TCU insurance coverage.  He states prior to TCU, he was able to walk with a walker. He had pain but was able to ambulate short distances.  He lives in a split level home; 7 stairs up to his level. His brother lives on the bottom level. [facility] to transport at discharge.     Patient stating he would not go back to [facility]. He states he was not provided adequate care and has not been getting therapy to improve his mobility.  He has released his bed hold. His goal has been to return home. He has been paying privately for LTC. He states he would prefer TCU then home but if LTC is recommended, he would go into LTC pay privately and would still like therapy.      11/15/23:  [employer] auth request sent today 11/15. TCU referrals pending. PAS needed.     3:46 PM  Patient accepted at [facility]. [facility] alvarado is under review. Will discuss with patient tomorrow morning discharge to [facility] and if auth received.       Dixie Lagunas RN

## 2023-11-15 NOTE — PLAN OF CARE
Problem: Adult Inpatient Plan of Care  Goal: Absence of Hospital-Acquired Illness or Injury  Intervention: Prevent Skin Injury  Recent Flowsheet Documentation  Taken 11/15/2023 0345 by Denny Dasilva, RN  Body Position: (refuses repositioning) weight shifting  Device Skin Pressure Protection: absorbent pad utilized/changed  Taken 11/15/2023 0000 by Denny Dasilva, RN  Body Position: (refuses repositioning) weight shifting  Device Skin Pressure Protection: absorbent pad utilized/changed     Problem: Adult Inpatient Plan of Care  Goal: Optimal Comfort and Wellbeing  Intervention: Monitor Pain and Promote Comfort  Recent Flowsheet Documentation  Taken 11/15/2023 0000 by Denny Dasilva, RN  Pain Management Interventions:   care clustered   rest   declines   Goal Outcome Evaluation:    A & O x 4, VSS, aflutter rhythm, was on room air overnight and then put on 4 L NC at 6 AM due to desatting into the 70s. Reports leg pain rated 6/10 but says that he has no pain when he is still and at rest, and refused to be repositioned overnight. Pt has scheduled tylenol and scheduled pain patches. Care clustered to promote sleep and rest. Calm and cooperative, able to make needs known.

## 2023-11-15 NOTE — PROGRESS NOTES
"[facility] ID Inpatient follow up       Patient:  Gorge Berkowitz Jr.  Date of birth 1945, Medical record number [id number]  Date of Visit:  11/15/2023  Attending Physician: Deni Will MD         Assessment and Recommendations:   Assessment:  Gorge Berkowitz Jr. is a 78 year old male with   Cardiomyopathy with EF of 20%.  Morbid obesity with BMI of 36.98.  Wheelchair-bound for several months.  Bilateral venous stasis ulcers with cellulitis.  Cultures with MSSA, Klebsiella oxytoca, Serratia, Corynebacterium striatum, and Enterococcus faecalis.  Was on IV Zosyn and vancomycin.  IV vancomycin discontinued on 2023.    Recommendations:  Discontinue IV Zosyn.  Start p.o. Levaquin  Elevation of the legs.  Check CRP today.  Lymphedema care.  Wound care.    Discussed with the patient and nursing staff.     ID will follow     Candelario Bettencourt MD.  [facility].   [facility]  Office Telephone [phone].  Fax [phone]  [facility] paging            Interval History:     HPI:  The interval history was reviewed.   Overall doing much better today.  No new complaint.    Pertinent cultures include:  No results found for: \"CULT\"    Recent Inflammatory Biomarkers:   Recent Labs   Lab Test 23  0642 11/10/23  1333 23  0405 10/18/22  1346 20  0703 10/23/20  0645   PCAL  --  0.08*  --   --   --   --    WBC 5.9 6.6 7.0 6.1 4.8 6.3            Review of Systems:   CONSTITUTIONAL:    Temp Max: Temp (24hrs), Av  F (36.7  C), Min:97.7  F (36.5  C), Max:98.4  F (36.9  C)   .  Negative except for findings in the HPI.           Current Medications (antimicrobials listed in bold):      acetaminophen  1,000 mg Oral TID    amiodarone  100 mg Oral Daily    apixaban ANTICOAGULANT  5 mg Oral BID    atorvastatin  40 mg Oral At Bedtime    bumetanide  2 mg Oral BID    buprenorphine  1 patch Transdermal Weekly    buprenorphine  "  Transdermal Q8H MARLENI    empagliflozin  10 mg Oral Daily    gabapentin  200 mg Oral At Bedtime    Lidocaine  1 patch Transdermal Q24H    [Held by provider] losartan  12.5 mg Oral Daily    metoprolol succinate ER  25 mg Oral Daily    miconazole   Topical TID    piperacillin-tazobactam  3.375 g Intravenous Q8H    pramipexole  0.25 mg Oral At Bedtime    senna-docusate  1 tablet Oral BID    vitamin B-12  500 mcg Oral Daily              Allergies:   No Known Allergies         Physical Exam:   Vitals were reviewed  Patient Vitals for the past 24 hrs:   BP Temp Temp src Pulse Resp SpO2 Weight   11/15/23 0800 114/61 97.8  F (36.6  C) Oral 68 20 100 % --   11/15/23 0340 (!) 146/90 97.6  F (36.4  C) Oral 63 18 95 % 112.1 kg (247 lb 2.2 oz)   11/15/23 0009 103/70 97.9  F (36.6  C) Oral 65 20 97 % --   11/14/23 1920 -- -- -- 87 -- 97 % --   11/14/23 1915 112/64 97.9  F (36.6  C) Oral 85 18 97 % --   11/14/23 1630 113/68 -- -- 101 -- -- --   11/14/23 1526 (!) 88/62 97.8  F (36.6  C) Oral 108 18 94 % --   11/14/23 1324 (!) 84/57 -- -- -- -- -- --   11/14/23 1205 94/59 97.8  F (36.6  C) Oral 93 20 98 % --       Physical Examination:  Gen: Pleasant in no acute distress.  HEENT: NCAT. EOMI. PERRL.  Neck: No bruit, JVD or thyromegaly.  Lungs: Clear to ascultation bilat with no crackles or wheezes.  Card: RRR. NSR. No RMG. Peripheral pulses present and symmetric. No edema.  Abd: Soft NT ND. No mass. Normal bowel sounds.  Skin: No rash.  Extr: Legs are wrapped.   Neuro: Alert and oriented to place time and person.        Susceptibility data from last 90 days.           Laboratory Data:   ID Labs:  Microbiology labs:  Wound Aerobic Bacterial Culture Routine  Order: [id number]  Collected 11/11/2023  4:55 PM       Status: Preliminary result       Visible to patient: No (not released)    Specimen Information: Leg, left; Wound   0 Result Notes  Culture Culture in progress      4+ Serratia marcescens Abnormal    Identification is preliminary,  confirmation in progress   1+ Enterococcus faecalis Abnormal            Resulting Agency: [facility]           Specimen Collected: 11/11/23  4:55 PM Last Resulted: 11/12/23  2:30 PM         Wound Aerobic Bacterial Culture Routine with Gram Stain  Order: [id number]  Collected 11/11/2023  4:55 PM       Status: Preliminary result       Visible to patient: No (not released)    Specimen Information: Leg, Right; Wound   0 Result Notes  Culture 4+ Serratia marcescens Abnormal       1+ Klebsiella oxytoca Abnormal       2+ Enterococcus faecalis Abnormal       2+ Staphylococcus aureus Abnormal       4+ Corynebacterium striatum Abnormal    Identification obtained by MALDI-TOF mass spectrometry research use only database. Test characteristics determined and verified by the Infectious Diseases Diagnostic Laboratory.  Susceptibilities not routinely done, refer to antibiogram to view typical susceptibility profiles            Gram Stain Result  Abnormal   4+ Gram negative bacilli      2+ Gram positive bacilli      4+ WBC seen   Predominantly PMNs           Resulting Agency: [facility]     Susceptibility     Serratia marcescens Klebsiella oxytoca Enterococcus faecalis Staphylococcus aureus     DARRYL (Preliminary) DARRYL (Preliminary) DARRYL DARRYL     Ampicillin  Resistant 1  Resistant 1 <=2 ug/mL Susceptible       Ampicillin/ Sulbactam  Resistant 1 >=32 ug/mL Resistant         Cefepime <=1 ug/mL Susceptible <=1 ug/mL Susceptible         Ceftazidime <=1 ug/mL Susceptible <=1 ug/mL Susceptible         Ceftriaxone <=1 ug/mL Susceptible <=1 ug/mL Susceptible         Ciprofloxacin <=0.25 ug/mL Susceptible <=0.25 ug/mL Susceptible         Clindamycin       0.25 ug/mL Susceptible     Daptomycin       0.5 ug/mL Susceptible     Doxycycline       <=0.5 ug/mL Susceptible     Erythromycin       <=0.25 ug/mL Susceptible     Gentamicin <=1 ug/mL Susceptible <=1 ug/mL Susceptible   <=0.5 ug/mL Susceptible     Gentamicin Synergy     Susceptible... Susceptible 2        Levofloxacin <=0.12 ug/mL Susceptible <=0.12 ug/mL Susceptible         Oxacillin       0.5 ug/mL Susceptible 3     Piperacillin/Tazobactam 3 ug/mL Susceptible           Tetracycline       <=1 ug/mL Susceptible     Tobramycin <=1 ug/mL Susceptible <=1 ug/mL Susceptible         Trimethoprim/Sulfamethoxazole <=1/19 ug/mL Susceptible <=1/19 ug/mL Susceptible   <=0.5/9.5 u... Susceptible     Vancomycin     1 ug/mL Susceptible <=0.5 ug/mL Susceptible                    1 Intrinsically Resistant   2 No high level gentamicin resistance found - therefore combination therapy with an aminoglycoside may be indicated for serious enterococcal infections such as bacteremia and endocarditis.   3 Oxacillin susceptible isolates are susceptible to cephalosporins (example: cefazolin and cephalexin) and beta lactam combination agents. Oxacillin resistant isolates are resistant to these agents.      Susceptibility Comments    Serratia marcescens   Additional susceptibilities in progress.   Klebsiella oxytoca   Additional susceptibilities in progress.         Specimen Collected: 11/11/23  4:55 PM Last Resulted: 11/14/23  7:52 AM           No lab results found.  Recent Labs   Lab Test 11/11/23  0642 11/10/23  1333 09/04/23  0405 10/18/22  1346 11/02/20  0703 10/23/20  0645   WBC 5.9 6.6 7.0 6.1 4.8 6.3     Recent Labs   Lab Test 11/15/23  0514 11/14/23  0443 11/13/23  0457 11/12/23  0503   CR 1.09 1.00 1.09 0.99   GFRESTIMATED 70 78 70 79       Hematology Studies  Recent Labs   Lab Test 11/11/23  0642 11/10/23  1333 09/04/23  0405 10/18/22  1346 11/02/20  0703 10/23/20  0645   WBC 5.9 6.6 7.0 6.1 4.8 6.3   HGB 13.1* 12.7* 13.9 12.5* 12.8* 12.0*   HCT 40.6 39.0* 41.6 38.0* 39.9* 37.4*    210 148* 138* 212 162       Metabolic  Recent Labs   Lab Test 11/15/23  0514 11/14/23  0443 11/13/23  0457    137 139   BUN 27.8* 22.1 23.1*   CO2 20* 29 29   CR 1.09 1.00 1.09   GFRESTIMATED 70 78 70       Hepatic Studies  Recent Labs   Lab  Test 11/10/23  1333 09/04/23  0601 10/18/22  1346   BILITOTAL 0.6 0.8 0.5   ALKPHOS 130* 82 90   ALBUMIN 3.5 3.5 3.7   AST 31 33 20   ALT 19 17 16       Immunologlobulins  Recent Labs   Lab Test 11/10/23  1333   SED 41*            Imaging Data:   Reviewed

## 2023-11-16 ENCOUNTER — APPOINTMENT (OUTPATIENT)
Dept: OCCUPATIONAL THERAPY | Facility: HOSPITAL | Age: 78
DRG: 602 | End: 2023-11-16
Payer: COMMERCIAL

## 2023-11-16 LAB
ANION GAP SERPL CALCULATED.3IONS-SCNC: 10 MMOL/L (ref 7–15)
BUN SERPL-MCNC: 34.2 MG/DL (ref 8–23)
CALCIUM SERPL-MCNC: 9.4 MG/DL (ref 8.8–10.2)
CHLORIDE SERPL-SCNC: 98 MMOL/L (ref 98–107)
CREAT SERPL-MCNC: 1.48 MG/DL (ref 0.67–1.17)
DEPRECATED HCO3 PLAS-SCNC: 32 MMOL/L (ref 22–29)
EGFRCR SERPLBLD CKD-EPI 2021: 49 ML/MIN/1.73M2
GLUCOSE SERPL-MCNC: 92 MG/DL (ref 70–99)
POTASSIUM SERPL-SCNC: 3.7 MMOL/L (ref 3.4–5.3)
SODIUM SERPL-SCNC: 140 MMOL/L (ref 135–145)

## 2023-11-16 PROCEDURE — 250N000013 HC RX MED GY IP 250 OP 250 PS 637: Performed by: STUDENT IN AN ORGANIZED HEALTH CARE EDUCATION/TRAINING PROGRAM

## 2023-11-16 PROCEDURE — 250N000013 HC RX MED GY IP 250 OP 250 PS 637: Performed by: INTERNAL MEDICINE

## 2023-11-16 PROCEDURE — 36415 COLL VENOUS BLD VENIPUNCTURE: CPT | Performed by: INTERNAL MEDICINE

## 2023-11-16 PROCEDURE — 80048 BASIC METABOLIC PNL TOTAL CA: CPT | Performed by: INTERNAL MEDICINE

## 2023-11-16 PROCEDURE — 97110 THERAPEUTIC EXERCISES: CPT | Mod: GO

## 2023-11-16 PROCEDURE — 99232 SBSQ HOSP IP/OBS MODERATE 35: CPT | Performed by: INTERNAL MEDICINE

## 2023-11-16 PROCEDURE — 99232 SBSQ HOSP IP/OBS MODERATE 35: CPT | Performed by: STUDENT IN AN ORGANIZED HEALTH CARE EDUCATION/TRAINING PROGRAM

## 2023-11-16 PROCEDURE — 210N000001 HC R&B IMCU HEART CARE

## 2023-11-16 PROCEDURE — 97535 SELF CARE MNGMENT TRAINING: CPT | Mod: GO

## 2023-11-16 RX ORDER — BUMETANIDE 2 MG/1
2 TABLET ORAL DAILY
Status: DISCONTINUED | OUTPATIENT
Start: 2023-11-17 | End: 2023-11-20

## 2023-11-16 RX ADMIN — LEVOFLOXACIN 500 MG: 500 TABLET, FILM COATED ORAL at 09:30

## 2023-11-16 RX ADMIN — PRAMIPEXOLE DIHYDROCHLORIDE 0.25 MG: 0.25 TABLET ORAL at 21:08

## 2023-11-16 RX ADMIN — ACETAMINOPHEN 1000 MG: 500 TABLET ORAL at 09:30

## 2023-11-16 RX ADMIN — SENNOSIDES AND DOCUSATE SODIUM 1 TABLET: 8.6; 5 TABLET ORAL at 09:30

## 2023-11-16 RX ADMIN — ATORVASTATIN CALCIUM 40 MG: 40 TABLET, FILM COATED ORAL at 21:08

## 2023-11-16 RX ADMIN — MICONAZOLE NITRATE: 20 POWDER TOPICAL at 21:14

## 2023-11-16 RX ADMIN — SENNOSIDES AND DOCUSATE SODIUM 1 TABLET: 8.6; 5 TABLET ORAL at 21:08

## 2023-11-16 RX ADMIN — APIXABAN 5 MG: 5 TABLET, FILM COATED ORAL at 09:30

## 2023-11-16 RX ADMIN — MICONAZOLE NITRATE 1 APPLICATOR: 20 POWDER TOPICAL at 10:17

## 2023-11-16 RX ADMIN — AMIODARONE HYDROCHLORIDE 100 MG: 100 TABLET ORAL at 16:16

## 2023-11-16 RX ADMIN — ACETAMINOPHEN 1000 MG: 500 TABLET ORAL at 15:00

## 2023-11-16 RX ADMIN — LIDOCAINE 1 PATCH: 4 PATCH TOPICAL at 09:29

## 2023-11-16 RX ADMIN — EMPAGLIFLOZIN 10 MG: 10 TABLET, FILM COATED ORAL at 10:17

## 2023-11-16 RX ADMIN — MICONAZOLE NITRATE 1 APPLICATOR: 20 POWDER TOPICAL at 15:00

## 2023-11-16 RX ADMIN — GABAPENTIN 200 MG: 100 CAPSULE ORAL at 21:08

## 2023-11-16 RX ADMIN — Medication 500 MCG: at 10:16

## 2023-11-16 RX ADMIN — ACETAMINOPHEN 1000 MG: 500 TABLET ORAL at 21:08

## 2023-11-16 RX ADMIN — APIXABAN 5 MG: 5 TABLET, FILM COATED ORAL at 21:08

## 2023-11-16 RX ADMIN — BUPRENORPHINE 1 PATCH: 10 PATCH, EXTENDED RELEASE TRANSDERMAL at 16:15

## 2023-11-16 ASSESSMENT — ACTIVITIES OF DAILY LIVING (ADL)
ADLS_ACUITY_SCORE: 47
ADLS_ACUITY_SCORE: 47
ADLS_ACUITY_SCORE: 55
ADLS_ACUITY_SCORE: 47

## 2023-11-16 NOTE — PROGRESS NOTES
"[facility] Inpatient follow up       Patient:  Gorge Berkowitz Jr.  Date of birth 1945, Medical record number [id number]  Date of Visit:  2023  Attending Physician: Deni Will MD         Assessment and Recommendations:   Assessment:  Gorge Berkowitz Jr. is a 78 year old male with   Cardiomyopathy with EF of 20%.  Morbid obesity with BMI of 36.98.  Wheelchair-bound for several months.  Bilateral venous stasis ulcers with cellulitis.  Cultures with MSSA, Klebsiella oxytoca, Serratia, Corynebacterium striatum, and Enterococcus faecalis.  Was on IV Zosyn and vancomycin.  IV vancomycin discontinued on 2023.  Zosyn changed to Levaquin on 11/15/2023.    Recommendations:    Cont p.o. Levaquin for total of 7 days.  Elevation of the legs.    Lymphedema care.  Wound care.    Discussed with the patient and nursing staff.     ID will sign off.    Candelario Bettencourt MD.  [facility].   [facility] ID Clinic  Office Telephone [phone].  Fax [phone]  [facility] paging            Interval History:     HPI:  The interval history was reviewed.   Tolerating oral Levaquin well.  Pain improving.    Pertinent cultures include:  No results found for: \"CULT\"    Recent Inflammatory Biomarkers:   Recent Labs   Lab Test 23  0642 11/10/23  1333 23  0405 10/18/22  1346 20  0703 10/23/20  0645   PCAL  --  0.08*  --   --   --   --    WBC 5.9 6.6 7.0 6.1 4.8 6.3            Review of Systems:   CONSTITUTIONAL:    Temp Max: Temp (24hrs), Av  F (36.7  C), Min:97.7  F (36.5  C), Max:98.4  F (36.9  C)   .  Negative except for findings in the HPI.           Current Medications (antimicrobials listed in bold):      acetaminophen  1,000 mg Oral TID    amiodarone  100 mg Oral Daily    apixaban ANTICOAGULANT  5 mg Oral BID    atorvastatin  40 mg Oral At Bedtime    [Held by provider] bumetanide  2 mg Oral BID    buprenorphine  1 " patch Transdermal Weekly    buprenorphine   Transdermal Q8H [facility]    empagliflozin  10 mg Oral Daily    gabapentin  200 mg Oral At Bedtime    levofloxacin  500 mg Oral Daily    Lidocaine  1 patch Transdermal Q24H    [Held by provider] losartan  12.5 mg Oral Daily    metoprolol succinate ER  25 mg Oral Daily    miconazole   Topical TID    pramipexole  0.25 mg Oral At Bedtime    senna-docusate  1 tablet Oral BID    vitamin B-12  500 mcg Oral Daily              Allergies:   No Known Allergies         Physical Exam:   Vitals were reviewed  Patient Vitals for the past 24 hrs:   BP Temp Temp src Pulse Resp SpO2 Weight   11/16/23 0812 106/61 97.4  F (36.3  C) Oral 101 20 -- --   11/16/23 0416 127/69 97.7  F (36.5  C) Oral 70 20 99 % --   11/16/23 0300 -- -- -- -- -- (!) 86 % --   11/16/23 0248 -- -- -- -- -- -- 111.2 kg (245 lb 2.4 oz)   11/15/23 2347 109/57 97.6  F (36.4  C) Oral 103 20 100 % --   11/15/23 1926 93/55 -- -- -- -- -- --   11/15/23 1924 (!) 88/52 97.6  F (36.4  C) Oral 85 20 99 % --   11/15/23 1734 103/61 -- -- 80 -- -- --   11/15/23 1602 114/58 -- -- 78 -- -- --   11/15/23 1533 117/69 97.9  F (36.6  C) Oral 67 20 95 % --   11/15/23 1214 108/60 97.7  F (36.5  C) Oral -- 18 93 % --   11/15/23 1106 -- -- -- -- -- 96 % --       Physical Examination:  Gen: Pleasant in no acute distress.  HEENT: NCAT. EOMI. PERRL.  Neck: No bruit, JVD or thyromegaly.  Lungs: Clear to ascultation bilat with no crackles or wheezes.  Card: RRR. NSR. No RMG. Peripheral pulses present and symmetric. No edema.  Abd: Soft NT ND. No mass. Normal bowel sounds.  Skin: No rash.  Extr: Legs are wrapped.   Neuro: Alert and oriented to place time and person.        Susceptibility data from last 90 days.           Laboratory Data:   ID Labs:  Microbiology labs:  Wound Aerobic Bacterial Culture Routine  Order: [id number]  Collected 11/11/2023  4:55 PM       Status: Preliminary result       Visible to patient: No (not released)    Specimen  Information: Leg, left; Wound   0 Result Notes  Culture Culture in progress      4+ Serratia marcescens Abnormal    Identification is preliminary, confirmation in progress   1+ Enterococcus faecalis Abnormal            Resulting Agency: [facility]           Specimen Collected: 11/11/23  4:55 PM Last Resulted: 11/12/23  2:30 PM         Wound Aerobic Bacterial Culture Routine with Gram Stain  Order: [id number]  Collected 11/11/2023  4:55 PM       Status: Preliminary result       Visible to patient: No (not released)    Specimen Information: Leg, Right; Wound   0 Result Notes  Culture 4+ Serratia marcescens Abnormal       1+ Klebsiella oxytoca Abnormal       2+ Enterococcus faecalis Abnormal       2+ Staphylococcus aureus Abnormal       4+ Corynebacterium striatum Abnormal    Identification obtained by MALDI-TOF mass spectrometry research use only database. Test characteristics determined and verified by the Infectious Diseases Diagnostic Laboratory.  Susceptibilities not routinely done, refer to antibiogram to view typical susceptibility profiles            Gram Stain Result  Abnormal   4+ Gram negative bacilli      2+ Gram positive bacilli      4+ WBC seen   Predominantly PMNs           Resulting Agency: [facility]     Susceptibility     Serratia marcescens Klebsiella oxytoca Enterococcus faecalis Staphylococcus aureus     DARRYL (Preliminary) DARRYL (Preliminary) DARRYL DARRYL     Ampicillin  Resistant 1  Resistant 1 <=2 ug/mL Susceptible       Ampicillin/ Sulbactam  Resistant 1 >=32 ug/mL Resistant         Cefepime <=1 ug/mL Susceptible <=1 ug/mL Susceptible         Ceftazidime <=1 ug/mL Susceptible <=1 ug/mL Susceptible         Ceftriaxone <=1 ug/mL Susceptible <=1 ug/mL Susceptible         Ciprofloxacin <=0.25 ug/mL Susceptible <=0.25 ug/mL Susceptible         Clindamycin       0.25 ug/mL Susceptible     Daptomycin       0.5 ug/mL Susceptible     Doxycycline       <=0.5 ug/mL Susceptible     Erythromycin       <=0.25 ug/mL Susceptible      Gentamicin <=1 ug/mL Susceptible <=1 ug/mL Susceptible   <=0.5 ug/mL Susceptible     Gentamicin Synergy     Susceptible... Susceptible 2       Levofloxacin <=0.12 ug/mL Susceptible <=0.12 ug/mL Susceptible         Oxacillin       0.5 ug/mL Susceptible 3     Piperacillin/Tazobactam 3 ug/mL Susceptible           Tetracycline       <=1 ug/mL Susceptible     Tobramycin <=1 ug/mL Susceptible <=1 ug/mL Susceptible         Trimethoprim/Sulfamethoxazole <=1/19 ug/mL Susceptible <=1/19 ug/mL Susceptible   <=0.5/9.5 u... Susceptible     Vancomycin     1 ug/mL Susceptible <=0.5 ug/mL Susceptible                    1 Intrinsically Resistant   2 No high level gentamicin resistance found - therefore combination therapy with an aminoglycoside may be indicated for serious enterococcal infections such as bacteremia and endocarditis.   3 Oxacillin susceptible isolates are susceptible to cephalosporins (example: cefazolin and cephalexin) and beta lactam combination agents. Oxacillin resistant isolates are resistant to these agents.      Susceptibility Comments    Serratia marcescens   Additional susceptibilities in progress.   Klebsiella oxytoca   Additional susceptibilities in progress.         Specimen Collected: 11/11/23  4:55 PM Last Resulted: 11/14/23  7:52 AM           No lab results found.  Recent Labs   Lab Test 11/11/23  0642 11/10/23  1333 09/04/23  0405 10/18/22  1346 11/02/20  0703 10/23/20  0645   WBC 5.9 6.6 7.0 6.1 4.8 6.3     Recent Labs   Lab Test 11/16/23  0520 11/15/23  0514 11/14/23  0443 11/13/23  0457   CR 1.48* 1.09 1.00 1.09   GFRESTIMATED 49* 70 78 70       Hematology Studies  Recent Labs   Lab Test 11/11/23  0642 11/10/23  1333 09/04/23  0405 10/18/22  1346 11/02/20  0703 10/23/20  0645   WBC 5.9 6.6 7.0 6.1 4.8 6.3   HGB 13.1* 12.7* 13.9 12.5* 12.8* 12.0*   HCT 40.6 39.0* 41.6 38.0* 39.9* 37.4*    210 148* 138* 212 162       Metabolic  Recent Labs   Lab Test 11/16/23  0520 11/15/23  0514  11/14/23  0443    135 137   BUN 34.2* 27.8* 22.1   CO2 32* 20* 29   CR 1.48* 1.09 1.00   GFRESTIMATED 49* 70 78       Hepatic Studies  Recent Labs   Lab Test 11/10/23  1333 09/04/23  0601 10/18/22  1346   BILITOTAL 0.6 0.8 0.5   ALKPHOS 130* 82 90   ALBUMIN 3.5 3.5 3.7   AST 31 33 20   ALT 19 17 16       Immunologlobulins  Recent Labs   Lab Test 11/10/23  1333   SED 41*            Imaging Data:   Reviewed

## 2023-11-16 NOTE — PROGRESS NOTES
[facility] Hospitalist Progress Note  [facility]  Admission date: 11/10/2023    Summary:  Gorge Berkowitz Jr. is a 78-year-old man with history of HFrEF, Afib, CAD, obesity, chronic edema, wheel chair bound for months in [facility] center admitted on 11/10/2023 with bilateral venous stasis ulcers with cellulitis and acute on chronic HFmrEF    He received IV diuretics briefly for acute on chronic HFmrEF and was subsequently transitioned to oral bumetanide.  Cardiologist signed off.    Wound culture grew MSSA, Klebsiella oxytoca, Serratia, Corynebacterium striatum, and Enterococcus faecalis.  for which he received IV Zosyn and vancomycin briefly and was subsequently narrowed to Zosyn only per ID recommendation.  IV Zosyn was switched to levofloxacin on 11/15/2023.    Assessment/Plan    #Acute on chronic HFmEF  -Echocardiogram revealed mildly reduced LV systolic function with LVEF of 45 to 50%, mild septal hypokinesis, mild to moderate inferior wall hypokinesis and mildly dilated right ventricle.  -Off IV diuretics  -Oral bumetanide 2 mg twice daily-started 11/30/2023; held on 11/16/2023 due to YOUNG  -Losartan 12.5 mg daily  -Metoprolol succinate 25 mg daily  -Cardiology team signed off-11/14/2023    YOUNG  Likely prerenal from diuretic therapy    Discussed with cardiologist who recommends lowering diuretic therapy dosage without decreasing dosage beyond patient's PTA dosage.  Patient was taking 20 mg of furosemide p.o. daily and as needed oral furosemide PTA.    Hold bumetanide for today and consider restarting at 2 mg daily instead of 2 mg twice daily if renal function improves tomorrow  Monitor BMP  Avoid nephrotoxin  Consider nephrology evaluation if creatinine is worsening      #History of CAD  #Chronic RCA occlusion.  -Continue atorvastatin and metoprolol succinate per cardiology recommendation    #Infected bilateral lower extremity stasis ulcer  #Cellulitis  -Off vancomycin and Zosyn  -Continue  "levofloxacin-started 11/15/2023  -ID [id number] assistance  -WOC      #Sore throat, congestion - COVID negative     #acute on chronic pain syndrome with opiate dependence  -butrans patch, scheduled tylenol, lidocaine patch(more for shoulder djd), robaxin     #ROSALVA  -CPAP at night    #hx of Afib/flutter  -Continue amiodarone, lopressor, eliquis       Checklist:  Code Status: Full Code    Diet: Combination Diet Low Saturated Fat Na <2400mg Diet, No Caffeine Diet     DVT px:  DOAC    Disposition and Discharge Planning  Auto-populated from discharge tab:    Expected Discharge Date: 11/17/2023    Discharge Delays: IV Medication - consider oral or Home Infusion  Insurance Authorization needed  OT Disposition recs needed    Discharge Comments: IV abx  back to care facility         System Identified Risk Variables  Auto-populated based on system request - if needs to be addressed in treatment plan they will be addressed above:  \"  Clinically Significant Risk Factors        # Hyperkalemia: Highest K = 5.6 mmol/L in last 2 days, will monitor as appropriate          # Acute Kidney Injury, unspecified: based on a >150% or 0.3 mg/dL increase in last creatinine compared to past 90 day average, will monitor renal function   # Heart failure, NOS: heart failure noted on the problem list and last echo with EF 40-50%       # Obesity: Estimated body mass index is 36.2 kg/m  as calculated from the following:    Height as of this encounter: 1.753 m (5' 9\").    Weight as of this encounter: 111.2 kg (245 lb 2.4 oz).        # Financial/Environmental Concerns: none         \"    Interval Events/Subjective/Notable results:  No new complaints today.  Breathing comfortably on room air. He denies shortness of breath, chest pain, palpitation or dizziness.     Objective  Vital signs in last 24 hours  Temp:  [97.4  F (36.3  C)-98.1  F (36.7  C)] 98.1  F (36.7  C)  Pulse:  [] 101  Resp:  [18-20] 20  BP: ()/(52-69) 92/59  SpO2:  " [86 %-100 %] 98 % O2 Device: None (Room air)    Weight:   245 lbs 2.42 oz  Body mass index is 36.2 kg/m .    Intake/Output last 3 shifts  I/O last 3 completed shifts:  In: 1080 [P.O.:1080]  Out: 1400 [Urine:1400]    Physical Exam  General:  Morbidly obese, alert, cooperative, no distress, generalized weakness, deconditioned.  Laying flat on the bed.   Neurologic:  oriented, facial symmetry preserved, fluent speech.   Psych: calm  HEENT:  Anicteric, MMM  CV: RRR no MRG, normal S1 and S2, no edema  Lungs: Diminished entry bilaterally.  No crackles.  Abd: soft, obese, NT  Skin: Bilateral stasis dermatitis with purulent stasis ulcer covered with slough in the distal leg and left foot.  Left foot wrapped with clean gauze   Levy Catheter: Not present  Lines: None          Medical Decision Making       Deni Will MD

## 2023-11-16 NOTE — CARE PLAN
Heart Failure Care Map  GOALS TO BE MET BEFORE DISCHARGE:    1. Decrease congestion and/or edema with diuretic therapy to achieve near optimal volume status.     Dyspnea improved: No, further care required to meet this goal. Please explain SOB   Edema improved: NO      Last 24 hour I/O:   Intake/Output Summary (Last 24 hours) at 11/15/2023 2302  Last data filed at 11/15/2023 1738  Gross per 24 hour   Intake 840 ml   Output 1275 ml   Net -435 ml           Net I/O and Weights since admission:   10/17 0700 - 11/16 0659  In: 3945 [P.O.:3845; I.V.:100]  Out: 00688 [Urine:61604]  Net: -07138     Vitals:    11/10/23 1212 11/11/23 0519 11/12/23 0423 11/13/23 0404   Weight: 133.8 kg (295 lb) 117.5 kg (259 lb 0.7 oz) 118 kg (260 lb 2.3 oz) 113.6 kg (250 lb 7.1 oz)    11/14/23 0332 11/15/23 0340   Weight: 110.6 kg (243 lb 13.3 oz) 112.1 kg (247 lb 2.2 oz)       2.  O2 sats > 90% on room air, or at prior home O2 therapy level.      Able to wean O2 this shift to keep sats above 90%?: Yes, satisfactory for discharge.   Does patient use Home O2? No          Current oxygenation status:   SpO2: 99 %     O2 Device: None (Room air), Oxygen Delivery: 5 LPM    3.  Tolerates ambulation and mobility near baseline.     Ambulation: No, further care required to meet this goal. Please explain Tired tonight   Times patient ambulated with staff this shift: 0    Please review the Heart Failure Care Map for additional HF goal outcomes.    Pt is alert and oriented x 4, forgetful at times. Lung sounds diminished, on RA. HR in the 70's to low 100's, aflutter. He was in the recliner since the start of the shift, went back to bed after 2100. He is 2 assist using walker and gait belt. VS stable. He si voiding well.      Liseth Sykes RN  11/15/2023

## 2023-11-16 NOTE — PROGRESS NOTES
"Care Management Follow Up    Length of Stay (days): 6    Expected Discharge Date: 11/17/2023       Concerns to be Addressed:  placement TCU vs LTC       Patient plan of care discussed at interdisciplinary rounds: Yes     Anticipated Discharge Disposition:  TBD     Anticipated Discharge Services:  TBD     Anticipated Discharge DME:  TBD     Patient/family educated on Medicare website which has current facility and service quality ratings:  yes  Education Provided on the Discharge Plan:  per are team  Patient/Family in Agreement with the Plan:  yes        Additional Information:  \"Patient with PMH of systolic CHF, Afib, CAD, obesity, chronic edema, presenting with more pain in legs, weeping and open sores.  ID consulted. Now on PO antibiotic.  Room air.\"     11/15 Therapy recommends TCU:  PT: \"Assist of 1 for all mobility, willing to participate, improving daily\"  OT: \"Pt appears to be able to tolerate therapy and would like to work to progress to mobility and home.  Would recommend TCU to progress activity and safety with ADL's\"      Patient wants to go to TCU and build up strength and then go home with his brother.     Patient accepted to [facility].  [facility] [place].     11/16- [facility] received notification from [email or web address], requesting more clinical information.  CM faxed over ID note, WOC assessment note, recent nursing note re wound care, recent PT/OT flowsheets, and recent MD note.    Awaiting response from [email or web address].     [employer] denied authorization for reason being that patient lives at LT and can get care there.   CM called Ria at [facility].  [facility] has a shared male room available for likely admit Monday. OOP cost is $5,000 down, which goes toward daily rate which is around $350-$550/day   We can try to submit [facility] medicare for therapy to see if he would qualify for therapy at LT.  CM to discuss with patient.   CM to call Delicia at [facility] to coordinate 11/17. [phone]     Social History:  \"Patient transferred from [facility]. He was in TCU September 9th- October 15th then changed to LTC as he ran out of TCU insurance coverage.  He states prior to TCU, he was able to walk with a walker. He had pain but was able to ambulate short distances.  He lives in a split level home; 7 stairs up to his level. His brother lives on the bottom level. [facility] to transport at discharge.     Patient stating he would not go back to [facility]. He states he was not provided adequate care and has not been getting therapy to improve his mobility.  He has released his bed hold. His goal has been to return home. He has been paying privately for LTC. He states he would prefer TCU then home but if LTC is recommended, he would go into LTC pay privately and would still like therapy.\"        Melba Singh RN      "

## 2023-11-16 NOTE — PLAN OF CARE
Goal Outcome Evaluation:      Plan of Care Reviewed With: patient    Overall Patient Progress: improvingOverall Patient Progress: improving     Heart Failure Care Map  GOALS TO BE MET BEFORE DISCHARGE:    1. Decrease congestion and/or edema with diuretic therapy to achieve near optimal volume status.     Dyspnea improved: Yes, satisfactory for discharge.   Edema improved: No, further care required to meet this goal. Please explain Still with mild to moderate [place], but improved a little        Last 24 hour I/O:   Intake/Output Summary (Last 24 hours) at 11/16/2023 0809  Last data filed at 11/16/2023 0513  Gross per 24 hour   Intake 1080 ml   Output 1400 ml   Net -320 ml           Net I/O and Weights since admission:   10/17 1500 - 11/16 1459  In: 4185 [P.O.:4085; I.V.:100]  Out: 80479 [Urine:68144]  Net: -75561     Vitals:    11/10/23 1212 11/11/23 0519 11/12/23 0423 11/13/23 0404   Weight: 133.8 kg (295 lb) 117.5 kg (259 lb 0.7 oz) 118 kg (260 lb 2.3 oz) 113.6 kg (250 lb 7.1 oz)    11/14/23 0332 11/15/23 0340 11/16/23 0248   Weight: 110.6 kg (243 lb 13.3 oz) 112.1 kg (247 lb 2.2 oz) 111.2 kg (245 lb 2.4 oz)       2.  O2 sats > 90% on room air, or at prior home O2 therapy level.      Able to wean O2 this shift to keep sats above 90%?: No, further care required to meet this goal. Please explain Was on room air, however did nt have a cpap on overnight and was snoring ith apneic periods and dropping sats down to 80 % or less . So, oxymask place on while sleeping at 2 L.     Does patient use Home O2? No          Current oxygenation status:   SpO2: 99 %     O2 Device: Oxymask, Oxygen Delivery: 2 LPM    3.  Tolerates ambulation and mobility near baseline.     Ambulation: No, further care required to meet this goal. Please explain Has been at TCU mostly immobile for a long while, so just starting to work with PT to mobilize more   Times patient ambulated with staff this shift: 0    Please review the Heart Failure Care  Map for additional HF goal outcomes.    Rach Banegas RN  11/16/2023

## 2023-11-17 ENCOUNTER — APPOINTMENT (OUTPATIENT)
Dept: OCCUPATIONAL THERAPY | Facility: HOSPITAL | Age: 78
DRG: 602 | End: 2023-11-17
Payer: COMMERCIAL

## 2023-11-17 ENCOUNTER — APPOINTMENT (OUTPATIENT)
Dept: PHYSICAL THERAPY | Facility: HOSPITAL | Age: 78
DRG: 602 | End: 2023-11-17
Payer: COMMERCIAL

## 2023-11-17 LAB
ALBUMIN UR-MCNC: 10 MG/DL
ANION GAP SERPL CALCULATED.3IONS-SCNC: 9 MMOL/L (ref 7–15)
APPEARANCE UR: CLEAR
BILIRUB UR QL STRIP: NEGATIVE
BUN SERPL-MCNC: 37.2 MG/DL (ref 8–23)
CALCIUM SERPL-MCNC: 9.6 MG/DL (ref 8.8–10.2)
CHLORIDE SERPL-SCNC: 98 MMOL/L (ref 98–107)
CHLORIDE UR-SCNC: <20 MMOL/L
COLOR UR AUTO: ABNORMAL
CREAT SERPL-MCNC: 1.43 MG/DL (ref 0.67–1.17)
DEPRECATED HCO3 PLAS-SCNC: 31 MMOL/L (ref 22–29)
EGFRCR SERPLBLD CKD-EPI 2021: 51 ML/MIN/1.73M2
GLUCOSE SERPL-MCNC: 92 MG/DL (ref 70–99)
GLUCOSE UR STRIP-MCNC: >1000 MG/DL
HGB UR QL STRIP: NEGATIVE
KETONES UR STRIP-MCNC: NEGATIVE MG/DL
LEUKOCYTE ESTERASE UR QL STRIP: NEGATIVE
NITRATE UR QL: NEGATIVE
PH UR STRIP: 5.5 [PH] (ref 5–7)
POTASSIUM SERPL-SCNC: 3.4 MMOL/L (ref 3.4–5.3)
POTASSIUM UR-SCNC: 46.9 MMOL/L
RBC URINE: <1 /HPF
SODIUM SERPL-SCNC: 138 MMOL/L (ref 135–145)
SODIUM UR-SCNC: 54 MMOL/L
SP GR UR STRIP: 1.03 (ref 1–1.03)
URATE CRY #/AREA URNS HPF: ABNORMAL /HPF
UROBILINOGEN UR STRIP-MCNC: <2 MG/DL
WBC URINE: 2 /HPF

## 2023-11-17 PROCEDURE — 84300 ASSAY OF URINE SODIUM: CPT | Performed by: INTERNAL MEDICINE

## 2023-11-17 PROCEDURE — 82436 ASSAY OF URINE CHLORIDE: CPT | Performed by: INTERNAL MEDICINE

## 2023-11-17 PROCEDURE — 250N000013 HC RX MED GY IP 250 OP 250 PS 637: Performed by: PHYSICIAN ASSISTANT

## 2023-11-17 PROCEDURE — 250N000013 HC RX MED GY IP 250 OP 250 PS 637: Performed by: STUDENT IN AN ORGANIZED HEALTH CARE EDUCATION/TRAINING PROGRAM

## 2023-11-17 PROCEDURE — 84133 ASSAY OF URINE POTASSIUM: CPT | Performed by: INTERNAL MEDICINE

## 2023-11-17 PROCEDURE — 80048 BASIC METABOLIC PNL TOTAL CA: CPT | Performed by: INTERNAL MEDICINE

## 2023-11-17 PROCEDURE — 36415 COLL VENOUS BLD VENIPUNCTURE: CPT | Performed by: INTERNAL MEDICINE

## 2023-11-17 PROCEDURE — 250N000013 HC RX MED GY IP 250 OP 250 PS 637: Performed by: INTERNAL MEDICINE

## 2023-11-17 PROCEDURE — 97116 GAIT TRAINING THERAPY: CPT | Mod: GP

## 2023-11-17 PROCEDURE — 97110 THERAPEUTIC EXERCISES: CPT | Mod: GO

## 2023-11-17 PROCEDURE — 81001 URINALYSIS AUTO W/SCOPE: CPT | Performed by: INTERNAL MEDICINE

## 2023-11-17 PROCEDURE — 99232 SBSQ HOSP IP/OBS MODERATE 35: CPT | Performed by: INTERNAL MEDICINE

## 2023-11-17 PROCEDURE — 97530 THERAPEUTIC ACTIVITIES: CPT | Mod: GP

## 2023-11-17 PROCEDURE — 99222 1ST HOSP IP/OBS MODERATE 55: CPT | Mod: FS | Performed by: INTERNAL MEDICINE

## 2023-11-17 PROCEDURE — 210N000001 HC R&B IMCU HEART CARE

## 2023-11-17 RX ORDER — POTASSIUM CHLORIDE 1500 MG/1
40 TABLET, EXTENDED RELEASE ORAL ONCE
Qty: 2 TABLET | Refills: 0 | Status: COMPLETED | OUTPATIENT
Start: 2023-11-17 | End: 2023-11-17

## 2023-11-17 RX ADMIN — PRAMIPEXOLE DIHYDROCHLORIDE 0.25 MG: 0.25 TABLET ORAL at 21:37

## 2023-11-17 RX ADMIN — GABAPENTIN 200 MG: 100 CAPSULE ORAL at 21:36

## 2023-11-17 RX ADMIN — MICONAZOLE NITRATE: 20 POWDER TOPICAL at 08:50

## 2023-11-17 RX ADMIN — POTASSIUM CHLORIDE 40 MEQ: 1500 TABLET, EXTENDED RELEASE ORAL at 15:12

## 2023-11-17 RX ADMIN — Medication 500 MCG: at 08:49

## 2023-11-17 RX ADMIN — AMIODARONE HYDROCHLORIDE 100 MG: 100 TABLET ORAL at 16:52

## 2023-11-17 RX ADMIN — APIXABAN 5 MG: 5 TABLET, FILM COATED ORAL at 08:49

## 2023-11-17 RX ADMIN — EMPAGLIFLOZIN 10 MG: 10 TABLET, FILM COATED ORAL at 08:49

## 2023-11-17 RX ADMIN — ACETAMINOPHEN 1000 MG: 500 TABLET ORAL at 08:49

## 2023-11-17 RX ADMIN — APIXABAN 5 MG: 5 TABLET, FILM COATED ORAL at 21:37

## 2023-11-17 RX ADMIN — ACETAMINOPHEN 1000 MG: 500 TABLET ORAL at 21:36

## 2023-11-17 RX ADMIN — MICONAZOLE NITRATE: 20 POWDER TOPICAL at 15:12

## 2023-11-17 RX ADMIN — MICONAZOLE NITRATE: 20 POWDER TOPICAL at 21:37

## 2023-11-17 RX ADMIN — LEVOFLOXACIN 500 MG: 500 TABLET, FILM COATED ORAL at 08:49

## 2023-11-17 RX ADMIN — ACETAMINOPHEN 1000 MG: 500 TABLET ORAL at 15:12

## 2023-11-17 RX ADMIN — ATORVASTATIN CALCIUM 40 MG: 40 TABLET, FILM COATED ORAL at 21:37

## 2023-11-17 ASSESSMENT — ACTIVITIES OF DAILY LIVING (ADL)
ADLS_ACUITY_SCORE: 55

## 2023-11-17 NOTE — PROGRESS NOTES
[facility] Hospitalist Progress Note  [facility]  Admission date: 11/10/2023    Summary:  Gorge Berkowitz Jr. is a 78-year-old man with history of HFrEF, Afib, CAD, obesity, chronic edema, wheel chair bound for months in [facility] center admitted on 11/10/2023 with bilateral venous stasis ulcers with cellulitis and acute on chronic HFmrEF    He received IV diuretics briefly for acute on chronic HFmrEF and was subsequently transitioned to oral bumetanide.  Cardiologist signed off.    Wound culture grew MSSA, Klebsiella oxytoca, Serratia, Corynebacterium striatum, and Enterococcus faecalis.  for which he received IV Zosyn and vancomycin briefly and was subsequently narrowed to Zosyn only per ID recommendation.  IV Zosyn was switched to levofloxacin on 11/15/2023.    Assessment/Plan    #Acute on chronic HFmEF  -Echocardiogram revealed mildly reduced LV systolic function with LVEF of 45 to 50%, mild septal hypokinesis, mild to moderate inferior wall hypokinesis and mildly dilated right ventricle.  -Off IV diuretics  -Oral bumetanide 2 mg twice daily-started 11/30/2023; held on 11/16/2023 due to YOUNG  -Losartan 12.5 mg daily  -Metoprolol succinate 25 mg daily  -Cardiology team signed off-11/14/2023    YOUNG  Likely prerenal from diuretic therapy    Discussed with cardiologist who recommends lowering diuretic therapy dosage without decreasing dosage beyond patient's PTA dosage.  Patient was taking 20 mg of furosemide p.o. daily and as needed oral furosemide PTA.    Hold bumetanide for today and consider restarting at 2 mg daily instead of 2 mg twice daily if renal function improves tomorrow  Continue to hold losartan for now    Monitor BMP  Avoid nephrotoxin  Follow-up nephrology consult      #History of CAD  #Chronic RCA occlusion.  -Continue atorvastatin and metoprolol succinate per cardiology recommendation    #Infected bilateral lower extremity stasis ulcer  #Cellulitis  -Off vancomycin and Zosyn  -Continue  "levofloxacin-started 11/15/2023  -ID [id number] assistance  -WOC      #Sore throat, congestion - COVID negative     #acute on chronic pain syndrome with opiate dependence  -butrans patch, scheduled tylenol, lidocaine patch(more for shoulder djd), robaxin     #ROSALVA  -CPAP at night    #hx of Afib/flutter  -Continue amiodarone, lopressor, eliquis       Checklist:  Code Status: Full Code    Diet: Combination Diet Low Saturated Fat Na <2400mg Diet, No Caffeine Diet     DVT px:  DOAC    Disposition and Discharge Planning  Auto-populated from discharge tab:     Expected Discharge Date: 11/20/2023    Discharge Delays: Placement - LTC    Discharge Comments: IV abx  back to care facility         System Identified Risk Variables  Auto-populated based on system request - if needs to be addressed in treatment plan they will be addressed above:  \"  Clinically Significant Risk Factors                 # Acute Kidney Injury, unspecified: based on a >150% or 0.3 mg/dL increase in last creatinine compared to past 90 day average, will monitor renal function   # Heart failure, NOS: heart failure noted on the problem list and last echo with EF 40-50%       # Obesity: Estimated body mass index is 36.56 kg/m  as calculated from the following:    Height as of this encounter: 1.753 m (5' 9\").    Weight as of this encounter: 112.3 kg (247 lb 9.2 oz).        # Financial/Environmental Concerns: none         \"    Interval Events/Subjective/Notable results:  No new complaints today and no acute events overnight.  He denies shortness of breath, however, required supplemental oxygen via nonrebreather mask overnight.      Objective  Vital signs in last 24 hours  Temp:  [97.3  F (36.3  C)-98  F (36.7  C)] 97.8  F (36.6  C)  Pulse:  [60-98] 96  Resp:  [16-20] 20  BP: ()/(57-92) 123/78  SpO2:  [90 %-99 %] 90 % O2 Device: None (Room air)    Weight:   247 lbs 9.23 oz  Body mass index is 36.56 kg/m .    Intake/Output last 3 shifts  I/O last " 3 completed shifts:  In: 840 [P.O.:840]  Out: 950 [Urine:950]    Physical Exam  General:  Morbidly obese, alert, cooperative, no distress, generalized weakness, deconditioned.  Laying flat on the bed.   Neurologic:  oriented, facial symmetry preserved, fluent speech.   Psych: calm  HEENT:  Anicteric, MMM  CV: RRR no MRG, normal S1 and S2, no edema  Lungs: Diminished entry bilaterally.  No crackles.  Abd: soft, obese, NT  Skin: Bilateral stasis dermatitis with purulent stasis ulcer covered with slough in the distal leg and left foot.  Left foot wrapped with clean gauze   Levy Catheter: Not present  Lines: None          Medical Decision Making       Deni Will MD

## 2023-11-17 NOTE — PROGRESS NOTES
CLINICAL NUTRITION SERVICES - BRIEF NOTE    Diet: Low Saturated Fat, <2400 mg Na, No caffeine diet    Intake:  Reviewed nutrition risk factors due to LOS. Pt is tolerating diet and eating adequately per nursing documentation (% of meals). [employer] shows full meals being ordered (entree, 1-4 sides, beverages) 3x/day. Per pt, reports good/adequate oral intake and appetite throughout hospital stay, and normally eats 3x/day at home. No N/V.    Weight:  Reviewed wt hx and appears relatively stable.  No unintentional wt loss per pt report.    Vitals:    11/13/23 0404 11/14/23 0332 11/15/23 0340 11/16/23 0248   Weight: 113.6 kg (250 lb 7.1 oz) 110.6 kg (243 lb 13.3 oz) 112.1 kg (247 lb 2.2 oz) 111.2 kg (245 lb 2.4 oz)    11/17/23 0405   Weight: 112.3 kg (247 lb 9.2 oz)     Follow Up / Monitoring:   Will continue to follow pt until discharge.    Alla Ingram  Dietetic Intern

## 2023-11-17 NOTE — PLAN OF CARE
Goal Outcome Evaluation:      Plan of Care Reviewed With: patient    Overall Patient Progress: improvingOverall Patient Progress: improving    Outcome Evaluation: Pt reports pain with movement but none at rest. Pain patch on lower back with some good effect. Assist of 2 up to pivot to bed from chair. Oxymask overnigt to 6L for severe sleep apnea. Tele is Afib.

## 2023-11-17 NOTE — PLAN OF CARE
[id number]  Problem: Skin Injury Risk Increased  Goal: Skin Health and Integrity  Intervention: Optimize Skin Protection  Recent Flowsheet Documentation  Taken 11/16/2023 1800 by Sera Tanner RN  Skin Protection: incontinence pads utilized  Taken 11/16/2023 1600 by Sera Tanner RN  Activity Management: up in chair  Head of Bed (HOB) Positioning: HOB at 20-30 degrees  Taken 11/16/2023 1300 by Sera Tanner RN  Activity Management: up in chair  Head of Bed (HOB) Positioning: HOB at 20-30 degrees  Taken 11/16/2023 0900 by Sera Tanner RN  Activity Management: (Pt refused today saying he is bed bound even though he got up with therapy yesterday) ambulated in room  Head of Bed (HOB) Positioning: HOB at 20-30 degrees   Goal Outcome Evaluation:       Wound care done on LE this evening.  Pt refused to get up out of bed until PT/OT worked with pt in afternoon.  Up in chair most of evening. Offer pt to go back to bed. Pt wanted to stay in chair until after supper. Micro shifting himself. On po Levaquin now.  ID signed off.

## 2023-11-17 NOTE — PROGRESS NOTES
Care Management Follow Up    Length of Stay (days): 7    Expected Discharge Date: 11/20/2023         Concerns to be Addressed:  LTC placement       Patient plan of care discussed at interdisciplinary rounds: Yes     Anticipated Discharge Disposition:  LTC     Anticipated Discharge Services:  LTC     Anticipated Discharge DME:  no new DME     Patient/family educated on Medicare website which has current facility and service quality ratings:  yes  Education Provided on the Discharge Plan:  per are team  Patient/Family in Agreement with the Plan:  yes        Additional Information:  Patient with PMH of systolic CHF, Afib, CAD, obesity, chronic edema, presenting with more pain in legs, weeping and open sores.  ID consulted. Now on PO antibiotic.  Room air.     Therapy:  PT: bed mobility with min assist, sit/stand with FWW and min assist, amb 15', 30' with FWW and CGA/min assist of 1  Recommend TCU to improve mobility and strength.   OT: minAx2 trsf , [facility] g/h, [facility] drsg; Recommending TCU.         Social History:  Patient transferred from [facility]. He was in TCU September 9th- October 15th then changed to LTC as he ran out of TCU insurance coverage.  He states prior to TCU, he was able to walk with a walker. He had pain but was able to ambulate short distances.  He lives in a split level home; 7 stairs up to his level. His brother lives on the bottom level. [facility] to transport at discharge.      11/17/23:  [facility] denied for TCU. Updated patient. He would like LTC placement at [facility]. States he would not go to [facility]. Possibly willing to go to [facility] if no bed at [facility].  Referral faxed. Voice message left with [facility] LTC admissions regarding if patient is able to share a room as he is contact isolation here.    12:09 PM  Patient states he really only wants to go to LTC at [facility]. [facility] has declined for both TCU and LTC due to acuity. Patient states Ellen judge of  $49985 is high for him but he does want a private room.  Waiting to hear back from [facility] on if they can take patient in the shared room as he is in isolation here.    3:29 PM  Updated patient on LTC placement. [facility] both declined patient. He states he wants a private room and willing to pay extra for this. He states he will accept a bed at [facility]. Voice message and epic message sent requesting to secure LTC bed at [facility].   [facility] $12,000 down $440 + private room fee $45 per day.    Dixie Lagunas RN

## 2023-11-17 NOTE — PLAN OF CARE
"Goal Outcome Evaluation:    Pt alert and oriented. Denied SOB and chest pain.  Chronic back pain noted with patch on lower back x 1 week.  Vitals checked and WNL.  BLE  wrapped with acewraps; CDI.  Pt able to turn LR independently on bed.  Up on recliner for meals.  Refused lidocaine patch, stated his shoulder is fine.  Call light with reach, able to make needs known.      Problem: Adult Inpatient Plan of Care  Goal: Plan of Care Review  Description: The Plan of Care Review/Shift note should be completed every shift.  The Outcome Evaluation is a brief statement about your assessment that the patient is improving, declining, or no change.  This information will be displayed automatically on your shift  note.  Outcome: Progressing  Goal: Patient-Specific Goal (Individualized)  Description: You can add care plan individualizations to a care plan. Examples of Individualization might be:  \"Parent requests to be called daily at 9am for status\", \"I have a hard time hearing out of my right ear\", or \"Do not touch me to wake me up as it startles  me\".  Outcome: Progressing  Goal: Absence of Hospital-Acquired Illness or Injury  Outcome: Progressing  Intervention: Identify and Manage Fall Risk  Recent Flowsheet Documentation  Taken 11/17/2023 0800 by Gaston Gipson, RN  Safety Promotion/Fall Prevention: increase visualization of patient  Intervention: Prevent Infection  Recent Flowsheet Documentation  Taken 11/17/2023 0800 by Gaston Gipson, RN  Infection Prevention: single patient room provided  Goal: Optimal Comfort and Wellbeing  Outcome: Progressing  Intervention: Monitor Pain and Promote Comfort  Recent Flowsheet Documentation  Taken 11/17/2023 0800 by Gaston Gipson, RN  Pain Management Interventions: medication (see MAR)  Goal: Readiness for Transition of Care  Outcome: Progressing         "

## 2023-11-17 NOTE — CONSULTS
RENAL CONSULT NOTE    REQUESTING PHYSICIAN: Dr. Will     REASON FOR CONSULT: YOUNG     ASSESSMENT/PLAN:    YOUNG - Baseline Cr 1-1.2; had been diuresing with IV Lasix and then converted to oral Bumex and empagliflozin 11/14 and have seen Cr and serum CO2 rise since that time despite tapering of UO; BP's have been on the softer side and has had massive diuresis this admit (~22 kg net negative by documented weights). UA bland with only glucosuria and minimal proteinuria. Uptic in Cr can be expected after initiation of SGLT2i particularly in combination with loop diuresis. Less likely to be direct toxic effect from Vanco/Zosyn with Vanco level 11/13 in range.     Recs:   - Ok to continue empagliflozin for now; labs reflect ~24% change from baseline Cr since initiation of SGLT2i which is considered acceptable. Will need to follow volume status closely as SGLT2i can have a heavy diuretic effect. Bumex is currently held.   - Follow renal function daily for now   - Agree with holding ARB given presence of other nephroplegic medications and softer BP's     Hypokalemia  Metabolic alkalosis   Serum CO2 trending up and K+ trending down with transition to oral Bumex/SGLT2i combo   Follow acid/base for now with Bumex held   Will give 40 mEq KCL x 1 today     Hypertension - BP's on the softer side; losartan currently on hold; on metoprolol XL 25 mg daily and empagliflozin; following BP closely with ongoing diuresis attempts     Bilateral polymicrobial LE cellulitis - Previously on Vanco/Zosyn and now converted to oral Levaquin per ID.     Atrial fibrillation - on amiodarone, BB, and apixaban     HFrEF - LVEF 45-50%, significant diuresis this admit, now on SGLT2i and loop diuresis held, cardiology has been following     Chronic pain - on buprenorphine     CAD - On statin, BB     HPI: Gorge Sho is a 79 yo M with PMH significant for coronary artery disease, atrial fibrillation, HFrEF, and chronic lymphedema who was admitted  11/10/23 from LTC with weeping open sores of bilateral legs secondary to chronic severe lymphedema. He was started on IV diuresis and IV antibiotics for coverage of cellulitis. Has had excellent response to diuresis and was converted to oral Bumex and empagliflozin on 23 by cardiology. Since then have seen Cr uptrending to 1.4 and nephrology has been asked to see.     During interview patient has no real physical complaints aside from not feeling well in general from prolonged admission. Reports he has been eating well. Thinks his edema is better and has not noticed SOB. Thinks he is urinating well and emptying bladder. Pain is variable, mostly controlled and at baseline with current medications. Mostly hyperfixated on prospect of LTC placement and expense of this and implication that he will not live independently again; concerned about the care he will receive there.     Discussed case with Dr. Kong today.     REVIEW OF SYSTEMS:  Comprehensive ROS negative except per HPI     ALLERGIES/SENSITIVITIES:  No Known Allergies  Social History     Tobacco Use    Smoking status: Former     Types: Cigarettes     Quit date: 2005     Years since quittin.8    Smokeless tobacco: Never   Substance Use Topics    Alcohol use: No     Comment: Alcoholic Drinks/day: former alcoholic- quit     Drug use: No             PHYSICAL EXAM:  Physical Exam   Temp: 97.5  F (36.4  C) Temp src: Oral BP: 105/69 Pulse: 96   Resp: 20 SpO2: 90 % O2 Device: None (Room air) Oxygen Delivery: 6 LPM  Vitals:    11/15/23 0340 23 0248 23 0405   Weight: 112.1 kg (247 lb 2.2 oz) 111.2 kg (245 lb 2.4 oz) 112.3 kg (247 lb 9.2 oz)     Vital Signs with Ranges  Temp:  [97.3  F (36.3  C)-98.1  F (36.7  C)] 97.5  F (36.4  C)  Pulse:  [60-98] 96  Resp:  [16-20] 20  BP: ()/(57-92) 105/69  SpO2:  [90 %-99 %] 90 %  I/O last 3 completed shifts:  In: 840 [P.O.:840]  Out: 950 [Urine:950]    @TMAXR(24)@    Patient Vitals for the past 72  hrs:   Weight   11/17/23 0405 112.3 kg (247 lb 9.2 oz)   11/16/23 0248 111.2 kg (245 lb 2.4 oz)   11/15/23 0340 112.1 kg (247 lb 2.2 oz)       General - A & O x 3, NAD  HEENT - PERRLA, no scleral icterus  Neck - no carotid bruits, no JVD  Respiratory - Lungs CTA bilat without wheeze, rhonchi, rales  Cardiovascular - AP RRR with murmur  Abdomen - soft, BS present, no bruits, no fluid wave  Extremities - + bilateral LE edema, lymphedema wraps in place   Integumentary - intact, good turgor, no rash/lesions  Neurologic - grossly intact  Psych:  Judgement intact, affect WNL    Laboratory:     Recent Labs   Lab 11/11/23  0642 11/10/23  1333   WBC 5.9 6.6   RBC 3.99* 3.82*   HGB 13.1* 12.7*   HCT 40.6 39.0*    210       Basic Metabolic Panel:  Recent Labs   Lab 11/17/23  0447 11/16/23  0520 11/15/23  1052 11/15/23  0514 11/14/23  0443 11/13/23  0457 11/12/23  0503    140  --  135 137 139 138   POTASSIUM 3.4 3.7 4.4 5.6* 4.0 4.1 4.2   CHLORIDE 98 98  --  99 98 98 99   CO2 31* 32*  --  20* 29 29 27   BUN 37.2* 34.2*  --  27.8* 22.1 23.1* 21.8   CR 1.43* 1.48*  --  1.09 1.00 1.09 0.99   GLC 92 92  --  79 95 93 82   LIZBET 9.6 9.4  --  9.4 9.6 9.0 9.3       INRNo lab results found in last 7 days.    Recent Labs   Lab Test 11/17/23 0447 11/16/23  0520   POTASSIUM 3.4 3.7   CHLORIDE 98 98   BUN 37.2* 34.2*      Recent Labs   Lab Test 11/10/23  1333 09/04/23  0615 09/04/23  0601   ALBUMIN 3.5  --  3.5   BILITOTAL 0.6  --  0.8   ALT 19  --  17   AST 31  --  33   PROTEIN  --  10*  --        Personally reviewed today's laboratory studies      Thank you for involving us in the care of this patient. We will continue to follow along with you.    Lakia Becerra PA-C   [facility]  [phone]

## 2023-11-18 LAB
ALBUMIN SERPL BCG-MCNC: 3.5 G/DL (ref 3.5–5.2)
ANION GAP SERPL CALCULATED.3IONS-SCNC: 8 MMOL/L (ref 7–15)
BUN SERPL-MCNC: 36.1 MG/DL (ref 8–23)
CALCIUM SERPL-MCNC: 9.4 MG/DL (ref 8.8–10.2)
CHLORIDE SERPL-SCNC: 101 MMOL/L (ref 98–107)
CREAT SERPL-MCNC: 1.31 MG/DL (ref 0.67–1.17)
DEPRECATED HCO3 PLAS-SCNC: 30 MMOL/L (ref 22–29)
EGFRCR SERPLBLD CKD-EPI 2021: 56 ML/MIN/1.73M2
GLUCOSE SERPL-MCNC: 88 MG/DL (ref 70–99)
PHOSPHATE SERPL-MCNC: 3.6 MG/DL (ref 2.5–4.5)
POTASSIUM SERPL-SCNC: 4 MMOL/L (ref 3.4–5.3)
SODIUM SERPL-SCNC: 139 MMOL/L (ref 135–145)

## 2023-11-18 PROCEDURE — 250N000013 HC RX MED GY IP 250 OP 250 PS 637: Performed by: STUDENT IN AN ORGANIZED HEALTH CARE EDUCATION/TRAINING PROGRAM

## 2023-11-18 PROCEDURE — 36415 COLL VENOUS BLD VENIPUNCTURE: CPT | Performed by: INTERNAL MEDICINE

## 2023-11-18 PROCEDURE — 80069 RENAL FUNCTION PANEL: CPT | Performed by: INTERNAL MEDICINE

## 2023-11-18 PROCEDURE — 99232 SBSQ HOSP IP/OBS MODERATE 35: CPT | Performed by: INTERNAL MEDICINE

## 2023-11-18 PROCEDURE — 250N000013 HC RX MED GY IP 250 OP 250 PS 637: Performed by: INTERNAL MEDICINE

## 2023-11-18 PROCEDURE — 210N000001 HC R&B IMCU HEART CARE

## 2023-11-18 RX ADMIN — METOPROLOL SUCCINATE 25 MG: 25 TABLET, EXTENDED RELEASE ORAL at 15:22

## 2023-11-18 RX ADMIN — LEVOFLOXACIN 500 MG: 500 TABLET, FILM COATED ORAL at 08:44

## 2023-11-18 RX ADMIN — PRAMIPEXOLE DIHYDROCHLORIDE 0.25 MG: 0.25 TABLET ORAL at 21:08

## 2023-11-18 RX ADMIN — ACETAMINOPHEN 1000 MG: 500 TABLET ORAL at 21:08

## 2023-11-18 RX ADMIN — MICONAZOLE NITRATE: 20 POWDER TOPICAL at 15:23

## 2023-11-18 RX ADMIN — EMPAGLIFLOZIN 10 MG: 10 TABLET, FILM COATED ORAL at 08:44

## 2023-11-18 RX ADMIN — ATORVASTATIN CALCIUM 40 MG: 40 TABLET, FILM COATED ORAL at 21:08

## 2023-11-18 RX ADMIN — Medication 500 MCG: at 08:47

## 2023-11-18 RX ADMIN — APIXABAN 5 MG: 5 TABLET, FILM COATED ORAL at 21:08

## 2023-11-18 RX ADMIN — SENNOSIDES AND DOCUSATE SODIUM 1 TABLET: 8.6; 5 TABLET ORAL at 08:44

## 2023-11-18 RX ADMIN — AMIODARONE HYDROCHLORIDE 100 MG: 100 TABLET ORAL at 17:21

## 2023-11-18 RX ADMIN — APIXABAN 5 MG: 5 TABLET, FILM COATED ORAL at 08:44

## 2023-11-18 RX ADMIN — ACETAMINOPHEN 1000 MG: 500 TABLET ORAL at 08:43

## 2023-11-18 RX ADMIN — MICONAZOLE NITRATE: 20 POWDER TOPICAL at 08:47

## 2023-11-18 RX ADMIN — GABAPENTIN 200 MG: 100 CAPSULE ORAL at 21:08

## 2023-11-18 RX ADMIN — ACETAMINOPHEN 1000 MG: 500 TABLET ORAL at 15:22

## 2023-11-18 RX ADMIN — MICONAZOLE NITRATE: 20 POWDER TOPICAL at 21:08

## 2023-11-18 RX ADMIN — LIDOCAINE 1 PATCH: 4 PATCH TOPICAL at 08:44

## 2023-11-18 ASSESSMENT — ACTIVITIES OF DAILY LIVING (ADL)
ADLS_ACUITY_SCORE: 50
ADLS_ACUITY_SCORE: 50
ADLS_ACUITY_SCORE: 48
ADLS_ACUITY_SCORE: 50
ADLS_ACUITY_SCORE: 48
ADLS_ACUITY_SCORE: 50
ADLS_ACUITY_SCORE: 55
ADLS_ACUITY_SCORE: 55
ADLS_ACUITY_SCORE: 48
ADLS_ACUITY_SCORE: 50
ADLS_ACUITY_SCORE: 55
ADLS_ACUITY_SCORE: 50

## 2023-11-18 NOTE — PROGRESS NOTES
[facility] Hospitalist Progress Note  [facility]  Admission date: 11/10/2023    Summary:  Gorge Berkowitz Jr. is a 78-year-old man with history of HFrEF, Afib, CAD, obesity, chronic edema, wheel chair bound for months in [facility] center admitted on 11/10/2023 with bilateral venous stasis ulcers with cellulitis and acute on chronic HFmrEF    He received IV diuretics briefly for acute on chronic HFmrEF and was subsequently transitioned to oral bumetanide.  Cardiologist signed off.    Wound culture grew MSSA, Klebsiella oxytoca, Serratia, Corynebacterium striatum, and Enterococcus faecalis.  for which he received IV Zosyn and vancomycin briefly and was subsequently narrowed to Zosyn only per ID recommendation.  IV Zosyn was switched to levofloxacin on 11/15/2023.    Assessment/Plan    #Acute on chronic HFmEF  -Echocardiogram revealed mildly reduced LV systolic function with LVEF of 45 to 50%, mild septal hypokinesis, mild to moderate inferior wall hypokinesis and mildly dilated right ventricle.  -Off IV diuretics  -Oral bumetanide 2 mg twice daily-started 11/30/2023; held on 11/16/2023 due to YOUNG  -Losartan 12.5 mg daily  -Metoprolol succinate 25 mg daily  -Cardiology team signed off-11/14/2023    YOUNG  Likely prerenal from diuretic therapy.  Renal function is improving.  Discussed with cardiologist who recommends lowering diuretic therapy dosage without decreasing dosage beyond patient's PTA dosage.  Patient was taking 20 mg of furosemide p.o. daily and as needed oral furosemide PTA.    Hold bumetanide for today and consider restarting at 2 mg daily instead of 2 mg twice daily if renal function improves   Continue to hold losartan for now    Monitor BMP  Avoid nephrotoxin  Nephrology ybswdz-zj-asjethaedy assistance    #History of CAD  #Chronic RCA occlusion.  -Continue atorvastatin and metoprolol succinate per cardiology recommendation    #Infected bilateral lower extremity stasis ulcer  #Cellulitis  -Off  "vancomycin and Zosyn  -Continue levofloxacin-started 11/15/2023  -ID [id number] assistance  -WOC      #Sore throat, congestion - COVID negative     #acute on chronic pain syndrome with opiate dependence  -butrans patch, scheduled tylenol, lidocaine patch(more for shoulder djd), robaxin     #ROSALVA  -CPAP at night    #hx of Afib/flutter  -Continue amiodarone, lopressor, eliquis       Checklist:  Code Status: Full Code    Diet: Combination Diet Low Saturated Fat Na <2400mg Diet, No Caffeine Diet     DVT px:  DOAC    Disposition and Discharge Planning  Auto-populated from discharge tab:    Expected Discharge Date: 11/20/2023    Discharge Delays: Placement - LTC    Discharge Comments: IV abx  back to care facility         System Identified Risk Variables  Auto-populated based on system request - if needs to be addressed in treatment plan they will be addressed above:  \"  Clinically Significant Risk Factors                   # Heart failure, NOS: heart failure noted on the problem list and last echo with EF 40-50%       # Obesity: Estimated body mass index is 36.46 kg/m  as calculated from the following:    Height as of this encounter: 1.753 m (5' 9\").    Weight as of this encounter: 112 kg (246 lb 14.6 oz).        # Financial/Environmental Concerns: none         \"    Interval Events/Subjective/Notable results:  No new complaints today and no acute events overnight.  He denies shortness of breath.  Waiting for TCU placement.      Objective  Vital signs in last 24 hours  Temp:  [97.5  F (36.4  C)-98.4  F (36.9  C)] 97.7  F (36.5  C)  Pulse:  [] 107  Resp:  [16-20] 20  BP: ()/(61-80) 161/80  SpO2:  [94 %-99 %] 95 % O2 Device: None (Room air)    Weight:   246 lbs 14.64 oz  Body mass index is 36.46 kg/m .    Intake/Output last 3 shifts  I/O last 3 completed shifts:  In: 960 [P.O.:960]  Out: 1000 [Urine:1000]    Physical Exam  General:  Morbidly obese, alert, cooperative, no distress, generalized weakness, " deconditioned.  Laying flat on the bed.   Neurologic:  oriented, facial symmetry preserved, fluent speech.   Psych: calm  HEENT:  Anicteric, MMM  CV: RRR no MRG, normal S1 and S2, no edema  Lungs: Diminished entry bilaterally.  No crackles.  Abd: soft, obese, NT  Skin: Bilateral stasis dermatitis with purulent stasis ulcer covered with slough in the distal leg and left foot.  Left foot wrapped with clean gauze   Levy Catheter: Not present  Lines: None          Medical Decision Making       Deni Will MD

## 2023-11-18 NOTE — PLAN OF CARE
"Goal Outcome Evaluation:                  Patient is alert and oriented.  Dressings on lower extremities are intact.  RN asked patient if the dressings could be changed over night, but patient declined in favor of sleeping.  Legs kept elevated on pillows.  Patient slept well in between cares.  Vital signs stable.  Will continue to monitor.        Problem: Adult Inpatient Plan of Care  Goal: Plan of Care Review  Description: The Plan of Care Review/Shift note should be completed every shift.  The Outcome Evaluation is a brief statement about your assessment that the patient is improving, declining, or no change.  This information will be displayed automatically on your shift  note.  Outcome: Progressing  Goal: Patient-Specific Goal (Individualized)  Description: You can add care plan individualizations to a care plan. Examples of Individualization might be:  \"Parent requests to be called daily at 9am for status\", \"I have a hard time hearing out of my right ear\", or \"Do not touch me to wake me up as it startles  me\".  Outcome: Progressing  Goal: Absence of Hospital-Acquired Illness or Injury  Outcome: Progressing  Intervention: Identify and Manage Fall Risk  Recent Flowsheet Documentation  Taken 11/18/2023 0030 by Yudy Newsome RN  Safety Promotion/Fall Prevention:   activity supervised   safety round/check completed   nonskid shoes/slippers when out of bed   lighting adjusted  Intervention: Prevent Skin Injury  Recent Flowsheet Documentation  Taken 11/18/2023 0030 by Yudy Newsome RN  Body Position: heels elevated  Intervention: Prevent Infection  Recent Flowsheet Documentation  Taken 11/18/2023 0030 by Yudy Newsome RN  Infection Prevention:   rest/sleep promoted   hand hygiene promoted   equipment surfaces disinfected   single patient room provided  Goal: Optimal Comfort and Wellbeing  Outcome: Progressing  Intervention: Monitor Pain and Promote Comfort  Recent Flowsheet Documentation  Taken " 11/18/2023 0440 by Yudy Newsome, RN  Pain Management Interventions:   rest   repositioned  Goal: Readiness for Transition of Care  Outcome: Progressing

## 2023-11-18 NOTE — PROGRESS NOTES
RENAL PROGRESS NOTE    CC:  Deni Will MD    ASSESSMENT & PLAN:   YOUNG - Baseline Cr 1-1.2; had been diuresing with IV Lasix and then converted to oral Bumex and empagliflozin 11/14 and have seen Cr and serum CO2 rise since that time despite tapering of UO; BP's have been on the softer side and has had massive diuresis this admit (~22 kg net negative by documented weights). UA bland with only glucosuria and minimal proteinuria. Uptic in Cr can be expected after initiation of SGLT2i particularly in combination with loop diuresis. Less likely to be direct toxic effect from Vanco/Zosyn with Vanco level 11/13 in range.   Creatinine improved to 1.31 today    - Ok to continue empagliflozin for now; labs reflect ~24% change from baseline Cr since initiation of SGLT2i which is considered acceptable. Will need to follow volume status closely as SGLT2i can have a heavy diuretic effect. Bumex is currently held.   - Follow renal function daily for now   - Agree with holding ARB given presence of other nephroplegic medications and softer BP's      Electrolytes: Sodium 139, potassium 4.0.  Monitor.    Metabolic alkalosis   Urine chloride less than less than 20 consistent with chloride responsive metabolic alkalosis.  Treatment is give chloride but with improving, we will hold off for now.  Replacing potassium helps.  -Monitor bicarb level.     Hypertension - BP's on the softer side; losartan currently on hold; on metoprolol XL 25 mg daily and empagliflozin; following BP closely with ongoing diuresis attempts   -Avoid extreme hypotension    SUBJECTIVE: Patient seen and examined.  He is sleeping on room air.    OBJECTIVE:  Physical Exam   Temp: 97.7  F (36.5  C) Temp src: Oral BP: (!) 161/80 Pulse: 107   Resp: 20 SpO2: 95 % O2 Device: None (Room air)    Vitals:    11/16/23 0248 11/17/23 0405 11/18/23 0639   Weight: 111.2 kg (245 lb 2.4 oz) 112.3 kg (247 lb 9.2 oz) 112 kg (246 lb 14.6 oz)     Vital Signs with  "Ranges  Temp:  [97.5  F (36.4  C)-98.4  F (36.9  C)] 97.7  F (36.5  C)  Pulse:  [] 107  Resp:  [16-20] 20  BP: ()/(61-80) 161/80  SpO2:  [94 %-99 %] 95 %  I/O last 3 completed shifts:  In: 960 [P.O.:960]  Out: 1000 [Urine:1000]    @TMAXR(24)@    Patient Vitals for the past 72 hrs:   Weight   11/18/23 0639 112 kg (246 lb 14.6 oz)   11/17/23 0405 112.3 kg (247 lb 9.2 oz)   11/16/23 0248 111.2 kg (245 lb 2.4 oz)     Intake/Output Summary (Last 24 hours) at 11/18/2023 1304  Last data filed at 11/18/2023 1131  Gross per 24 hour   Intake 960 ml   Output 1400 ml   Net -440 ml       PHYSICAL EXAM:  General -sleeping,  NAD  Cardiovascular - Regular rate and rhythm, no rub  Respiratory - Clear to auscultation bilaterally, no crackles or wheezes  Abd: BS present, no guarding or pain with palpation, no ascites  Extremities - No lower extremity edema bilaterally  Skin: dry, intact, no rash, good turgor  Neuro:  Grossly intact, no focal deficits  MSK:  Grossly intact  Psych:  Normal affect    LABORATORY STUDIES:   No results for input(s): \"WBC\", \"RBC\", \"HGB\", \"HCT\", \"PLT\" in the last 168 hours.    Basic Metabolic Panel:  Recent Labs   Lab 11/18/23  0517 11/17/23  0447 11/16/23  0520 11/15/23  1052 11/15/23  0514 11/14/23  0443 11/13/23  0457    138 140  --  135 137 139   POTASSIUM 4.0 3.4 3.7 4.4 5.6* 4.0 4.1   CHLORIDE 101 98 98  --  99 98 98   CO2 30* 31* 32*  --  20* 29 29   BUN 36.1* 37.2* 34.2*  --  27.8* 22.1 23.1*   CR 1.31* 1.43* 1.48*  --  1.09 1.00 1.09   GLC 88 92 92  --  79 95 93   LIZBET 9.4 9.6 9.4  --  9.4 9.6 9.0       INRNo lab results found in last 7 days.     Recent Labs   Lab Test 11/11/23  0642 11/10/23  1333 09/04/23  0405 10/18/22  1346 08/08/18  0714 08/07/18  1349   INR  --   --   --  1.25*  --  1.16*   WBC 5.9 6.6   < > 6.1   < > 6.7   HGB 13.1* 12.7*   < > 12.5*   < > 14.9    210   < > 138*   < > 139*    < > = values in this interval not displayed.       Personally reviewed current " labs      Manolo Kong MD  [facility]  [phone]

## 2023-11-18 NOTE — PLAN OF CARE
"Goal Outcome Evaluation:    Pt alert and oriented. Denied SOB and chest pain.  Vitals checked and WNL.  Pt using urinal and commode at bedside.  On A2 pivot with GB.  Up on recliner for meals.  Wound dressing done per order.  Call light within reach, calls appropriately.  Awaiting TCU placement.        Problem: Adult Inpatient Plan of Care  Goal: Plan of Care Review  Description: The Plan of Care Review/Shift note should be completed every shift.  The Outcome Evaluation is a brief statement about your assessment that the patient is improving, declining, or no change.  This information will be displayed automatically on your shift  note.  Outcome: Progressing  Goal: Patient-Specific Goal (Individualized)  Description: You can add care plan individualizations to a care plan. Examples of Individualization might be:  \"Parent requests to be called daily at 9am for status\", \"I have a hard time hearing out of my right ear\", or \"Do not touch me to wake me up as it startles  me\".  Outcome: Progressing  Goal: Absence of Hospital-Acquired Illness or Injury  Outcome: Progressing  Intervention: Identify and Manage Fall Risk  Recent Flowsheet Documentation  Taken 11/18/2023 1600 by Gaston Gipson, RN  Safety Promotion/Fall Prevention:   activity supervised   safety round/check completed   nonskid shoes/slippers when out of bed   lighting adjusted  Taken 11/18/2023 0800 by Gaston Gipson, RN  Safety Promotion/Fall Prevention:   activity supervised   safety round/check completed   nonskid shoes/slippers when out of bed   lighting adjusted  Intervention: Prevent Infection  Recent Flowsheet Documentation  Taken 11/18/2023 1600 by Gaston Gipson, RN  Infection Prevention: personal protective equipment utilized  Taken 11/18/2023 0800 by Gaston Gipson, RN  Infection Prevention: personal protective equipment utilized  Goal: Optimal Comfort and Wellbeing  Outcome: Progressing  Intervention: Monitor Pain and Promote Comfort  Recent " Flowsheet Documentation  Taken 11/18/2023 1600 by Gaston Gipson, RN  Pain Management Interventions:   repositioned   rest  Taken 11/18/2023 1200 by Gaston Gipson, RN  Pain Management Interventions:   repositioned   rest  Taken 11/18/2023 0800 by Gaston Gipson, RN  Pain Management Interventions: rest  Goal: Readiness for Transition of Care  Outcome: Progressing

## 2023-11-18 NOTE — PLAN OF CARE
Problem: Adult Inpatient Plan of Care  Goal: Plan of Care Review  Description: The Plan of Care Review/Shift note should be completed every shift.  The Outcome Evaluation is a brief statement about your assessment that the patient is improving, declining, or no change.  This information will be displayed automatically on your shift  note.  Outcome: Progressing     Problem: Adult Inpatient Plan of Care  Goal: Absence of Hospital-Acquired Illness or Injury  Intervention: Prevent Skin Injury  Recent Flowsheet Documentation  Taken 11/17/2023 2135 by Aicha Calhoun RN  Body Position:   heels elevated   legs elevated     Problem: Risk for Delirium  Goal: Optimal Coping  Outcome: Progressing   Goal Outcome Evaluation: Patient remained in chair most of the evening. Repositioning encouraged. Call light within reach, can make needs known. A&O x4, VSS.

## 2023-11-18 NOTE — PROGRESS NOTES
Care Management Follow Up    Length of Stay (days): 8    Expected Discharge Date: 11/20/2023     Concerns to be Addressed:  LTC placement       Patient plan of care discussed at interdisciplinary rounds: Yes     Anticipated Discharge Disposition:  LTC     Anticipated Discharge Services:  LTC     Anticipated Discharge DME:  no new DME     Patient/family educated on Medicare website which has current facility and service quality ratings:  yes  Education Provided on the Discharge Plan:  per are team  Patient/Family in Agreement with the Plan:  yes        Additional Information:  Patient with PMH of systolic CHF, Afib, CAD, obesity, chronic edema, presenting with more pain in legs, weeping and open sores.  ID consulted. Now on PO antibiotic.  Room air.     Therapy:  PT: bed mobility with min assist, sit/stand with FWW and min assist, amb 15', 30' with FWW and CGA/min assist of 1  Recommend TCU to improve mobility and strength.   OT: minAx2 tr[facility] , [facility], [facility]; Recommending TCU.         Social History:  Patient transferred from [facility]. He was in TCU September 9th- October 15th then changed to LTC as he ran out of TCU insurance coverage.  He states prior to TCU, he was able to walk with a walker. He had pain but was able to ambulate short distances.  He lives in a split level home; 7 stairs up to his level. His brother lives on the bottom level. [facility] to transport at discharge.       11/18/23:  [facility] room [facility]. Cost $12,000 down $440 + private room fee $45 per day. Patient stating agreement and ability to afford cost. Waiting to hear back from [facility] for confirmation on LTC placement 11/20/23.    2:45 PM  Met with patient and his friend Jey. They had many questions about LTC placement. Much discussion about [facility] being full, [facility] is a shared room etc. Currently only option right now is [facility]. Reviewed cost.  Reiterated we need confirmation that  the bed is still available. He states he wants this bed and is able to afford cost.         Dixie Lagunas RN

## 2023-11-19 ENCOUNTER — APPOINTMENT (OUTPATIENT)
Dept: OCCUPATIONAL THERAPY | Facility: HOSPITAL | Age: 78
DRG: 602 | End: 2023-11-19
Payer: COMMERCIAL

## 2023-11-19 ENCOUNTER — APPOINTMENT (OUTPATIENT)
Dept: PHYSICAL THERAPY | Facility: HOSPITAL | Age: 78
DRG: 602 | End: 2023-11-19
Payer: COMMERCIAL

## 2023-11-19 LAB
ALBUMIN SERPL BCG-MCNC: 3.1 G/DL (ref 3.5–5.2)
ANION GAP SERPL CALCULATED.3IONS-SCNC: 9 MMOL/L (ref 7–15)
BUN SERPL-MCNC: 35 MG/DL (ref 8–23)
CALCIUM SERPL-MCNC: 8.8 MG/DL (ref 8.8–10.2)
CHLORIDE SERPL-SCNC: 101 MMOL/L (ref 98–107)
CREAT SERPL-MCNC: 1.11 MG/DL (ref 0.67–1.17)
DEPRECATED HCO3 PLAS-SCNC: 26 MMOL/L (ref 22–29)
EGFRCR SERPLBLD CKD-EPI 2021: 69 ML/MIN/1.73M2
GLUCOSE SERPL-MCNC: 88 MG/DL (ref 70–99)
PHOSPHATE SERPL-MCNC: 3.7 MG/DL (ref 2.5–4.5)
POTASSIUM SERPL-SCNC: 4.2 MMOL/L (ref 3.4–5.3)
SODIUM SERPL-SCNC: 136 MMOL/L (ref 135–145)

## 2023-11-19 PROCEDURE — 80069 RENAL FUNCTION PANEL: CPT | Performed by: INTERNAL MEDICINE

## 2023-11-19 PROCEDURE — 36415 COLL VENOUS BLD VENIPUNCTURE: CPT | Performed by: INTERNAL MEDICINE

## 2023-11-19 PROCEDURE — 250N000013 HC RX MED GY IP 250 OP 250 PS 637: Performed by: INTERNAL MEDICINE

## 2023-11-19 PROCEDURE — 99232 SBSQ HOSP IP/OBS MODERATE 35: CPT | Performed by: INTERNAL MEDICINE

## 2023-11-19 PROCEDURE — 210N000001 HC R&B IMCU HEART CARE

## 2023-11-19 PROCEDURE — 97110 THERAPEUTIC EXERCISES: CPT | Mod: GP

## 2023-11-19 PROCEDURE — 97116 GAIT TRAINING THERAPY: CPT | Mod: GP

## 2023-11-19 PROCEDURE — 999N000202 HC STATISTICAL VASC ACCESS NURSE TIME, 1-15 MINUTES

## 2023-11-19 PROCEDURE — 97110 THERAPEUTIC EXERCISES: CPT | Mod: GO

## 2023-11-19 PROCEDURE — 250N000013 HC RX MED GY IP 250 OP 250 PS 637: Performed by: STUDENT IN AN ORGANIZED HEALTH CARE EDUCATION/TRAINING PROGRAM

## 2023-11-19 RX ADMIN — MICONAZOLE NITRATE: 20 POWDER TOPICAL at 15:03

## 2023-11-19 RX ADMIN — AMIODARONE HYDROCHLORIDE 100 MG: 100 TABLET ORAL at 17:29

## 2023-11-19 RX ADMIN — PRAMIPEXOLE DIHYDROCHLORIDE 0.25 MG: 0.25 TABLET ORAL at 20:56

## 2023-11-19 RX ADMIN — APIXABAN 5 MG: 5 TABLET, FILM COATED ORAL at 20:52

## 2023-11-19 RX ADMIN — ACETAMINOPHEN 1000 MG: 500 TABLET ORAL at 20:52

## 2023-11-19 RX ADMIN — MICONAZOLE NITRATE: 20 POWDER TOPICAL at 08:51

## 2023-11-19 RX ADMIN — Medication 500 MCG: at 08:51

## 2023-11-19 RX ADMIN — SENNOSIDES AND DOCUSATE SODIUM 1 TABLET: 8.6; 5 TABLET ORAL at 08:51

## 2023-11-19 RX ADMIN — GABAPENTIN 200 MG: 100 CAPSULE ORAL at 20:56

## 2023-11-19 RX ADMIN — ATORVASTATIN CALCIUM 40 MG: 40 TABLET, FILM COATED ORAL at 20:55

## 2023-11-19 RX ADMIN — METOPROLOL SUCCINATE 25 MG: 25 TABLET, EXTENDED RELEASE ORAL at 15:03

## 2023-11-19 RX ADMIN — APIXABAN 5 MG: 5 TABLET, FILM COATED ORAL at 08:51

## 2023-11-19 RX ADMIN — MICONAZOLE NITRATE: 20 POWDER TOPICAL at 20:52

## 2023-11-19 RX ADMIN — ACETAMINOPHEN 1000 MG: 500 TABLET ORAL at 15:03

## 2023-11-19 RX ADMIN — LEVOFLOXACIN 500 MG: 500 TABLET, FILM COATED ORAL at 08:51

## 2023-11-19 RX ADMIN — EMPAGLIFLOZIN 10 MG: 10 TABLET, FILM COATED ORAL at 08:51

## 2023-11-19 RX ADMIN — ACETAMINOPHEN 1000 MG: 500 TABLET ORAL at 08:51

## 2023-11-19 RX ADMIN — LIDOCAINE 1 PATCH: 4 PATCH TOPICAL at 08:51

## 2023-11-19 ASSESSMENT — ACTIVITIES OF DAILY LIVING (ADL)
TOILETING_ISSUES: NO
DIFFICULTY_EATING/SWALLOWING: NO
ADLS_ACUITY_SCORE: 48
ADLS_ACUITY_SCORE: 44
ADLS_ACUITY_SCORE: 48
ADLS_ACUITY_SCORE: 44
ADLS_ACUITY_SCORE: 44
ADLS_ACUITY_SCORE: 48
ADLS_ACUITY_SCORE: 44
ADLS_ACUITY_SCORE: 44
ADLS_ACUITY_SCORE: 48
ADLS_ACUITY_SCORE: 48

## 2023-11-19 NOTE — PLAN OF CARE
"Goal Outcome Evaluation:                  Patient is alert and oriented.  Transferred from recliner to bed at 0040.  Patient reported back pain with activity.  RN offered robaxin for comfort, but patient declined.  Pain patch in lower back in place.  Dressing in place over leg wounds appear clean, dry, and intact.  Pillows used to elevate legs.  Will continue to monitor.        Problem: Adult Inpatient Plan of Care  Goal: Plan of Care Review  Description: The Plan of Care Review/Shift note should be completed every shift.  The Outcome Evaluation is a brief statement about your assessment that the patient is improving, declining, or no change.  This information will be displayed automatically on your shift  note.  Outcome: Progressing  Goal: Patient-Specific Goal (Individualized)  Description: You can add care plan individualizations to a care plan. Examples of Individualization might be:  \"Parent requests to be called daily at 9am for status\", \"I have a hard time hearing out of my right ear\", or \"Do not touch me to wake me up as it startles  me\".  Outcome: Progressing  Goal: Absence of Hospital-Acquired Illness or Injury  Outcome: Progressing  Intervention: Identify and Manage Fall Risk  Recent Flowsheet Documentation  Taken 11/19/2023 0015 by Yudy Newsome RN  Safety Promotion/Fall Prevention: activity supervised  Intervention: Prevent Skin Injury  Recent Flowsheet Documentation  Taken 11/19/2023 0306 by Yudy Newsome RN  Body Position: position changed independently  Intervention: Prevent Infection  Recent Flowsheet Documentation  Taken 11/19/2023 0015 by Yudy Newsome RN  Infection Prevention:   hand hygiene promoted   personal protective equipment utilized   rest/sleep promoted  Goal: Optimal Comfort and Wellbeing  Outcome: Progressing  Intervention: Monitor Pain and Promote Comfort  Recent Flowsheet Documentation  Taken 11/19/2023 0015 by Yudy Newsome RN  Pain Management Interventions:   " rest   repositioned  Goal: Readiness for Transition of Care  Outcome: Progressing

## 2023-11-19 NOTE — PROGRESS NOTES
"PICC/Vacular Access Note:     S: Request for US guided peripheral IV  B: Pts IV was red and leaking  A: PT finishing with patient at bedside. Spoke with patient about placing an IV. Pt stated, \"I haven't had anything intravenously in 5 days and I'm leaving tomorrow so am not getting an IV today. If my bed tomorrow is not available and I have to stay here then you can throw one in.\"   R: Discussed with primary [place]. They will call if patient agrees.      Juana Vera, RN, BSN  Vascular Access - [facility]  "

## 2023-11-19 NOTE — PROGRESS NOTES
RENAL PROGRESS NOTE    CC:  Deni Will MD    ASSESSMENT & PLAN:   YOUNG - Baseline Cr 1-1.2; had been diuresing with IV Lasix and then converted to oral Bumex and empagliflozin 11/14 and have seen Cr and serum CO2 rise since that time despite tapering of UO; BP's have been on the softer side and has had massive diuresis this admit (~22 kg net negative by documented weights). UA bland with only glucosuria and minimal proteinuria. Uptic in Cr can be expected after initiation of SGLT2i particularly in combination with loop diuresis. Less likely to be direct toxic effect from Vanco/Zosyn with Vanco level 11/13 in range.   Creatinine improved to 1.11 today  - Ok to continue empagliflozin for now; labs reflect ~24% change from baseline Cr since initiation of SGLT2i which is considered acceptable. Will need to follow volume status closely as SGLT2i can have a heavy diuretic effect. Bumex is currently held.   - Follow renal function daily for now   - Agree with holding ARB given presence of other nephroplegic medications and softer BP's   -Would hold ARB until 1 week after his kidney function back to his baseline.     Electrolytes: Sodium 136, potassium 4.2.  Monitor.    Metabolic alkalosis   Urine chloride less than less than 20 consistent with chloride responsive metabolic alkalosis.  Treatment is give chloride but with improving, we will hold off for now.  Replacing potassium helps.  26 today.  Monitor     Hypertension - BP's on the softer side; losartan currently on hold; on metoprolol XL 25 mg daily and empagliflozin; following BP closely with ongoing diuresis attempts   -Avoid extreme hypotension  -Could resume back to losartan 1 week after his kidney function back to his baseline.    Nephrology will sign off today.  Please give us a call with additional question or concern.  Follow-up losartan initiation for 1 week after his kidney function back to his baseline to make sure that stable at his baseline.   "We will recheck BMP 2 weeks after losartan reinitiated.  No need for nephrology follow-up outpatient unless primary has a question and they can refer to us.    SUBJECTIVE: Patient seen and examined.  Denies any chest pain, potation, nausea.  Appetite is good.      OBJECTIVE:  Physical Exam   Temp: 97.6  F (36.4  C) Temp src: Oral BP: 121/60 Pulse: 103   Resp: 18 SpO2: 99 % O2 Device: None (Room air)    Vitals:    11/17/23 0405 11/18/23 0639 11/19/23 0306   Weight: 112.3 kg (247 lb 9.2 oz) 112 kg (246 lb 14.6 oz) 113.8 kg (250 lb 14.1 oz)     Vital Signs with Ranges  Temp:  [97.5  F (36.4  C)-98  F (36.7  C)] 97.6  F (36.4  C)  Pulse:  [] 103  Resp:  [16-18] 18  BP: ()/(59-67) 121/60  SpO2:  [97 %-100 %] 99 %  I/O last 3 completed shifts:  In: 1080 [P.O.:1080]  Out: 1800 [Urine:1800]    @TMAXR(24)@    Patient Vitals for the past 72 hrs:   Weight   11/19/23 0306 113.8 kg (250 lb 14.1 oz)   11/18/23 0639 112 kg (246 lb 14.6 oz)   11/17/23 0405 112.3 kg (247 lb 9.2 oz)     Intake/Output Summary (Last 24 hours) at 11/18/2023 1304  Last data filed at 11/18/2023 1131  Gross per 24 hour   Intake 960 ml   Output 1400 ml   Net -440 ml       PHYSICAL EXAM:  General -sleeping,  NAD  Cardiovascular - Regular rate and rhythm, no rub  Respiratory - Clear to auscultation bilaterally, no crackles or wheezes  Abd: BS present, no guarding or pain with palpation, no ascites  Extremities - No lower extremity edema bilaterally  Skin: dry, intact, no rash, good turgor  Neuro:  Grossly intact, no focal deficits  MSK:  Grossly intact  Psych:  Normal affect    LABORATORY STUDIES:   No results for input(s): \"WBC\", \"RBC\", \"HGB\", \"HCT\", \"PLT\" in the last 168 hours.    Basic Metabolic Panel:  Recent Labs   Lab 11/19/23  0454 11/18/23  0517 11/17/23  0447 11/16/23  0520 11/15/23  1052 11/15/23  0514 11/14/23  0443    139 138 140  --  135 137   POTASSIUM 4.2 4.0 3.4 3.7 4.4 5.6* 4.0   CHLORIDE 101 101 98 98  --  99 98   CO2 26 " 30* 31* 32*  --  20* 29   BUN 35.0* 36.1* 37.2* 34.2*  --  27.8* 22.1   CR 1.11 1.31* 1.43* 1.48*  --  1.09 1.00   GLC 88 88 92 92  --  79 95   LIZBET 8.8 9.4 9.6 9.4  --  9.4 9.6       INRNo lab results found in last 7 days.     Recent Labs   Lab Test 11/11/23  0642 11/10/23  1333 09/04/23  0405 10/18/22  1346 08/08/18  0714 08/07/18  1349   INR  --   --   --  1.25*  --  1.16*   WBC 5.9 6.6   < > 6.1   < > 6.7   HGB 13.1* 12.7*   < > 12.5*   < > 14.9    210   < > 138*   < > 139*    < > = values in this interval not displayed.       Personally reviewed current labs      Manolo Kong MD  [facility]  [phone]

## 2023-11-19 NOTE — PROGRESS NOTES
[facility] Hospitalist Progress Note  [facility]  Admission date: 11/10/2023    Summary:  Gorge Berkowitz Jr. is a 78-year-old man with history of HFrEF, Afib, CAD, obesity, chronic edema, wheel chair bound for months in [facility] center admitted on 11/10/2023 with bilateral venous stasis ulcers with cellulitis and acute on chronic HFmrEF    He received IV diuretics briefly for acute on chronic HFmrEF and was subsequently transitioned to oral bumetanide.  Cardiologist signed off.    Wound culture grew MSSA, Klebsiella oxytoca, Serratia, Corynebacterium striatum, and Enterococcus faecalis.  for which he received IV Zosyn and vancomycin briefly and was subsequently narrowed to Zosyn only per ID recommendation.  IV Zosyn was switched to levofloxacin on 11/15/2023.    Assessment/Plan    #Acute on chronic HFmEF  -Echocardiogram revealed mildly reduced LV systolic function with LVEF of 45 to 50%, mild septal hypokinesis, mild to moderate inferior wall hypokinesis and mildly dilated right ventricle.  -Off IV diuretics  -Oral bumetanide 2 mg twice daily-started 11/30/2023; held on 11/16/2023 due to YOUNG  -YOUNG has resolved; will consider restarting bumetanide at 2 mg daily after discussion with nephrologist.  -Losartan 12.5 mg daily  -Metoprolol succinate 25 mg daily  -Cardiology team signed off-11/14/2023    YOUNG  Likely prerenal from diuretic therapy.  Renal function is improving.  YOUNG has resolved; will consider restarting bumetanide at 2 mg daily after discussion with nephrologist.  Continue to hold losartan for now    Monitor BMP  Avoid nephrotoxin  Nephrology [id number] assistance    #History of CAD  #Chronic RCA occlusion.  -Continue atorvastatin and metoprolol succinate per cardiology recommendation    #Infected bilateral lower extremity stasis ulcer  #Cellulitis  -Off vancomycin and Zosyn  -Continue levofloxacin-started 11/15/2023  -ID [id number] assistance  -WO      #Sore throat,  "congestion - COVID negative     #acute on chronic pain syndrome with opiate dependence  -butrans patch, scheduled tylenol, lidocaine patch(more for shoulder djd), robaxin     #ROSALVA  -CPAP at night    #hx of Afib/flutter  -Continue amiodarone, lopressor, eliquis       Checklist:  Code Status: Full Code    Diet: Combination Diet Low Saturated Fat Na <2400mg Diet, No Caffeine Diet     DVT px:  DOAC    Disposition and Discharge Planning  Auto-populated from discharge tab:     Expected Discharge Date: 11/20/2023    Discharge Delays: Placement - LTC    Discharge Comments: TCU placement pending         System Identified Risk Variables  Auto-populated based on system request - if needs to be addressed in treatment plan they will be addressed above:  \"  Clinically Significant Risk Factors           # Hypercalcemia: corrected calcium is >10.1, will monitor as appropriate    # Hypoalbuminemia: Lowest albumin = 3.1 g/dL at 11/19/2023  4:54 AM, will monitor as appropriate      # Heart failure, NOS: heart failure noted on the problem list and last echo with EF 40-50%       # Obesity: Estimated body mass index is 37.05 kg/m  as calculated from the following:    Height as of this encounter: 1.753 m (5' 9\").    Weight as of this encounter: 113.8 kg (250 lb 14.1 oz).        # Financial/Environmental Concerns: none         \"    Interval Events/Subjective/Notable results:  No new complaints today and no acute events overnight.  He denies shortness of breath.  Waiting for TCU placement.      Objective  Vital signs in last 24 hours  Temp:  [97.5  F (36.4  C)-98  F (36.7  C)] 97.6  F (36.4  C)  Pulse:  [] 103  Resp:  [16-18] 18  BP: ()/(59-67) 121/60  SpO2:  [97 %-100 %] 99 % O2 Device: None (Room air)    Weight:   250 lbs 14.14 oz  Body mass index is 37.05 kg/m .    Intake/Output last 3 shifts  I/O last 3 completed shifts:  In: 1080 [P.O.:1080]  Out: 1800 [Urine:1800]    Physical Exam  General:  Morbidly obese, alert, " cooperative, no distress, generalized weakness, deconditioned.  Laying flat on the bed.   Neurologic:  oriented, facial symmetry preserved, fluent speech.   Psych: calm  HEENT:  Anicteric, MMM  CV: RRR no MRG, normal S1 and S2, no edema  Lungs: Diminished entry bilaterally.  No crackles.  Abd: soft, obese, NT  Skin: Bilateral stasis dermatitis with purulent stasis ulcer covered with slough in the distal leg and left foot.  Left foot wrapped with clean gauze   Levy Catheter: Not present  Lines: None          Medical Decision Making       Deni Will MD

## 2023-11-19 NOTE — PLAN OF CARE
"Goal Outcome Evaluation:    Pt alert and oriented. Denied SOB and chest pain.  Vitals checked and WNL.  Pt up on chair for meals.  Call light within reach, able to make needs known.  BLE wound dressing done.    Awaiting TCU placement.      Problem: Adult Inpatient Plan of Care  Goal: Plan of Care Review  Description: The Plan of Care Review/Shift note should be completed every shift.  The Outcome Evaluation is a brief statement about your assessment that the patient is improving, declining, or no change.  This information will be displayed automatically on your shift  note.  Outcome: Progressing  Goal: Patient-Specific Goal (Individualized)  Description: You can add care plan individualizations to a care plan. Examples of Individualization might be:  \"Parent requests to be called daily at 9am for status\", \"I have a hard time hearing out of my right ear\", or \"Do not touch me to wake me up as it startles  me\".  Outcome: Progressing  Goal: Absence of Hospital-Acquired Illness or Injury  Outcome: Progressing  Goal: Optimal Comfort and Wellbeing  Outcome: Progressing  Goal: Readiness for Transition of Care  Outcome: Progressing     "

## 2023-11-20 PROCEDURE — 250N000013 HC RX MED GY IP 250 OP 250 PS 637: Performed by: INTERNAL MEDICINE

## 2023-11-20 PROCEDURE — G0463 HOSPITAL OUTPT CLINIC VISIT: HCPCS

## 2023-11-20 PROCEDURE — 99232 SBSQ HOSP IP/OBS MODERATE 35: CPT | Performed by: STUDENT IN AN ORGANIZED HEALTH CARE EDUCATION/TRAINING PROGRAM

## 2023-11-20 PROCEDURE — 36415 COLL VENOUS BLD VENIPUNCTURE: CPT | Performed by: STUDENT IN AN ORGANIZED HEALTH CARE EDUCATION/TRAINING PROGRAM

## 2023-11-20 PROCEDURE — 250N000013 HC RX MED GY IP 250 OP 250 PS 637: Performed by: STUDENT IN AN ORGANIZED HEALTH CARE EDUCATION/TRAINING PROGRAM

## 2023-11-20 PROCEDURE — 210N000001 HC R&B IMCU HEART CARE

## 2023-11-20 RX ORDER — BUMETANIDE 1 MG/1
1 TABLET ORAL
Status: DISCONTINUED | OUTPATIENT
Start: 2023-11-20 | End: 2023-11-25

## 2023-11-20 RX ORDER — BUMETANIDE 1 MG/1
1 TABLET ORAL 2 TIMES DAILY
DISCHARGE
Start: 2023-11-20 | End: 2023-12-01

## 2023-11-20 RX ORDER — LEVOFLOXACIN 500 MG/1
500 TABLET, FILM COATED ORAL DAILY
DISCHARGE
Start: 2023-11-21 | End: 2023-11-22

## 2023-11-20 RX ADMIN — LEVOFLOXACIN 500 MG: 500 TABLET, FILM COATED ORAL at 08:22

## 2023-11-20 RX ADMIN — APIXABAN 5 MG: 5 TABLET, FILM COATED ORAL at 20:25

## 2023-11-20 RX ADMIN — AMIODARONE HYDROCHLORIDE 100 MG: 100 TABLET ORAL at 16:03

## 2023-11-20 RX ADMIN — ATORVASTATIN CALCIUM 40 MG: 40 TABLET, FILM COATED ORAL at 22:27

## 2023-11-20 RX ADMIN — APIXABAN 5 MG: 5 TABLET, FILM COATED ORAL at 08:22

## 2023-11-20 RX ADMIN — PRAMIPEXOLE DIHYDROCHLORIDE 0.25 MG: 0.25 TABLET ORAL at 22:27

## 2023-11-20 RX ADMIN — MICONAZOLE NITRATE: 20 POWDER TOPICAL at 08:25

## 2023-11-20 RX ADMIN — MICONAZOLE NITRATE: 20 POWDER TOPICAL at 14:28

## 2023-11-20 RX ADMIN — SENNOSIDES AND DOCUSATE SODIUM 1 TABLET: 8.6; 5 TABLET ORAL at 20:25

## 2023-11-20 RX ADMIN — ACETAMINOPHEN 1000 MG: 500 TABLET ORAL at 14:28

## 2023-11-20 RX ADMIN — LIDOCAINE 1 PATCH: 4 PATCH TOPICAL at 08:22

## 2023-11-20 RX ADMIN — ACETAMINOPHEN 1000 MG: 500 TABLET ORAL at 20:25

## 2023-11-20 RX ADMIN — Medication 500 MCG: at 09:37

## 2023-11-20 RX ADMIN — GABAPENTIN 200 MG: 100 CAPSULE ORAL at 22:27

## 2023-11-20 RX ADMIN — BUMETANIDE 1 MG: 1 TABLET ORAL at 18:23

## 2023-11-20 RX ADMIN — MICONAZOLE NITRATE: 20 POWDER TOPICAL at 20:26

## 2023-11-20 RX ADMIN — EMPAGLIFLOZIN 10 MG: 10 TABLET, FILM COATED ORAL at 08:26

## 2023-11-20 RX ADMIN — ACETAMINOPHEN 1000 MG: 500 TABLET ORAL at 08:22

## 2023-11-20 ASSESSMENT — ACTIVITIES OF DAILY LIVING (ADL)
ADLS_ACUITY_SCORE: 44

## 2023-11-20 NOTE — PLAN OF CARE
Occupational Therapy Discharge Summary    Reason for therapy discharge:    Discharged to long term care facility.    Progress towards therapy goal(s). See goals on Care Plan in [facility] electronic health record for goal details.  Goals not met.  Barriers to achieving goals:   discharge from facility.    Therapy recommendation(s):    Continued therapy is recommended.  Rationale/Recommendations:  pt progressing with acute OT/PT. Recommend continued therapy at LTC to progress BADL IND.

## 2023-11-20 NOTE — PROGRESS NOTES
SPIRITUAL HEALTH SERVICES Progress Note    Saw pt Gorge Berkowitz Jr. and offered [employer] sacrament of anointing for the healing of the sick.     Fr. Kevyn Mckinney

## 2023-11-20 NOTE — PLAN OF CARE
Problem: Wound  Goal: Optimal Coping  Outcome: Progressing  Goal: Optimal Functional Ability  Outcome: Progressing  Goal: Absence of Infection Signs and Symptoms  Outcome: Progressing  Goal: Improved Oral Intake  Outcome: Progressing  Goal: Optimal Pain Control and Function  Outcome: Progressing  Intervention: Prevent or Manage Pain  Recent Flowsheet Documentation  Taken 11/20/2023 0800 by Yasmin Goodman RN  Pain Management Interventions: medication (see MAR)  Goal: Skin Health and Integrity  Outcome: Progressing  Goal: Optimal Wound Healing  Outcome: Progressing    Uneventful shift. Was to discharge to LTC facility today but has been on hold due to infection status. Wound dressings changed per WOC nurse.

## 2023-11-20 NOTE — PLAN OF CARE
Physical Therapy Discharge Summary    Reason for therapy discharge:    Discharged to long term care facility.    Progress towards therapy goal(s). See goals on Care Plan in [facility] electronic health record for goal details.  Goals partially met.  Barriers to achieving goals:   discharge from facility.    Therapy recommendation(s):    Recommend continued therapies to improve overall strength, balance and mobility.         Demi Salmon, PT, DPT  11/20/2023

## 2023-11-20 NOTE — PLAN OF CARE
"Goal Outcome Evaluation:             Patient is calm and cooperative.  Vital signs stable.   Transferred from recliner to bed at 0030 with assist of 2 staff, walker and gait belt. Patient is pleasant and cooperative.  Staff able to get patient comfortable with pillows.  Dressings covering leg wounds are clean, dry and intact. Pillows used to elevate legs for most of shift, then for comfort, patient requested pillows be left off.  Will continue to monitor.      Problem: Adult Inpatient Plan of Care  Goal: Plan of Care Review  Description: The Plan of Care Review/Shift note should be completed every shift.  The Outcome Evaluation is a brief statement about your assessment that the patient is improving, declining, or no change.  This information will be displayed automatically on your shift  note.  Outcome: Progressing  Goal: Patient-Specific Goal (Individualized)  Description: You can add care plan individualizations to a care plan. Examples of Individualization might be:  \"Parent requests to be called daily at 9am for status\", \"I have a hard time hearing out of my right ear\", or \"Do not touch me to wake me up as it startles  me\".  Outcome: Progressing  Goal: Absence of Hospital-Acquired Illness or Injury  Outcome: Progressing  Intervention: Identify and Manage Fall Risk  Recent Flowsheet Documentation  Taken 11/20/2023 0030 by Yudy Newsome RN  Safety Promotion/Fall Prevention:   activity supervised   safety round/check completed   room door open   nonskid shoes/slippers when out of bed   clutter free environment maintained   supervised activity  Intervention: Prevent Skin Injury  Recent Flowsheet Documentation  Taken 11/20/2023 0030 by Yudy Newsome RN  Body Position: position changed independently  Intervention: Prevent Infection  Recent Flowsheet Documentation  Taken 11/20/2023 0030 by Yudy Newsome RN  Infection Prevention: hand hygiene promoted  Goal: Optimal Comfort and Wellbeing  Outcome: " Progressing  Goal: Readiness for Transition of Care  Outcome: Progressing

## 2023-11-20 NOTE — PROGRESS NOTES
Called for discontinue plans  Patient should resume diuresis , 1mg bid Bumex with goal wt ~245, once at the wt drop the pm dose and take PRN    No objection to discharge  No plans for renl OP follow up , can be referred as needed    Brian Mendes MD  [facility]  [phone]

## 2023-11-20 NOTE — PROGRESS NOTES
[facility]    Medicine Progress Note - Hospitalist Service    Date of Admission:  11/10/2023    Assessment & Plan   Gorge Berkowitz Jr. is a 78-year-old man with history of HFrEF, Afib, CAD, obesity, chronic edema, wheel chair bound for months in [facility] center admitted on 11/10/2023 with bilateral venous stasis ulcers with cellulitis and acute on chronic HFmrEF     He received IV diuretics briefly for acute on chronic HFmrEF and was subsequently transitioned to oral bumetanide.  Cardiologist signed off.     Wound culture grew MSSA, Klebsiella oxytoca, Serratia, Corynebacterium striatum, and Enterococcus faecalis.  for which he received IV Zosyn and vancomycin briefly and was subsequently narrowed to Zosyn only per ID recommendation.  IV Zosyn was switched to levofloxacin on 11/15/2023.     Acute on chronic HFmEF  -Echocardiogram revealed mildly reduced LV systolic function with LVEF of 45 to 50%, mild septal hypokinesis, mild to moderate inferior wall hypokinesis and mildly dilated right ventricle.  -Off IV diuretics  -Oral bumetanide 2 mg twice daily-started 11/30/2023; held on 11/16/2023 due to YOUNG  -YOUNG has resolved; will consider restarting bumetanide at 2 mg daily after discussion with nephrologist.  -Losartan 12.5 mg daily  -Metoprolol succinate 25 mg daily  -Cardiology team signed off-11/14/2023 11/20 Patient should resume diuresis , 1mg bid Bumex with goal wt ~245, once at the wt drop the pm dose and take PRN   Could resume back to losartan 1 week after his kidney function back to his baseline.   Recheck BMP 2 weeks after losartan reinitiated.       YOUNG  Likely prerenal from diuretic therapy.  Renal function is improving.  YOUNG has resolved; will consider restarting bumetanide at 2 mg daily after discussion with nephrologist.  Continue to hold losartan for now  Monitor BMP  Avoid nephrotoxin  Nephrology [id number] assistance    History of CAD  Chronic RCA  "occlusion.  -Continue atorvastatin and metoprolol succinate per cardiology recommendation    Infected bilateral lower extremity stasis ulcer  Cellulitis  -Off vancomycin and Zosyn  -Continue levofloxacin-started 11/15/2023  -ID [id number] assistance  -WOC    Sore throat, congestion - COVID negative  acute on chronic pain syndrome with opiate dependence  -butrans patch, scheduled tylenol, lidocaine patch(more for shoulder djd), robaxin    ROSALVA  -CPAP at night    hx of Afib/flutter  -Continue amiodarone, lopressor, eliquis           Diet: Combination Diet Low Saturated Fat Na <2400mg Diet, No Caffeine Diet  Diet    DVT Prophylaxis: DOAC  Levy Catheter: Not present  Lines: None     Cardiac Monitoring: ACTIVE order. Indication: Acute decompensated heart failure (48 hours)  Code Status: Full Code      Clinically Significant Risk Factors              # Hypoalbuminemia: Lowest albumin = 3.1 g/dL at 11/19/2023  4:54 AM, will monitor as appropriate      # Heart failure, NOS: heart failure noted on the problem list and last echo with EF 40-50%       # Obesity: Estimated body mass index is 36.85 kg/m  as calculated from the following:    Height as of this encounter: 1.753 m (5' 9\").    Weight as of this encounter: 113.2 kg (249 lb 9 oz).      # Financial/Environmental Concerns: none         Disposition Plan      Expected Discharge Date: 11/20/2023,  3:30 PM      Discharge Comments: TCU placement pending            Nadya Short MD  Hospitalist Service  [facility]  Securely message with [facility] (more info)  Text page via [employer] Paging/Directory   ______________________________________________________________________    Interval History   Patient is new to me today.  Patient is seen and examined at bedside.  No acute complaints.    Physical Exam   Vital Signs: Temp: 97.3  F (36.3  C) Temp src: Oral BP: 101/58 Pulse: 95   Resp: 18 SpO2: 98 % O2 Device: None (Room air)    Weight: 249 lbs 8.97 " oz    GEN: Alert and oriented. Not in acute distress.  HEENT: Atraumatic, mucous membrane- moist and pink.  Chest: Bilateral air entry.  CVS: S1S2 regular.   Abdomen: Soft. Non-tender, non-distended. No organomegaly. No guarding or rigidity. Bowel sounds active.   Extremities: b/l LE stasis wound  CNS: No involuntary movements.  Skin: no cyanosis or clubbing.     Medical Decision Making             Data

## 2023-11-20 NOTE — DISCHARGE SUMMARY
"[facility]  Hospitalist Discharge Summary      Date of Admission:  11/10/2023  Date of Discharge:  11/20/2023  Discharging Provider: Nadya Short MD  Discharge Service: Hospitalist Service    Discharge Diagnoses   Acute CHF exacerbation  YOUNG    Clinically Significant Risk Factors     # Obesity: Estimated body mass index is 36.85 kg/m  as calculated from the following:    Height as of this encounter: 1.753 m (5' 9\").    Weight as of this encounter: 113.2 kg (249 lb 9 oz).       Follow-ups Needed After Discharge   Follow-up Appointments     Follow Up and recommended labs and tests      Follow up with [facility] physician.  The following labs/tests are   recommended: BMP in 3-5 days.  Follow up with primary care provider in 1-2 weeks.  The following   labs/tests are recommended: BMP in 2 weeks.            Unresulted Labs Ordered in the Past 30 Days of this Admission       No orders found from 10/11/2023 to 11/11/2023.            Discharge Disposition   Discharged to long-term care facility  Condition at discharge: Stable    Hospital Course    Gorge Berkowitz . is a 78-year-old man with history of HFrEF, Afib, CAD, obesity, chronic edema, wheel chair bound for months in [facility] center admitted on 11/10/2023 with bilateral venous stasis ulcers with cellulitis and acute on chronic HFmrEF     He received IV diuretics briefly for acute on chronic HFmrEF and was subsequently transitioned to oral bumetanide.  Cardiologist signed off.     Wound culture grew MSSA, Klebsiella oxytoca, Serratia, Corynebacterium striatum, and Enterococcus faecalis.  for which he received IV Zosyn and vancomycin briefly and was subsequently narrowed to Zosyn only per ID recommendation.  IV Zosyn was switched to levofloxacin on 11/15/2023.     Acute on chronic HFmEF  -Echocardiogram revealed mildly reduced LV systolic function with LVEF of 45 to 50%, mild septal hypokinesis, mild to moderate inferior wall " hypokinesis and mildly dilated right ventricle.  -Off IV diuretics  -Oral bumetanide 2 mg twice daily-started 11/30/2023; held on 11/16/2023 due to YOUNG  -YOUNG has resolved; will consider restarting bumetanide at 2 mg daily after discussion with nephrologist.  -Losartan 12.5 mg daily  -Metoprolol succinate 25 mg daily  -Cardiology team signed off-11/14/2023 11/20Patient should resume diuresis , 1mg bid Bumex with goal wt ~245, once at the wt drop the pm dose and take PRN   Could resume back to losartan 1 week after his kidney function back to his baseline.   Recheck BMP 2 weeks after losartan reinitiated.      YOUNG  Likely prerenal from diuretic therapy.  Renal function is improving.  YOUNG has resolved; will consider restarting bumetanide at 2 mg daily after discussion with nephrologist.  Continue to hold losartan for now  Monitor BMP  Avoid nephrotoxin  Nephrology viavoy-xm-jqobxjnbtu assistance  History of CAD  Chronic RCA occlusion.  -Continue atorvastatin and metoprolol succinate per cardiology recommendation  Infected bilateral lower extremity stasis ulcer  Cellulitis  -Off vancomycin and Zosyn  -Continue levofloxacin-started 11/15/2023  -ID [id number] assistance  -WOC  Sore throat, congestion - COVID negative  acute on chronic pain syndrome with opiate dependence  -butrans patch, scheduled tylenol, lidocaine patch(more for shoulder djd), robaxin  ROSALVA  -CPAP at night  hx of Afib/flutter  -Continue amiodarone, lopressor, eliquis  Patient is clinically and hemodynamically stable for discharge to LTC. Medication reconciliation was done. Follow up appointment and recommendations as shown below. Patient verbalized understanding and agreed to plan of care. All questions answered.  Consultations This Hospital Stay   PHARMACY TO DOSE VANCO  CARDIOLOGY IP CONSULT  PAIN MANAGEMENT ADULT IP CONSULT  WOUND OSTOMY CONTINENCE NURSE  IP CONSULT  PHARMACY TO DOSE VANCO  CARE MANAGEMENT / SOCIAL WORK IP CONSULT  WOUND  OSTOMY CONTINENCE NURSE  IP CONSULT  INFECTIOUS DISEASES IP CONSULT  PHYSICAL THERAPY ADULT IP CONSULT  OCCUPATIONAL THERAPY ADULT IP CONSULT  NEPHROLOGY IP CONSULT  WOUND OSTOMY CONTINENCE NURSE  IP CONSULT  PHYSICAL THERAPY ADULT IP CONSULT  OCCUPATIONAL THERAPY ADULT IP CONSULT    Code Status   Full Code    Time Spent on this Encounter   I, Nadya Short MD, personally saw the patient today and spent greater than 30 minutes discharging this patient.       Nadya Short MD  [facility]  [address] [place] [id number]  Phone: [phone]  Fax: [phone]  ______________________________________________________________________    Physical Exam   Vital Signs: Temp: 97.3  F (36.3  C) Temp src: Oral BP: 101/58 Pulse: 95   Resp: 18 SpO2: 98 % O2 Device: None (Room air)    Weight: 249 lbs 8.97 oz  GEN: Alert and oriented. Not in acute distress.  HEENT: Atraumatic, mucous membrane- moist and pink.  Chest: Bilateral air entry.  CVS: S1S2 regular.   Abdomen: Soft. Non-tender, non-distended. No organomegaly. No guarding or rigidity. Bowel sounds active.   Extremities: No pedal edema.  CNS: No involuntary movements.  Skin: no cyanosis or clubbing.        Primary Care Physician   Lisseth Scott    Discharge Orders      General info for SNF    Length of Stay Estimate: Short Term Care: Estimated # of Days <30  Condition at Discharge: Stable  Level of care:skilled   Rehabilitation Potential: Good  Admission H&P remains valid and up-to-date: Yes  Recent Chemotherapy: N/A  Use Nursing Home Standing Orders: Yes     Mantoux instructions    Give two-step Mantoux (PPD) Per Facility Policy Yes     Follow Up and recommended labs and tests    Follow up with alf physician.  The following labs/tests are recommended: BMP in 3-5 days.  Follow up with primary care provider in 1-2 weeks.  The following labs/tests are recommended: BMP in 2 weeks.     Reason for your hospital  stay    Acute CHF exacerbation, YOUNG     Activity - Up with nursing assistance     Additional Discharge Instructions    - Patient should resume diuresis , 1mg bid Bumex with goal wt ~245, once at the wt,  drop the pm dose and take PRN   - Could resume back to losartan 1 week after his kidney function back to his baseline.   - Recheck BMP 2 weeks after losartan reinitiated.  - Resume Ferrous sulfate and Calcium-vit d supplement after completion of levofloxacin.     Physical Therapy Adult Consult    Evaluate and treat as clinically indicated.    Reason:  Impaired functional mobility     Occupational Therapy Adult Consult    Evaluate and treat as clinically indicated.    Reason: Inability to complete ADLs/IADLs     Fall precautions     Diet    Follow this diet upon discharge: Orders Placed This Encounter      Combination Diet Low Saturated Fat Na <2400mg Diet, No Caffeine Diet       Significant Results and Procedures       Discharge Medications   Current Discharge Medication List        START taking these medications    Details   bumetanide (BUMEX) 1 MG tablet Take 1 tablet (1 mg) by mouth 2 times daily    Associated Diagnoses: Chronic systolic congestive heart failure (H)      empagliflozin (JARDIANCE) 10 MG TABS tablet Take 1 tablet (10 mg) by mouth daily    Associated Diagnoses: Chronic systolic congestive heart failure (H)      levofloxacin (LEVAQUIN) 500 MG tablet Take 1 tablet (500 mg) by mouth daily for 1 day    Associated Diagnoses: Bilateral cellulitis of lower leg           CONTINUE these medications which have NOT CHANGED    Details   !! acetaminophen (TYLENOL) 325 MG tablet Take 650 mg by mouth every 4 hours as needed (mild pain)      !! acetaminophen (TYLENOL) 500 MG tablet Take 1,000 mg by mouth 3 times daily      amiodarone (PACERONE) 200 MG tablet [AMIODARONE (PACERONE) 200 MG TABLET] Take 0.5 tablets (100 mg total) by mouth daily.  Qty: 45 tablet, Refills: 0    Associated Diagnoses: A-fib (H)       atorvastatin (LIPITOR) 40 MG tablet [ATORVASTATIN (LIPITOR) 40 MG TABLET] Take 1 tablet (40 mg total) by mouth at bedtime.  Qty: 90 tablet, Refills: 1    Associated Diagnoses: Coronary artery disease involving native coronary artery of native heart without angina pectoris      buprenorphine (BUTRANS) 10 MCG/HR WK patch Place 1 patch onto the skin every 7 days Wednesdays      ELIQUIS 5 mg Tab tablet [ELIQUIS 5 MG TAB TABLET] Take 1 tablet (5 mg total) by mouth 2 (two) times a day.  Qty: 180 tablet, Refills: 0    Associated Diagnoses: Paroxysmal atrial flutter (H)      glucosamine-chondroitin 500-400 MG CAPS per capsule Take 1 capsule by mouth daily      Krill Oil Ultra Strength 1500 MG CAPS Take 3 capsules by mouth every evening      Lidocaine (LIDOCARE) 4 % Patch Place 1 patch onto the skin every 24 hours Place onto the left shoulder.   To prevent lidocaine toxicity, patient should be patch free for 12 hrs daily.    Associated Diagnoses: Acute pain of left shoulder      melatonin 3 MG tablet Take 3 mg by mouth at bedtime      methocarbamol (ROBAXIN) 500 MG tablet Take 500 mg by mouth 2 times daily as needed for muscle spasms      metoprolol succinate (TOPROL-XL) 50 MG 24 hr tablet [METOPROLOL SUCCINATE (TOPROL-XL) 50 MG 24 HR TABLET] Take 0.5 tablets (25 mg total) by mouth daily.  Qty: 90 tablet, Refills: 1    Comments: Dose change effective 10/3/19 as per Dr. BOBBY Johansen  Associated Diagnoses: Chronic systolic heart failure (H)      miconazole (MICATIN) 2 % external powder Apply topically 2 times daily To affected areas    Associated Diagnoses: Rash      polyethylene glycol (GLYCOLAX) 17 gram/dose powder Take 17 g by mouth daily as needed for constipation      pramipexole (MIRAPEX) 0.25 MG tablet Take 0.25 mg by mouth At Bedtime      senna-docusate (SENOKOT-S/PERICOLACE) 8.6-50 MG tablet Take 1 tablet by mouth 2 times daily    Associated Diagnoses: Slow transit constipation      vitamin B-12 (CYANOCOBALAMIN) 500 MCG  tablet Take 500 mcg by mouth daily       !! - Potential duplicate medications found. Please discuss with provider.        STOP taking these medications       calcium carbonate-vitamin D (CALTRATE) 600-10 MG-MCG per tablet Comments:   Reason for Stopping:         ferrous sulfate (FE TABS) 325 (65 Fe) MG EC tablet Comments:   Reason for Stopping:         furosemide (LASIX) 20 MG tablet Comments:   Reason for Stopping:             Allergies   No Known Allergies

## 2023-11-20 NOTE — PROGRESS NOTES
[facility] Nurse Inpatient Assessment     Consulted for: leg ulcers    Patient History (according to provider note(s):      Per H+P:  78 year old male admitted on 11/10/2023. He has a hx of systolic CHF, Afib, CAD, obesity, chronic edema, wheel chair bound for months in care center here with more pain in legs, weeping and open sores     He notes more weeping in legs and swelling over past 2 weeks and is not sleeping well due to pain.  Pain in both lower legs constant  He has been WC bound for months now  He is living in a care center  He thinks his wt is up 10 pounds  He is not sure if he is still taking his lasix or not  He denied any cp or sob at rest, once again cannot ambulate     He denied fever or chills  He feels he got some relief in ER with the iv morphine    Assessment:      Areas visualized during today's visit: Lower extremities     Wound location: Bilateral lower extremities     11/13 11/20 RLE          11/20 LLE    Last photo: 11/20   Wound due to: Venous Ulcer + Arterial, mixed etiology  Wound history/plan of care: see above  Wound base: left lateral foot granular, all other areas scabs/dried drainage.Small open area to R shin, 1.2 x 0.3 x 0.2cm; LLE open areas to shin in a 3 x 3 x 0.2cm area     Palpation of the wound bed: normal      Drainage: small     Description of drainage: serosanguinous     Tunneling: N/A     Undermining: N/A  Periwound skin: Edematous and Hemosiderin staining      Color: dusky      Temperature: normal   Odor: none  Pain: moderate and during dressing change  Pain interventions prior to dressing change: patient tolerated well and slow and gentle cares   Treatment goal: Infection control/prevention, Maintain (prevention of deterioration), and Protection  STATUS: initial assessment  Supplies ordered: gathered       Treatment Plan:     Bilateral lower legs:  1. Cleanse with wound cleanser, including estefanía wound skin, gently pat dry  2. Apply  xeroform to all open and scabbed areas  3. Cover with abd pads, wrap with kerlix toes to knees  Change daily  If supplies needed from stores, PS# [id number] Xeroform      Orders: Written    RECOMMEND PRIMARY TEAM ORDER: Lymphedema consult  Education provided: importance of repositioning and plan of care  Discussed plan of care with: Patient and Nurse  [employer] [employer] nurse follow-up plan: weekly  Notify WOC if wound(s) deteriorate.  Nursing to notify the Provider(s) and re-consult the WO Nurse if new skin concern.    DATA:     Current support surface: Standard  Standard gel/foam mattress (IsoFlex, Atmos air, etc)  Containment of urine/stool: Incontinent pad in bed  BMI: Body mass index is 36.85 kg/m .   Active diet order: Orders Placed This Encounter      Combination Diet Low Saturated Fat Na <2400mg Diet, No Caffeine Diet      Diet     Output: I/O last 3 completed shifts:  In: 1080 [P.O.:1080]  Out: 1480 [Urine:1480]     Labs:   Recent Labs   Lab 11/19/23  0454   ALBUMIN 3.1*     Pressure injury risk assessment:   Sensory Perception: 3-->slightly limited  Moisture: 3-->occasionally moist  Activity: 2-->chairfast  Mobility: 2-->very limited  Nutrition: 3-->adequate  Friction and Shear: 2-->potential problem  Josue Score: 15    KWESI Caraballo RN CWOCN  Pager no longer in use, please contact through [employer] group: [facility] [employer] [facility]

## 2023-11-20 NOTE — PROGRESS NOTES
SPIRITUAL HEALTH SERVICES ([facility]) Progress Note  [facility].  Unit LTACH     Saw pt Gorge Berkowitz . per LOS. 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      Patient/Family Understanding of Illness and Goals of Care - Mr Berkowitz is excited about going to [facility], a [facility] facility, where he will have access to daily eucharist. He expects to be discharged today.      Distress and Loss - Mr Berkowitz was most distressed about his last placement and blames them for his admission to [facility]. It was difficult to redirect him because he was deeply distressed and repeated stories of lack of care he received there.       Strengths, Coping, and Resources - Mr Berkowitz is a Rastafari who has had an on-again, off-again relationship with the local [employer]. He is now at peace with the Yarsanism and, as mentioned above, he is looking forward to attending daily mass at [facility].      Meaning, Beliefs, and Spirituality - Mr Berkowitz's spirituality is heavily influenced by the Ignatian tradition associated with the [employer]. He has participated in  40  -  4 day silent retreats with the local [employer] and even has a plaque to show it.  His spirituality is also influenced by his years of recovery (AA).      Plan of Care - I have referred him to the staff  for anointing and communion prior to his discharge. Otherwise, no further spiritual care needs at this time.        Raissa Ruiz, Ph.D.,[facility]  , Spiritual Health Services      [facility] available 24/7 for emergency requests/referrals, either by having the on-call  paged or by entering an ASAP/STAT consult in Epic (this will also page the on-call ).

## 2023-11-20 NOTE — PROGRESS NOTES
Care Management Discharge Note    Discharge Date: 11/20/2023       Discharge Disposition: Long Term Care    Discharge Services: None    Discharge DME: None    Discharge Transportation: family or friend will provide    Private pay costs discussed: transportation costs and estimated cost for LTC room    Does the patient's insurance plan have a 3 day qualifying hospital stay waiver?  No    PAS Confirmation Code: [id number]  Patient/family educated on Medicare website which has current facility and service quality ratings:  yes    Education Provided on the Discharge Plan:  per care plan  Persons Notified of Discharge Plans: patient and LTC admissions    Patient/Family in Agreement with the Plan: yes    Handoff Referral Completed: Yes    Additional Information:    Patient to discharge to [facility]. He states he is happy to be going to [facility].     3:39 PM  [facility] admission and DON stating they can not take patient with CRE precaution. Infectious disease will be calling them to discuss patient. Notified patient who states understanding and hoping they can work this out.          Dixie Lagunas RN

## 2023-11-21 ENCOUNTER — APPOINTMENT (OUTPATIENT)
Dept: OCCUPATIONAL THERAPY | Facility: HOSPITAL | Age: 78
DRG: 602 | End: 2023-11-21
Payer: COMMERCIAL

## 2023-11-21 LAB
ANION GAP SERPL CALCULATED.3IONS-SCNC: 9 MMOL/L (ref 7–15)
BUN SERPL-MCNC: 31.2 MG/DL (ref 8–23)
CALCIUM SERPL-MCNC: 9.2 MG/DL (ref 8.8–10.2)
CHLORIDE SERPL-SCNC: 104 MMOL/L (ref 98–107)
CREAT SERPL-MCNC: 1.08 MG/DL (ref 0.67–1.17)
DEPRECATED HCO3 PLAS-SCNC: 27 MMOL/L (ref 22–29)
EGFRCR SERPLBLD CKD-EPI 2021: 71 ML/MIN/1.73M2
GLUCOSE SERPL-MCNC: 91 MG/DL (ref 70–99)
HOLD SPECIMEN: NORMAL
POTASSIUM SERPL-SCNC: 4 MMOL/L (ref 3.4–5.3)
SODIUM SERPL-SCNC: 140 MMOL/L (ref 135–145)

## 2023-11-21 PROCEDURE — 250N000013 HC RX MED GY IP 250 OP 250 PS 637: Performed by: INTERNAL MEDICINE

## 2023-11-21 PROCEDURE — 97110 THERAPEUTIC EXERCISES: CPT | Mod: GO

## 2023-11-21 PROCEDURE — 210N000001 HC R&B IMCU HEART CARE

## 2023-11-21 PROCEDURE — 250N000013 HC RX MED GY IP 250 OP 250 PS 637: Performed by: STUDENT IN AN ORGANIZED HEALTH CARE EDUCATION/TRAINING PROGRAM

## 2023-11-21 PROCEDURE — 36415 COLL VENOUS BLD VENIPUNCTURE: CPT | Performed by: STUDENT IN AN ORGANIZED HEALTH CARE EDUCATION/TRAINING PROGRAM

## 2023-11-21 PROCEDURE — 99232 SBSQ HOSP IP/OBS MODERATE 35: CPT | Performed by: STUDENT IN AN ORGANIZED HEALTH CARE EDUCATION/TRAINING PROGRAM

## 2023-11-21 PROCEDURE — 80048 BASIC METABOLIC PNL TOTAL CA: CPT | Performed by: STUDENT IN AN ORGANIZED HEALTH CARE EDUCATION/TRAINING PROGRAM

## 2023-11-21 PROCEDURE — 97535 SELF CARE MNGMENT TRAINING: CPT | Mod: GO

## 2023-11-21 RX ADMIN — APIXABAN 5 MG: 5 TABLET, FILM COATED ORAL at 08:11

## 2023-11-21 RX ADMIN — LEVOFLOXACIN 500 MG: 500 TABLET, FILM COATED ORAL at 08:11

## 2023-11-21 RX ADMIN — APIXABAN 5 MG: 5 TABLET, FILM COATED ORAL at 21:39

## 2023-11-21 RX ADMIN — AMIODARONE HYDROCHLORIDE 100 MG: 100 TABLET ORAL at 16:40

## 2023-11-21 RX ADMIN — PRAMIPEXOLE DIHYDROCHLORIDE 0.25 MG: 0.25 TABLET ORAL at 21:38

## 2023-11-21 RX ADMIN — BUMETANIDE 1 MG: 1 TABLET ORAL at 08:11

## 2023-11-21 RX ADMIN — ATORVASTATIN CALCIUM 40 MG: 40 TABLET, FILM COATED ORAL at 21:38

## 2023-11-21 RX ADMIN — Medication 500 MCG: at 08:16

## 2023-11-21 RX ADMIN — GABAPENTIN 200 MG: 100 CAPSULE ORAL at 21:38

## 2023-11-21 RX ADMIN — SENNOSIDES AND DOCUSATE SODIUM 1 TABLET: 8.6; 5 TABLET ORAL at 21:37

## 2023-11-21 RX ADMIN — BUMETANIDE 1 MG: 1 TABLET ORAL at 17:42

## 2023-11-21 RX ADMIN — EMPAGLIFLOZIN 10 MG: 10 TABLET, FILM COATED ORAL at 08:16

## 2023-11-21 RX ADMIN — ACETAMINOPHEN 1000 MG: 500 TABLET ORAL at 21:38

## 2023-11-21 RX ADMIN — ACETAMINOPHEN 1000 MG: 500 TABLET ORAL at 14:59

## 2023-11-21 RX ADMIN — ACETAMINOPHEN 1000 MG: 500 TABLET ORAL at 08:11

## 2023-11-21 RX ADMIN — MICONAZOLE NITRATE: 20 POWDER TOPICAL at 08:11

## 2023-11-21 RX ADMIN — MICONAZOLE NITRATE: 20 POWDER TOPICAL at 21:40

## 2023-11-21 RX ADMIN — LIDOCAINE 1 PATCH: 4 PATCH TOPICAL at 08:11

## 2023-11-21 ASSESSMENT — ACTIVITIES OF DAILY LIVING (ADL)
ADLS_ACUITY_SCORE: 44

## 2023-11-21 NOTE — PROGRESS NOTES
Care Management Follow Up    Length of Stay (days): 11    Expected Discharge Date: 11/23/2023    Concerns to be Addressed:  LTC placement       Patient plan of care discussed at interdisciplinary rounds: Yes     Anticipated Discharge Disposition:  LTC     Anticipated Discharge Services:  LTC     Anticipated Discharge DME:  no new DME     Patient/family educated on Medicare website which has current facility and service quality ratings:  yes  Education Provided on the Discharge Plan:  per are team  Patient/Family in Agreement with the Plan:  yes        Additional Information:  Patient with PMH of systolic CHF, Afib, CAD, obesity, chronic edema, presenting with more pain in legs, weeping and open sores.  ID consulted. Now on PO antibiotic.  Room air.  Daily dressing changes to LE wounds.      Therapy:  PT: bed mobility with min assist, sit/stand with FWW and min assist, amb 15', 30' with FWW and CGA/min assist of 1  Recommend TCU to improve mobility and strength.   OT: minAx2 trsf , [facility] g/h, [facility] drsg; Recommending TCU.      11/22/23:  Patient was accepted at [facility], about to discharge and facility cancelled due to inability to take patient with CRE precautions.  Escalated issue to our infectious disease department who will be discussing with [facility] regarding if this should be an issue for LTC facility.  Patient notified and states he would like to go to [facility] but would be fine for CM to send out more referrals.    Final decision TBD on [facility].    3:04 PM  Received call from Cater to u Yany with [employer] [phone]. She would like update on patient. CM to obtain permission for exchange of information. She does have patient's phone number to call him directly.        Dixie Lagunas, RN

## 2023-11-21 NOTE — PLAN OF CARE
Problem: Risk for Delirium  Goal: Improved Attention and Thought Clarity  Outcome: Progressing     Problem: Risk for Delirium  Goal: Improved Sleep  Outcome: Progressing     Problem: Infection  Goal: Absence of Infection Signs and Symptoms  Outcome: Progressing  Intervention: Prevent or Manage Infection  Recent Flowsheet Documentation  Taken 11/20/2023 2354 by Colton Dasilva, RN  Isolation Precautions: contact precautions maintained  Taken 11/20/2023 2032 by Colton Dasilva, RN  Isolation Precautions: contact precautions maintained     Problem: Wound  Goal: Optimal Pain Control and Function  Outcome: Progressing   Goal Outcome Evaluation:  Patient denied pain, SOB or nausea overnight.  Remains in Afib/Aflutter. HR 60s.  Cares grouped to allow rest/sleep overnight.  Call light within reach.

## 2023-11-21 NOTE — PROGRESS NOTES
[facility]    Medicine Progress Note - Hospitalist Service    Date of Admission:  11/10/2023    Assessment & Plan   Gorge Berkowitz Jr. is a 78-year-old man with history of HFrEF, Afib, CAD, obesity, chronic edema, wheel chair bound for months in [facility] center admitted on 11/10/2023 with bilateral venous stasis ulcers with cellulitis and acute on chronic HFmrEF     He received IV diuretics briefly for acute on chronic HFmrEF and was subsequently transitioned to oral bumetanide.  Cardiologist signed off.     Wound culture grew MSSA, Klebsiella oxytoca, Serratia, Corynebacterium striatum, and Enterococcus faecalis.  for which he received IV Zosyn and vancomycin briefly and was subsequently narrowed to Zosyn only per ID recommendation.  IV Zosyn was switched to levofloxacin on 11/15/2023.     Acute on chronic HFmEF  -Echocardiogram revealed mildly reduced LV systolic function with LVEF of 45 to 50%, mild septal hypokinesis, mild to moderate inferior wall hypokinesis and mildly dilated right ventricle.  -Off IV diuretics  -Oral bumetanide 2 mg twice daily-started 11/30/2023; held on 11/16/2023 due to YOUNG  -YOUNG has resolved; will consider restarting bumetanide at 2 mg daily after discussion with nephrologist.  -Losartan 12.5 mg daily  -Metoprolol succinate 25 mg daily  -Cardiology team signed off-11/14/2023 11/20 Patient should resume diuresis , 1mg bid Bumex with goal wt ~245, once at the wt drop the pm dose and take PRN   Could resume back to losartan 1 week after his kidney function back to his baseline.   Recheck BMP 2 weeks after losartan reinitiated.       YOUNG  -Likely prerenal from diuretic therapy.  -Patient should resume diuresis , 1mg bid Bumex with goal wt ~245, once at the wt drop the pm dose and take PRN   -Could resume back to losartan 1 week after his kidney function back to his baseline.   -Recheck BMP 2 weeks after losartan reinitiated.     History of CAD  Chronic RCA  "occlusion.  -Continue atorvastatin and metoprolol succinate per cardiology recommendation    Infected bilateral lower extremity stasis ulcer  Cellulitis  -Off vancomycin and Zosyn  -Continue levofloxacin-started 11/15/2023  -ID pzugob-[id number] assistance  -WOC    Sore throat, congestion - COVID negative  acute on chronic pain syndrome with opiate dependence  -butrans patch, scheduled tylenol, lidocaine patch(more for shoulder djd), robaxin    ROSALVA  -CPAP at night    hx of Afib/flutter  -Continue amiodarone, lopressor, eliquis           Diet: Combination Diet Low Saturated Fat Na <2400mg Diet, No Caffeine Diet  Diet    DVT Prophylaxis: DOAC  Levy Catheter: Not present  Lines: None     Cardiac Monitoring: ACTIVE order. Indication: Acute decompensated heart failure (48 hours)  Code Status: Full Code      Clinically Significant Risk Factors              # Hypoalbuminemia: Lowest albumin = 3.1 g/dL at 11/19/2023  4:54 AM, will monitor as appropriate        # Heart failure, NOS: heart failure noted on the problem list and last echo with EF 40-50%       # Obesity: Estimated body mass index is 37.02 kg/m  as calculated from the following:    Height as of this encounter: 1.753 m (5' 9\").    Weight as of this encounter: 113.7 kg (250 lb 10.6 oz).        # Financial/Environmental Concerns: none         Disposition Plan      Expected Discharge Date: 11/23/2023    Discharge Delays: Placement - LTC    Discharge Comments: TCU placement pending            Nadya Short MD  Hospitalist Service  [facility]  Securely message with VMob (more info)  Text page via 3dim Paging/Directory   ______________________________________________________________________    Interval History   Patient is seen and examined at bedside.  No acute complaints.    Physical Exam   Vital Signs: Temp: 97.8  F (36.6  C) Temp src: Oral BP: 109/62 Pulse: 59   Resp: 20 SpO2: 99 % O2 Device: None (Room air)    Weight: 250 lbs " 10.61 oz    GEN: Alert and oriented. Not in acute distress.  HEENT: Atraumatic, mucous membrane- moist and pink.  Chest: Bilateral air entry.  CVS: S1S2 regular.   Abdomen: Soft. Non-tender, non-distended. No organomegaly. No guarding or rigidity. Bowel sounds active.   Extremities: b/l LE stasis wound  CNS: No involuntary movements.  Skin: no cyanosis or clubbing.     Medical Decision Making             Data

## 2023-11-22 ENCOUNTER — APPOINTMENT (OUTPATIENT)
Dept: PHYSICAL THERAPY | Facility: HOSPITAL | Age: 78
DRG: 602 | End: 2023-11-22
Payer: COMMERCIAL

## 2023-11-22 ENCOUNTER — APPOINTMENT (OUTPATIENT)
Dept: OCCUPATIONAL THERAPY | Facility: HOSPITAL | Age: 78
DRG: 602 | End: 2023-11-22
Payer: COMMERCIAL

## 2023-11-22 LAB
ANION GAP SERPL CALCULATED.3IONS-SCNC: 9 MMOL/L (ref 7–15)
BUN SERPL-MCNC: 35.7 MG/DL (ref 8–23)
CALCIUM SERPL-MCNC: 9.6 MG/DL (ref 8.8–10.2)
CHLORIDE SERPL-SCNC: 101 MMOL/L (ref 98–107)
CREAT SERPL-MCNC: 1.23 MG/DL (ref 0.67–1.17)
DEPRECATED HCO3 PLAS-SCNC: 27 MMOL/L (ref 22–29)
EGFRCR SERPLBLD CKD-EPI 2021: 61 ML/MIN/1.73M2
GLUCOSE SERPL-MCNC: 93 MG/DL (ref 70–99)
POTASSIUM SERPL-SCNC: 4.3 MMOL/L (ref 3.4–5.3)
SODIUM SERPL-SCNC: 137 MMOL/L (ref 135–145)

## 2023-11-22 PROCEDURE — 97110 THERAPEUTIC EXERCISES: CPT | Mod: GP

## 2023-11-22 PROCEDURE — 80048 BASIC METABOLIC PNL TOTAL CA: CPT | Performed by: STUDENT IN AN ORGANIZED HEALTH CARE EDUCATION/TRAINING PROGRAM

## 2023-11-22 PROCEDURE — 97535 SELF CARE MNGMENT TRAINING: CPT | Mod: GO

## 2023-11-22 PROCEDURE — 250N000013 HC RX MED GY IP 250 OP 250 PS 637: Performed by: STUDENT IN AN ORGANIZED HEALTH CARE EDUCATION/TRAINING PROGRAM

## 2023-11-22 PROCEDURE — 99232 SBSQ HOSP IP/OBS MODERATE 35: CPT | Performed by: STUDENT IN AN ORGANIZED HEALTH CARE EDUCATION/TRAINING PROGRAM

## 2023-11-22 PROCEDURE — 250N000013 HC RX MED GY IP 250 OP 250 PS 637: Performed by: INTERNAL MEDICINE

## 2023-11-22 PROCEDURE — 97116 GAIT TRAINING THERAPY: CPT | Mod: GP

## 2023-11-22 PROCEDURE — 97110 THERAPEUTIC EXERCISES: CPT | Mod: GO

## 2023-11-22 PROCEDURE — 36415 COLL VENOUS BLD VENIPUNCTURE: CPT | Performed by: STUDENT IN AN ORGANIZED HEALTH CARE EDUCATION/TRAINING PROGRAM

## 2023-11-22 PROCEDURE — 210N000001 HC R&B IMCU HEART CARE

## 2023-11-22 RX ADMIN — ACETAMINOPHEN 1000 MG: 500 TABLET ORAL at 07:58

## 2023-11-22 RX ADMIN — MICONAZOLE NITRATE: 20 POWDER TOPICAL at 13:41

## 2023-11-22 RX ADMIN — MICONAZOLE NITRATE: 20 POWDER TOPICAL at 21:24

## 2023-11-22 RX ADMIN — SENNOSIDES AND DOCUSATE SODIUM 1 TABLET: 8.6; 5 TABLET ORAL at 21:24

## 2023-11-22 RX ADMIN — LIDOCAINE 1 PATCH: 4 PATCH TOPICAL at 07:57

## 2023-11-22 RX ADMIN — PRAMIPEXOLE DIHYDROCHLORIDE 0.25 MG: 0.25 TABLET ORAL at 21:24

## 2023-11-22 RX ADMIN — ATORVASTATIN CALCIUM 40 MG: 40 TABLET, FILM COATED ORAL at 21:24

## 2023-11-22 RX ADMIN — APIXABAN 5 MG: 5 TABLET, FILM COATED ORAL at 21:23

## 2023-11-22 RX ADMIN — EMPAGLIFLOZIN 10 MG: 10 TABLET, FILM COATED ORAL at 08:01

## 2023-11-22 RX ADMIN — BUMETANIDE 1 MG: 1 TABLET ORAL at 08:01

## 2023-11-22 RX ADMIN — METOPROLOL SUCCINATE 25 MG: 25 TABLET, EXTENDED RELEASE ORAL at 17:15

## 2023-11-22 RX ADMIN — LEVOFLOXACIN 500 MG: 500 TABLET, FILM COATED ORAL at 08:01

## 2023-11-22 RX ADMIN — APIXABAN 5 MG: 5 TABLET, FILM COATED ORAL at 07:58

## 2023-11-22 RX ADMIN — ACETAMINOPHEN 1000 MG: 500 TABLET ORAL at 13:41

## 2023-11-22 RX ADMIN — SENNOSIDES AND DOCUSATE SODIUM 1 TABLET: 8.6; 5 TABLET ORAL at 07:58

## 2023-11-22 RX ADMIN — GABAPENTIN 200 MG: 100 CAPSULE ORAL at 21:24

## 2023-11-22 RX ADMIN — ACETAMINOPHEN 1000 MG: 500 TABLET ORAL at 21:23

## 2023-11-22 RX ADMIN — Medication 500 MCG: at 08:01

## 2023-11-22 RX ADMIN — AMIODARONE HYDROCHLORIDE 100 MG: 100 TABLET ORAL at 17:15

## 2023-11-22 RX ADMIN — MICONAZOLE NITRATE: 20 POWDER TOPICAL at 08:01

## 2023-11-22 ASSESSMENT — ACTIVITIES OF DAILY LIVING (ADL)
ADLS_ACUITY_SCORE: 48
ADLS_ACUITY_SCORE: 48
ADLS_ACUITY_SCORE: 44
ADLS_ACUITY_SCORE: 48
ADLS_ACUITY_SCORE: 48
ADLS_ACUITY_SCORE: 44
ADLS_ACUITY_SCORE: 48

## 2023-11-22 NOTE — PROGRESS NOTES
[facility]    Medicine Progress Note - Hospitalist Service    Date of Admission:  11/10/2023    Assessment & Plan   Gorge Berkowitz Jr. is a 78-year-old man with history of HFrEF, Afib, CAD, obesity, chronic edema, wheel chair bound for months in [facility] center admitted on 11/10/2023 with bilateral venous stasis ulcers with cellulitis and acute on chronic HFmrEF     He received IV diuretics briefly for acute on chronic HFmrEF and was subsequently transitioned to oral bumetanide.  Cardiologist signed off.     Wound culture grew MSSA, Klebsiella oxytoca, Serratia, Corynebacterium striatum, and Enterococcus faecalis.  for which he received IV Zosyn and vancomycin briefly and was subsequently narrowed to Zosyn only per ID recommendation.  IV Zosyn was switched to levofloxacin on 11/15/2023.     Acute on chronic HFmEF  -Echocardiogram revealed mildly reduced LV systolic function with LVEF of 45 to 50%, mild septal hypokinesis, mild to moderate inferior wall hypokinesis and mildly dilated right ventricle.  -Off IV diuretics  -Oral bumetanide 2 mg twice daily-started 11/30/2023; held on 11/16/2023 due to YOUNG  -YOUNG has resolved; will consider restarting bumetanide at 2 mg daily after discussion with nephrologist.  -Losartan 12.5 mg daily  -Metoprolol succinate 25 mg daily  -Cardiology team signed off-11/14/2023 11/20 Patient should resume diuresis , 1mg bid Bumex with goal wt ~245, once at the wt drop the pm dose and take PRN   Could resume back to losartan 1 week after his kidney function back to his baseline.   Recheck BMP 2 weeks after losartan reinitiated.   11/22: hold night bumex dose due to an uptick in Cr.      YOUNG  -Likely prerenal from diuretic therapy.  -Patient should resume diuresis , 1mg bid Bumex with goal wt ~245, once at the wt drop the pm dose and take PRN   -Could resume back to losartan 1 week after his kidney function back to his baseline.   -Recheck BMP 2 weeks after losartan  "reinitiated.     History of CAD  Chronic RCA occlusion.  -Continue atorvastatin and metoprolol succinate per cardiology recommendation    Infected bilateral lower extremity stasis ulcer  Cellulitis  -Off vancomycin and Zosyn  -Continue levofloxacin-started 11/15/2023  -ID [id number] assistance  -WOC    Sore throat, congestion - COVID negative  acute on chronic pain syndrome with opiate dependence  -butrans patch, scheduled tylenol, lidocaine patch(more for shoulder djd), robaxin    ROSALVA  -CPAP at night    hx of Afib/flutter  -Continue amiodarone, lopressor, eliquis           Diet: Combination Diet Low Saturated Fat Na <2400mg Diet, No Caffeine Diet  Diet    DVT Prophylaxis: DOAC  Levy Catheter: Not present  Lines: None     Cardiac Monitoring: ACTIVE order. Indication: Acute decompensated heart failure (48 hours)  Code Status: Full Code      Clinically Significant Risk Factors              # Hypoalbuminemia: Lowest albumin = 3.1 g/dL at 11/19/2023  4:54 AM, will monitor as appropriate        # Heart failure, NOS: heart failure noted on the problem list and last echo with EF 40-50%       # Obesity: Estimated body mass index is 36.43 kg/m  as calculated from the following:    Height as of this encounter: 1.753 m (5' 9\").    Weight as of this encounter: 111.9 kg (246 lb 11.1 oz).        # Financial/Environmental Concerns: none         Disposition Plan      Expected Discharge Date: 11/24/2023    Discharge Delays: Placement - LTC    Discharge Comments: LTC referrals pending            Nadya Short MD  Hospitalist Service  [facility]  Securely message with [employer] (more info)  Text page via [employer] Paging/Directory   ______________________________________________________________________    Interval History   Patient is seen and examined at bedside.  No acute complaints.    Physical Exam   Vital Signs: Temp: 98.6  F (37  C) Temp src: Oral BP: 111/66 Pulse: 111   Resp: 18 SpO2: 99 % O2 " Device: None (Room air)    Weight: 246 lbs 11.12 oz    GEN: Alert and oriented. Not in acute distress.  HEENT: Atraumatic, mucous membrane- moist and pink.  Chest: Bilateral air entry.  CVS: S1S2 regular.   Abdomen: Soft. Non-tender, non-distended. No organomegaly. No guarding or rigidity. Bowel sounds active.   Extremities: b/l LE stasis wound  CNS: No involuntary movements.  Skin: no cyanosis or clubbing.     Medical Decision Making             Data

## 2023-11-22 NOTE — PLAN OF CARE
"Goal Outcome Evaluation:    Assumed care at 1530H  Pt alert and oriented. Denied SOB and chest pain.  Pt up on chair, call light within reach.  BLE dressing CDI.  Up on chair for dinner.  Will cont to monitor.      Problem: Adult Inpatient Plan of Care  Goal: Plan of Care Review  Description: The Plan of Care Review/Shift note should be completed every shift.  The Outcome Evaluation is a brief statement about your assessment that the patient is improving, declining, or no change.  This information will be displayed automatically on your shift  note.  Outcome: Progressing  Goal: Patient-Specific Goal (Individualized)  Description: You can add care plan individualizations to a care plan. Examples of Individualization might be:  \"Parent requests to be called daily at 9am for status\", \"I have a hard time hearing out of my right ear\", or \"Do not touch me to wake me up as it startles  me\".  Outcome: Progressing  Goal: Absence of Hospital-Acquired Illness or Injury  Outcome: Progressing  Goal: Readiness for Transition of Care  Outcome: Progressing   Cardiac:  A-Flutter with 3:1 conduction (occasional variable conduction) with SVR and rate control.  Rate  trending 50-60's.  Respiratory:  Rate 16-20, non-labored.  Sat's: Maintaining mid 90's at RA.  LS: Diminished in the bases, bilat.  Neuro:  WNL.  GI:  Passing flatus.  No BM for HS/NOC.  BS: Present X4 quadrants.  :  Voiding.  Good UOP (see I/O).  Pain:  Denies.  Ambulation:  Up with assist of X1-2, with walker and belt.   Lab:  AM labs pending.  Plan:  Awaiting placement in TCU, for continuation of care.  Pending possible discharge.         "

## 2023-11-22 NOTE — PROGRESS NOTES
"Care Management Follow Up    Length of Stay (days): 12    Expected Discharge Date: 11/23/2023     Concerns to be Addressed:  discharge planning, care progression, LTC placement     Patient plan of care discussed at interdisciplinary rounds: Yes    Anticipated Discharge Disposition: Long Term Care (LTC)     Anticipated Discharge Services: LTC  Anticipated Discharge DME: None    Patient/family educated on Medicare website which has current facility and service quality ratings:    Education Provided on the Discharge Plan:  Yes, per Treatment Team  Patient/Family in Agreement with the Plan: yes    Referrals Placed by CM/SW:  LTC  Private pay costs discussed: private room/amenity fees    Additional Information:  Chart reviewed and plan of care discussed with hospitalist.  Pt is medically ready for discharge.      Pt had been previously accepted at [facility] for LTC placement and had planned transport yesterday.  Discharge was cancelled while LTC was reviewing their protocol for accepting pt with CRE.   This was addressed by Infectious Disease Dept in coordination with MD, which concurred pt should be able to admit to SNF.  They have left messages for DON at SNF and have had no response.    Writer placed call to [facility] admissions and left message requesting return call.  Escalated to Care Management Manager who spoke with  at [facility] and they decline admission of pt.    Met with pt and provided update and explained need for additional LTC choices since previous referrals at his other choices had also been declined.  Pt refused to give choices initially, then after a couple hours he was ready to provide choices.  Met with pt again, who was very upset and proceeded to read name of every SNF on the [facility] list with exception of 2 locations ([facility] and [facility]).  He said he is agreeable with any other location, but \"demands\" references from former pts at each " facility stating they were humanely treated.  He verbalized being upset that he was sent to [facility] where he feels he was in contact with this organism and now wants to make sure he gets a facility that provides good care, as well as supports his [employer] and liberal political view.  Directed pt to Medicare website for additional information on facilities.    Additional LTC referrals sent and are pending.    Shanthi Francois RN

## 2023-11-22 NOTE — PLAN OF CARE
"  Problem: Adult Inpatient Plan of Care  Goal: Plan of Care Review  Description: The Plan of Care Review/Shift note should be completed every shift.  The Outcome Evaluation is a brief statement about your assessment that the patient is improving, declining, or no change.  This information will be displayed automatically on your shift  note.  Outcome: Progressing  Flowsheets (Taken 11/21/2023 2242)  Plan of Care Reviewed With: patient  Goal: Patient-Specific Goal (Individualized)  Description: You can add care plan individualizations to a care plan. Examples of Individualization might be:  \"Parent requests to be called daily at 9am for status\", \"I have a hard time hearing out of my right ear\", or \"Do not touch me to wake me up as it startles  me\".  Outcome: Progressing  Goal: Absence of Hospital-Acquired Illness or Injury  Outcome: Progressing  Intervention: Prevent Infection  Recent Flowsheet Documentation  Taken 11/21/2023 1600 by Rommel Casanova, RN  Infection Prevention:   hand hygiene promoted   personal protective equipment utilized   rest/sleep promoted   single patient room provided  Goal: Optimal Comfort and Wellbeing  Outcome: Progressing  Goal: Readiness for Transition of Care  Outcome: Progressing     Problem: Risk for Delirium  Goal: Optimal Coping  Outcome: Progressing  Goal: Improved Behavioral Control  Outcome: Progressing  Goal: Improved Attention and Thought Clarity  Outcome: Progressing  Goal: Improved Sleep  Outcome: Progressing     Problem: Infection  Goal: Absence of Infection Signs and Symptoms  Outcome: Progressing  Intervention: Prevent or Manage Infection  Recent Flowsheet Documentation  Taken 11/21/2023 1600 by Rommel Casanova RN  Isolation Precautions: contact precautions maintained     Problem: Skin Injury Risk Increased  Goal: Skin Health and Integrity  Outcome: Progressing     Problem: Wound  Goal: Optimal Coping  Outcome: Progressing  Goal: Optimal Functional Ability  Outcome: " Progressing  Goal: Absence of Infection Signs and Symptoms  Outcome: Progressing  Intervention: Prevent or Manage Infection  Recent Flowsheet Documentation  Taken 11/21/2023 1600 by Rommel Casanova RN  Isolation Precautions: contact precautions maintained  Goal: Improved Oral Intake  Outcome: Progressing  Goal: Optimal Pain Control and Function  Outcome: Progressing  Goal: Skin Health and Integrity  Outcome: Progressing  Goal: Optimal Wound Healing  Outcome: Progressing    Goal Outcome Evaluation:      Plan of Care Reviewed With: patient    Overall uneventful shift. Pt A/O x 4, neuro intact. Vitally stable this shift & saturating well on RA, lung sounds clear/diminished. Tele continues to be A-flutter with controlled rates. Pt denied presence of pain this evening. Currently awaiting LTC placement.  Will continue to monitor.     Rmomel Casanova RN on 11/21/2023 at 10:45 PM

## 2023-11-22 NOTE — PLAN OF CARE
Problem: Infection  Goal: Absence of Infection Signs and Symptoms  Outcome: Progressing     Problem: Wound  Goal: Optimal Wound Healing  Outcome: Progressing   Goal Outcome Evaluation:     Patient's dressings to BLE done. Open area's are covered with dry scabs. Minimal dried blood to the Xeroform that was removed. Remaining skin is dusky with area of peeling at edges. Largest scab to outer edge of left foot just below pinky toe.  Patient hoping to discharge soon. He is worried about where is going to go. Said that the last place he was sent did not take care of him and that is why he ended up back here.

## 2023-11-22 NOTE — PLAN OF CARE
Problem: Adult Inpatient Plan of Care  Goal: Optimal Comfort and Wellbeing  Outcome: Met     Problem: Wound  Goal: Optimal Pain Control and Function  Outcome: Met     Problem: Fall Injury Risk  Goal: Absence of Fall and Fall-Related Injury  Outcome: Met   Goal Outcome Evaluation:       Alert and oriented. On telemetry Aflutter. Was up in recliner for awhile then back to bed. Vital signs stable. Uses call light for all needs. Denies pain. Lymphedema wraps on BLE. Waiting for LTC placement. Pernell continue to monitor.    Yosef Tanner RN                    No significant past surgical history

## 2023-11-23 PROCEDURE — 99232 SBSQ HOSP IP/OBS MODERATE 35: CPT | Performed by: STUDENT IN AN ORGANIZED HEALTH CARE EDUCATION/TRAINING PROGRAM

## 2023-11-23 PROCEDURE — 250N000013 HC RX MED GY IP 250 OP 250 PS 637: Performed by: INTERNAL MEDICINE

## 2023-11-23 PROCEDURE — 210N000001 HC R&B IMCU HEART CARE

## 2023-11-23 RX ADMIN — LIDOCAINE 1 PATCH: 4 PATCH TOPICAL at 08:27

## 2023-11-23 RX ADMIN — EMPAGLIFLOZIN 10 MG: 10 TABLET, FILM COATED ORAL at 08:28

## 2023-11-23 RX ADMIN — MICONAZOLE NITRATE: 20 POWDER TOPICAL at 21:17

## 2023-11-23 RX ADMIN — ACETAMINOPHEN 1000 MG: 500 TABLET ORAL at 14:16

## 2023-11-23 RX ADMIN — Medication 500 MCG: at 08:28

## 2023-11-23 RX ADMIN — GABAPENTIN 200 MG: 100 CAPSULE ORAL at 21:14

## 2023-11-23 RX ADMIN — MICONAZOLE NITRATE: 20 POWDER TOPICAL at 08:28

## 2023-11-23 RX ADMIN — APIXABAN 5 MG: 5 TABLET, FILM COATED ORAL at 08:28

## 2023-11-23 RX ADMIN — SENNOSIDES AND DOCUSATE SODIUM 1 TABLET: 8.6; 5 TABLET ORAL at 21:15

## 2023-11-23 RX ADMIN — AMIODARONE HYDROCHLORIDE 100 MG: 100 TABLET ORAL at 16:11

## 2023-11-23 RX ADMIN — MICONAZOLE NITRATE: 20 POWDER TOPICAL at 14:16

## 2023-11-23 RX ADMIN — ATORVASTATIN CALCIUM 40 MG: 40 TABLET, FILM COATED ORAL at 21:15

## 2023-11-23 RX ADMIN — SENNOSIDES AND DOCUSATE SODIUM 1 TABLET: 8.6; 5 TABLET ORAL at 08:28

## 2023-11-23 RX ADMIN — BUPRENORPHINE 1 PATCH: 10 PATCH, EXTENDED RELEASE TRANSDERMAL at 14:16

## 2023-11-23 RX ADMIN — PRAMIPEXOLE DIHYDROCHLORIDE 0.25 MG: 0.25 TABLET ORAL at 21:15

## 2023-11-23 RX ADMIN — APIXABAN 5 MG: 5 TABLET, FILM COATED ORAL at 21:14

## 2023-11-23 RX ADMIN — ACETAMINOPHEN 1000 MG: 500 TABLET ORAL at 08:27

## 2023-11-23 RX ADMIN — ACETAMINOPHEN 1000 MG: 500 TABLET ORAL at 21:14

## 2023-11-23 ASSESSMENT — ACTIVITIES OF DAILY LIVING (ADL)
ADLS_ACUITY_SCORE: 48
ADLS_ACUITY_SCORE: 44
ADLS_ACUITY_SCORE: 48

## 2023-11-23 NOTE — PLAN OF CARE
Problem: Wound  Goal: Optimal Wound Healing  Outcome: Progressing   Goal Outcome Evaluation:     Dressing changed to BLE. Xeroform ABD and Kerlix applied. Patient asking if wounds look better and asking if there is bleeding. There was very slight serosanguinous drainage on ABD that was removed from left leg but no visible active bleeding.

## 2023-11-23 NOTE — PROGRESS NOTES
[facility]    Medicine Progress Note - Hospitalist Service    Date of Admission:  11/10/2023    Assessment & Plan   Gorge Berkowitz Jr. is a 78-year-old man with history of HFrEF, Afib, CAD, obesity, chronic edema, wheel chair bound for months in [facility] center admitted on 11/10/2023 with bilateral venous stasis ulcers with cellulitis and acute on chronic HFmrEF     He received IV diuretics briefly for acute on chronic HFmrEF and was subsequently transitioned to oral bumetanide.  Cardiologist signed off.     Wound culture grew MSSA, Klebsiella oxytoca, Serratia, Corynebacterium striatum, and Enterococcus faecalis.  for which he received IV Zosyn and vancomycin briefly and was subsequently narrowed to Zosyn only per ID recommendation.  IV Zosyn was switched to levofloxacin on 11/15/2023.     Acute on chronic HFmEF  -Echocardiogram revealed mildly reduced LV systolic function with LVEF of 45 to 50%, mild septal hypokinesis, mild to moderate inferior wall hypokinesis and mildly dilated right ventricle.  -Off IV diuretics  -Oral bumetanide 2 mg twice daily-started 11/30/2023; held on 11/16/2023 due to YOUNG  -YOUNG has resolved; will consider restarting bumetanide at 2 mg daily after discussion with nephrologist.  -Losartan 12.5 mg daily  -Metoprolol succinate 25 mg daily  -Cardiology team signed off-11/14/2023 11/20 Patient should resume diuresis , 1mg bid Bumex with goal wt ~245, once at the wt drop the pm dose and take PRN   Could resume back to losartan 1 week after his kidney function back to his baseline.   Recheck BMP 2 weeks after losartan reinitiated.   11/22: hold night bumex dose due to an uptick in Cr.  11/23: continue to hold bumex. Repeat BMP in am      YOUNG  -Likely prerenal from diuretic therapy.  -Patient should resume diuresis , 1mg bid Bumex with goal wt ~245, once at the wt drop the pm dose and take PRN   -Could resume back to losartan 1 week after his kidney function back to his  "baseline.   -Recheck BMP 2 weeks after losartan reinitiated.     History of CAD  Chronic RCA occlusion.  -Continue atorvastatin and metoprolol succinate per cardiology recommendation    Infected bilateral lower extremity stasis ulcer  Cellulitis  -Off vancomycin and Zosyn  -Continue levofloxacin-started 11/15/2023  -ID [id number] assistance  -WOC    Sore throat, congestion - COVID negative  acute on chronic pain syndrome with opiate dependence  -butrans patch, scheduled tylenol, lidocaine patch(more for shoulder djd), robaxin    ROSALVA  -CPAP at night    hx of Afib/flutter  -Continue amiodarone, lopressor, eliquis           Diet: Combination Diet Low Saturated Fat Na <2400mg Diet, No Caffeine Diet  Diet    DVT Prophylaxis: DOAC  Levy Catheter: Not present  Lines: None     Cardiac Monitoring: None  Code Status: Full Code      Clinically Significant Risk Factors              # Hypoalbuminemia: Lowest albumin = 3.1 g/dL at 11/19/2023  4:54 AM, will monitor as appropriate        # Heart failure, NOS: heart failure noted on the problem list and last echo with EF 40-50%       # Obesity: Estimated body mass index is 36.43 kg/m  as calculated from the following:    Height as of this encounter: 1.753 m (5' 9\").    Weight as of this encounter: 111.9 kg (246 lb 11.1 oz).        # Financial/Environmental Concerns: none         Disposition Plan     Expected Discharge Date: 11/24/2023    Discharge Delays: Placement - LTC    Discharge Comments: LTC referrals pending            Nadya Short MD  Hospitalist Service  [facility]  Securely message with [employer] (more info)  Text page via [employer] Paging/Directory   ______________________________________________________________________    Interval History   Patient is seen and examined at bedside.  No acute complaints.    Physical Exam   Vital Signs: Temp: 97.7  F (36.5  C) Temp src: Oral BP: 106/63 Pulse: 75   Resp: 24 SpO2: 99 % O2 Device: None (Room " air)    Weight: 246 lbs 11.12 oz    GEN: Alert and oriented. Not in acute distress.  HEENT: Atraumatic, mucous membrane- moist and pink.  Chest: Bilateral air entry.  CVS: S1S2 regular.   Abdomen: Soft. Non-tender, non-distended. No organomegaly. No guarding or rigidity. Bowel sounds active.   Extremities: b/l LE stasis wound  CNS: No involuntary movements.  Skin: no cyanosis or clubbing.     Medical Decision Making             Data

## 2023-11-24 ENCOUNTER — APPOINTMENT (OUTPATIENT)
Dept: OCCUPATIONAL THERAPY | Facility: HOSPITAL | Age: 78
DRG: 602 | End: 2023-11-24
Payer: COMMERCIAL

## 2023-11-24 LAB
ANION GAP SERPL CALCULATED.3IONS-SCNC: 5 MMOL/L (ref 7–15)
BUN SERPL-MCNC: 34.6 MG/DL (ref 8–23)
CALCIUM SERPL-MCNC: 9.6 MG/DL (ref 8.8–10.2)
CHLORIDE SERPL-SCNC: 102 MMOL/L (ref 98–107)
CREAT SERPL-MCNC: 1.28 MG/DL (ref 0.67–1.17)
DEPRECATED HCO3 PLAS-SCNC: 31 MMOL/L (ref 22–29)
EGFRCR SERPLBLD CKD-EPI 2021: 58 ML/MIN/1.73M2
GLUCOSE SERPL-MCNC: 85 MG/DL (ref 70–99)
POTASSIUM SERPL-SCNC: 5.1 MMOL/L (ref 3.4–5.3)
SODIUM SERPL-SCNC: 138 MMOL/L (ref 135–145)

## 2023-11-24 PROCEDURE — 250N000013 HC RX MED GY IP 250 OP 250 PS 637: Performed by: INTERNAL MEDICINE

## 2023-11-24 PROCEDURE — 210N000001 HC R&B IMCU HEART CARE

## 2023-11-24 PROCEDURE — 97535 SELF CARE MNGMENT TRAINING: CPT | Mod: GO

## 2023-11-24 PROCEDURE — 97110 THERAPEUTIC EXERCISES: CPT | Mod: GO

## 2023-11-24 PROCEDURE — 80048 BASIC METABOLIC PNL TOTAL CA: CPT | Performed by: STUDENT IN AN ORGANIZED HEALTH CARE EDUCATION/TRAINING PROGRAM

## 2023-11-24 PROCEDURE — 99232 SBSQ HOSP IP/OBS MODERATE 35: CPT | Performed by: STUDENT IN AN ORGANIZED HEALTH CARE EDUCATION/TRAINING PROGRAM

## 2023-11-24 PROCEDURE — 36415 COLL VENOUS BLD VENIPUNCTURE: CPT | Performed by: STUDENT IN AN ORGANIZED HEALTH CARE EDUCATION/TRAINING PROGRAM

## 2023-11-24 RX ADMIN — SENNOSIDES AND DOCUSATE SODIUM 1 TABLET: 8.6; 5 TABLET ORAL at 08:48

## 2023-11-24 RX ADMIN — ACETAMINOPHEN 1000 MG: 500 TABLET ORAL at 08:47

## 2023-11-24 RX ADMIN — MICONAZOLE NITRATE: 20 POWDER TOPICAL at 20:52

## 2023-11-24 RX ADMIN — SENNOSIDES AND DOCUSATE SODIUM 1 TABLET: 8.6; 5 TABLET ORAL at 20:52

## 2023-11-24 RX ADMIN — EMPAGLIFLOZIN 10 MG: 10 TABLET, FILM COATED ORAL at 09:14

## 2023-11-24 RX ADMIN — METOPROLOL SUCCINATE 25 MG: 25 TABLET, EXTENDED RELEASE ORAL at 15:26

## 2023-11-24 RX ADMIN — APIXABAN 5 MG: 5 TABLET, FILM COATED ORAL at 20:51

## 2023-11-24 RX ADMIN — GABAPENTIN 200 MG: 100 CAPSULE ORAL at 21:02

## 2023-11-24 RX ADMIN — ATORVASTATIN CALCIUM 40 MG: 40 TABLET, FILM COATED ORAL at 21:02

## 2023-11-24 RX ADMIN — PRAMIPEXOLE DIHYDROCHLORIDE 0.25 MG: 0.25 TABLET ORAL at 21:02

## 2023-11-24 RX ADMIN — LIDOCAINE 1 PATCH: 4 PATCH TOPICAL at 08:49

## 2023-11-24 RX ADMIN — AMIODARONE HYDROCHLORIDE 100 MG: 100 TABLET ORAL at 15:27

## 2023-11-24 RX ADMIN — APIXABAN 5 MG: 5 TABLET, FILM COATED ORAL at 08:48

## 2023-11-24 RX ADMIN — MICONAZOLE NITRATE: 20 POWDER TOPICAL at 15:26

## 2023-11-24 RX ADMIN — MICONAZOLE NITRATE: 20 POWDER TOPICAL at 08:49

## 2023-11-24 RX ADMIN — Medication 500 MCG: at 09:04

## 2023-11-24 ASSESSMENT — ACTIVITIES OF DAILY LIVING (ADL)
ADLS_ACUITY_SCORE: 44
ADLS_ACUITY_SCORE: 48
ADLS_ACUITY_SCORE: 44
ADLS_ACUITY_SCORE: 44
ADLS_ACUITY_SCORE: 48
ADLS_ACUITY_SCORE: 44
ADLS_ACUITY_SCORE: 48
ADLS_ACUITY_SCORE: 44
ADLS_ACUITY_SCORE: 44
ADLS_ACUITY_SCORE: 48

## 2023-11-24 NOTE — PROGRESS NOTES
CLINICAL NUTRITION SERVICES    Reviewed nutrition risk factors due to LOS. Pt is tolerating diet, eating well per nursing documentation. No nutrition issues identified at this time. RD will follow via rounds at this time, unless consulted. Pt enjoys the meals here and feels he gets enough to eat

## 2023-11-24 NOTE — PROGRESS NOTES
[facility]    Medicine Progress Note - Hospitalist Service    Date of Admission:  11/10/2023    Assessment & Plan   Gorge Berkowitz Jr. is a 78-year-old man with history of HFrEF, Afib, CAD, obesity, chronic edema, wheel chair bound for months in [facility] center admitted on 11/10/2023 with bilateral venous stasis ulcers with cellulitis and acute on chronic HFmrEF     He received IV diuretics briefly for acute on chronic HFmrEF and was subsequently transitioned to oral bumetanide.  Cardiologist signed off.     Wound culture grew MSSA, Klebsiella oxytoca, Serratia, Corynebacterium striatum, and Enterococcus faecalis.  for which he received IV Zosyn and vancomycin briefly and was subsequently narrowed to Zosyn only per ID recommendation.  IV Zosyn was switched to levofloxacin on 11/15/2023.     Acute on chronic HFmEF  -Echocardiogram revealed mildly reduced LV systolic function with LVEF of 45 to 50%, mild septal hypokinesis, mild to moderate inferior wall hypokinesis and mildly dilated right ventricle.  -Off IV diuretics  -Oral bumetanide 2 mg twice daily-started 11/30/2023; held on 11/16/2023 due to YOUNG  -YOUNG has resolved; will consider restarting bumetanide at 2 mg daily after discussion with nephrologist.  -Losartan 12.5 mg daily  -Metoprolol succinate 25 mg daily  -Cardiology team signed off-11/14/2023 11/20 Patient should resume diuresis , 1mg bid Bumex with goal wt ~245, once at the wt drop the pm dose and take PRN   Could resume back to losartan 1 week after his kidney function back to his baseline.   Recheck BMP 2 weeks after losartan reinitiated.   11/22: hold night bumex dose due to an uptick in Cr.  11/23: continue to hold bumex. Repeat BMP in am  11/24: c/w to hold bumex. Monitor BMP      YOUNG  -Likely prerenal from diuretic therapy.  - Discharge planning: Patient should resume diuresis , 1mg bid Bumex with goal wt ~245, once at the wt drop the pm dose and take PRN   -Could resume  "back to losartan 1 week after his kidney function back to his baseline.   -Recheck BMP 2 weeks after losartan reinitiated.   -11/24: currently bumex on hold    History of CAD  Chronic RCA occlusion.  -Continue atorvastatin and metoprolol succinate per cardiology recommendation    Infected bilateral lower extremity stasis ulcer  Cellulitis  -Off vancomycin and Zosyn  -Continue levofloxacin-started 11/15/2023  -ID [id number] assistance  -WOC    Sore throat, congestion - COVID negative  acute on chronic pain syndrome with opiate dependence  -butrans patch, scheduled tylenol, lidocaine patch(more for shoulder djd), robaxin    ROSALVA  -CPAP at night    hx of Afib/flutter  -Continue amiodarone, lopressor, eliquis           Diet: Combination Diet Low Saturated Fat Na <2400mg Diet, No Caffeine Diet  Diet    DVT Prophylaxis: DOAC  Levy Catheter: Not present  Lines: None     Cardiac Monitoring: None  Code Status: Full Code      Clinically Significant Risk Factors              # Hypoalbuminemia: Lowest albumin = 3.1 g/dL at 11/19/2023  4:54 AM, will monitor as appropriate        # Heart failure, NOS: heart failure noted on the problem list and last echo with EF 40-50%       # Obesity: Estimated body mass index is 35.84 kg/m  as calculated from the following:    Height as of this encounter: 1.753 m (5' 9\").    Weight as of this encounter: 110.1 kg (242 lb 11.6 oz).        # Financial/Environmental Concerns: none         Disposition Plan     Expected Discharge Date: 11/24/2023    Discharge Delays: Placement - LTC    Discharge Comments: LTC referrals pending            Nadya Short MD  Hospitalist Service  [facility]  Securely message with [employer] (more info)  Text page via [facility] Paging/Directory   ______________________________________________________________________    Interval History   Patient is seen and examined at bedside.  No acute complaints. Voiding well.     Physical Exam   Vital " Signs: Temp: 97.7  F (36.5  C) Temp src: Oral BP: 109/61 Pulse: 62   Resp: 20 SpO2: 99 % O2 Device: None (Room air)    Weight: 242 lbs 11.62 oz    GEN: Alert and oriented. Not in acute distress.  HEENT: Atraumatic, mucous membrane- moist and pink.  Chest: Bilateral air entry.  CVS: S1S2 regular.   Abdomen: Soft. Non-tender, non-distended. No organomegaly. No guarding or rigidity. Bowel sounds active.   Extremities: b/l LE stasis wound  CNS: No involuntary movements.  Skin: no cyanosis or clubbing.     Medical Decision Making             Data

## 2023-11-24 NOTE — PLAN OF CARE
A&O x :4, forgetful at times.      Activity:Assist of 1 with walker. Pt requires encouragement to increase activity.      Cardiac Rhythm: A-fib/ A-flutter with rate control prior to telemetry being discontinued this evening.     Pain:Chronic low back pain is managed with Butrans patch. L-knee pain - chronic; rated 2/10- declined any medical intervention.    Wounds to BLE are covered with c/d/I gauze.      Plan:TCU placement pending.

## 2023-11-24 NOTE — PLAN OF CARE
Goal Outcome Evaluation:      Plan of Care Reviewed With: patient    Overall Patient Progress: improvingOverall Patient Progress: improving     Pt. Mostly  sleeping.Denied pain, nausea, or dyspnea. Voiding adequately using the urinal. Still with edema lower extremities, lungs clear.  Continue to monitor.

## 2023-11-24 NOTE — PROGRESS NOTES
Care Management Follow Up    Length of Stay (days): 14    Expected Discharge Date: 11/24/2023     Concerns to be Addressed: discharge planning      Patient plan of care discussed at interdisciplinary rounds: Yes    Anticipated Discharge Disposition: Long Term Care     Anticipated Discharge Services: None  Anticipated Discharge DME: None    Patient/family educated on Medicare website which has current facility and service quality ratings: yes   Education Provided on the Discharge Plan: yes   Patient/Family in Agreement with the Plan: yes    Referrals Placed by CM/SW: long-term care   Private pay costs discussed: Not applicable    Additional Information:  11:53 AM  ROSINA called [facility] and [facility] to follow up on Pt's pending LTC referrals. SW left a voicemail with both facilities requesting a phone call back to CM [phone]. Pt is needing LTC placement and plans to pay privately for placement.    ROSINA received a phone call back from Mady at [facility] who reported that she will pass on Pt's referral to her DON and Nurse Managers at the facility for final review. Mady stated that her team is not working today and won't be back until Monday to review the referral. Mady provided information on the costs for LTC placement. Mady reported that CM and facility can follow up with one another on Monday.    [facility]:  Initial Deposit: $3500  Estimated Daily Rate (final daily rate upon initial nursing assessment on admission): $400-$500/day  Private Room Charge: $46/day    1:48 PM  Mady called reporting that she would like to make sure that Pt would be agreeable to placement as they are primarily a Evangelical facility. Pt reported to CM previously having strong Confucianist teri and would prefer a facility that aligns with his beliefs. Mady did mention that the facility does has some Confucianist support with a  visiting once a month. ROSINA reported that CM will have a discussion  with Pt and follow up with her team on Monday.    CM to follow up with Pt regarding potential placement option.      KIMBER Chau

## 2023-11-25 LAB
ANION GAP SERPL CALCULATED.3IONS-SCNC: 5 MMOL/L (ref 7–15)
BUN SERPL-MCNC: 26.5 MG/DL (ref 8–23)
CALCIUM SERPL-MCNC: 8.9 MG/DL (ref 8.8–10.2)
CHLORIDE SERPL-SCNC: 103 MMOL/L (ref 98–107)
CREAT SERPL-MCNC: 1.1 MG/DL (ref 0.67–1.17)
DEPRECATED HCO3 PLAS-SCNC: 30 MMOL/L (ref 22–29)
EGFRCR SERPLBLD CKD-EPI 2021: 70 ML/MIN/1.73M2
GLUCOSE SERPL-MCNC: 78 MG/DL (ref 70–99)
HOLD SPECIMEN: NORMAL
POTASSIUM SERPL-SCNC: 4.3 MMOL/L (ref 3.4–5.3)
SODIUM SERPL-SCNC: 138 MMOL/L (ref 135–145)

## 2023-11-25 PROCEDURE — 250N000013 HC RX MED GY IP 250 OP 250 PS 637: Performed by: INTERNAL MEDICINE

## 2023-11-25 PROCEDURE — 80048 BASIC METABOLIC PNL TOTAL CA: CPT | Performed by: STUDENT IN AN ORGANIZED HEALTH CARE EDUCATION/TRAINING PROGRAM

## 2023-11-25 PROCEDURE — 99232 SBSQ HOSP IP/OBS MODERATE 35: CPT | Performed by: STUDENT IN AN ORGANIZED HEALTH CARE EDUCATION/TRAINING PROGRAM

## 2023-11-25 PROCEDURE — 36415 COLL VENOUS BLD VENIPUNCTURE: CPT | Performed by: STUDENT IN AN ORGANIZED HEALTH CARE EDUCATION/TRAINING PROGRAM

## 2023-11-25 PROCEDURE — 250N000013 HC RX MED GY IP 250 OP 250 PS 637: Performed by: STUDENT IN AN ORGANIZED HEALTH CARE EDUCATION/TRAINING PROGRAM

## 2023-11-25 PROCEDURE — 120N000004 HC R&B MS OVERFLOW

## 2023-11-25 RX ORDER — BUMETANIDE 1 MG/1
1 TABLET ORAL DAILY
Status: DISCONTINUED | OUTPATIENT
Start: 2023-11-25 | End: 2023-12-04 | Stop reason: HOSPADM

## 2023-11-25 RX ADMIN — GABAPENTIN 200 MG: 100 CAPSULE ORAL at 21:44

## 2023-11-25 RX ADMIN — Medication 500 MCG: at 08:38

## 2023-11-25 RX ADMIN — AMIODARONE HYDROCHLORIDE 100 MG: 100 TABLET ORAL at 16:04

## 2023-11-25 RX ADMIN — APIXABAN 5 MG: 5 TABLET, FILM COATED ORAL at 08:32

## 2023-11-25 RX ADMIN — APIXABAN 5 MG: 5 TABLET, FILM COATED ORAL at 21:44

## 2023-11-25 RX ADMIN — MICONAZOLE NITRATE 1 APPLICATOR: 20 POWDER TOPICAL at 08:33

## 2023-11-25 RX ADMIN — LIDOCAINE 1 PATCH: 4 PATCH TOPICAL at 08:31

## 2023-11-25 RX ADMIN — SENNOSIDES AND DOCUSATE SODIUM 1 TABLET: 8.6; 5 TABLET ORAL at 21:44

## 2023-11-25 RX ADMIN — ATORVASTATIN CALCIUM 40 MG: 40 TABLET, FILM COATED ORAL at 21:44

## 2023-11-25 RX ADMIN — SENNOSIDES AND DOCUSATE SODIUM 1 TABLET: 8.6; 5 TABLET ORAL at 08:32

## 2023-11-25 RX ADMIN — BUMETANIDE 1 MG: 1 TABLET ORAL at 10:07

## 2023-11-25 RX ADMIN — EMPAGLIFLOZIN 10 MG: 10 TABLET, FILM COATED ORAL at 08:38

## 2023-11-25 RX ADMIN — PRAMIPEXOLE DIHYDROCHLORIDE 0.25 MG: 0.25 TABLET ORAL at 21:44

## 2023-11-25 RX ADMIN — MICONAZOLE NITRATE: 20 POWDER TOPICAL at 21:44

## 2023-11-25 ASSESSMENT — ACTIVITIES OF DAILY LIVING (ADL)
EQUIPMENT_CURRENTLY_USED_AT_HOME: WALKER, ROLLING
TOILETING_ISSUES: NO
WEAR_GLASSES_OR_BLIND: YES
CHANGE_IN_FUNCTIONAL_STATUS_SINCE_ONSET_OF_CURRENT_ILLNESS/INJURY: YES
VISION_MANAGEMENT: GLASSES
DRESSING/BATHING: BATHING DIFFICULTY, ASSISTANCE 1 PERSON
ADLS_ACUITY_SCORE: 50
CONCENTRATING,_REMEMBERING_OR_MAKING_DECISIONS_DIFFICULTY: NO
ADLS_ACUITY_SCORE: 50
ADLS_ACUITY_SCORE: 50
DIFFICULTY_COMMUNICATING: NO
ADLS_ACUITY_SCORE: 48
ADLS_ACUITY_SCORE: 48
ADLS_ACUITY_SCORE: 51
DIFFICULTY_EATING/SWALLOWING: NO
ADLS_ACUITY_SCORE: 51
WALKING_OR_CLIMBING_STAIRS_DIFFICULTY: YES
DOING_ERRANDS_INDEPENDENTLY_DIFFICULTY: YES
WALKING_OR_CLIMBING_STAIRS: AMBULATION DIFFICULTY, REQUIRES EQUIPMENT
HEARING_DIFFICULTY_OR_DEAF: NO
ADLS_ACUITY_SCORE: 51
ADLS_ACUITY_SCORE: 51
FALL_HISTORY_WITHIN_LAST_SIX_MONTHS: YES
ADLS_ACUITY_SCORE: 50
ADLS_ACUITY_SCORE: 48
NUMBER_OF_TIMES_PATIENT_HAS_FALLEN_WITHIN_LAST_SIX_MONTHS: 3
DRESSING/BATHING_DIFFICULTY: YES
ADLS_ACUITY_SCORE: 48

## 2023-11-25 NOTE — PLAN OF CARE
Goal Outcome Evaluation:      Plan of Care Reviewed With: patient    Overall Patient Progress: improvingOverall Patient Progress: improving     Heart Failure Care Map  GOALS TO BE MET BEFORE DISCHARGE:    1. Decrease congestion and/or edema with diuretic therapy to achieve near optimal volume status.     Dyspnea improved: Yes, satisfactory for discharge.   Edema improved: No, further care required to meet this goal. Please explain Edema in legs improving, wounds slowly improving.         Last 24 hour I/O:   Intake/Output Summary (Last 24 hours) at 11/25/2023 0644  Last data filed at 11/25/2023 0100  Gross per 24 hour   Intake 1000 ml   Output 1250 ml   Net -250 ml           Net I/O and Weights since admission:   10/26 0700 - 11/25 0659  In: 15063 [P.O.:40937; I.V.:100]  Out: 49451 [Urine:94115]  Net: -19905     Vitals:    11/10/23 1212 11/11/23 0519 11/12/23 0423 11/13/23 0404   Weight: 133.8 kg (295 lb) 117.5 kg (259 lb 0.7 oz) 118 kg (260 lb 2.3 oz) 113.6 kg (250 lb 7.1 oz)    11/14/23 0332 11/15/23 0340 11/16/23 0248 11/17/23 0405   Weight: 110.6 kg (243 lb 13.3 oz) 112.1 kg (247 lb 2.2 oz) 111.2 kg (245 lb 2.4 oz) 112.3 kg (247 lb 9.2 oz)    11/18/23 0639 11/19/23 0306 11/20/23 0340 11/21/23 0352   Weight: 112 kg (246 lb 14.6 oz) 113.8 kg (250 lb 14.1 oz) 113.2 kg (249 lb 9 oz) 113.7 kg (250 lb 10.6 oz)    11/22/23 0318 11/24/23 0659   Weight: 111.9 kg (246 lb 11.1 oz) 110.1 kg (242 lb 11.6 oz)       2.  O2 sats > 90% on room air, or at prior home O2 therapy level.      Able to wean O2 this shift to keep sats above 90%?: Yes, satisfactory for discharge.   Does patient use Home O2? No          Current oxygenation status:   SpO2: 97 %     O2 Device: None (Room air),      3.  Tolerates ambulation and mobility near baseline.     Ambulation: No, further care required to meet this goal. Please explain Pt. Has been at a care facility for months and is deconditioned , here has gotten up OOB only minimally, requiring 1  assist with walker and gait belt.      Times patient ambulated with staff this shift: 0    Please review the Heart Failure Care Map for additional HF goal outcomes.     Plan for discharge to TCU once a bed is found and coverage verified, possibly Monday    Rach Banegas RN  11/25/2023

## 2023-11-25 NOTE — PROGRESS NOTES
[facility]    Medicine Progress Note - Hospitalist Service    Date of Admission:  11/10/2023    Assessment & Plan   Gorge Berkowitz Jr. is a 78-year-old man with history of HFrEF, Afib, CAD, obesity, chronic edema, wheel chair bound for months in [facility] center admitted on 11/10/2023 with bilateral venous stasis ulcers with cellulitis and acute on chronic HFmrEF     He received IV diuretics briefly for acute on chronic HFmrEF and was subsequently transitioned to oral bumetanide.  Cardiologist signed off.     Wound culture grew MSSA, Klebsiella oxytoca, Serratia, Corynebacterium striatum, and Enterococcus faecalis.  for which he received IV Zosyn and vancomycin briefly and was subsequently narrowed to Zosyn only per ID recommendation.  IV Zosyn was switched to levofloxacin on 11/15/2023.     Acute on chronic HFmEF  -Echocardiogram revealed mildly reduced LV systolic function with LVEF of 45 to 50%, mild septal hypokinesis, mild to moderate inferior wall hypokinesis and mildly dilated right ventricle.  -Off IV diuretics  -Oral bumetanide 2 mg twice daily-started 11/30/2023; held on 11/16/2023 due to YOUNG  -YOUNG has resolved; will consider restarting bumetanide at 2 mg daily after discussion with nephrologist.  -Losartan 12.5 mg daily  -Metoprolol succinate 25 mg daily  -Cardiology team signed off-11/14/2023 11/20 Patient should resume diuresis , 1mg bid Bumex with goal wt ~245, once at the wt drop the pm dose and take PRN   Could resume back to losartan 1 week after his kidney function back to his baseline.   Recheck BMP 2 weeks after losartan reinitiated.   11/22: hold night bumex dose due to an uptick in Cr.  11/23: continue to hold bumex. Repeat BMP in am  11/24: c/w to hold bumex. Monitor BMP  11/25: resume bumex 1 mg every day as Cr improved.      YOUNG- improving  -Likely prerenal from diuretic therapy.  - Discharge planning: Patient should resume diuresis , 1mg bid Bumex with goal wt ~245,  "once at the wt drop the pm dose and take PRN   -Could resume back to losartan 1 week after his kidney function back to his baseline.   -Recheck BMP 2 weeks after losartan reinitiated.   -11/24: currently bumex on hold    History of CAD  Chronic RCA occlusion.  -Continue atorvastatin and metoprolol succinate per cardiology recommendation    Infected bilateral lower extremity stasis ulcer  Cellulitis  -Off vancomycin and Zosyn  -Continue levofloxacin-started 11/15/2023  -ID [id number] assistance  -WOC    Sore throat, congestion - COVID negative  acute on chronic pain syndrome with opiate dependence  -butrans patch, scheduled tylenol, lidocaine patch(more for shoulder djd), robaxin    ROSALVA  -CPAP at night    hx of Afib/flutter  -Continue amiodarone, lopressor, eliquis           Diet: Combination Diet Low Saturated Fat Na <2400mg Diet, No Caffeine Diet  Diet    DVT Prophylaxis: DOAC  Levy Catheter: Not present  Lines: None     Cardiac Monitoring: None  Code Status: Full Code      Clinically Significant Risk Factors              # Hypoalbuminemia: Lowest albumin = 3.1 g/dL at 11/19/2023  4:54 AM, will monitor as appropriate        # Heart failure, NOS: heart failure noted on the problem list and last echo with EF 40-50%       # Obesity: Estimated body mass index is 36.89 kg/m  as calculated from the following:    Height as of this encounter: 1.753 m (5' 9\").    Weight as of this encounter: 113.3 kg (249 lb 12.5 oz).        # Financial/Environmental Concerns: none         Disposition Plan      Expected Discharge Date: 11/28/2023    Discharge Delays: Placement - LTC    Discharge Comments: LTC referrals pending            Nadya Short MD  Hospitalist Service  [facility]  Securely message with [employer] (more info)  Text page via [employer] Paging/Directory   ______________________________________________________________________    Interval History   Patient is seen and examined at " bedside.  No acute complaints. Voiding well.     Physical Exam   Vital Signs: Temp: 97.6  F (36.4  C) Temp src: Oral BP: 104/60 Pulse: 59   Resp: 20 SpO2: 97 % O2 Device: None (Room air)    Weight: 249 lbs 12.5 oz    GEN: Alert and oriented. Not in acute distress.  HEENT: Atraumatic, mucous membrane- moist and pink.  Chest: Bilateral air entry.  CVS: S1S2 regular.   Abdomen: Soft. Non-tender, non-distended. No organomegaly. No guarding or rigidity. Bowel sounds active.   Extremities: b/l LE stasis wound  CNS: No involuntary movements.  Skin: no cyanosis or clubbing.     Medical Decision Making             Data

## 2023-11-25 NOTE — PLAN OF CARE
[id number]  Problem: Adult Inpatient Plan of Care  Goal: Plan of Care Review  Description: The Plan of Care Review/Shift note should be completed every shift.  The Outcome Evaluation is a brief statement about your assessment that the patient is improving, declining, or no change.  This information will be displayed automatically on your shift  note.  Outcome: Progressing   Goal Outcome Evaluation:               Restarted on po Bumex. Denies pain and refused 2 doses of  Tylenol. Up in chair for a few hours.

## 2023-11-25 NOTE — PLAN OF CARE
Problem: Infection  Goal: Absence of Infection Signs and Symptoms  Outcome: Progressing     Problem: Skin Injury Risk Increased  Goal: Skin Health and Integrity  Outcome: Progressing   Goal Outcome Evaluation:         Writer preformed dressing change on ble.  Pt had scan drainage from left leg.    Pt tolerated well and denies pain.    PT I hesitant to move and needs help for even small task.  Pt had x-large bm today.     Pt is on RA with no report of sob.   Pt is waiting placement.

## 2023-11-26 ENCOUNTER — APPOINTMENT (OUTPATIENT)
Dept: PHYSICAL THERAPY | Facility: HOSPITAL | Age: 78
DRG: 602 | End: 2023-11-26
Payer: COMMERCIAL

## 2023-11-26 ENCOUNTER — APPOINTMENT (OUTPATIENT)
Dept: OCCUPATIONAL THERAPY | Facility: HOSPITAL | Age: 78
DRG: 602 | End: 2023-11-26
Attending: STUDENT IN AN ORGANIZED HEALTH CARE EDUCATION/TRAINING PROGRAM
Payer: COMMERCIAL

## 2023-11-26 LAB
ANION GAP SERPL CALCULATED.3IONS-SCNC: 11 MMOL/L (ref 7–15)
BUN SERPL-MCNC: 29.4 MG/DL (ref 8–23)
CALCIUM SERPL-MCNC: 9.3 MG/DL (ref 8.8–10.2)
CHLORIDE SERPL-SCNC: 99 MMOL/L (ref 98–107)
CREAT SERPL-MCNC: 1.13 MG/DL (ref 0.67–1.17)
DEPRECATED HCO3 PLAS-SCNC: 28 MMOL/L (ref 22–29)
EGFRCR SERPLBLD CKD-EPI 2021: 67 ML/MIN/1.73M2
GLUCOSE SERPL-MCNC: 89 MG/DL (ref 70–99)
POTASSIUM SERPL-SCNC: 4.1 MMOL/L (ref 3.4–5.3)
SODIUM SERPL-SCNC: 138 MMOL/L (ref 135–145)

## 2023-11-26 PROCEDURE — 250N000013 HC RX MED GY IP 250 OP 250 PS 637: Performed by: STUDENT IN AN ORGANIZED HEALTH CARE EDUCATION/TRAINING PROGRAM

## 2023-11-26 PROCEDURE — 97110 THERAPEUTIC EXERCISES: CPT | Mod: GP

## 2023-11-26 PROCEDURE — 97535 SELF CARE MNGMENT TRAINING: CPT | Mod: GO

## 2023-11-26 PROCEDURE — 80048 BASIC METABOLIC PNL TOTAL CA: CPT | Performed by: STUDENT IN AN ORGANIZED HEALTH CARE EDUCATION/TRAINING PROGRAM

## 2023-11-26 PROCEDURE — 120N000004 HC R&B MS OVERFLOW

## 2023-11-26 PROCEDURE — 36415 COLL VENOUS BLD VENIPUNCTURE: CPT | Performed by: STUDENT IN AN ORGANIZED HEALTH CARE EDUCATION/TRAINING PROGRAM

## 2023-11-26 PROCEDURE — 99232 SBSQ HOSP IP/OBS MODERATE 35: CPT | Performed by: STUDENT IN AN ORGANIZED HEALTH CARE EDUCATION/TRAINING PROGRAM

## 2023-11-26 PROCEDURE — 250N000013 HC RX MED GY IP 250 OP 250 PS 637: Performed by: INTERNAL MEDICINE

## 2023-11-26 PROCEDURE — 97116 GAIT TRAINING THERAPY: CPT | Mod: GP

## 2023-11-26 RX ADMIN — EMPAGLIFLOZIN 10 MG: 10 TABLET, FILM COATED ORAL at 09:33

## 2023-11-26 RX ADMIN — ACETAMINOPHEN 1000 MG: 500 TABLET ORAL at 20:04

## 2023-11-26 RX ADMIN — MICONAZOLE NITRATE: 20 POWDER TOPICAL at 20:06

## 2023-11-26 RX ADMIN — ACETAMINOPHEN 1000 MG: 500 TABLET ORAL at 05:56

## 2023-11-26 RX ADMIN — Medication 500 MCG: at 09:33

## 2023-11-26 RX ADMIN — ACETAMINOPHEN 1000 MG: 500 TABLET ORAL at 14:18

## 2023-11-26 RX ADMIN — MICONAZOLE NITRATE 1 APPLICATOR: 20 POWDER TOPICAL at 08:16

## 2023-11-26 RX ADMIN — ATORVASTATIN CALCIUM 40 MG: 40 TABLET, FILM COATED ORAL at 21:23

## 2023-11-26 RX ADMIN — PRAMIPEXOLE DIHYDROCHLORIDE 0.25 MG: 0.25 TABLET ORAL at 21:24

## 2023-11-26 RX ADMIN — AMIODARONE HYDROCHLORIDE 100 MG: 100 TABLET ORAL at 17:33

## 2023-11-26 RX ADMIN — APIXABAN 5 MG: 5 TABLET, FILM COATED ORAL at 20:05

## 2023-11-26 RX ADMIN — SENNOSIDES AND DOCUSATE SODIUM 1 TABLET: 8.6; 5 TABLET ORAL at 20:05

## 2023-11-26 RX ADMIN — GABAPENTIN 200 MG: 100 CAPSULE ORAL at 21:24

## 2023-11-26 RX ADMIN — LIDOCAINE 1 PATCH: 4 PATCH TOPICAL at 08:15

## 2023-11-26 RX ADMIN — APIXABAN 5 MG: 5 TABLET, FILM COATED ORAL at 08:15

## 2023-11-26 RX ADMIN — SENNOSIDES AND DOCUSATE SODIUM 1 TABLET: 8.6; 5 TABLET ORAL at 08:15

## 2023-11-26 RX ADMIN — BUMETANIDE 1 MG: 1 TABLET ORAL at 08:15

## 2023-11-26 ASSESSMENT — ACTIVITIES OF DAILY LIVING (ADL)
ADLS_ACUITY_SCORE: 51
ADLS_ACUITY_SCORE: 47
ADLS_ACUITY_SCORE: 51
ADLS_ACUITY_SCORE: 47
ADLS_ACUITY_SCORE: 47
ADLS_ACUITY_SCORE: 43
ADLS_ACUITY_SCORE: 43
ADLS_ACUITY_SCORE: 51
ADLS_ACUITY_SCORE: 47
ADLS_ACUITY_SCORE: 47

## 2023-11-26 NOTE — PLAN OF CARE
Goal Outcome Evaluation:      Plan of Care Reviewed With: patient    Overall Patient Progress: improvingOverall Patient Progress: improving     Heart Failure Care Map  GOALS TO BE MET BEFORE DISCHARGE:    1. Decrease congestion and/or edema with diuretic therapy to achieve near optimal volume status.     Dyspnea improved: Yes, satisfactory for discharge.   Edema improved: No, further care required to meet this goal. Please explain Still has mild to moderate LE edema        Last 24 hour I/O:   Intake/Output Summary (Last 24 hours) at 11/26/2023 0819  Last data filed at 11/26/2023 0400  Gross per 24 hour   Intake 820 ml   Output 1825 ml   Net -1005 ml           Net I/O and Weights since admission:   10/27 1500 - 11/26 1459  In: 36549 [P.O.:92286; I.V.:100]  Out: 91872 [Urine:64625]  Net: -07980     Vitals:    11/10/23 1212 11/11/23 0519 11/12/23 0423 11/13/23 0404   Weight: 133.8 kg (295 lb) 117.5 kg (259 lb 0.7 oz) 118 kg (260 lb 2.3 oz) 113.6 kg (250 lb 7.1 oz)    11/14/23 0332 11/15/23 0340 11/16/23 0248 11/17/23 0405   Weight: 110.6 kg (243 lb 13.3 oz) 112.1 kg (247 lb 2.2 oz) 111.2 kg (245 lb 2.4 oz) 112.3 kg (247 lb 9.2 oz)    11/18/23 0639 11/19/23 0306 11/20/23 0340 11/21/23 0352   Weight: 112 kg (246 lb 14.6 oz) 113.8 kg (250 lb 14.1 oz) 113.2 kg (249 lb 9 oz) 113.7 kg (250 lb 10.6 oz)    11/22/23 0318 11/24/23 0659 11/25/23 0330 11/26/23 0300   Weight: 111.9 kg (246 lb 11.1 oz) 110.1 kg (242 lb 11.6 oz) 113.3 kg (249 lb 12.5 oz) 111.8 kg (246 lb 7.6 oz)       2.  O2 sats > 90% on room air, or at prior home O2 therapy level.      Able to wean O2 this shift to keep sats above 90%?: Yes, satisfactory for discharge.   Does patient use Home O2? No          Current oxygenation status:   SpO2: 96 %     O2 Device: None (Room air),      3.  Tolerates ambulation and mobility near baseline.     Ambulation: No, further care required to meet this goal. Please explain  : 2months deconditioning , needs to increase  ambulation, up x 1 with 2 assist chait to bed overnight.     Times patient ambulated with staff this shift: 1    Please review the Heart Failure Care Map for additional HF goal outcomes.    Rach Banegas RN  11/26/2023

## 2023-11-26 NOTE — PLAN OF CARE
[id number]    Problem: Adult Inpatient Plan of Care  Goal: Plan of Care Review  Description: The Plan of Care Review/Shift note should be completed every shift.  The Outcome Evaluation is a brief statement about your assessment that the patient is improving, declining, or no change.  This information will be displayed automatically on your shift  note.  Outcome: Progressing  Flowsheets (Taken 11/25/2023 5282)  Plan of Care Reviewed With: patient   Goal Outcome Evaluation:      Plan of Care Reviewed With: patient        Pt alert and oriented times 4. Vitals stable, Denied any pain, nausea and SOB. Pt sitting up in chair. Refused to go back to bed. Wound dressing changed per order. Held his scheduled metoprolol since he did not meet parameter .

## 2023-11-26 NOTE — PLAN OF CARE
Assumed care 1900 to 2330. A&O x 4. Assist x 1 with a gait belt and walker. Denies pain, declined scheduled Tylenol. Up to toilet, urinal at bedside. Up in chair, declined getting into bed. Call light within reach, able to make needs known. Bed alarm on for safety.    Problem: Wound  Goal: Optimal Functional Ability  Intervention: Optimize Functional Ability  Recent Flowsheet Documentation  Taken 11/25/2023 2144 by AMADA NICHOLE  Assistive Device Utilized:   gait belt   walker  Activity Management: activity adjusted per tolerance  Activity Assistance Provided: assistance, 2 people     Problem: Wound  Goal: Absence of Infection Signs and Symptoms  Intervention: Prevent or Manage Infection  Recent Flowsheet Documentation  Taken 11/25/2023 2144 by AMADA NICHOLE  Isolation Precautions: contact precautions maintained

## 2023-11-26 NOTE — PLAN OF CARE
Dressing change done.  Pictures taken of both legs.  Media Information    Document Information    Other: Photograph   Right leg   11/26/2023 1:31 PM   Attached To:   Hospital Encounter on 11/10/23   Source Information    Sera Tanner, RN  Sjn P3        Media Information    Document Information    Other: Photograph   Right heal   11/26/2023 1:31 PM   Attached To:   Hospital Encounter on 11/10/23   Source Information    Sera Tanner, RN  Sjn P3      Media Information      Document Information    Other: Photograph   Right outer leg   11/26/2023 1:31 PM   Attached To:   Hospital Encounter on 11/10/23   Source Information     Media Information      Document Information    Other: Photograph   Top of left leg and foot   11/26/2023 1:31 PM   Attached To:   Hospital Encounter on 11/10/23   Source Information   Media Information      Document Information    Other: Photograph   Left outer foot   11/26/2023 1:31 PM   Attached To:   Hospital Encounter on 11/10/23   Source Information    Sera Tanner, RN  Sjn P3       Sera Tanner, RN  Sjn P3     Sera Tanner, RN  Sjn P3

## 2023-11-26 NOTE — PROGRESS NOTES
Care Management Follow Up    Length of Stay (days): 16    Expected Discharge Date: 11/28/2023     Concerns to be Addressed: discharge planning      Patient plan of care discussed at interdisciplinary rounds: Yes    Anticipated Discharge Disposition: Long Term Care     Anticipated Discharge Services: None  Anticipated Discharge DME: None    Patient/family educated on Medicare website which has current facility and service quality ratings:    Education Provided on the Discharge Plan: yes   Patient/Family in Agreement with the Plan: yes    Referrals Placed by CM/SW: long term care   Private pay costs discussed: private room/amenity fees and transportation costs    Additional Information:  2:51 PM  SW met and spoke with Pt in regards to the search for LTC placement. SW informed Pt that [facility] is reviewing his referral and considering him for placement. SW provided Pt information on the estimated costs for LTC placement at the facility. SW mentioned that the facility is primarily Gnosticist but is welcoming of his Mosque teri. Pt reported that he does not have an issue as long as the facility does not have any issues with his beliefs. Pt reported that he will call and have his contacts review the facility and will wait for CM to update him with confirmation that they will accept him for LTC placement. CM to follow up.    KIMBER Chau

## 2023-11-26 NOTE — PLAN OF CARE
[id number]  Problem: Adult Inpatient Plan of Care  Goal: Plan of Care Review  Description: The Plan of Care Review/Shift note should be completed every shift.  The Outcome Evaluation is a brief statement about your assessment that the patient is improving, declining, or no change.  This information will be displayed automatically on your shift  note.  Outcome: Progressing   Goal Outcome Evaluation:  Pt had some back pain earlier in am. Repositioned. Pt up in chair from this morning.  LE dressing change done. Dr. Short asked for pictures to be taken. Pictures in nursing note.

## 2023-11-26 NOTE — PROGRESS NOTES
[facility]    Medicine Progress Note - Hospitalist Service    Date of Admission:  11/10/2023    Assessment & Plan   Gorge Berkowitz Jr. is a 78-year-old man with history of HFrEF, Afib, CAD, obesity, chronic edema, wheel chair bound for months in [facility] center admitted on 11/10/2023 with bilateral venous stasis ulcers with cellulitis and acute on chronic HFmrEF     He received IV diuretics briefly for acute on chronic HFmrEF and was subsequently transitioned to oral bumetanide.  Cardiologist signed off.     Wound culture grew MSSA, Klebsiella oxytoca, Serratia, Corynebacterium striatum, and Enterococcus faecalis.  for which he received IV Zosyn and vancomycin briefly and was subsequently narrowed to Zosyn only per ID recommendation.  IV Zosyn was switched to levofloxacin on 11/15/2023.     Acute on chronic HFmEF  -Echocardiogram revealed mildly reduced LV systolic function with LVEF of 45 to 50%, mild septal hypokinesis, mild to moderate inferior wall hypokinesis and mildly dilated right ventricle.  -Off IV diuretics  -Oral bumetanide 2 mg twice daily-started 11/30/2023; held on 11/16/2023 due to YOUNG  -Metoprolol succinate 25 mg daily  -Cardiology team signed off-11/14/2023 11/22: hold night bumex dose due to an uptick in Cr.  11/23: continue to hold bumex. Repeat BMP in am  11/24: c/w to hold bumex. Monitor BMP  11/25: resume bumex 1 mg every day as Cr improved.  11/26: c/w bumex 1mg. Goat weight ~245 Ib. Can consider adding bumex 1 mg prn   Could resume back to losartan 1 week after his kidney function back to his baseline.   Recheck BMP 2 weeks after losartan reinitiated.       YOUNG- resolved  -Likely prerenal from diuretic therapy.  - Discharge planning: Patient should resume diuresis , 1mg bid Bumex with goal wt ~245, once at the wt drop the pm dose and take PRN   -Could resume back to losartan 1 week after his kidney function back to his baseline.   -Recheck BMP 2 weeks after losartan  "reinitiated.   -11/24: currently bumex on hold    History of CAD  Chronic RCA occlusion.  -Continue atorvastatin and metoprolol succinate per cardiology recommendation    Infected bilateral lower extremity stasis ulcer  Cellulitis  -Off vancomycin and Zosyn  -Continue levofloxacin-started 11/15/2023  -ID [id number] assistance  -WOC    Sore throat, congestion - COVID negative  acute on chronic pain syndrome with opiate dependence  -butrans patch, scheduled tylenol, lidocaine patch(more for shoulder djd), robaxin    ROSALVA  -CPAP at night    hx of Afib/flutter  -Continue amiodarone, lopressor, eliquis     Dispo: Medically ready for discharge. Awaiting placement.          Diet: Combination Diet Low Saturated Fat Na <2400mg Diet, No Caffeine Diet  Diet  Room Service    DVT Prophylaxis: DOAC  Levy Catheter: Not present  Lines: None     Cardiac Monitoring: None  Code Status: Full Code      Clinically Significant Risk Factors              # Hypoalbuminemia: Lowest albumin = 3.1 g/dL at 11/19/2023  4:54 AM, will monitor as appropriate        # Heart failure, NOS: heart failure noted on the problem list and last echo with EF 40-50%       # Obesity: Estimated body mass index is 36.4 kg/m  as calculated from the following:    Height as of this encounter: 1.753 m (5' 9\").    Weight as of this encounter: 111.8 kg (246 lb 7.6 oz).        # Financial/Environmental Concerns: none         Disposition Plan      Expected Discharge Date: 11/28/2023    Discharge Delays: Placement - LTC    Discharge Comments: LTC referrals pending            Nadya Short MD  Hospitalist Service  [facility]  Securely message with [employer] (more info)  Text page via [facility] Paging/Directory   ______________________________________________________________________    Interval History   Patient is seen and examined at bedside.  No acute complaints. Voiding well.     Physical Exam   Vital Signs: Temp: 97.6  F (36.4  C) Temp " src: Oral BP: 111/67 Pulse: 61   Resp: 20 SpO2: 96 % O2 Device: None (Room air)    Weight: 246 lbs 7.59 oz    GEN: Alert and oriented. Not in acute distress.  HEENT: Atraumatic, mucous membrane- moist and pink.  Chest: Bilateral air entry.  CVS: S1S2 regular.   Abdomen: Soft. Non-tender, non-distended. No organomegaly. No guarding or rigidity. Bowel sounds active.   Extremities: b/l LE stasis wound  CNS: No involuntary movements.  Skin: no cyanosis or clubbing.     Medical Decision Making             Data

## 2023-11-27 ENCOUNTER — APPOINTMENT (OUTPATIENT)
Dept: PHYSICAL THERAPY | Facility: HOSPITAL | Age: 78
DRG: 602 | End: 2023-11-27
Payer: COMMERCIAL

## 2023-11-27 ENCOUNTER — APPOINTMENT (OUTPATIENT)
Dept: OCCUPATIONAL THERAPY | Facility: HOSPITAL | Age: 78
DRG: 602 | End: 2023-11-27
Payer: COMMERCIAL

## 2023-11-27 LAB
ANION GAP SERPL CALCULATED.3IONS-SCNC: 6 MMOL/L (ref 7–15)
BACTERIA WND CULT: ABNORMAL
BUN SERPL-MCNC: 33.8 MG/DL (ref 8–23)
CALCIUM SERPL-MCNC: 9.3 MG/DL (ref 8.8–10.2)
CHLORIDE SERPL-SCNC: 99 MMOL/L (ref 98–107)
CREAT SERPL-MCNC: 1.33 MG/DL (ref 0.67–1.17)
DEPRECATED HCO3 PLAS-SCNC: 32 MMOL/L (ref 22–29)
EGFRCR SERPLBLD CKD-EPI 2021: 55 ML/MIN/1.73M2
GLUCOSE SERPL-MCNC: 83 MG/DL (ref 70–99)
GRAM STAIN RESULT: ABNORMAL
HGB BLD-MCNC: 13.4 G/DL (ref 13.3–17.7)
MAGNESIUM SERPL-MCNC: 2.5 MG/DL (ref 1.7–2.3)
PLATELET # BLD AUTO: 160 10E3/UL (ref 150–450)
POTASSIUM SERPL-SCNC: 4.5 MMOL/L (ref 3.4–5.3)
SODIUM SERPL-SCNC: 137 MMOL/L (ref 135–145)

## 2023-11-27 PROCEDURE — G0463 HOSPITAL OUTPT CLINIC VISIT: HCPCS

## 2023-11-27 PROCEDURE — 97110 THERAPEUTIC EXERCISES: CPT | Mod: GO

## 2023-11-27 PROCEDURE — 250N000013 HC RX MED GY IP 250 OP 250 PS 637: Performed by: INTERNAL MEDICINE

## 2023-11-27 PROCEDURE — 36415 COLL VENOUS BLD VENIPUNCTURE: CPT | Performed by: INTERNAL MEDICINE

## 2023-11-27 PROCEDURE — 82310 ASSAY OF CALCIUM: CPT | Performed by: INTERNAL MEDICINE

## 2023-11-27 PROCEDURE — 97535 SELF CARE MNGMENT TRAINING: CPT | Mod: GO

## 2023-11-27 PROCEDURE — 250N000013 HC RX MED GY IP 250 OP 250 PS 637: Performed by: STUDENT IN AN ORGANIZED HEALTH CARE EDUCATION/TRAINING PROGRAM

## 2023-11-27 PROCEDURE — 120N000004 HC R&B MS OVERFLOW

## 2023-11-27 PROCEDURE — 85049 AUTOMATED PLATELET COUNT: CPT | Performed by: INTERNAL MEDICINE

## 2023-11-27 PROCEDURE — 85018 HEMOGLOBIN: CPT | Performed by: INTERNAL MEDICINE

## 2023-11-27 PROCEDURE — 97110 THERAPEUTIC EXERCISES: CPT | Mod: GP

## 2023-11-27 PROCEDURE — 83735 ASSAY OF MAGNESIUM: CPT | Performed by: INTERNAL MEDICINE

## 2023-11-27 PROCEDURE — 99232 SBSQ HOSP IP/OBS MODERATE 35: CPT | Performed by: INTERNAL MEDICINE

## 2023-11-27 RX ADMIN — APIXABAN 5 MG: 5 TABLET, FILM COATED ORAL at 08:01

## 2023-11-27 RX ADMIN — MICONAZOLE NITRATE: 20 POWDER TOPICAL at 08:01

## 2023-11-27 RX ADMIN — Medication 500 MCG: at 08:04

## 2023-11-27 RX ADMIN — SENNOSIDES AND DOCUSATE SODIUM 1 TABLET: 8.6; 5 TABLET ORAL at 21:43

## 2023-11-27 RX ADMIN — ACETAMINOPHEN 1000 MG: 500 TABLET ORAL at 13:36

## 2023-11-27 RX ADMIN — MICONAZOLE NITRATE: 20 POWDER TOPICAL at 21:41

## 2023-11-27 RX ADMIN — APIXABAN 5 MG: 5 TABLET, FILM COATED ORAL at 21:43

## 2023-11-27 RX ADMIN — LIDOCAINE 1 PATCH: 4 PATCH TOPICAL at 08:01

## 2023-11-27 RX ADMIN — PRAMIPEXOLE DIHYDROCHLORIDE 0.25 MG: 0.25 TABLET ORAL at 21:43

## 2023-11-27 RX ADMIN — MICONAZOLE NITRATE: 20 POWDER TOPICAL at 13:36

## 2023-11-27 RX ADMIN — ATORVASTATIN CALCIUM 40 MG: 40 TABLET, FILM COATED ORAL at 21:43

## 2023-11-27 RX ADMIN — BUMETANIDE 1 MG: 1 TABLET ORAL at 08:01

## 2023-11-27 RX ADMIN — EMPAGLIFLOZIN 10 MG: 10 TABLET, FILM COATED ORAL at 08:04

## 2023-11-27 RX ADMIN — GABAPENTIN 200 MG: 100 CAPSULE ORAL at 21:43

## 2023-11-27 RX ADMIN — ACETAMINOPHEN 1000 MG: 500 TABLET ORAL at 08:01

## 2023-11-27 RX ADMIN — SENNOSIDES AND DOCUSATE SODIUM 1 TABLET: 8.6; 5 TABLET ORAL at 08:01

## 2023-11-27 RX ADMIN — AMIODARONE HYDROCHLORIDE 100 MG: 100 TABLET ORAL at 16:53

## 2023-11-27 ASSESSMENT — ACTIVITIES OF DAILY LIVING (ADL)
ADLS_ACUITY_SCORE: 43

## 2023-11-27 NOTE — PROGRESS NOTES
Care Management Follow Up    Length of Stay (days): 17    Expected Discharge Date: 11/28/2023     Concerns to be Addressed:       Patient plan of care discussed at interdisciplinary rounds: Yes    Anticipated Discharge Disposition: Long Term Care     Anticipated Discharge Services: None  Anticipated Discharge DME: None    Patient/family educated on Medicare website which has current facility and service quality ratings:    Education Provided on the Discharge Plan:    Patient/Family in Agreement with the Plan: yes    Referrals Placed by CM/SW:    Private pay costs discussed: private room/amenity fees    Additional Information:  Chart reviewed-  SW spoke to Rebeca in admissions at [facility]. Facility is still reviewing referral with nursing team.   Awaiting response for placement.  2:51 PM  Additional referrals for long term care placed.   [facility] contact precuations are a barrier for placement, infection control nurse aware and assisting as needed with clarification for facilities.    Ashlyn Brock, MACKSW

## 2023-11-27 NOTE — PLAN OF CARE
Occupational Therapy Discharge Summary    Reason for therapy discharge:    No further expectations of functional progress.    Progress towards therapy goal(s). See goals on Care Plan in [facility] electronic health record for goal details.  Goals partially met.  Barriers to achieving goals:   Plan for LTC.    Therapy recommendation(s):    No further therapy is recommended.

## 2023-11-27 NOTE — PROGRESS NOTES
"[facility]  WOC Nurse Inpatient Assessment     Consulted for: leg ulcers    Patient History (according to provider note(s):      \"78-year-old man with history of HFrEF, Afib, CAD, obesity, chronic edema, wheel chair bound for months in care center admitted on 11/10/2023 with bilateral venous stasis ulcers with cellulitis and acute on chronic HFmrEF. \"    Assessment:      Areas visualized during today's visit: Lower extremities     Wound location: Bilateral lower extremities                     Last photo: 11/26  Wound due to: Venous Ulcer + Arterial, mixed etiology  Wound history/plan of care: see above  Wound base: left lateral foot granular, all other areas scabs/dried drainage vs eschar. Small open area to R shin, 1.2 x 0.3 x 0.2cm; LLE open areas to shin in a 3 x 3 x 0.2cm area (stable)      Palpation of the wound bed: normal      Drainage: small     Description of drainage: serosanguinous     Tunneling: N/A     Undermining: N/A  Periwound skin: Edematous and Hemosiderin staining      Color: dusky      Temperature: normal   Odor: none  Treatment goal: Infection control/prevention, Maintain (prevention of deterioration), and Protection  STATUS: stable  Supplies ordered: supplies stored on unit       Treatment Plan:       Wound care  Start:  11/28/23 0600,   DAILY,   Routine        Comments: Bilateral lower legs:  1. Cleanse with wound cleanser, including esteafnía wound skin, gently pat dry  2. Apply xeroform to all open and scabbed areas  3. Cover with abd pads, wrap with kerlix toes to knees  Change daily      Modified from: Wound care - DAILY Start: 11/14/23 0600, DAILY, Routine            Orders: Reviewed    RECOMMEND PRIMARY TEAM ORDER:  none   Education provided: plan of care, Moisture management, and Hygiene  Discussed plan of care with: Patient and Nurse  WOC nurse follow-up plan: weekly  Notify WOC if wound(s) deteriorate.  Nursing to notify the Provider(s) and re-consult the WOC " Nurse if new skin concern.    DATA:     Current support surface: Standard  Standard gel/foam mattress (IsoFlex, Atmos air, etc)  Containment of urine/stool: Incontinence Protocol, Urinal, and Incontinent pad in bed  BMI: Body mass index is 36.79 kg/m .   Active diet order: Orders Placed This Encounter      Combination Diet Low Saturated Fat Na <2400mg Diet, No Caffeine Diet      Diet     Output: I/O last 3 completed shifts:  In: 600 [P.O.:600]  Out: 1360 [Urine:1360]     Labs:   Recent Labs   Lab 11/27/23  0450   HGB 13.4     Pressure injury risk assessment:   Sensory Perception: 4-->no impairment  Moisture: 3-->occasionally moist  Activity: 2-->chairfast  Mobility: 3-->slightly limited  Nutrition: 3-->adequate  Friction and Shear: 2-->potential problem  Josue Score: 17    Alexia   Pager no longer in use, please contact through iRx Reminder group: [facility]

## 2023-11-27 NOTE — PROGRESS NOTES
SPIRITUAL HEALTH SERVICES ([facility]) Progress Note  [facility].  Unit [address]3     Saw pt Gorge Berkowitz . per LOS.     Patient/Family Understanding of Illness and Goals of Care - Mr Berkowitz understands his illness and the goals of his care.      Distress and Loss - He is deeply distressed that his number 1 choice for TCU, [facility], denied his request for admission because of his wound. He is angry that, according  to him, his previous placement in a Tcu in [place] resulted in the current wound he has that is now the major barrier to placement.      Strengths, Coping, and Resources - While a person of deep teri, his anger at the previous TCU dominates his conversation with me.       Meaning, Beliefs, and Spirituality - He believes in God but not a puppeteer God. Rather he believes that God is Love, so he is not blaming God for what has happened. But he remains angry that he's in the situation where he can't be placed in a TCU because of his wounds.      Plan of Care - [facility] will follow, as able, during this hospitalization.        Raissa Ruiz, Ph.D.,[facility]  , Spiritual Health Services      [facility] available 24/7 for emergency requests/referrals, either by having the on-call  paged or by entering an ASAP/STAT consult in Epic (this will also page the on-call ).

## 2023-11-27 NOTE — PLAN OF CARE
Problem: Adult Inpatient Plan of Care  Goal: Plan of Care Review  Description: The Plan of Care Review/Shift note should be completed every shift.  The Outcome Evaluation is a brief statement about your assessment that the patient is improving, declining, or no change.  This information will be displayed automatically on your shift  note.  Outcome: Progressing     Problem: Infection  Goal: Absence of Infection Signs and Symptoms  Outcome: Progressing  Intervention: Prevent or Manage Infection  Recent Flowsheet Documentation  Taken 11/26/2023 2000 by Elvin Crenshaw RN  Isolation Precautions: contact precautions maintained     Problem: Wound  Goal: Optimal Coping  Outcome: Progressing  Intervention: Support Patient and Family Response  Recent Flowsheet Documentation  Taken 11/26/2023 2000 by Elvin Crenshaw RN  Supportive Measures: active listening utilized   Goal Outcome Evaluation:       Pt is alert and oriented to self, situation and place, but not to time, denies pain at this time.  Presently sitting in the chair.  Bilateral venous stasis wounds could not be assessed due to Lymphedema wraps in place. Pt started on po Bumex. X 1 BM today per patient. Assist of one with a walker. Pt is possible discharge for Monday the 26 th.   Uses a urinal at the bed side. Will continue to monitor pt.

## 2023-11-27 NOTE — PROGRESS NOTES
[facility]    Medicine Progress Note - Hospitalist Service    Date of Admission:  11/10/2023    Assessment & Plan   Gorge Berkowitz Jr. is a 78-year-old man with history of HFrEF, Afib, CAD, obesity, chronic edema, wheel chair bound for months in care center admitted on 11/10/2023 with bilateral venous stasis ulcers with cellulitis and acute on chronic HFmrEF     1.Acute on chronic HFmEF  -He received IV diuretics briefly for acute on chronic HFmrEF and was subsequently transitioned to oral bumetanide.  Cardiologist signed off  -Echocardiogram revealed mildly reduced LV systolic function with LVEF of 45 to 50%, mild septal hypokinesis, mild to moderate inferior wall hypokinesis and mildly dilated right ventricle.  -Off IV diuretics  -Oral bumetanide 2 mg twice daily-started 11/30/2023; held on 11/16/2023 due to YOUNG  -Metoprolol succinate 25 mg daily  -Cardiology team signed off-11/14/2023 11/22: hold night bumex dose due to an uptick in Cr.  11/23: continue to hold bumex. Repeat BMP in am  11/24: c/w to hold bumex. Monitor BMP  11/25: resume bumex 1 mg every day as Cr improved.  11/26: c/w bumex 1mg. Goal weight ~245 Ib.   11/27-creat 1.33, will continue bumex at current dose and need to monitor creat at LTC      2.YOUNG- resolved  -Likely prerenal from diuretic therapy.  -on bumex  -holding losartan  -today's creat 1.33     3.History of CAD  Chronic RCA occlusion.  -Continue atorvastatin and metoprolol succinate per cardiology recommendation     4.Infected bilateral lower extremity stasis ulcer  Cellulitis  -Wound culture grew MSSA, Klebsiella oxytoca, Serratia, Corynebacterium striatum, and Enterococcus faecalis.  for which he received IV Zosyn and vancomycin briefly and was subsequently narrowed to Zosyn only per ID recommendation.  IV Zosyn was switched to levofloxacin on 11/15/2023--done with levo  -ID [id number] assistance  -WOC  -still needs lymphedema wraps     5.Sore  "throat, congestion - COVID negative    6.Acute on chronic pain syndrome with opiate dependence  -butrans patch, scheduled tylenol, lidocaine patch(more for shoulder djd), robaxin     7.ROSALVA  -CPAP at night     8.hx of Afib/flutter  -Continue amiodarone, lopressor, eliquis                Diet: Combination Diet Low Saturated Fat Na <2400mg Diet, No Caffeine Diet  Diet  Room Service    DVT Prophylaxis: DOAC  Levy Catheter: Not present  Lines: None     Cardiac Monitoring: None  Code Status: Full Code      Clinically Significant Risk Factors              # Hypoalbuminemia: Lowest albumin = 3.1 g/dL at 11/19/2023  4:54 AM, will monitor as appropriate      # Heart failure, NOS: heart failure noted on the problem list and last echo with EF 40-50%       # Obesity: Estimated body mass index is 36.79 kg/m  as calculated from the following:    Height as of this encounter: 1.753 m (5' 9\").    Weight as of this encounter: 113 kg (249 lb 1.9 oz).      # Financial/Environmental Concerns: none         Disposition Plan     Expected Discharge Date: 11/28/2023    Discharge Delays: Placement - LTC    Discharge Comments: LTC referrals pending            Shanthi Saldaña MD  Hospitalist Service  [facility]  Securely message with [employer] (more info)  Text page via [employer] Paging/Directory   ______________________________________________________________________    Interval History   He feels good  He feels ready for LTC  No new c/o  No cp  No sob  Legs feel better  Eating well  Hopes that new place will respect him    Physical Exam   Vital Signs: Temp: 97.4  F (36.3  C) Temp src: Oral BP: 116/80 Pulse: 66   Resp: 18 SpO2: 99 % O2 Device: None (Room air)    Weight: 249 lbs 1.92 oz    Constitutional: awake, alert, cooperative, and no apparent distress  Respiratory: No increased work of breathing, good air exchange, clear to auscultation bilaterally, no crackles or wheezing  Cardiovascular: Normal apical impulse, " regular rate and rhythm, normal S1 and S2, no S3 or S4, and no murmur noted  GI: normal bowel sounds, soft, non-distended, and non-tender  Skin: dressing over legs, lymphedema wraps  Musculoskeletal: wraps on legs, but clearly helping edema  Neurologic: Mental Status Exam:  Level of Alertness:   awake  Attention/Concentration:  normal  Language:  normal  Neuropsychiatric: General: normal, calm, and normal eye contact  Affect: normal and pleasant    Medical Decision Making       35 MINUTES SPENT BY ME on the date of service doing chart review, history, exam, documentation & further activities per the note.      Data     I have personally reviewed the following data over the past 24 hrs:    N/A  \   13.4   / 160     137 99 33.8 (H) /  83   4.5 32 (H) 1.33 (H) \       Imaging results reviewed over the past 24 hrs:   No results found for this or any previous visit (from the past 24 hour(s)).

## 2023-11-27 NOTE — PLAN OF CARE
Problem: Adult Inpatient Plan of Care  Goal: Readiness for Transition of Care  Outcome: Progressing   Goal Outcome Evaluation:    Patient placement continues to be delayed due to placement issues. He is frustrated with waiting for placement. Up in chair. Dressing changed today and lymph wraps replace by OT.

## 2023-11-28 ENCOUNTER — APPOINTMENT (OUTPATIENT)
Dept: OCCUPATIONAL THERAPY | Facility: HOSPITAL | Age: 78
DRG: 602 | End: 2023-11-28
Payer: COMMERCIAL

## 2023-11-28 LAB
ANION GAP SERPL CALCULATED.3IONS-SCNC: 7 MMOL/L (ref 7–15)
BUN SERPL-MCNC: 32 MG/DL (ref 8–23)
CALCIUM SERPL-MCNC: 9.3 MG/DL (ref 8.8–10.2)
CHLORIDE SERPL-SCNC: 97 MMOL/L (ref 98–107)
CREAT SERPL-MCNC: 1.24 MG/DL (ref 0.67–1.17)
DEPRECATED HCO3 PLAS-SCNC: 30 MMOL/L (ref 22–29)
EGFRCR SERPLBLD CKD-EPI 2021: 60 ML/MIN/1.73M2
GLUCOSE SERPL-MCNC: 84 MG/DL (ref 70–99)
POTASSIUM SERPL-SCNC: 4.7 MMOL/L (ref 3.4–5.3)
SODIUM SERPL-SCNC: 134 MMOL/L (ref 135–145)

## 2023-11-28 PROCEDURE — 250N000013 HC RX MED GY IP 250 OP 250 PS 637: Performed by: INTERNAL MEDICINE

## 2023-11-28 PROCEDURE — 120N000004 HC R&B MS OVERFLOW

## 2023-11-28 PROCEDURE — 36415 COLL VENOUS BLD VENIPUNCTURE: CPT | Performed by: INTERNAL MEDICINE

## 2023-11-28 PROCEDURE — 97535 SELF CARE MNGMENT TRAINING: CPT | Mod: GO

## 2023-11-28 PROCEDURE — 80048 BASIC METABOLIC PNL TOTAL CA: CPT | Performed by: INTERNAL MEDICINE

## 2023-11-28 PROCEDURE — 99232 SBSQ HOSP IP/OBS MODERATE 35: CPT | Performed by: INTERNAL MEDICINE

## 2023-11-28 PROCEDURE — 250N000013 HC RX MED GY IP 250 OP 250 PS 637: Performed by: STUDENT IN AN ORGANIZED HEALTH CARE EDUCATION/TRAINING PROGRAM

## 2023-11-28 RX ADMIN — APIXABAN 5 MG: 5 TABLET, FILM COATED ORAL at 08:51

## 2023-11-28 RX ADMIN — GABAPENTIN 200 MG: 100 CAPSULE ORAL at 20:20

## 2023-11-28 RX ADMIN — MICONAZOLE NITRATE: 20 POWDER TOPICAL at 08:53

## 2023-11-28 RX ADMIN — MICONAZOLE NITRATE: 20 POWDER TOPICAL at 13:59

## 2023-11-28 RX ADMIN — LIDOCAINE 1 PATCH: 4 PATCH TOPICAL at 08:52

## 2023-11-28 RX ADMIN — AMIODARONE HYDROCHLORIDE 100 MG: 100 TABLET ORAL at 16:01

## 2023-11-28 RX ADMIN — ACETAMINOPHEN 1000 MG: 500 TABLET ORAL at 13:53

## 2023-11-28 RX ADMIN — ACETAMINOPHEN 1000 MG: 500 TABLET ORAL at 20:18

## 2023-11-28 RX ADMIN — Medication 500 MCG: at 08:53

## 2023-11-28 RX ADMIN — SENNOSIDES AND DOCUSATE SODIUM 1 TABLET: 8.6; 5 TABLET ORAL at 20:19

## 2023-11-28 RX ADMIN — PRAMIPEXOLE DIHYDROCHLORIDE 0.25 MG: 0.25 TABLET ORAL at 20:20

## 2023-11-28 RX ADMIN — SENNOSIDES AND DOCUSATE SODIUM 1 TABLET: 8.6; 5 TABLET ORAL at 08:53

## 2023-11-28 RX ADMIN — ATORVASTATIN CALCIUM 40 MG: 40 TABLET, FILM COATED ORAL at 20:20

## 2023-11-28 RX ADMIN — METOPROLOL SUCCINATE 25 MG: 25 TABLET, EXTENDED RELEASE ORAL at 16:01

## 2023-11-28 RX ADMIN — EMPAGLIFLOZIN 10 MG: 10 TABLET, FILM COATED ORAL at 09:05

## 2023-11-28 RX ADMIN — APIXABAN 5 MG: 5 TABLET, FILM COATED ORAL at 20:19

## 2023-11-28 RX ADMIN — MICONAZOLE NITRATE: 20 POWDER TOPICAL at 20:21

## 2023-11-28 RX ADMIN — BUMETANIDE 1 MG: 1 TABLET ORAL at 08:51

## 2023-11-28 RX ADMIN — ACETAMINOPHEN 1000 MG: 500 TABLET ORAL at 08:51

## 2023-11-28 ASSESSMENT — ACTIVITIES OF DAILY LIVING (ADL)
ADLS_ACUITY_SCORE: 47
ADLS_ACUITY_SCORE: 47
ADLS_ACUITY_SCORE: 43
ADLS_ACUITY_SCORE: 43
ADLS_ACUITY_SCORE: 47
ADLS_ACUITY_SCORE: 43
ADLS_ACUITY_SCORE: 43
ADLS_ACUITY_SCORE: 47
ADLS_ACUITY_SCORE: 47
ADLS_ACUITY_SCORE: 43
ADLS_ACUITY_SCORE: 47
ADLS_ACUITY_SCORE: 43

## 2023-11-28 NOTE — PLAN OF CARE
Problem: Adult Inpatient Plan of Care  Goal: Absence of Hospital-Acquired Illness or Injury  Intervention: Identify and Manage Fall Risk  Recent Flowsheet Documentation  Taken 11/28/2023 0030 by Denny Dasilva, RN  Safety Promotion/Fall Prevention:   activity supervised   nonskid shoes/slippers when out of bed   patient and family education   clutter free environment maintained   lighting adjusted   room door open   room organization consistent   safety round/check completed   supervised activity   toileting scheduled     Problem: Risk for Delirium  Goal: Improved Behavioral Control  11/28/2023 0640 by Denny Dasilva, RN  Outcome: Progressing  11/27/2023 2232 by Denny Dasilva, KIRA  Outcome: Progressing  Intervention: Minimize Safety Risk  Recent Flowsheet Documentation  Taken 11/28/2023 0030 by Denny Dasilva, RN  Enhanced Safety Measures: room near unit station   Goal Outcome Evaluation:    Slept in chair for half of night and then returned to bed. Denied pain, VSS, on room air. Lymphedema clean, dry, intact. Uses call light appropriately.

## 2023-11-28 NOTE — PLAN OF CARE
Problem: Adult Inpatient Plan of Care  Goal: Absence of Hospital-Acquired Illness or Injury  Intervention: Identify and Manage Fall Risk  Recent Flowsheet Documentation  Taken 11/27/2023 1630 by Denny Dasilva, RN  Safety Promotion/Fall Prevention:   activity supervised   nonskid shoes/slippers when out of bed   patient and family education   clutter free environment maintained   lighting adjusted   room door open   room organization consistent   safety round/check completed   supervised activity   toileting scheduled     Problem: Risk for Delirium  Goal: Improved Attention and Thought Clarity  Outcome: Progressing   Goal Outcome Evaluation:    A & O x 4, on room air, VSS but SBP was in 90s at beginning of shift and pt reportedly felt lightheaded and did not ambulate with PT. SBP this evening still in the mid 90s, MD notified. Denied pain. Pt stayed in chair all evening and agreed to notify staff when wanting to return to bed. Pt uses call light appropriately, is calm and cooperative.

## 2023-11-28 NOTE — PROGRESS NOTES
Care Management Follow Up    Length of Stay (days): 18    Expected Discharge Date: 11/28/2023     Concerns to be Addressed:       Patient plan of care discussed at interdisciplinary rounds: Yes    Anticipated Discharge Disposition: Long Term Care     Anticipated Discharge Services: None  Anticipated Discharge DME: None    Patient/family educated on Medicare website which has current facility and service quality ratings:    Education Provided on the Discharge Plan:    Patient/Family in Agreement with the Plan: yes    Referrals Placed by CM/SW:    Private pay costs discussed: private room/amenity fees    Additional Information:  SW met with patient. Seeking placement. Patient states he is ready to discharge. SW also discussed referral to enhanced care assisted living facilities, he is agreeable to this.   Referral sent to [facility] [phone].      ARMEN Briggs

## 2023-11-28 NOTE — PROGRESS NOTES
[facility]    Medicine Progress Note - Hospitalist Service    Date of Admission:  11/10/2023    Assessment & Plan   Gorge Berkowitz Jr. is a 78-year-old man with history of HFrEF, Afib, CAD, obesity, chronic edema, wheel chair bound for months in care center admitted on 11/10/2023 with bilateral venous stasis ulcers with cellulitis and acute on chronic HFmrEF     1.Acute on chronic HFmEF  -He received IV diuretics briefly for acute on chronic HFmrEF and was subsequently transitioned to oral bumetanide.  Cardiologist signed off  -Echocardiogram revealed mildly reduced LV systolic function with LVEF of 45 to 50%, mild septal hypokinesis, mild to moderate inferior wall hypokinesis and mildly dilated right ventricle.  -Off IV diuretics  -Oral bumetanide 2 mg twice daily-started 11/30/2023; held on 11/16/2023 due to YOUNG  -Metoprolol succinate 25 mg daily  -Cardiology team signed off-11/14/2023 11/22: hold night bumex dose due to an uptick in Cr.  11/23: continue to hold bumex. Repeat BMP in am  11/24: c/w to hold bumex. Monitor BMP  11/25: resume bumex 1 mg every day as Cr improved.  11/26: c/w bumex 1mg. Goal weight ~245 Ib.   11/27-creat 1.33, will continue bumex at current dose and need to monitor creat at [facility]  11/28-creat 1.24      2.YOUNG- resolved  -Likely prerenal from diuretic therapy.  -on bumex  -holding losartan  -today's creat 1.24     3.History of CAD  Chronic RCA occlusion.  -Continue atorvastatin and metoprolol succinate per cardiology recommendation     4.Infected bilateral lower extremity stasis ulcer left lateral foot--long standing  Cellulitis  -Wound culture grew MSSA, Klebsiella oxytoca, Serratia, Corynebacterium striatum, and Enterococcus faecalis.  for which he received IV Zosyn and vancomycin briefly and was subsequently narrowed to Zosyn only per ID recommendation.  IV Zosyn was switched to levofloxacin on 11/15/2023--done with levo  -ID [id number]  "assistance  -WOC  -still needs lymphedema wraps     5.Sore throat, congestion - COVID negative    6.Acute on chronic pain syndrome with opiate dependence  -butrans patch, scheduled tylenol, lidocaine patch(more for shoulder djd), robaxin     7.ROSALVA  -CPAP at night     8.hx of Afib/flutter  -Continue amiodarone, lopressor, eliquis     9.Dispo-Still trying to find LTC for him               Diet: Combination Diet Low Saturated Fat Na <2400mg Diet, No Caffeine Diet  Diet  Room Service    DVT Prophylaxis: DOAC  Levy Catheter: Not present  Lines: None     Cardiac Monitoring: None  Code Status: Full Code      Clinically Significant Risk Factors              # Hypoalbuminemia: Lowest albumin = 3.1 g/dL at 11/19/2023  4:54 AM, will monitor as appropriate      # Heart failure, NOS: heart failure noted on the problem list and last echo with EF 40-50%       # Obesity: Estimated body mass index is 36.3 kg/m  as calculated from the following:    Height as of this encounter: 1.753 m (5' 9\").    Weight as of this encounter: 111.5 kg (245 lb 13 oz).      # Financial/Environmental Concerns: none         Disposition Plan     Expected Discharge Date: 11/28/2023    Discharge Delays: Placement - LTC    Discharge Comments: LTC referrals pending            Shanthi Saldaña MD  Hospitalist Service  [facility]  Securely message with [employer] (more info)  Text page via [employer] system Paging/Directory   ______________________________________________________________________    Interval History     He feels ok  Some concern about chronic wound on left foot--\"I have had it so long\"  No other concerns  No pain  Frustrated with finding LTC , felt last place did not respect him    Physical Exam   Vital Signs: Temp: 97.6  F (36.4  C) Temp src: Oral BP: 121/78 Pulse: 97   Resp: 18 SpO2: 98 % O2 Device: None (Room air)    Weight: 245 lbs 13.01 oz    Constitutional: awake, fatigued, alert, cooperative, and no apparent " distress  Respiratory: No increased work of breathing, good air exchange, clear to auscultation bilaterally, no crackles or wheezing  Cardiovascular: Normal apical impulse, regular rate and rhythm, normal S1 and S2, no S3 or S4, and no murmur noted  GI: normal bowel sounds, soft, non-distended, and non-tender  Skin: I did remove wrap on left foot and lateral left foot there is wound with scab -non tender and was not oozing--looks similar today as picture yesterday in chart--no redness by it  Musculoskeletal: wraps have really helped , minimal edema now  Neurologic: Mental Status Exam:  Level of Alertness:   awake  Neuropsychiatric: General: normal, calm, and normal eye contact    Medical Decision Making       35 MINUTES SPENT BY ME on the date of service doing chart review, history, exam, documentation & further activities per the note.      Data     I have personally reviewed the following data over the past 24 hrs:    N/A  \   N/A   / N/A     134 (L) 97 (L) 32.0 (H) /  84   4.7 30 (H) 1.24 (H) \       Imaging results reviewed over the past 24 hrs:   No results found for this or any previous visit (from the past 24 hour(s)).

## 2023-11-29 ENCOUNTER — APPOINTMENT (OUTPATIENT)
Dept: OCCUPATIONAL THERAPY | Facility: HOSPITAL | Age: 78
DRG: 602 | End: 2023-11-29
Payer: COMMERCIAL

## 2023-11-29 ENCOUNTER — APPOINTMENT (OUTPATIENT)
Dept: PHYSICAL THERAPY | Facility: HOSPITAL | Age: 78
DRG: 602 | End: 2023-11-29
Payer: COMMERCIAL

## 2023-11-29 PROBLEM — I50.23 ACUTE ON CHRONIC SYSTOLIC CONGESTIVE HEART FAILURE (H): Status: ACTIVE | Noted: 2018-10-08

## 2023-11-29 LAB
ANION GAP SERPL CALCULATED.3IONS-SCNC: 7 MMOL/L (ref 7–15)
BUN SERPL-MCNC: 31.1 MG/DL (ref 8–23)
CALCIUM SERPL-MCNC: 9.2 MG/DL (ref 8.8–10.2)
CHLORIDE SERPL-SCNC: 103 MMOL/L (ref 98–107)
CREAT SERPL-MCNC: 1.16 MG/DL (ref 0.67–1.17)
DEPRECATED HCO3 PLAS-SCNC: 31 MMOL/L (ref 22–29)
EGFRCR SERPLBLD CKD-EPI 2021: 65 ML/MIN/1.73M2
GLUCOSE SERPL-MCNC: 77 MG/DL (ref 70–99)
POTASSIUM SERPL-SCNC: 4.3 MMOL/L (ref 3.4–5.3)
SODIUM SERPL-SCNC: 141 MMOL/L (ref 135–145)

## 2023-11-29 PROCEDURE — 82947 ASSAY GLUCOSE BLOOD QUANT: CPT | Performed by: INTERNAL MEDICINE

## 2023-11-29 PROCEDURE — 120N000004 HC R&B MS OVERFLOW

## 2023-11-29 PROCEDURE — 250N000013 HC RX MED GY IP 250 OP 250 PS 637: Performed by: STUDENT IN AN ORGANIZED HEALTH CARE EDUCATION/TRAINING PROGRAM

## 2023-11-29 PROCEDURE — 250N000013 HC RX MED GY IP 250 OP 250 PS 637: Performed by: INTERNAL MEDICINE

## 2023-11-29 PROCEDURE — 99232 SBSQ HOSP IP/OBS MODERATE 35: CPT | Performed by: INTERNAL MEDICINE

## 2023-11-29 PROCEDURE — 97110 THERAPEUTIC EXERCISES: CPT | Mod: GP

## 2023-11-29 PROCEDURE — 36415 COLL VENOUS BLD VENIPUNCTURE: CPT | Performed by: INTERNAL MEDICINE

## 2023-11-29 PROCEDURE — 97535 SELF CARE MNGMENT TRAINING: CPT | Mod: GO

## 2023-11-29 PROCEDURE — 97530 THERAPEUTIC ACTIVITIES: CPT | Mod: GP

## 2023-11-29 RX ADMIN — PRAMIPEXOLE DIHYDROCHLORIDE 0.25 MG: 0.25 TABLET ORAL at 20:31

## 2023-11-29 RX ADMIN — BUMETANIDE 1 MG: 1 TABLET ORAL at 09:07

## 2023-11-29 RX ADMIN — Medication 500 MCG: at 09:07

## 2023-11-29 RX ADMIN — METOPROLOL SUCCINATE 25 MG: 25 TABLET, EXTENDED RELEASE ORAL at 15:13

## 2023-11-29 RX ADMIN — GABAPENTIN 200 MG: 100 CAPSULE ORAL at 20:31

## 2023-11-29 RX ADMIN — MICONAZOLE NITRATE: 20 POWDER TOPICAL at 09:06

## 2023-11-29 RX ADMIN — AMIODARONE HYDROCHLORIDE 100 MG: 100 TABLET ORAL at 15:13

## 2023-11-29 RX ADMIN — MICONAZOLE NITRATE 1 APPLICATOR: 20 POWDER TOPICAL at 20:35

## 2023-11-29 RX ADMIN — APIXABAN 5 MG: 5 TABLET, FILM COATED ORAL at 20:31

## 2023-11-29 RX ADMIN — SENNOSIDES AND DOCUSATE SODIUM 1 TABLET: 8.6; 5 TABLET ORAL at 09:06

## 2023-11-29 RX ADMIN — ACETAMINOPHEN 1000 MG: 500 TABLET ORAL at 09:04

## 2023-11-29 RX ADMIN — APIXABAN 5 MG: 5 TABLET, FILM COATED ORAL at 09:04

## 2023-11-29 RX ADMIN — LIDOCAINE 1 PATCH: 4 PATCH TOPICAL at 09:06

## 2023-11-29 RX ADMIN — SENNOSIDES AND DOCUSATE SODIUM 1 TABLET: 8.6; 5 TABLET ORAL at 20:31

## 2023-11-29 RX ADMIN — ACETAMINOPHEN 1000 MG: 500 TABLET ORAL at 15:11

## 2023-11-29 RX ADMIN — ACETAMINOPHEN 1000 MG: 500 TABLET ORAL at 20:31

## 2023-11-29 RX ADMIN — ATORVASTATIN CALCIUM 40 MG: 40 TABLET, FILM COATED ORAL at 20:31

## 2023-11-29 RX ADMIN — MICONAZOLE NITRATE: 20 POWDER TOPICAL at 15:11

## 2023-11-29 RX ADMIN — EMPAGLIFLOZIN 10 MG: 10 TABLET, FILM COATED ORAL at 09:05

## 2023-11-29 ASSESSMENT — ACTIVITIES OF DAILY LIVING (ADL)
ADLS_ACUITY_SCORE: 47
ADLS_ACUITY_SCORE: 45
ADLS_ACUITY_SCORE: 42
ADLS_ACUITY_SCORE: 47
ADLS_ACUITY_SCORE: 47
ADLS_ACUITY_SCORE: 45

## 2023-11-29 NOTE — PLAN OF CARE
"Goal Outcome Evaluation:         Patient is alert and oriented.  Denied pain while at rest in bed.  Butrans patch in place on lower back.  Dressings on legs in place under lymphedema wraps.  Will continue to monitor.      Problem: Adult Inpatient Plan of Care  Goal: Plan of Care Review  Description: The Plan of Care Review/Shift note should be completed every shift.  The Outcome Evaluation is a brief statement about your assessment that the patient is improving, declining, or no change.  This information will be displayed automatically on your shift  note.  Outcome: Progressing  Goal: Patient-Specific Goal (Individualized)  Description: You can add care plan individualizations to a care plan. Examples of Individualization might be:  \"Parent requests to be called daily at 9am for status\", \"I have a hard time hearing out of my right ear\", or \"Do not touch me to wake me up as it startles  me\".  Outcome: Progressing  Goal: Absence of Hospital-Acquired Illness or Injury  Outcome: Progressing  Intervention: Identify and Manage Fall Risk  Recent Flowsheet Documentation  Taken 11/29/2023 0100 by Yudy Newsome RN  Safety Promotion/Fall Prevention: activity supervised  Intervention: Prevent Skin Injury  Recent Flowsheet Documentation  Taken 11/29/2023 0100 by Yudy Newsome RN  Body Position: position changed independently  Intervention: Prevent Infection  Recent Flowsheet Documentation  Taken 11/29/2023 0100 by Yudy Newsome RN  Infection Prevention:   hand hygiene promoted   rest/sleep promoted   single patient room provided   equipment surfaces disinfected  Goal: Optimal Comfort and Wellbeing  Intervention: Provide Person-Centered Care  Recent Flowsheet Documentation  Taken 11/29/2023 0100 by Yudy Newsome RN  Trust Relationship/Rapport: choices provided  Goal: Readiness for Transition of Care  Outcome: Progressing                  "

## 2023-11-29 NOTE — PROGRESS NOTES
[facility]    Medicine Progress Note - Hospitalist Service    Date of Admission:  11/10/2023    Assessment & Plan     Gorge Berkowitz Jr. is a 78-year-old man with history of HFrEF, Afib, CAD, obesity, chronic edema, wheel chair bound for months in care center admitted on 11/10/2023 with bilateral venous stasis ulcers with cellulitis and acute on chronic HFrEF.    Infected bilateral lower extremity stasis ulcer left lateral foot  Cellulitis  Wound culture grew MSSA, Klebsiella oxytoca, Serratia, Corynebacterium striatum, and Enterococcus faecalis.   Received IV Zosyn and vancomycin briefly and was subsequently narrowed to Zosyn only per ID recommendation.  IV Zosyn was switched to levofloxacin on 11/15/2023, now completed.  -WOC  -still needs lymphedema wraps     Acute on chronic HFrEF  Echocardiogram revealed mildly reduced LV systolic function with LVEF of 45 to 50%, mild septal hypokinesis, mild to moderate inferior wall hypokinesis and mildly dilated right ventricle.  Received IV diuretics briefly, subsequently transitioned to oral bumetanide. Oral bumetanide 2 mg twice daily-started 11/30/2023; held on 11/16/2023 due to YOUNG. Bumex resumed 11/25.  - Continue bumex at 1 mg daily. Monitor creatinine and electrolytes.      YOUNG- resolved  Likely prerenal from diuretic therapy. Hold PTA losartan. Bumex as above.     CAD, Chronic RCA occlusion:  -Continue atorvastatin and metoprolol succinate per cardiology recommendation     Acute on chronic pain syndrome with opiate dependence  -butrans patch, scheduled tylenol, lidocaine patch(more for shoulder djd), robaxin     ROSALVA: CPAP at night     Afib/flutter: Continue amiodarone, lopressor, eliquis     Disposition: Still trying to find LTC for him               Diet: Combination Diet Low Saturated Fat Na <2400mg Diet, No Caffeine Diet  Diet  Room Service    DVT Prophylaxis: DOAC  Levy Catheter: Not present  Lines: None     Cardiac Monitoring:  "None  Code Status: Full Code      Clinically Significant Risk Factors              # Hypoalbuminemia: Lowest albumin = 3.1 g/dL at 11/19/2023  4:54 AM, will monitor as appropriate      # Heart failure, NOS: heart failure noted on the problem list and last echo with EF 40-50%       # Obesity: Estimated body mass index is 34.93 kg/m  as calculated from the following:    Height as of this encounter: 1.753 m (5' 9\").    Weight as of this encounter: 107.3 kg (236 lb 8.9 oz).      # Financial/Environmental Concerns: none         Disposition Plan      Expected Discharge Date: 12/01/2023    Discharge Delays: Placement - LTC    Discharge Comments: LTC referrals pending            Rudy Cotter MD  Hospitalist Service  [facility]  Securely message with [employer] (more info)  Text page via [employer] Paging/Directory   ______________________________________________________________________    Interval History   Patient is new to me. Previous progress notes reviewed. Patient reports feeling tired and wants to take a nap.     Physical Exam   Vital Signs: Temp: 98.1  F (36.7  C) Temp src: Oral BP: 102/56 Pulse: 82   Resp: 20 SpO2: 98 % O2 Device: None (Room air)    Weight: 236 lbs 8.86 oz    General appearance: not in acute distress  HEENT: PERRL, EOMI  Lungs: Clear breath sounds in bilateral lung fields  Cardiovascular: Regular rate and rhythm, normal S1-S2  Abdomen: Soft, non tender, no distension  Musculoskeletal: No joint swelling  Skin: No rash . Bilateral lower extremities with lymphedema wrap.  Neurology: AAO ×3.  Cranial nerves II - XII normal.  Normal muscle strength in all four extremities.     Medical Decision Making       45 MINUTES SPENT BY ME on the date of service doing chart review, history, exam, documentation & further activities per the note.      Data     I have personally reviewed the following data over the past 24 hrs:    N/A  \   N/A   / N/A     141 103 31.1 (H) /  77   4.3 31 (H) 1.16 \    "    Imaging results reviewed over the past 24 hrs:   No results found for this or any previous visit (from the past 24 hour(s)).

## 2023-11-29 NOTE — PROGRESS NOTES
Care Management Follow Up    Length of Stay (days): 19    Expected Discharge Date: 11/29/2023     Concerns to be Addressed:       Patient plan of care discussed at interdisciplinary rounds: Yes    Anticipated Discharge Disposition: Long Term Care     Anticipated Discharge Services: None  Anticipated Discharge DME: None    Patient/family educated on Medicare website which has current facility and service quality ratings:    Education Provided on the Discharge Plan:    Patient/Family in Agreement with the Plan: yes    Referrals Placed by CM/SW:    Private pay costs discussed: private room/amenity fees    Additional Information:  Referrals pending for long term care vs Assisted living with enhanced care.  ROSINA spoke to Key at [facility] available and referral to be reviewed.  Referral also sent to [facility]. ROSINA spoke to Ochoa in  at all [facility] who will have referral reviewed. [phone]   Patient was updated today in his room and agrees with plan. He is eager to discharge and is aware of medical costs of remaining in the hospital.    ARMEN Briggs

## 2023-11-29 NOTE — PLAN OF CARE
Problem: Adult Inpatient Plan of Care  Goal: Optimal Comfort and Wellbeing  Intervention: Monitor Pain and Promote Comfort  Recent Flowsheet Documentation  Taken 11/28/2023 2018 by Liseth Sykes RN  Pain Management Interventions: medication (see MAR)  Intervention: Provide Person-Centered Care  Recent Flowsheet Documentation  Taken 11/28/2023 2015 by Liseth Sykes RN  Trust Relationship/Rapport: care explained     Problem: Infection  Goal: Absence of Infection Signs and Symptoms  Outcome: Progressing  Intervention: Prevent or Manage Infection  Recent Flowsheet Documentation  Taken 11/28/2023 2015 by Liseth Sykes RN  Isolation Precautions: contact precautions maintained   Goal Outcome Evaluation:       Pt is alert and oriented x 4, c/o pain in the right knee, on scheduled Tylenol, pt verbalized relief after an hour. Lung sounds diminished,  on RA, non tele.

## 2023-11-30 ENCOUNTER — APPOINTMENT (OUTPATIENT)
Dept: PHYSICAL THERAPY | Facility: HOSPITAL | Age: 78
DRG: 602 | End: 2023-11-30
Payer: COMMERCIAL

## 2023-11-30 ENCOUNTER — APPOINTMENT (OUTPATIENT)
Dept: OCCUPATIONAL THERAPY | Facility: HOSPITAL | Age: 78
DRG: 602 | End: 2023-11-30
Payer: COMMERCIAL

## 2023-11-30 PROCEDURE — 250N000013 HC RX MED GY IP 250 OP 250 PS 637: Performed by: INTERNAL MEDICINE

## 2023-11-30 PROCEDURE — 97530 THERAPEUTIC ACTIVITIES: CPT | Mod: GP | Performed by: PHYSICAL THERAPIST

## 2023-11-30 PROCEDURE — 120N000004 HC R&B MS OVERFLOW

## 2023-11-30 PROCEDURE — 99232 SBSQ HOSP IP/OBS MODERATE 35: CPT | Performed by: INTERNAL MEDICINE

## 2023-11-30 PROCEDURE — 97535 SELF CARE MNGMENT TRAINING: CPT | Mod: GO

## 2023-11-30 PROCEDURE — 250N000013 HC RX MED GY IP 250 OP 250 PS 637: Performed by: STUDENT IN AN ORGANIZED HEALTH CARE EDUCATION/TRAINING PROGRAM

## 2023-11-30 RX ADMIN — Medication 500 MCG: at 08:50

## 2023-11-30 RX ADMIN — SENNOSIDES AND DOCUSATE SODIUM 1 TABLET: 8.6; 5 TABLET ORAL at 08:49

## 2023-11-30 RX ADMIN — SENNOSIDES AND DOCUSATE SODIUM 1 TABLET: 8.6; 5 TABLET ORAL at 21:04

## 2023-11-30 RX ADMIN — GABAPENTIN 200 MG: 100 CAPSULE ORAL at 21:04

## 2023-11-30 RX ADMIN — MICONAZOLE NITRATE: 20 POWDER TOPICAL at 21:05

## 2023-11-30 RX ADMIN — PRAMIPEXOLE DIHYDROCHLORIDE 0.25 MG: 0.25 TABLET ORAL at 21:05

## 2023-11-30 RX ADMIN — MICONAZOLE NITRATE: 20 POWDER TOPICAL at 08:51

## 2023-11-30 RX ADMIN — ACETAMINOPHEN 1000 MG: 500 TABLET ORAL at 21:04

## 2023-11-30 RX ADMIN — APIXABAN 5 MG: 5 TABLET, FILM COATED ORAL at 21:04

## 2023-11-30 RX ADMIN — EMPAGLIFLOZIN 10 MG: 10 TABLET, FILM COATED ORAL at 08:50

## 2023-11-30 RX ADMIN — ATORVASTATIN CALCIUM 40 MG: 40 TABLET, FILM COATED ORAL at 21:04

## 2023-11-30 RX ADMIN — LIDOCAINE 1 PATCH: 4 PATCH TOPICAL at 08:50

## 2023-11-30 RX ADMIN — BUPRENORPHINE 1 PATCH: 10 PATCH, EXTENDED RELEASE TRANSDERMAL at 16:52

## 2023-11-30 RX ADMIN — ACETAMINOPHEN 1000 MG: 500 TABLET ORAL at 08:49

## 2023-11-30 RX ADMIN — APIXABAN 5 MG: 5 TABLET, FILM COATED ORAL at 08:49

## 2023-11-30 RX ADMIN — BUMETANIDE 1 MG: 1 TABLET ORAL at 08:49

## 2023-11-30 RX ADMIN — AMIODARONE HYDROCHLORIDE 100 MG: 100 TABLET ORAL at 15:44

## 2023-11-30 RX ADMIN — MICONAZOLE NITRATE: 20 POWDER TOPICAL at 13:28

## 2023-11-30 ASSESSMENT — ACTIVITIES OF DAILY LIVING (ADL)
ADLS_ACUITY_SCORE: 42
ADLS_ACUITY_SCORE: 42
ADLS_ACUITY_SCORE: 45
ADLS_ACUITY_SCORE: 42
ADLS_ACUITY_SCORE: 45
ADLS_ACUITY_SCORE: 42
ADLS_ACUITY_SCORE: 42
ADLS_ACUITY_SCORE: 45
ADLS_ACUITY_SCORE: 42
ADLS_ACUITY_SCORE: 45

## 2023-11-30 NOTE — PLAN OF CARE
Problem: Adult Inpatient Plan of Care  Goal: Plan of Care Review  Description: The Plan of Care Review/Shift note should be completed every shift.  The Outcome Evaluation is a brief statement about your assessment that the patient is improving, declining, or no change.  This information will be displayed automatically on your shift  note.  Outcome: Progressing  Flowsheets (Taken 11/30/2023 1718)  Plan of Care Reviewed With: patient     Problem: Adult Inpatient Plan of Care  Goal: Absence of Hospital-Acquired Illness or Injury  Intervention: Prevent Skin Injury  Recent Flowsheet Documentation  Taken 11/30/2023 1600 by Gisele Chavis RN  Body Position: weight shifting  Intervention: Prevent Infection  Recent Flowsheet Documentation  Taken 11/30/2023 1600 by Gisele Chavis RN  Infection Prevention: hand hygiene promoted     Problem: Risk for Delirium  Goal: Improved Behavioral Control  Intervention: Prevent and Manage Agitation  Recent Flowsheet Documentation  Taken 11/30/2023 1600 by Gisele Chavis RN  Environment Familiarity/Consistency: daily routine followed  Intervention: Minimize Safety Risk  Recent Flowsheet Documentation  Taken 11/30/2023 1600 by Gisele Chavis RN  Communication Enhancement Strategies: call light answered in person     Problem: Infection  Goal: Absence of Infection Signs and Symptoms  Intervention: Prevent or Manage Infection  Recent Flowsheet Documentation  Taken 11/30/2023 1600 by Gisele Chavis RN  Isolation Precautions: contact precautions maintained     Problem: Skin Injury Risk Increased  Goal: Skin Health and Integrity  Intervention: Optimize Skin Protection  Recent Flowsheet Documentation  Taken 11/30/2023 1600 by Gisele Chavis RN  Activity Management: up in chair  Head of Bed (HOB) Positioning: HOB at 20-30 degrees     Problem: Wound  Goal: Optimal Functional Ability  Intervention: Optimize Functional Ability  Recent Flowsheet Documentation  Taken 11/30/2023 1600 by Palmira  Gisele PAYNE RN  Activity Management: up in chair  Goal: Absence of Infection Signs and Symptoms  Intervention: Prevent or Manage Infection  Recent Flowsheet Documentation  Taken 11/30/2023 1600 by Gisele Chavis RN  Isolation Precautions: contact precautions maintained  Goal: Skin Health and Integrity  Intervention: Optimize Skin Protection  Recent Flowsheet Documentation  Taken 11/30/2023 1600 by Gisele Chavis, RN  Activity Management: up in chair  Head of Bed (HOB) Positioning: HOB at 20-30 degrees   Goal Outcome Evaluation:      Plan of Care Reviewed With: patient  Pt alert and oriented x 4, up on chair refusing activity/ turn and remained on chair. Metoprolol held for soft BP. Lower extremity wrapped with lymph wrap. Pt waiting on for placement to LTC. Will continue to monitor.

## 2023-11-30 NOTE — PLAN OF CARE
"  Problem: Infection  Goal: Absence of Infection Signs and Symptoms  Outcome: Progressing     Problem: Wound  Goal: Optimal Coping  Outcome: Progressing  Goal: Optimal Functional Ability  Outcome: Progressing  Goal: Absence of Infection Signs and Symptoms  Outcome: Progressing  Goal: Improved Oral Intake  Outcome: Progressing  Goal: Skin Health and Integrity  Outcome: Progressing  Goal: Optimal Wound Healing  Outcome: Progressing   Goal Outcome Evaluation:             Pt was sitting in chair this shift.  Pt was offered several times to transfer back to bed.  Pt states he needs to do it with therapy and the right \"strong\" people.  Writer offered to help and pt states \"Maybe later\".      Writer preformed dressing changes on BLE and lymphedema wraps were changed by OT.     Pt denies pain today.                     "

## 2023-11-30 NOTE — PROGRESS NOTES
[facility]    Medicine Progress Note - Hospitalist Service    Date of Admission:  11/10/2023    Assessment & Plan     Gorge Berkowitz Jr. is a 78-year-old man with history of HFrEF, Afib, CAD, obesity, chronic edema, wheel chair bound for months in care center admitted on 11/10/2023 with bilateral venous stasis ulcers with cellulitis and acute on chronic HFrEF.    Infected bilateral lower extremity stasis ulcer left lateral foot  Cellulitis  Wound culture grew MSSA, Klebsiella oxytoca, Serratia, Corynebacterium striatum, and Enterococcus faecalis.   Received IV Zosyn and vancomycin briefly and was subsequently narrowed to Zosyn only per ID recommendation.  IV Zosyn was switched to levofloxacin on 11/15/2023, now completed.  -WOC  -still needs lymphedema wraps     Acute on chronic HFrEF  Echocardiogram revealed mildly reduced LV systolic function with LVEF of 45 to 50%, mild septal hypokinesis, mild to moderate inferior wall hypokinesis and mildly dilated right ventricle.  Received IV diuretics briefly, subsequently transitioned to oral bumetanide. Oral bumetanide 2 mg twice daily-started 11/30/2023; held on 11/16/2023 due to YOUNG. Bumex resumed 11/25.  - Continue bumex at 1 mg daily. Monitor creatinine and electrolytes.      YOUNG- resolved  Likely prerenal from diuretic therapy. Hold PTA losartan. Bumex as above.     CAD, Chronic RCA occlusion:  -Continue atorvastatin and metoprolol succinate per cardiology recommendation     Acute on chronic pain syndrome with opiate dependence  -butrans patch, scheduled tylenol, lidocaine patch(more for shoulder djd), robaxin     ROSALVA: CPAP at night     Afib/flutter: Continue amiodarone, lopressor, eliquis     Disposition: Still trying to find LTC for him               Diet: Combination Diet Low Saturated Fat Na <2400mg Diet, No Caffeine Diet  Diet  Room Service    DVT Prophylaxis: DOAC  Levy Catheter: Not present  Lines: None     Cardiac Monitoring:  "None  Code Status: Full Code      Clinically Significant Risk Factors              # Hypoalbuminemia: Lowest albumin = 3.1 g/dL at 11/19/2023  4:54 AM, will monitor as appropriate        # Heart failure, NOS: heart failure noted on the problem list and last echo with EF 40-50%       # Obesity: Estimated body mass index is 34.93 kg/m  as calculated from the following:    Height as of this encounter: 1.753 m (5' 9\").    Weight as of this encounter: 107.3 kg (236 lb 8.9 oz).        # Financial/Environmental Concerns: none         Disposition Plan     Expected Discharge Date: 12/01/2023    Discharge Delays: Placement - LTC    Discharge Comments: LTC referrals pending            Rudy Cotter MD  Hospitalist Service  [facility]  Securely message with [employer] (more info)  Text page via [employer] Paging/Directory   ______________________________________________________________________    Interval History   Patient slept in the chair this morning. He states that he feels dizzy when getting up.     Physical Exam   Vital Signs: Temp: 97.9  F (36.6  C) Temp src: Oral BP: 95/58 Pulse: 66   Resp: 18 SpO2: 98 % O2 Device: None (Room air)    Weight: 236 lbs 8.86 oz    General appearance: not in acute distress  HEENT: PERRL, EOMI  Lungs: Clear breath sounds in bilateral lung fields  Cardiovascular: Regular rate and rhythm, normal S1-S2  Abdomen: Soft, non tender, no distension  Musculoskeletal: No joint swelling  Skin: No rash . Bilateral lower extremities with lymphedema wrap.  Neurology: AAO ×3.  Cranial nerves II - XII normal.  Normal muscle strength in all four extremities.     Medical Decision Making       35 MINUTES SPENT BY ME on the date of service doing chart review, history, exam, documentation & further activities per the note.      Data         Imaging results reviewed over the past 24 hrs:   No results found for this or any previous visit (from the past 24 hour(s)).  "

## 2023-11-30 NOTE — PLAN OF CARE
Problem: Adult Inpatient Plan of Care  Goal: Absence of Hospital-Acquired Illness or Injury  Intervention: Identify and Manage Fall Risk  Recent Flowsheet Documentation  Taken 11/30/2023 0100 by Denny Dasilva RN  Safety Promotion/Fall Prevention:   activity supervised   clutter free environment maintained   lighting adjusted   mobility aid in reach   nonskid shoes/slippers when out of bed   patient and family education   room door open   room organization consistent   safety round/check completed   supervised activity   toileting scheduled     Problem: Risk for Delirium  Goal: Improved Sleep  Outcome: Progressing   Goal Outcome Evaluation:    A & O x 4, on room air. SBP in lower 90s, recheck was in upper 90s. Pt reported some dizziness, fluids encouraged as pt is still adamant about keeping PIV out in the case that he may need some IV fluids. Pt said he felt too tired to get up from chair to bed and wanted to sleep in chair. Denied pain. Uses call light appropriately. No acute events overnight.

## 2023-11-30 NOTE — PROGRESS NOTES
Care Management Follow Up    Length of Stay (days): 20    Expected Discharge Date: 12/01/2023     Concerns to be Addressed:       Patient plan of care discussed at interdisciplinary rounds: Yes    Anticipated Discharge Disposition: Long Term Care     Anticipated Discharge Services: None  Anticipated Discharge DME: None    Patient/family educated on Medicare website which has current facility and service quality ratings:    Education Provided on the Discharge Plan:    Patient/Family in Agreement with the Plan: yes    Referrals Placed by CM/SW:    Private pay costs discussed: Not applicable    Additional Information:  Referrals pending for long term placement.  CRE barrier with prior facilities.     [facility] in [place]: [phone], Fax: [phone]  Atul Oscar LT  [facility]  [facility]    ARMEN Briggs

## 2023-11-30 NOTE — PLAN OF CARE
Problem: Wound  Goal: Optimal Coping  Outcome: Progressing  Goal: Optimal Functional Ability  Outcome: Progressing  Goal: Absence of Infection Signs and Symptoms  Outcome: Progressing  Goal: Improved Oral Intake  Outcome: Progressing  Goal: Skin Health and Integrity  Outcome: Progressing  Goal: Optimal Wound Healing  Outcome: Progressing   Goal Outcome Evaluation:       Pt is alert and oriented x 4, denies pain or SOB. Lung sounds clear, on RA. HR in the 80's, non tele. BP 86/54 in the right arm sitting, 104/64 in the left arm sitting. Pt denies dizziness or lightheadedness.Lymphedema wraps in BLE in place. He is assist of 2 with transfer. Pt still in the recliner since the start of the shift. HS cares done.

## 2023-12-01 PROBLEM — I25.10 CORONARY ARTERY DISEASE INVOLVING NATIVE CORONARY ARTERY OF NATIVE HEART WITHOUT ANGINA PECTORIS: Status: ACTIVE | Noted: 2018-08-15

## 2023-12-01 PROBLEM — N17.9 AKI (ACUTE KIDNEY INJURY) (H): Status: ACTIVE | Noted: 2023-12-01

## 2023-12-01 PROCEDURE — 250N000013 HC RX MED GY IP 250 OP 250 PS 637: Performed by: STUDENT IN AN ORGANIZED HEALTH CARE EDUCATION/TRAINING PROGRAM

## 2023-12-01 PROCEDURE — 120N000004 HC R&B MS OVERFLOW

## 2023-12-01 PROCEDURE — 250N000013 HC RX MED GY IP 250 OP 250 PS 637: Performed by: INTERNAL MEDICINE

## 2023-12-01 PROCEDURE — 99232 SBSQ HOSP IP/OBS MODERATE 35: CPT | Performed by: INTERNAL MEDICINE

## 2023-12-01 RX ORDER — GABAPENTIN 100 MG/1
200 CAPSULE ORAL AT BEDTIME
DISCHARGE
Start: 2023-12-01

## 2023-12-01 RX ORDER — FERROUS SULFATE 325(65) MG
325 TABLET ORAL
DISCHARGE
Start: 2023-12-01

## 2023-12-01 RX ORDER — BUMETANIDE 1 MG/1
1 TABLET ORAL DAILY
DISCHARGE
Start: 2023-12-01

## 2023-12-01 RX ORDER — METOPROLOL SUCCINATE 25 MG/1
25 TABLET, EXTENDED RELEASE ORAL DAILY
DISCHARGE
Start: 2023-12-01

## 2023-12-01 RX ADMIN — METOPROLOL SUCCINATE 25 MG: 25 TABLET, EXTENDED RELEASE ORAL at 15:25

## 2023-12-01 RX ADMIN — GABAPENTIN 200 MG: 100 CAPSULE ORAL at 21:01

## 2023-12-01 RX ADMIN — APIXABAN 5 MG: 5 TABLET, FILM COATED ORAL at 21:01

## 2023-12-01 RX ADMIN — MICONAZOLE NITRATE: 20 POWDER TOPICAL at 08:58

## 2023-12-01 RX ADMIN — ATORVASTATIN CALCIUM 40 MG: 40 TABLET, FILM COATED ORAL at 21:02

## 2023-12-01 RX ADMIN — ACETAMINOPHEN 1000 MG: 500 TABLET ORAL at 08:59

## 2023-12-01 RX ADMIN — SENNOSIDES AND DOCUSATE SODIUM 1 TABLET: 8.6; 5 TABLET ORAL at 21:01

## 2023-12-01 RX ADMIN — PRAMIPEXOLE DIHYDROCHLORIDE 0.25 MG: 0.25 TABLET ORAL at 21:01

## 2023-12-01 RX ADMIN — AMIODARONE HYDROCHLORIDE 100 MG: 100 TABLET ORAL at 15:25

## 2023-12-01 RX ADMIN — BUMETANIDE 1 MG: 1 TABLET ORAL at 08:59

## 2023-12-01 RX ADMIN — SENNOSIDES AND DOCUSATE SODIUM 1 TABLET: 8.6; 5 TABLET ORAL at 08:59

## 2023-12-01 RX ADMIN — ACETAMINOPHEN 1000 MG: 500 TABLET ORAL at 15:25

## 2023-12-01 RX ADMIN — ACETAMINOPHEN 1000 MG: 500 TABLET ORAL at 21:02

## 2023-12-01 RX ADMIN — EMPAGLIFLOZIN 10 MG: 10 TABLET, FILM COATED ORAL at 08:59

## 2023-12-01 RX ADMIN — LIDOCAINE 1 PATCH: 4 PATCH TOPICAL at 08:59

## 2023-12-01 RX ADMIN — Medication 500 MCG: at 08:59

## 2023-12-01 RX ADMIN — APIXABAN 5 MG: 5 TABLET, FILM COATED ORAL at 08:59

## 2023-12-01 RX ADMIN — MICONAZOLE NITRATE: 20 POWDER TOPICAL at 15:25

## 2023-12-01 ASSESSMENT — ACTIVITIES OF DAILY LIVING (ADL)
ADLS_ACUITY_SCORE: 49
ADLS_ACUITY_SCORE: 49
ADLS_ACUITY_SCORE: 45
ADLS_ACUITY_SCORE: 49
ADLS_ACUITY_SCORE: 47
ADLS_ACUITY_SCORE: 49
ADLS_ACUITY_SCORE: 45
ADLS_ACUITY_SCORE: 45
ADLS_ACUITY_SCORE: 49
ADLS_ACUITY_SCORE: 47
ADLS_ACUITY_SCORE: 45
ADLS_ACUITY_SCORE: 45

## 2023-12-01 NOTE — SIGNIFICANT EVENT
Significant Event Note    Time of event: 5:01 PM December 1, 2023    Description of event:  The plan was to discharge patient to [facility] in [facility] at 5 PM. However, patient refuses to be discharged. He reports that he checked on the Yelp review and the facility is rated only one star. He read 5 pages of complaints about the facility. He also states that he consulted a close friend, who is in charge of inspecting care facilities, and she told him not to go there due to poor quality of care provided. Patient states that he will stay in the hospital until a facility with good rating is found. He is willing to pay for the daily cost in order to stay in the hospital.  Patient also called [facility] and told the facility that he does not want to go there. Per nursing staff, the facility now has concerns to accept him.      Plan:  Updated  and discharge was cancelled.  Patient was given the paper work to start the appealing process.       Discussed with: bedside nurse,       Rudy Cotter MD

## 2023-12-01 NOTE — PROGRESS NOTES
Call from MD, pt is refusing to leave and has called LTC and told them he is not going and would not get out of WC when he gets there. MD will try to talk to patient again. Registration informed to give the HINN.

## 2023-12-01 NOTE — PLAN OF CARE
Assumed care 2300 to 0730. A&O x 4. Assist x 2 with a gait belt & walker. Denies pain. Up in chair, back to bed. Patient slept intermittently. Urinal at bedside. Call light within reach, able to make needs known. Bed alarm on for safety.    Problem: Fall Injury Risk  Goal: Absence of Fall and Fall-Related Injury  Intervention: Identify and Manage Contributors  Recent Flowsheet Documentation  Taken 11/30/2023 2335 by AMADA NICHOLE  Medication Review/Management: medications reviewed     Problem: Wound  Goal: Skin Health and Integrity  Intervention: Optimize Skin Protection  Recent Flowsheet Documentation  Taken 11/30/2023 2335 by AMADA NICHOLE  Pressure Reduction Techniques: frequent weight shift encouraged  Skin Protection: incontinence pads utilized  Activity Management: back to bed  Head of Bed (HOB) Positioning: HOB at 20-30 degrees

## 2023-12-01 NOTE — PLAN OF CARE
"  Problem: Adult Inpatient Plan of Care  Goal: Readiness for Transition of Care  Outcome: Progressing   Patient has been accepted at a LTC facility, however, he is unsure about the facility and does not want to discharge today. Patient is talking with care management about his options. He denies pain, unless he is moving. Vitals are stable.     Problem: Wound  Goal: Optimal Functional Ability  Outcome: Progressing  Intervention: Optimize Functional Ability  Recent Flowsheet Documentation  Taken 12/1/2023 1159 by Kathleen Julian, RN  Activity Management: activity adjusted per tolerance  Activity Assistance Provided: assistance, 2 people  Taken 12/1/2023 0900 by Kathleen Julian, RN  Assistive Device Utilized:   walker   gait belt  Activity Management:   activity adjusted per tolerance   activity encouraged  Activity Assistance Provided: assistance, 2 people   Patient is refusing to get out of bed today. Patient complained of constipation, stating that it is \"stuck in his butt\". Offered patient the commode, he declined stating that he can't get up. Offered Miralx or suppository and he declined this as well, requesting the stool to be removed digitally.                          "

## 2023-12-01 NOTE — PROGRESS NOTES
[facility]    Medicine Progress Note - Hospitalist Service    Date of Admission:  11/10/2023    Assessment & Plan     Gorge Berkowitz Jr. is a 78-year-old man with history of HFrEF, Afib, CAD, obesity, chronic edema, wheel chair bound for months in care center admitted on 11/10/2023 with bilateral venous stasis ulcers with cellulitis and acute on chronic HFrEF.    Infected bilateral lower extremity stasis ulcer left lateral foot  Cellulitis  Wound culture grew MSSA, Klebsiella oxytoca, Serratia, Corynebacterium striatum, and Enterococcus faecalis.   Received IV Zosyn and vancomycin briefly and was subsequently narrowed to Zosyn only per ID recommendation.  IV Zosyn was switched to levofloxacin on 11/15/2023, now completed.  -WOC  -Continue lymphedema wraps     Acute on chronic HFrEF  Echocardiogram revealed mildly reduced LV systolic function with LVEF of 45 to 50%, mild septal hypokinesis, mild to moderate inferior wall hypokinesis and mildly dilated right ventricle.  Received IV diuretics briefly, subsequently transitioned to oral bumetanide. Oral bumetanide 2 mg twice daily-started 11/30/2023; held on 11/16/2023 due to YOUNG. Bumex resumed 11/25.  - Continue bumex at 1 mg daily. Monitor creatinine and electrolytes.      YOUNG- resolved  Likely prerenal from diuretic therapy. Hold PTA losartan. Bumex as above.     CAD, Chronic RCA occlusion:  -Continue atorvastatin and metoprolol succinate per cardiology recommendation     Acute on chronic pain syndrome with opiate dependence  -butrans patch, scheduled tylenol, lidocaine patch(more for shoulder djd), robaxin     ROSALVA: CPAP at night     Afib/flutter: Continue amiodarone, lopressor, eliquis     Disposition: Needs placement to LTC facility. [facility] has accepted him on 12/1. However, patient refuses to go due to concerns for poor quality of care. Appealing process started.                Diet: Combination Diet Low  "Saturated Fat Na <2400mg Diet, No Caffeine Diet  Diet  Room Service    DVT Prophylaxis: DOAC  Levy Catheter: Not present  Lines: None     Cardiac Monitoring: None  Code Status: Full Code      Clinically Significant Risk Factors              # Hypoalbuminemia: Lowest albumin = 3.1 g/dL at 11/19/2023  4:54 AM, will monitor as appropriate        # Heart failure, NOS: heart failure noted on the problem list and last echo with EF 40-50%       # Obesity: Estimated body mass index is 36.43 kg/m  as calculated from the following:    Height as of this encounter: 1.753 m (5' 9\").    Weight as of this encounter: 111.9 kg (246 lb 11.1 oz).        # Financial/Environmental Concerns: none         Disposition Plan      Expected Discharge Date: 12/01/2023,  5:05 PM  Discharge Delays: Placement - LTC    Discharge Comments: LTC.            Rudy Cotter MD  Hospitalist Service  [facility]  Securely message with [employer] (more info)  Text page via [employer] Paging/Directory   ______________________________________________________________________    Interval History   Patient reports having constipation. No abdominal pain. He complained of the TCU he went to when he was discharged from the hospital last time. He attributed his leg infection to poor quality of care received in the facility. [facility] has accepted patient today. However, patient refuses to be discharged due to poor rating of the facility on Yelp and information from a close friend. Despite having extensive discussions with nursing staff,  and provider, he continues to refuse to be discharged. He states that even if he gets transported to the facility, he would refuse to get out of the transportation. He even called the facility himself and told the facility that he really does not want to go there.     Physical Exam   Vital Signs: Temp: 97.4  F (36.3  C) Temp src: Oral BP: 133/79 Pulse: 88   Resp: 20 SpO2: 96 % O2 " Device: None (Room air)    Weight: 246 lbs 11.12 oz    General appearance: not in acute distress  HEENT: PERRL, EOMI  Lungs: Clear breath sounds in bilateral lung fields  Cardiovascular: Regular rate and rhythm, normal S1-S2  Abdomen: Soft, non tender, no distension  Musculoskeletal: No joint swelling  Skin: No rash . Bilateral lower extremities with lymphedema wrap.  Neurology: AAO ×3.  Cranial nerves II - XII normal.  Normal muscle strength in all four extremities.     Medical Decision Making       47 MINUTES SPENT BY ME on the date of service doing chart review, history, exam, documentation & further activities per the note.      Data         Imaging results reviewed over the past 24 hrs:   No results found for this or any previous visit (from the past 24 hour(s)).

## 2023-12-01 NOTE — PROGRESS NOTES
Care Management Follow Up    Length of Stay (days): 21    Expected Discharge Date: 12/01/2023     Concerns to be Addressed:       Patient plan of care discussed at interdisciplinary rounds: Yes    Anticipated Discharge Disposition: Long Term Care     Anticipated Discharge Services: long term care  Anticipated Discharge DME: None    Patient/family educated on Medicare website which has current facility and service quality ratings:    Education Provided on the Discharge Plan:    Patient/Family in Agreement with the Plan: yes    Referrals Placed by CM/SW:    Private pay costs discussed: private room/amenity fees    Additional Information:  Chart reviewed.  [facility] accepted patient for long term care today.  SW met with patient and hospitalist was also present. SW provided pt with information on [facility] where he has been accepted. Pt will review and SW will check back to continue coordinating discharge.  1:06 PM  SW confirmed with facility that they are able to accept before 7pm today.   Patient was informed of this. Patient has been declined to multiple other facilities due to no long term care beds or CRE infection.   CM will set up a ride via [facility] wheelchair.   RN and MD updated.   ROS[facility] checked with [facility] per pt request. They do not have male beds available and would not be able to accept active CRE infections.     Wheelchair ride scheduled for 5:05pm. Patient updated by SW in his room.         ARMEN Briggs

## 2023-12-02 ENCOUNTER — APPOINTMENT (OUTPATIENT)
Dept: OCCUPATIONAL THERAPY | Facility: HOSPITAL | Age: 78
DRG: 602 | End: 2023-12-02
Payer: COMMERCIAL

## 2023-12-02 PROCEDURE — 99232 SBSQ HOSP IP/OBS MODERATE 35: CPT | Performed by: INTERNAL MEDICINE

## 2023-12-02 PROCEDURE — 120N000004 HC R&B MS OVERFLOW

## 2023-12-02 PROCEDURE — 250N000013 HC RX MED GY IP 250 OP 250 PS 637: Performed by: INTERNAL MEDICINE

## 2023-12-02 PROCEDURE — 250N000013 HC RX MED GY IP 250 OP 250 PS 637: Performed by: STUDENT IN AN ORGANIZED HEALTH CARE EDUCATION/TRAINING PROGRAM

## 2023-12-02 PROCEDURE — 97535 SELF CARE MNGMENT TRAINING: CPT | Mod: GO

## 2023-12-02 RX ADMIN — MICONAZOLE NITRATE: 20 POWDER TOPICAL at 08:45

## 2023-12-02 RX ADMIN — METOPROLOL SUCCINATE 25 MG: 25 TABLET, EXTENDED RELEASE ORAL at 15:08

## 2023-12-02 RX ADMIN — ATORVASTATIN CALCIUM 40 MG: 40 TABLET, FILM COATED ORAL at 21:24

## 2023-12-02 RX ADMIN — LIDOCAINE 1 PATCH: 4 PATCH TOPICAL at 08:44

## 2023-12-02 RX ADMIN — APIXABAN 5 MG: 5 TABLET, FILM COATED ORAL at 19:47

## 2023-12-02 RX ADMIN — EMPAGLIFLOZIN 10 MG: 10 TABLET, FILM COATED ORAL at 08:45

## 2023-12-02 RX ADMIN — SENNOSIDES AND DOCUSATE SODIUM 1 TABLET: 8.6; 5 TABLET ORAL at 19:48

## 2023-12-02 RX ADMIN — MICONAZOLE NITRATE: 20 POWDER TOPICAL at 15:08

## 2023-12-02 RX ADMIN — APIXABAN 5 MG: 5 TABLET, FILM COATED ORAL at 08:45

## 2023-12-02 RX ADMIN — PRAMIPEXOLE DIHYDROCHLORIDE 0.25 MG: 0.25 TABLET ORAL at 21:21

## 2023-12-02 RX ADMIN — MICONAZOLE NITRATE: 20 POWDER TOPICAL at 21:21

## 2023-12-02 RX ADMIN — BUMETANIDE 1 MG: 1 TABLET ORAL at 08:45

## 2023-12-02 RX ADMIN — ACETAMINOPHEN 1000 MG: 500 TABLET ORAL at 19:47

## 2023-12-02 RX ADMIN — Medication 500 MCG: at 08:47

## 2023-12-02 RX ADMIN — SENNOSIDES AND DOCUSATE SODIUM 1 TABLET: 8.6; 5 TABLET ORAL at 08:45

## 2023-12-02 RX ADMIN — ACETAMINOPHEN 1000 MG: 500 TABLET ORAL at 15:08

## 2023-12-02 RX ADMIN — ACETAMINOPHEN 1000 MG: 500 TABLET ORAL at 08:45

## 2023-12-02 RX ADMIN — AMIODARONE HYDROCHLORIDE 100 MG: 100 TABLET ORAL at 15:08

## 2023-12-02 RX ADMIN — GABAPENTIN 200 MG: 100 CAPSULE ORAL at 21:21

## 2023-12-02 ASSESSMENT — ACTIVITIES OF DAILY LIVING (ADL)
ADLS_ACUITY_SCORE: 49
ADLS_ACUITY_SCORE: 47
ADLS_ACUITY_SCORE: 49
ADLS_ACUITY_SCORE: 47
ADLS_ACUITY_SCORE: 49
ADLS_ACUITY_SCORE: 47
ADLS_ACUITY_SCORE: 49
ADLS_ACUITY_SCORE: 43
ADLS_ACUITY_SCORE: 47
ADLS_ACUITY_SCORE: 49
ADLS_ACUITY_SCORE: 47
ADLS_ACUITY_SCORE: 43

## 2023-12-02 NOTE — PROGRESS NOTES
[facility]    Medicine Progress Note - Hospitalist Service    Date of Admission:  11/10/2023    Assessment & Plan     Gorge Berkowitz Jr. is a 78-year-old man with history of HFrEF, Afib, CAD, obesity, chronic edema, wheel chair bound for months in care center admitted on 11/10/2023 with bilateral venous stasis ulcers with cellulitis and acute on chronic HFrEF.    Infected bilateral lower extremity stasis ulcer left lateral foot  Cellulitis  Wound culture grew MSSA, Klebsiella oxytoca, Serratia, Corynebacterium striatum, and Enterococcus faecalis.   Received IV Zosyn and vancomycin briefly and was subsequently narrowed to Zosyn only per ID recommendation.  IV Zosyn was switched to levofloxacin on 11/15/2023, now completed.  -WOC  -Continue lymphedema wraps     Acute on chronic HFrEF  Echocardiogram revealed mildly reduced LV systolic function with LVEF of 45 to 50%, mild septal hypokinesis, mild to moderate inferior wall hypokinesis and mildly dilated right ventricle.  Received IV diuretics briefly, subsequently transitioned to oral bumetanide. Oral bumetanide 2 mg twice daily-started 11/30/2023; held on 11/16/2023 due to YOUNG. Bumex resumed 11/25.  - Continue bumex at 1 mg daily. Monitor creatinine and electrolytes.      YOUNG- resolved  Likely prerenal from diuretic therapy. Hold PTA losartan. Bumex as above.     CAD, Chronic RCA occlusion:  -Continue atorvastatin and metoprolol succinate per cardiology recommendation     Acute on chronic pain syndrome with opiate dependence  -butrans patch, scheduled tylenol, lidocaine patch(more for shoulder djd), robaxin     ROSALVA: CPAP at night     Afib/flutter: Continue amiodarone, lopressor, eliquis     Disposition: Needs placement to LTC facility. [facility] However, patient refuses to go due to concerns for poor quality of care. Appealing process started. Social work gave him a list of the 21  "facilities that have denied him and the ones are pending.                Diet: Combination Diet Low Saturated Fat Na <2400mg Diet, No Caffeine Diet  Diet  Room Service    DVT Prophylaxis: DOAC  Levy Catheter: Not present  Lines: None     Cardiac Monitoring: None  Code Status: Full Code      Clinically Significant Risk Factors              # Hypoalbuminemia: Lowest albumin = 3.1 g/dL at 11/19/2023  4:54 AM, will monitor as appropriate        # Heart failure, NOS: heart failure noted on the problem list and last echo with EF 40-50%       # Obesity: Estimated body mass index is 36.17 kg/m  as calculated from the following:    Height as of this encounter: 1.753 m (5' 9\").    Weight as of this encounter: 111.1 kg (244 lb 14.9 oz).        # Financial/Environmental Concerns: none         Disposition Plan      Expected Discharge Date: 12/02/2023    Discharge Delays: Placement - LTC    Discharge Comments: Refused d/c 12/1. MINN provided.            Rudy Cotter MD  Hospitalist Service  [facility]  Securely message with [employer] (more info)  Text page via [email or web address] Paging/Directory   ______________________________________________________________________    Interval History   Patient reports feeling overwhelm about his current situation. He does not want to go to [facility]. But he does not fel comfortable going home neither. He states that he needs some time to think about it.     Physical Exam   Vital Signs: Temp: 97.8  F (36.6  C) Temp src: Oral BP: 115/69 Pulse: 66   Resp: 16 SpO2: 96 % O2 Device: None (Room air)    Weight: 244 lbs 14.9 oz    General appearance: not in acute distress  HEENT: PERRL, EOMI  Lungs: Clear breath sounds in bilateral lung fields  Cardiovascular: Regular rate and rhythm, normal S1-S2  Abdomen: Soft, non tender, no distension  Musculoskeletal: No joint swelling  Skin: No rash . Bilateral lower extremities with lymphedema wrap.  Neurology: AAO ×3.  " Cranial nerves II - XII normal.  Normal muscle strength in all four extremities.     Medical Decision Making       40 MINUTES SPENT BY ME on the date of service doing chart review, history, exam, documentation & further activities per the note.      Data         Imaging results reviewed over the past 24 hrs:   No results found for this or any previous visit (from the past 24 hour(s)).

## 2023-12-02 NOTE — PROGRESS NOTES
"Care Management Follow Up    Length of Stay (days): 22    Expected Discharge Date: 12/02/2023     Concerns to be Addressed:     Care progression  Patient plan of care discussed at interdisciplinary rounds: Yes    Anticipated Discharge Disposition: Skilled Nursing Facility, Long Term Care     Anticipated Discharge Services: Skilled Nursing Facility, Long Term Care  Anticipated Discharge DME: None    Patient/family educated on Medicare website which has current facility and service quality ratings:  Yes  Education Provided on the Discharge Plan:  Yes per team  Patient/Family in Agreement with the Plan: yes    Referrals Placed by CM/SW:  NA  Private pay costs discussed: Not applicable    Additional Information:  9:00 AM met with patient at bedside with Aurelio Lucas from Registration to present the HINN (Preadmission or Admission Hospital-Issued Notice of Non-coverage). Read through the form and cost per day stay, starting 12/2/23.    Patient declined to sign the form. He said he will call [employer].  The original HINN placed in the chart. A copy of the HINN given to patient and a copy left for management, care management leadership's office.    Social Hx: \"Accepted at [facility]. PAS done [id number].\"     RNCM to follow for medical progression, recommendations, and final discharge plan.      Kath Mckenzie RN     9:55 AM Called patient's brother, Mohit, [phone], phone busy signal    Sent message to update provider, Dr. Cotter.    Provided patient a list of 24 declined TCUs and pending TCU list    1:30 PM patient reported he file an appeal to [facility] case #[id number]. Patient asked  1) What is the continue care plan during appeal process?   Informed him he will get the same cares he's been receiving  2) What is the discharge plan if patient does not win the appeal?  Informed patient, he will need to be discharge and be responsible for the hospital charges after he has been " medically ready for discharge and he refused to be discharged.    Patient said if no other TCU accept him on Monday, he is potentially willing to try [facility] if they will reconsider.    RNCM called [facility] and left a message for admission (they do not admit on the weekend).    Discussed other options and patient agreed if no other accepting TCU, he would like home care services.    RNCM also provided contact info for in-home services and a list of Private pay Home care agencies.    Sent message to update provider, Dr. Cotter    1:50 PM called to update and clarified with care management leadership  Verified the IMM was completed 12/1

## 2023-12-02 NOTE — PLAN OF CARE
Problem: Infection  Goal: Absence of Infection Signs and Symptoms  Outcome: Progressing  Intervention: Prevent or Manage Infection  Recent Flowsheet Documentation  Taken 12/2/2023 0020 by Nicolas Yang RN  Isolation Precautions: contact precautions maintained     Problem: Fall Injury Risk  Goal: Absence of Fall and Fall-Related Injury  Intervention: Identify and Manage Contributors  Recent Flowsheet Documentation  Taken 12/2/2023 0020 by Nicolas Yang RN  Medication Review/Management: medications reviewed  Intervention: Promote Injury-Free Environment  Recent Flowsheet Documentation  Taken 12/2/2023 0020 by Nicolas Yang RN  Safety Promotion/Fall Prevention: safety round/check completed         Goal Outcome Evaluation:  Patient has been calm and aox4 this shift. Denied any pain or discomfort. VSS. Patient's med-surg. Hr 50s. Patient is assist x2 for activity. Refused repositioning tonight. Using urinal in bed. Call-light within reach. Bed alarm on for safety.

## 2023-12-03 ENCOUNTER — APPOINTMENT (OUTPATIENT)
Dept: PHYSICAL THERAPY | Facility: HOSPITAL | Age: 78
DRG: 602 | End: 2023-12-03
Payer: COMMERCIAL

## 2023-12-03 PROCEDURE — 120N000004 HC R&B MS OVERFLOW

## 2023-12-03 PROCEDURE — 250N000013 HC RX MED GY IP 250 OP 250 PS 637: Performed by: INTERNAL MEDICINE

## 2023-12-03 PROCEDURE — 97530 THERAPEUTIC ACTIVITIES: CPT | Mod: GP

## 2023-12-03 PROCEDURE — 99232 SBSQ HOSP IP/OBS MODERATE 35: CPT | Performed by: INTERNAL MEDICINE

## 2023-12-03 PROCEDURE — 250N000013 HC RX MED GY IP 250 OP 250 PS 637: Performed by: STUDENT IN AN ORGANIZED HEALTH CARE EDUCATION/TRAINING PROGRAM

## 2023-12-03 PROCEDURE — 97110 THERAPEUTIC EXERCISES: CPT | Mod: GP

## 2023-12-03 RX ADMIN — MICONAZOLE NITRATE: 20 POWDER TOPICAL at 08:31

## 2023-12-03 RX ADMIN — BUMETANIDE 1 MG: 1 TABLET ORAL at 08:30

## 2023-12-03 RX ADMIN — APIXABAN 5 MG: 5 TABLET, FILM COATED ORAL at 08:30

## 2023-12-03 RX ADMIN — APIXABAN 5 MG: 5 TABLET, FILM COATED ORAL at 19:29

## 2023-12-03 RX ADMIN — ATORVASTATIN CALCIUM 40 MG: 40 TABLET, FILM COATED ORAL at 21:00

## 2023-12-03 RX ADMIN — GABAPENTIN 200 MG: 100 CAPSULE ORAL at 21:00

## 2023-12-03 RX ADMIN — METOPROLOL SUCCINATE 25 MG: 25 TABLET, EXTENDED RELEASE ORAL at 15:43

## 2023-12-03 RX ADMIN — MICONAZOLE NITRATE: 20 POWDER TOPICAL at 20:57

## 2023-12-03 RX ADMIN — ACETAMINOPHEN 1000 MG: 500 TABLET ORAL at 15:43

## 2023-12-03 RX ADMIN — ACETAMINOPHEN 1000 MG: 500 TABLET ORAL at 19:29

## 2023-12-03 RX ADMIN — ACETAMINOPHEN 1000 MG: 500 TABLET ORAL at 08:30

## 2023-12-03 RX ADMIN — AMIODARONE HYDROCHLORIDE 100 MG: 100 TABLET ORAL at 15:43

## 2023-12-03 RX ADMIN — MICONAZOLE NITRATE: 20 POWDER TOPICAL at 15:43

## 2023-12-03 RX ADMIN — SENNOSIDES AND DOCUSATE SODIUM 1 TABLET: 8.6; 5 TABLET ORAL at 08:30

## 2023-12-03 RX ADMIN — LIDOCAINE 1 PATCH: 4 PATCH TOPICAL at 08:30

## 2023-12-03 RX ADMIN — Medication 500 MCG: at 08:30

## 2023-12-03 RX ADMIN — PRAMIPEXOLE DIHYDROCHLORIDE 0.25 MG: 0.25 TABLET ORAL at 21:00

## 2023-12-03 RX ADMIN — EMPAGLIFLOZIN 10 MG: 10 TABLET, FILM COATED ORAL at 08:30

## 2023-12-03 RX ADMIN — SENNOSIDES AND DOCUSATE SODIUM 1 TABLET: 8.6; 5 TABLET ORAL at 19:29

## 2023-12-03 ASSESSMENT — ACTIVITIES OF DAILY LIVING (ADL)
ADLS_ACUITY_SCORE: 43
ADLS_ACUITY_SCORE: 43
ADLS_ACUITY_SCORE: 44
ADLS_ACUITY_SCORE: 43
ADLS_ACUITY_SCORE: 44
ADLS_ACUITY_SCORE: 43

## 2023-12-03 NOTE — PLAN OF CARE
Problem: Adult Inpatient Plan of Care  Goal: Plan of Care Review  Description: The Plan of Care Review/Shift note should be completed every shift.  The Outcome Evaluation is a brief statement about your assessment that the patient is improving, declining, or no change.  This information will be displayed automatically on your shift  note.  Outcome: Adequate for Care Transition   Goal Outcome Evaluation: No new concerns or issues. Patient denies pain. Vitals are stable. Patient has appealed discharge to SNF.

## 2023-12-03 NOTE — PROGRESS NOTES
"Care Management Follow Up    Length of Stay (days): 23    Expected Discharge Date: 12/01/2023     Concerns to be Addressed:     Care progression  Patient plan of care discussed at interdisciplinary rounds: Yes    Anticipated Discharge Disposition: Skilled Nursing Facility, Long Term Care     Anticipated Discharge Services: Skilled Nursing Facility, Long Term Care  Anticipated Discharge DME: None    Patient/family educated on Medicare website which has current facility and service quality ratings:  Yes  Education Provided on the Discharge Plan:  Yes per team  Patient/Family in Agreement with the Plan: yes    Referrals Placed by CM/SW:  NA  Private pay costs discussed: Not applicable    Additional Information:  Per provider, Dr. Cotter, what is the discharge plan for patient?    Informed provider, patient has appealed the discharge and waiting for final result.  RNCM left a message for admission w/[facility] to see if they can reconsider and accept patient.  Patient has been given in home support agencies w/contact, if he chooses to go home after the appeal result.  Patient have an option to go home with home care services    Social Hx: \"[facility]ed [facility]. PAS done [id number].\"     RNCM to follow for medical progression, recommendations, and final discharge plan.      Kath Mckenzie RN     "

## 2023-12-03 NOTE — PROGRESS NOTES
Pt. Used urinal in room, in bed.  Pt. Able to  make needs known and has no requests.   Off tele   ( Pt. Is ready to  discharge, but refused to go today.)

## 2023-12-03 NOTE — PLAN OF CARE
Problem: Wound  Goal: Optimal Functional Ability  Outcome: Progressing  Intervention: Optimize Functional Ability  Recent Flowsheet Documentation  Taken 12/2/2023 1600 by Kathleen Julian RN  Assistive Device Utilized:   walker   gait belt  Activity Management: activity adjusted per tolerance  Activity Assistance Provided: assistance, 2 people  Taken 12/2/2023 0800 by Kathleen Julian RN  Assistive Device Utilized:   walker   gait belt  Activity Management:   activity adjusted per tolerance   patient refuses activity  Activity Assistance Provided: assistance, 2 people   Patient agreed to using the commode today and sitting in the chair. He tolerated well.    Problem: Wound  Goal: Absence of Infection Signs and Symptoms  Outcome: Progressing  Intervention: Prevent or Manage Infection  Recent Flowsheet Documentation  Taken 12/2/2023 1600 by Kathleen Julian RN  Isolation Precautions: contact precautions maintained  Taken 12/2/2023 0800 by Kathleen Julian RN  Isolation Precautions: contact precautions maintained   BLE wounds cleaned and new dressing applied. No signs of infection.

## 2023-12-03 NOTE — PROGRESS NOTES
[facility]    Medicine Progress Note - Hospitalist Service    Date of Admission:  11/10/2023    Assessment & Plan     Gorge Berkowitz Jr. is a 78-year-old man with history of HFrEF, Afib, CAD, obesity, chronic edema, wheel chair bound for months in care center admitted on 11/10/2023 with bilateral venous stasis ulcers with cellulitis and acute on chronic HFrEF.    Infected bilateral lower extremity stasis ulcer left lateral foot  Cellulitis  Wound culture grew MSSA, Klebsiella oxytoca, Serratia, Corynebacterium striatum, and Enterococcus faecalis.   Received IV Zosyn and vancomycin briefly and was subsequently narrowed to Zosyn only per ID recommendation.  IV Zosyn was switched to levofloxacin on 11/15/2023, now completed.  -WOC  -Continue lymphedema wraps     Acute on chronic HFrEF  Echocardiogram revealed mildly reduced LV systolic function with LVEF of 45 to 50%, mild septal hypokinesis, mild to moderate inferior wall hypokinesis and mildly dilated right ventricle.  Received IV diuretics briefly, subsequently transitioned to oral bumetanide. Oral bumetanide 2 mg twice daily-started 11/30/2023; held on 11/16/2023 due to YOUNG. Bumex resumed 11/25.  - Continue bumex at 1 mg daily. Monitor creatinine and electrolytes.      YOUNG- resolved  Likely prerenal from diuretic therapy. Hold PTA losartan. Bumex as above.     CAD, Chronic RCA occlusion:  -Continue atorvastatin and metoprolol succinate per cardiology recommendation     Acute on chronic pain syndrome with opiate dependence  -butrans patch, scheduled tylenol, lidocaine patch(more for shoulder djd), robaxin     ROSALVA: CPAP at night     Afib/flutter: Continue amiodarone, lopressor, eliquis     Disposition: Needs [facility] to L[facility] facility[facility]. However, patient refuses to go due to concerns for poor quality of care. Appealing process started. Social work gave him a list of the 21  "facilities that have denied him and the ones are pending.                Diet: Combination Diet Low Saturated Fat Na <2400mg Diet, No Caffeine Diet  Diet  Room Service    DVT Prophylaxis: DOAC  Levy Catheter: Not present  Lines: None     Cardiac Monitoring: None  Code Status: Full Code      Clinically Significant Risk Factors              # Hypoalbuminemia: Lowest albumin = 3.1 g/dL at 11/19/2023  4:54 AM, will monitor as appropriate        # Heart failure, NOS: heart failure noted on the problem list and last echo with EF 40-50%       # Obesity: Estimated body mass index is 36.46 kg/m  as calculated from the following:    Height as of this encounter: 1.753 m (5' 9\").    Weight as of this encounter: 112 kg (246 lb 14.6 oz).        # Financial/Environmental Concerns: none         Disposition Plan             Rudy Cotter MD  Hospitalist Service  [facility]  Securely message with [employer] (more info)  Text page via [employer] Paging/Directory   ______________________________________________________________________    Interval History   Patient reports that now he is willing to go to [facility].      Physical Exam   Vital Signs: Temp: 98.4  F (36.9  C) Temp src: Oral BP: 113/73 Pulse: 70   Resp: 18 SpO2: 100 % O2 Device: None (Room air)    Weight: 246 lbs 14.64 oz    General appearance: not in acute distress  HEENT: PERRL, EOMI  Lungs: Clear breath sounds in bilateral lung fields  Cardiovascular: Regular rate and rhythm, normal S1-S2  Abdomen: Soft, non tender, no distension  Musculoskeletal: No joint swelling  Skin: No rash . Bilateral lower extremities with lymphedema wrap.  Neurology: AAO ×3.  Cranial nerves II - XII normal.  Normal muscle strength in all four extremities.     Medical Decision Making       30 MINUTES SPENT BY ME on the date of service doing chart review, history, exam, documentation & further activities per the note.      Data         Imaging results reviewed " over the past 24 hrs:   No results found for this or any previous visit (from the past 24 hour(s)).

## 2023-12-04 ENCOUNTER — APPOINTMENT (OUTPATIENT)
Dept: OCCUPATIONAL THERAPY | Facility: HOSPITAL | Age: 78
DRG: 602 | End: 2023-12-04
Payer: COMMERCIAL

## 2023-12-04 VITALS
HEIGHT: 69 IN | TEMPERATURE: 97.8 F | HEART RATE: 69 BPM | OXYGEN SATURATION: 97 % | DIASTOLIC BLOOD PRESSURE: 63 MMHG | RESPIRATION RATE: 18 BRPM | BODY MASS INDEX: 35.6 KG/M2 | SYSTOLIC BLOOD PRESSURE: 107 MMHG | WEIGHT: 240.4 LBS

## 2023-12-04 PROBLEM — L97.909 VENOUS STASIS ULCER (H): Status: ACTIVE | Noted: 2023-12-04

## 2023-12-04 PROBLEM — L97.201: Status: ACTIVE | Noted: 2023-12-04

## 2023-12-04 PROBLEM — I87.2: Status: ACTIVE | Noted: 2023-12-04

## 2023-12-04 PROBLEM — I83.009 VENOUS STASIS ULCER (H): Status: ACTIVE | Noted: 2023-12-04

## 2023-12-04 PROCEDURE — 97535 SELF CARE MNGMENT TRAINING: CPT | Mod: GO

## 2023-12-04 PROCEDURE — 250N000013 HC RX MED GY IP 250 OP 250 PS 637: Performed by: INTERNAL MEDICINE

## 2023-12-04 PROCEDURE — 250N000013 HC RX MED GY IP 250 OP 250 PS 637: Performed by: STUDENT IN AN ORGANIZED HEALTH CARE EDUCATION/TRAINING PROGRAM

## 2023-12-04 PROCEDURE — 99239 HOSP IP/OBS DSCHRG MGMT >30: CPT | Performed by: INTERNAL MEDICINE

## 2023-12-04 PROCEDURE — 999N000197 HC STATISTIC WOC PT EDUCATION, 0-15 MIN

## 2023-12-04 RX ADMIN — APIXABAN 5 MG: 5 TABLET, FILM COATED ORAL at 09:01

## 2023-12-04 RX ADMIN — MICONAZOLE NITRATE: 20 POWDER TOPICAL at 09:02

## 2023-12-04 RX ADMIN — SENNOSIDES AND DOCUSATE SODIUM 1 TABLET: 8.6; 5 TABLET ORAL at 09:02

## 2023-12-04 RX ADMIN — MICONAZOLE NITRATE: 20 POWDER TOPICAL at 13:41

## 2023-12-04 RX ADMIN — EMPAGLIFLOZIN 10 MG: 10 TABLET, FILM COATED ORAL at 09:01

## 2023-12-04 RX ADMIN — ACETAMINOPHEN 1000 MG: 500 TABLET ORAL at 09:01

## 2023-12-04 RX ADMIN — ACETAMINOPHEN 1000 MG: 500 TABLET ORAL at 13:41

## 2023-12-04 RX ADMIN — Medication 500 MCG: at 09:01

## 2023-12-04 RX ADMIN — BUMETANIDE 1 MG: 1 TABLET ORAL at 09:02

## 2023-12-04 RX ADMIN — LIDOCAINE 1 PATCH: 4 PATCH TOPICAL at 09:01

## 2023-12-04 ASSESSMENT — ACTIVITIES OF DAILY LIVING (ADL)
ADLS_ACUITY_SCORE: 44

## 2023-12-04 NOTE — PLAN OF CARE
[facility] Discharge Summary    Reason for therapy discharge:    Discharged to transitional care facility.    Progress towards therapy goal(s). See goals on Care Plan in [facility] electronic health record for goal details.  Goals met    Therapy recommendation(s):    No further therapy is recommended.

## 2023-12-04 NOTE — PLAN OF CARE
Goal Outcome Evaluation:       Patient is discharging to [facility] via w/c transport today between [id number]. Patient's wound cares were completed today, leg wraps on.  VSS. RA. Moving independently in bed. Up with assist and walker. Good PO intake and urine output.. Denies pain this afternoon.

## 2023-12-04 NOTE — PROGRESS NOTES
Care Management Discharge Note    Discharge Date: 12/04/2023       Discharge Disposition: Skilled Nursing Facility, Long Term Care [facility]    Discharge Services: LTC    Discharge DME: None    Discharge Transportation: [facility] ride set for [id number]    Private pay costs discussed: transportation costs    Does the patient's insurance plan have a 3 day qualifying hospital stay waiver?  No    PAS Confirmation Code:  [id number])  Patient/family educated on Medicare website which has current facility and service quality ratings:      Education Provided on the Discharge Plan:    Persons Notified of Discharge Plans: yes  Patient/Family in Agreement with the Plan: yes    Handoff Referral Completed: Yes    Additional Information:  CM discussed going to LT ([facility]) today with pt. Pt told CM that he was unsure of going to [facility] but now is accepting. CM then contacted Eneida from admissions and asked if they still have a bed and they do and it will be a private room. This information was given to the pt and he is accepting. I asked if he would like me to set up transport and he told CM I can set up transport and we then discussed the possible out of pocket fees for transport and he is accepting. CM then requested a ride. Facility updated on the ride time. Orders faxed to facility. No further CM needs.  Pinky Santana RN

## 2023-12-04 NOTE — PROGRESS NOTES
"[facility]  WOC Nurse Inpatient Assessment     Consulted for: leg ulcers     Summary: patient with poa leg ulcers to BLE, patient medically ready for discharge 12/1/23, patient with current discharge orders and discharge plan for today 12/4/23, primary RN with current orders to EMR for managing BLE including wound care topical recommendations and OT lymphedema recommendations which were updated today, answered Rns questions in person 12/4, patient ready for discharge from WOC perspective, AVS reviewed     Patient History (according to provider note(s):      \"\"78-year-old man with history of HFrEF, Afib, CAD, obesity, chronic edema, wheel chair bound for months in care center admitted on 11/10/2023 with bilateral venous stasis ulcers with cellulitis and acute on chronic HFmrEF. \"     Assessment:      Areas visualized during today's visit: Focused: chart reviewed        Treatment Plan:       Wound care  DAILY        Comments: Bilateral lower legs:  1. Cleanse with wound cleanser, including estefanía wound skin, gently pat dry  2. Apply xeroform to all open and scabbed areas  3. Cover with abd pads, wrap with kerlix toes to knees  Change daily        Orders placed by OT which are concurrent with WOC orders copied here:    Patient care order  PER UNIT ROUTINE        Comments: Lymphedema Care Plan: Elastic Stockinette (cream colored, tubular and toeless)  Please see the instructions including pictures posted in patient room for specific directions.  Patient is now in a maintenance phase for the management of their edema and will no longer be actively followed by edema therapists.  Please follow instructions below to assist the patient with use of the compression garment to maintain control of edema:    1. Pt is to wear the labeled size F stockinette on bilateral LE's with overlap of extra fabric at feet. Ensure top of elastic stockinette is 1\" below pt's knees and is smooth (do not fold double or " "bunch below knees)    2. Completely remove compression for 1 hour each day for skin care (wash and lotion) and skin assessment then re-don elastic stockinette.    3. Nursing to remove if:  - stockinette is causing pain/numbness  - stockinette becomes soiled  - stockinette is loose or falling off  - there are areas of bunching up and rolling (want to avoid risk of tourniquet effect)  - patient has a change in level of alertness or status impacting ability to communicate pain  - worsening respiratory status     Contact inpatient edema therapy (rehabilitation services) with questions or if there is a change in condition.          Orders: Reviewed    RECOMMEND PRIMARY TEAM ORDER: None, at this time  Education provided:  answered primary Rns questions in person   Discussed plan of care with: Nurse  [facility] nurse follow-up plan: weekly if patient remains in hospital woc will continue to eval patient legs weekly   Notify WOC if wound(s) deteriorate.  Nursing to notify the Provider(s) and re-consult the WOC Nurse if new skin concern.    DATA:     Current support surface: Standard  Standard gel/foam mattress (IsoFlex, Atmos air, etc)  Containment of urine/stool: Incontinence Protocol and Incontinent pad in bed  BMI: Body mass index is 35.5 kg/m .   Active diet order: Orders Placed This Encounter      Combination Diet Low Saturated Fat Na <2400mg Diet, No Caffeine Diet      Diet     Output: I/O last 3 completed shifts:  In: 1040 [P.O.:1040]  Out: 1350 [Urine:1350]     Labs: No lab results found in last 7 days.    Invalid input(s): \"GLUCOMBO\"  Pressure injury risk assessment:   Sensory Perception: 4-->no impairment  Moisture: 3-->occasionally moist  Activity: 2-->chairfast  Mobility: 2-->very limited  Nutrition: 3-->adequate  Friction and Shear: 2-->potential problem  Josue Score: 16    Alexia   AMCOM: [facility]   VOCPEDRITO: [facility]     "

## 2023-12-04 NOTE — PLAN OF CARE
Physical Therapy Discharge Summary    Reason for therapy discharge:    Discharged to long term care facility.    Progress towards therapy goal(s). See goals on Care Plan in [facility] electronic health record for goal details.  Goals partially met.  Barriers to achieving goals:   discharge from facility.    Therapy recommendation(s):    Continued therapy is recommended.  Rationale/Recommendations:  Continue PT at LT.      Demi Salmon, PT, DPT  12/4/2023

## 2023-12-04 NOTE — PLAN OF CARE
"  Problem: Fatigue  Goal: Improved Activity Tolerance  Outcome: Progressing   Goal Outcome Evaluation:    Focus:  Pt had an uneventful overnight as he was observed sleeping for a majority of the shift.  Pt requested having minimal interruptions through the night to maximize his opportunity to sleep.   Pt reports no pain or other distress.  VS within parameter.  Shift assessment unremarkable as observations were within pts baseline.  Pt made no use of the call light and remained in bed for the duration of the shift.  Inquery was made at times of VS and/or rounding.   However, pt declined at said times.  Pt's sleep pattern throughout the shift appeared \"restful\" and undisturbed.                        "

## 2023-12-04 NOTE — PROGRESS NOTES
Patient appealed his discharge on 12/2/2023 after refusing to leave on 12/1/2023. A HINN had been provided to patient on Saturday 12/2/2023 as well.   Jere case #[id number].  Care management will review the progress of the appeal today.   8:51 AM:  Clinical information from chart including Detailed Notice of Discharge, IMMs forwarded to HIM at [email or web address].   10:23 AM: HIM cannot release records to [facility] without the paperwork that they fax to us. Called Jere at [phone] to request a copy of the fax.   10:32 AM:  Received paperwork from [facility] and forwarded to HIM.   11:17 AM:  Met with patient to review the plan. He states that he agrees to discharge to [facility] today. Ride time set for 2:30 PM today. Patient agree with plan.     Enedina Rao RN

## 2023-12-05 ENCOUNTER — PATIENT OUTREACH (OUTPATIENT)
Dept: CARE COORDINATION | Facility: CLINIC | Age: 78
End: 2023-12-05
Payer: COMMERCIAL

## 2023-12-05 NOTE — DISCHARGE SUMMARY
"[facility]  Hospitalist Discharge Summary      Date of Admission:  11/10/2023  Date of Discharge:  12/4/2023  Discharging Provider: Rudy Cotter MD  Discharge Service: Hospitalist Service    Discharge Diagnoses     Principal Problem:    Bilateral cellulitis of lower leg  Active Problems:    ROSALVA on CPAP    Coronary artery disease involving native coronary artery of native heart without angina pectoris    Atrial flutter (H)    Acute on chronic systolic congestive heart failure (H)    Chronic pain disorder    Lymphedema    YOUNG (acute kidney injury) (H24)    Venous stasis ulcer of calf limited to breakdown of skin without varicose veins (H)       Clinically Significant Risk Factors     # Obesity: Estimated body mass index is 35.5 kg/m  as calculated from the following:    Height as of this encounter: 1.753 m (5' 9\").    Weight as of this encounter: 109 kg (240 lb 6.4 oz).       Follow-ups Needed After Discharge   Follow-up Appointments     Follow Up and recommended labs and tests      Follow up with Nursing home physician in 1-2 weeks for post discharge   follow up.          Discharge Disposition   Discharged to long-term care facility  Condition at discharge: Stable    Hospital Course     Gorge Berkowitz Jr. is a 78-year-old man with history of HFrEF, Afib, CAD, obesity, chronic edema, wheel chair bound for months in [facility] center admitted on 11/10/2023 with bilateral venous stasis ulcers with cellulitis and acute on chronic HFrEF.     Infected bilateral lower extremity stasis ulcer, left lateral foot  cellulitis  Wound culture grew MSSA, Klebsiella oxytoca, Serratia, Corynebacterium striatum, and Enterococcus faecalis.   Patient received IV Zosyn and vancomycin initially, followed by one week of levofloxacin, ending on 11/22/23. Patient received wound care and lymphedema wrap. The cellulitis and ulcer resolved.    Instructions for wound care:  Bilateral lower legs:  1. Cleanse with wound " cleanser, including estefanía wound skin, gently pat dry  2. Apply xeroform to all open and scabbed areas  3. Cover with abd pads, wrap with kerlix toes to knees  Change daily      Acute on chronic HFrEF  Echocardiogram revealed mildly reduced LV systolic function with LVEF of 45 to 50%, mild septal hypokinesis, mild to moderate inferior wall hypokinesis and mildly dilated right ventricle.  Patient received IV diuretics. Ultimately, he became euvolemic on bumex 1 mg bid.     YOUNG:  Patient developed YOUNG while on diuresis treatment. It later resolved after adjustment of diuretic doses.     Disposition:   Patient had a prolonged hospital stay since he is not able to take care of himself at home and needs LTC placement. Finally, one accepting facility was found and patient was discharged on 12/5/23.      Chronic stable conditions:  CAD: has chronic RCA occlusion.    Afib/flutter: Continue amiodarone, metoprolol and eliquis.   Chronic pain syndrome with opiate dependence: Chronically on butrans patch, tylenol, robaxin and topical lidocaine.   ROSALVA: CPAP at night          Consultations This Hospital Stay   PHARMACY TO DOSE VANCO  CARDIOLOGY IP CONSULT  PAIN MANAGEMENT ADULT IP CONSULT  WOUND OSTOMY CONTINENCE NURSE  IP CONSULT  PHARMACY TO DOSE VANCO  CARE MANAGEMENT / SOCIAL WORK IP CONSULT  WOUND OSTOMY CONTINENCE NURSE  IP CONSULT  INFECTIOUS DISEASES IP CONSULT  PHYSICAL THERAPY ADULT IP CONSULT  OCCUPATIONAL THERAPY ADULT IP CONSULT  NEPHROLOGY IP CONSULT  WOUND OSTOMY CONTINENCE NURSE  IP CONSULT  PHYSICAL THERAPY ADULT IP CONSULT  OCCUPATIONAL THERAPY ADULT IP CONSULT  LYMPHEDEMA THERAPY IP CONSULT  OCCUPATIONAL THERAPY ADULT IP CONSULT    Code Status   Prior    Time Spent on this Encounter   I, Rudy Cotter MD, personally saw the patient today and spent greater than 30 minutes discharging this patient.       Rudy Cotter MD  [facility]  [address] [place] [address]  [id number]  Phone: [phone]  Fax: [phone]  ______________________________________________________________________    Physical Exam   Vital Signs: Temp: 97.8  F (36.6  C) Temp src: Oral BP: 107/63 Pulse: 69   Resp: 18 SpO2: 97 % O2 Device: None (Room air)    Weight: 240 lbs 6.4 oz    General appearance: not in acute distress  HEENT: PERRL, EOMI  Lungs: Clear breath sounds in bilateral lung fields  Cardiovascular: Regular rate and rhythm, normal S1-S2  Abdomen: Soft, non tender, no distension  Musculoskeletal: No joint swelling  Skin: No rash . Bilateral lower extremities with lymphedema wrap.  Neurology: AAO ×3.  Cranial nerves II - XII normal.  Normal muscle strength in all four extremities         Primary Care Physician   Valentin Damian    Discharge Orders      Mantoux instructions    Give two-step Mantoux (PPD) Per Facility Policy Yes     Reason for your hospital stay    Acute CHF exacerbation, YOUNG     Activity - Up with nursing assistance     General info for SNF    Length of Stay Estimate: Long Term Care  Condition at Discharge: Stable  Level of care:skilled   Rehabilitation Potential: Fair  Admission H&P remains valid and up-to-date: Yes  Recent Chemotherapy: N/A  Use Nursing Home Standing Orders: Yes  Recent Chemotherapy: N/A  Use Nursing Home Standing Orders: Yes     Discharge Instructions    Continue CPAP     Follow Up and recommended labs and tests    Follow up with Nursing home physician in 1-2 weeks for post discharge follow up.     Physical Therapy Adult Consult    Evaluate and treat as clinically indicated.    Reason:  Impaired functional mobility     Occupational Therapy Adult Consult    Evaluate and treat as clinically indicated.    Reason: Inability to complete ADLs/IADLs. Lymphedema wraps.     Fall precautions     Diet    Follow this diet upon discharge: Orders Placed This Encounter      Combination Diet Low Saturated Fat Na <2400mg Diet, No Caffeine Diet       EXAM: XR CHEST PORT 1  VIEW  LOCATION: [facility]  DATE: 11/10/2023     INDICATION: Anasarca and edema.  COMPARISON: Chest x-ray 8/7/2018.                                                                    IMPRESSION: Stable cardiomegaly. Bilateral mild vascular congestion noted. Mild pulmonary edema pattern. Stable mildly elevated right hemidiaphragm.      EXAM: US LOWER EXTREMITY VENOUS DUPLEX BILATERAL  LOCATION: [facility]  DATE: 11/10/2023     INDICATION: Swelling. On DOAC pain. Make sure no clots.  COMPARISON: Ultrasound venous bilateral lower extremity Doppler 08/07/2018.  TECHNIQUE: Venous Duplex ultrasound of bilateral lower extremities with and without compression, augmentation and duplex. Color flow and spectral Doppler with waveform analysis performed.     FINDINGS: Exam includes the common femoral, femoral, popliteal veins as well as segmentally visualized deep calf veins and greater saphenous vein.      RIGHT: No deep vein thrombosis. No superficial thrombophlebitis. No popliteal cyst. Diffuse soft tissue swelling right calf. No significant interval change.     LEFT: No deep vein thrombosis. No superficial thrombophlebitis. No popliteal cyst. Diffuse soft tissue swelling left calf. No significant interval change.         Significant Results and Procedures   Most Recent 3 CBC's:  Recent Labs   Lab Test 11/27/23  0450 11/11/23  0642 11/10/23  1333 09/04/23  0405   WBC  --  5.9 6.6 7.0   HGB 13.4 13.1* 12.7* 13.9   MCV  --  102* 102* 102*    195 210 148*     Most Recent 3 BMP's:  Recent Labs   Lab Test 11/29/23  0506 11/28/23  0426 11/27/23  0450    134* 137   POTASSIUM 4.3 4.7 4.5   CHLORIDE 103 97* 99   CO2 31* 30* 32*   BUN 31.1* 32.0* 33.8*   CR 1.16 1.24* 1.33*   ANIONGAP 7 7 6*   LIZBET 9.2 9.3 9.3   GLC 77 84 83       Discharge Medications   Discharge Medication List as of 12/4/2023  2:38 PM        START taking these medications    Details   Calcium  Carbonate-Vitamin D 600-10 MG-MCG TABS Take 1 tablet by mouth daily, Transitional      empagliflozin (JARDIANCE) 10 MG TABS tablet Take 1 tablet (10 mg) by mouth daily, Transitional      ferrous sulfate (FEROSUL) 325 (65 Fe) MG tablet Take 1 tablet (325 mg) by mouth daily (with breakfast), Transitional      gabapentin (NEURONTIN) 100 MG capsule Take 2 capsules (200 mg) by mouth at bedtime, Transitional           CONTINUE these medications which have CHANGED    Details   bumetanide (BUMEX) 1 MG tablet Take 1 tablet (1 mg) by mouth daily, Transitional      metoprolol succinate ER (TOPROL XL) 25 MG 24 hr tablet Take 1 tablet (25 mg) by mouth daily, Transitional           CONTINUE these medications which have NOT CHANGED    Details   !! acetaminophen (TYLENOL) 325 MG tablet Take 650 mg by mouth every 4 hours as needed (mild pain), Historical      !! acetaminophen (TYLENOL) 500 MG tablet Take 1,000 mg by mouth 3 times daily, Historical      amiodarone (PACERONE) 200 MG tablet [AMIODARONE (PACERONE) 200 MG TABLET] Take 0.5 tablets (100 mg total) by mouth daily., Disp-45 tablet, R-0, Normal      atorvastatin (LIPITOR) 40 MG tablet [ATORVASTATIN (LIPITOR) 40 MG TABLET] Take 1 tablet (40 mg total) by mouth at bedtime., Disp-90 tablet, R-1, Normal      buprenorphine (BUTRANS) 10 MCG/HR WK patch Place 1 patch onto the skin every 7 days Wednesdays, Historical      ELIQUIS 5 mg Tab tablet [ELIQUIS 5 MG TAB TABLET] Take 1 tablet (5 mg total) by mouth 2 (two) times a day., Disp-180 tablet, R-0, Normal      glucosamine-chondroitin 500-400 MG CAPS per capsule Take 1 capsule by mouth daily, Historical      Krill Oil Ultra Strength 1500 MG CAPS Take 3 capsules by mouth every evening, Historical      Lidocaine (LIDOCARE) 4 % Patch Place 1 patch onto the skin every 24 hours Place onto the left shoulder.   To prevent lidocaine toxicity, patient should be patch free for 12 hrs daily.Transitional      melatonin 3 MG tablet Take 3 mg by  mouth at bedtime, Historical      methocarbamol (ROBAXIN) 500 MG tablet Take 500 mg by mouth 2 times daily as needed for muscle spasms, Historical      miconazole (MICATIN) 2 % external powder Apply topically 2 times daily To affected areasTransitional      polyethylene glycol (GLYCOLAX) 17 gram/dose powder Take 17 g by mouth daily as needed for constipation, Historical      pramipexole (MIRAPEX) 0.25 MG tablet Take 0.25 mg by mouth At Bedtime, Historical      senna-docusate (SENOKOT-S/PERICOLACE) 8.6-50 MG tablet Take 1 tablet by mouth 2 times daily, Transitional      vitamin B-12 (CYANOCOBALAMIN) 500 MCG tablet Take 500 mcg by mouth daily, Historical       !! - Potential duplicate medications found. Please discuss with provider.        STOP taking these medications       calcium carbonate-vitamin D (CALTRATE) 600-10 MG-MCG per tablet Comments:   Reason for Stopping:         ferrous sulfate (FE TABS) 325 (65 Fe) MG EC tablet Comments:   Reason for Stopping:         furosemide (LASIX) 20 MG tablet Comments:   Reason for Stopping:         levofloxacin (LEVAQUIN) 500 MG tablet Comments:   Reason for Stopping:             Allergies   No Known Allergies

## 2023-12-05 NOTE — PROGRESS NOTES
Received an update that [facility] BURNETT reviewer agreed that patient was medically ready for discharge and termination of services.    Enedina Rao RN

## 2023-12-05 NOTE — PROGRESS NOTES
Connected Care Resource Center    Background: Transitional Care Management program identified per system criteria and reviewed by [facility] team for possible outreach.    Assessment: Upon chart review, [facility] Team member will not proceed with patient outreach related to this episode of Transitional Care Management program due to reason below:    Patient has discharged to a Memory Care, Long-term Care, Assisted Living or Group Home where patient is receiving on-site support with their daily cares, including support with hospital follow up plan.    Plan: Transitional Care Management episode addressed appropriately per reason noted above.      Pinky Burnette MA  Connected Care Resource [place], [facility]    *Connected Care Resource Team does NOT follow patient ongoing. Referrals are identified based on internal discharge reports and the outreach is to ensure patient has an understanding of their discharge instructions.

## 2023-12-08 ENCOUNTER — TELEPHONE (OUTPATIENT)
Dept: CARDIOLOGY | Facility: CLINIC | Age: 78
End: 2023-12-08
Payer: COMMERCIAL

## 2023-12-08 NOTE — TELEPHONE ENCOUNTER
[facility] Call Center    Phone Message    May a detailed message be left on voicemail: yes     Reason for Call: Other: Edan called because Gorge has some questions he would like answered by his new care team and what his upcoming appt entail. Please reach out to Dean to answer her questions. Aware that oGrge is not set up with EP or HF yet but unsure who else to send TE to to answer questions about upcoming appts. Thank you!     Action Taken: Other: Cardiology    Travel Screening: Not Applicable    Thank you!  [facility]

## 2023-12-10 ENCOUNTER — DOCUMENTATION ONLY (OUTPATIENT)
Dept: OTHER | Facility: CLINIC | Age: 78
End: 2023-12-10
Payer: COMMERCIAL

## 2023-12-11 ENCOUNTER — TELEPHONE (OUTPATIENT)
Dept: CARDIOLOGY | Facility: CLINIC | Age: 78
End: 2023-12-11
Payer: COMMERCIAL

## 2023-12-11 NOTE — TELEPHONE ENCOUNTER
Dean , nurse from [facility] is calling to let both [facility] and EP know that the patient does not have transportation for upcoming appts. And therefore wishes to see if virtual is an option, or he desires to cancel them.    Please reach out to Dean to discuss EP visit for 12/13/23    [facility] has advised that in person is more optimal and that we do not do virtual visits.  Bessie MYERS RN BSN, CHFN, PCCN-K

## 2023-12-12 PROBLEM — M17.12 PRIMARY OSTEOARTHRITIS OF LEFT KNEE: Status: ACTIVE | Noted: 2020-10-13

## 2023-12-12 PROBLEM — I10 HTN (HYPERTENSION): Status: ACTIVE | Noted: 2023-04-26

## 2023-12-12 PROBLEM — K59.09 CHRONIC CONSTIPATION: Status: ACTIVE | Noted: 2023-04-26

## 2023-12-12 PROBLEM — E27.8: Status: ACTIVE | Noted: 2023-04-26

## 2023-12-12 PROBLEM — I48.0 PAROXYSMAL ATRIAL FIBRILLATION (H): Status: ACTIVE | Noted: 2023-12-12

## 2023-12-12 PROBLEM — M17.11 PRIMARY OSTEOARTHRITIS OF RIGHT KNEE: Status: ACTIVE | Noted: 2020-07-01

## 2023-12-12 PROBLEM — F33.9 DEPRESSION, RECURRENT (H): Status: ACTIVE | Noted: 2022-11-16

## 2023-12-12 NOTE — TELEPHONE ENCOUNTER
Yasmin Davis, NP  [facility] Support Pool - e16 hours ago (3:57 PM)     KD   He is now homebound and receives fairly extensive home health services through the [facility] system.  His services include visits by a physician, nurse practitioner, phlebotomist, therapists, and other services as needed.  He reports no new cardiac symptoms . He is homebound primarily due to orthopedic issues per reporting from Dr Johansen. My only concern is that he is due for Amiodarone level and he needs to have PFT's done, I don't see that he has ever had PFT's completed so my concern is how are we going to get these needed tests completed to assess lung function?? Does he have someone that can draw his amiodarone level there? He has not had a level since last December. ALT and TSH were completed in September and those levels looked fine. Let me know what you find out and then I can make a decision about the virtual visit.    Thanks,  Yasmin

## 2023-12-12 NOTE — TELEPHONE ENCOUNTER
2nd Attempt to contact pt, voicemail message was left with contact information and instructing pt to call back.  Left VM with Dean at below number.  12/12/2023 4:04 PM  Vicki Jacome RN

## 2023-12-14 NOTE — TELEPHONE ENCOUNTER
3rd VM for Dean or any RN from facility to contact back to help address the questions below  12/14/2023 9:15 AM  Vicki Erickson RN`1

## 2023-12-20 NOTE — TELEPHONE ENCOUNTER
4th VM left with Dean requesting call back as further information is needed prior to approving a virtual visit with EP.     Mayuri Pimentel RN

## 2023-12-26 NOTE — TELEPHONE ENCOUNTER
Yasmin-  After multiple attempts to pts home care we have been unable to get through or get anyone to call us back.  I think the safe bet is to order what you are requesting Amio level and PFT??  Then contact the pt to arrange this, or direct pt to arrange this with his home care facility.  I dont want to continue to delay care if we cannot get through to his home care staff  Thanks  Bethany

## 2024-01-02 ENCOUNTER — LAB REQUISITION (OUTPATIENT)
Dept: LAB | Facility: CLINIC | Age: 79
End: 2024-01-02
Payer: COMMERCIAL

## 2024-01-02 DIAGNOSIS — I48.91 UNSPECIFIED ATRIAL FIBRILLATION (H): ICD-10-CM

## 2024-01-02 DIAGNOSIS — Z79.899 ON AMIODARONE THERAPY: ICD-10-CM

## 2024-01-02 DIAGNOSIS — I48.0 PAROXYSMAL ATRIAL FIBRILLATION (H): Primary | ICD-10-CM

## 2024-01-02 NOTE — TELEPHONE ENCOUNTER
Noted.  Phone call to patients home, his phone number has been disconnected. Phone call to [facility].  Spoke to the nursing department.  Ok to fax orders to [phone].    Orders faxed.    Will review with Yasmin and set up virtual as needed.

## 2024-01-02 NOTE — TELEPHONE ENCOUNTER
Yasmin Davis, NP  [facility] Support Pool - Lhe12 minutes ago (12:35 PM)     KD  Amiodarone level and PFT's have been ordered.

## 2024-01-03 PROCEDURE — 36415 COLL VENOUS BLD VENIPUNCTURE: CPT | Mod: ORL | Performed by: NURSE PRACTITIONER

## 2024-01-03 PROCEDURE — 80151 DRUG ASSAY AMIODARONE: CPT | Mod: ORL | Performed by: NURSE PRACTITIONER

## 2024-01-03 PROCEDURE — P9603 ONE-WAY ALLOW PRORATED MILES: HCPCS | Mod: ORL | Performed by: NURSE PRACTITIONER

## 2024-01-05 LAB
AMIODARONE SERPL-MCNC: <0.3 UG/ML
DESETHYLAMIODARONE SERPL-MCNC: <0.3 UG/ML

## 2024-01-16 ENCOUNTER — LAB REQUISITION (OUTPATIENT)
Dept: LAB | Facility: CLINIC | Age: 79
End: 2024-01-16
Payer: COMMERCIAL

## 2024-01-16 DIAGNOSIS — I50.9 HEART FAILURE, UNSPECIFIED (H): ICD-10-CM

## 2024-01-17 LAB
ANION GAP SERPL CALCULATED.3IONS-SCNC: 11 MMOL/L (ref 7–15)
BUN SERPL-MCNC: 36.2 MG/DL (ref 8–23)
CALCIUM SERPL-MCNC: 8.9 MG/DL (ref 8.8–10.2)
CHLORIDE SERPL-SCNC: 105 MMOL/L (ref 98–107)
CREAT SERPL-MCNC: 1.15 MG/DL (ref 0.67–1.17)
DEPRECATED HCO3 PLAS-SCNC: 25 MMOL/L (ref 22–29)
EGFRCR SERPLBLD CKD-EPI 2021: 66 ML/MIN/1.73M2
GLUCOSE SERPL-MCNC: 62 MG/DL (ref 70–99)
POTASSIUM SERPL-SCNC: 4.5 MMOL/L (ref 3.4–5.3)
SODIUM SERPL-SCNC: 141 MMOL/L (ref 135–145)

## 2024-01-17 PROCEDURE — P9603 ONE-WAY ALLOW PRORATED MILES: HCPCS | Mod: ORL | Performed by: EMERGENCY MEDICINE

## 2024-01-17 PROCEDURE — 80048 BASIC METABOLIC PNL TOTAL CA: CPT | Mod: ORL | Performed by: EMERGENCY MEDICINE

## 2024-01-17 PROCEDURE — 36415 COLL VENOUS BLD VENIPUNCTURE: CPT | Mod: ORL | Performed by: EMERGENCY MEDICINE

## 2024-03-18 ENCOUNTER — LAB REQUISITION (OUTPATIENT)
Dept: LAB | Facility: CLINIC | Age: 79
End: 2024-03-18
Payer: COMMERCIAL

## 2024-03-18 DIAGNOSIS — E66.9 OBESITY, UNSPECIFIED: ICD-10-CM

## 2024-03-18 DIAGNOSIS — K92.1 MELENA: ICD-10-CM

## 2024-03-18 DIAGNOSIS — I50.9 HEART FAILURE, UNSPECIFIED (H): ICD-10-CM

## 2024-03-19 LAB
ANION GAP SERPL CALCULATED.3IONS-SCNC: 12 MMOL/L (ref 7–15)
BASOPHILS # BLD AUTO: 0 10E3/UL (ref 0–0.2)
BASOPHILS NFR BLD AUTO: 0 %
BUN SERPL-MCNC: 38.4 MG/DL (ref 8–23)
CALCIUM SERPL-MCNC: 9.2 MG/DL (ref 8.8–10.2)
CHLORIDE SERPL-SCNC: 103 MMOL/L (ref 98–107)
CREAT SERPL-MCNC: 1.14 MG/DL (ref 0.67–1.17)
DEPRECATED HCO3 PLAS-SCNC: 26 MMOL/L (ref 22–29)
EGFRCR SERPLBLD CKD-EPI 2021: 66 ML/MIN/1.73M2
EOSINOPHIL # BLD AUTO: 0.1 10E3/UL (ref 0–0.7)
EOSINOPHIL NFR BLD AUTO: 3 %
ERYTHROCYTE [DISTWIDTH] IN BLOOD BY AUTOMATED COUNT: 15.7 % (ref 10–15)
GLUCOSE SERPL-MCNC: 78 MG/DL (ref 70–99)
HBA1C MFR BLD: 5.2 %
HCT VFR BLD AUTO: 39.7 % (ref 40–53)
HGB BLD-MCNC: 12.7 G/DL (ref 13.3–17.7)
IMM GRANULOCYTES # BLD: 0 10E3/UL
IMM GRANULOCYTES NFR BLD: 0 %
LYMPHOCYTES # BLD AUTO: 1 10E3/UL (ref 0.8–5.3)
LYMPHOCYTES NFR BLD AUTO: 19 %
MCH RBC QN AUTO: 31.4 PG (ref 26.5–33)
MCHC RBC AUTO-ENTMCNC: 32 G/DL (ref 31.5–36.5)
MCV RBC AUTO: 98 FL (ref 78–100)
MONOCYTES # BLD AUTO: 0.6 10E3/UL (ref 0–1.3)
MONOCYTES NFR BLD AUTO: 11 %
NEUTROPHILS # BLD AUTO: 3.3 10E3/UL (ref 1.6–8.3)
NEUTROPHILS NFR BLD AUTO: 67 %
NRBC # BLD AUTO: 0 10E3/UL
NRBC BLD AUTO-RTO: 0 /100
PLATELET # BLD AUTO: 164 10E3/UL (ref 150–450)
POTASSIUM SERPL-SCNC: 4.4 MMOL/L (ref 3.4–5.3)
RBC # BLD AUTO: 4.05 10E6/UL (ref 4.4–5.9)
SODIUM SERPL-SCNC: 141 MMOL/L (ref 135–145)
TSH SERPL DL<=0.005 MIU/L-ACNC: 1.44 UIU/ML (ref 0.3–4.2)
WBC # BLD AUTO: 5 10E3/UL (ref 4–11)

## 2024-03-19 PROCEDURE — 85025 COMPLETE CBC W/AUTO DIFF WBC: CPT | Mod: ORL | Performed by: NURSE PRACTITIONER

## 2024-03-19 PROCEDURE — 36415 COLL VENOUS BLD VENIPUNCTURE: CPT | Mod: ORL | Performed by: NURSE PRACTITIONER

## 2024-03-19 PROCEDURE — 84443 ASSAY THYROID STIM HORMONE: CPT | Mod: ORL | Performed by: NURSE PRACTITIONER

## 2024-03-19 PROCEDURE — 80048 BASIC METABOLIC PNL TOTAL CA: CPT | Mod: ORL | Performed by: NURSE PRACTITIONER

## 2024-03-19 PROCEDURE — P9604 ONE-WAY ALLOW PRORATED TRIP: HCPCS | Mod: ORL | Performed by: NURSE PRACTITIONER

## 2024-03-19 PROCEDURE — 83036 HEMOGLOBIN GLYCOSYLATED A1C: CPT | Mod: ORL | Performed by: NURSE PRACTITIONER

## 2024-07-10 ENCOUNTER — LAB REQUISITION (OUTPATIENT)
Dept: LAB | Facility: CLINIC | Age: 79
End: 2024-07-10
Payer: COMMERCIAL

## 2024-07-10 DIAGNOSIS — E66.9 OBESITY, UNSPECIFIED: ICD-10-CM

## 2024-07-11 LAB
ANION GAP SERPL CALCULATED.3IONS-SCNC: 15 MMOL/L (ref 7–15)
BASOPHILS # BLD AUTO: 0 10E3/UL (ref 0–0.2)
BASOPHILS NFR BLD AUTO: 1 %
BUN SERPL-MCNC: 36 MG/DL (ref 8–23)
CALCIUM SERPL-MCNC: 9.4 MG/DL (ref 8.8–10.2)
CHLORIDE SERPL-SCNC: 103 MMOL/L (ref 98–107)
CREAT SERPL-MCNC: 1.07 MG/DL (ref 0.67–1.17)
DEPRECATED HCO3 PLAS-SCNC: 23 MMOL/L (ref 22–29)
EGFRCR SERPLBLD CKD-EPI 2021: 71 ML/MIN/1.73M2
EOSINOPHIL # BLD AUTO: 0.2 10E3/UL (ref 0–0.7)
EOSINOPHIL NFR BLD AUTO: 4 %
ERYTHROCYTE [DISTWIDTH] IN BLOOD BY AUTOMATED COUNT: 15.8 % (ref 10–15)
GLUCOSE SERPL-MCNC: 85 MG/DL (ref 70–99)
GLUCOSE SERPL-MCNC: 95 MG/DL (ref 70–99)
HBA1C MFR BLD: 5.6 %
HCT VFR BLD AUTO: 45.5 % (ref 40–53)
HGB BLD-MCNC: 14.6 G/DL (ref 13.3–17.7)
IMM GRANULOCYTES # BLD: 0 10E3/UL
IMM GRANULOCYTES NFR BLD: 0 %
LYMPHOCYTES # BLD AUTO: 1.1 10E3/UL (ref 0.8–5.3)
LYMPHOCYTES NFR BLD AUTO: 26 %
MCH RBC QN AUTO: 32.2 PG (ref 26.5–33)
MCHC RBC AUTO-ENTMCNC: 32.1 G/DL (ref 31.5–36.5)
MCV RBC AUTO: 100 FL (ref 78–100)
MONOCYTES # BLD AUTO: 0.6 10E3/UL (ref 0–1.3)
MONOCYTES NFR BLD AUTO: 14 %
NEUTROPHILS # BLD AUTO: 2.4 10E3/UL (ref 1.6–8.3)
NEUTROPHILS NFR BLD AUTO: 55 %
NRBC # BLD AUTO: 0 10E3/UL
NRBC BLD AUTO-RTO: 0 /100
PLATELET # BLD AUTO: 167 10E3/UL (ref 150–450)
POTASSIUM SERPL-SCNC: 4.1 MMOL/L (ref 3.4–5.3)
RBC # BLD AUTO: 4.53 10E6/UL (ref 4.4–5.9)
SODIUM SERPL-SCNC: 141 MMOL/L (ref 135–145)
TSH SERPL DL<=0.005 MIU/L-ACNC: 1.71 UIU/ML (ref 0.3–4.2)
WBC # BLD AUTO: 4.3 10E3/UL (ref 4–11)

## 2024-07-11 PROCEDURE — 85025 COMPLETE CBC W/AUTO DIFF WBC: CPT | Mod: ORL | Performed by: NURSE PRACTITIONER

## 2024-07-11 PROCEDURE — P9604 ONE-WAY ALLOW PRORATED TRIP: HCPCS | Mod: ORL | Performed by: NURSE PRACTITIONER

## 2024-07-11 PROCEDURE — 83036 HEMOGLOBIN GLYCOSYLATED A1C: CPT | Mod: ORL | Performed by: NURSE PRACTITIONER

## 2024-07-11 PROCEDURE — 36415 COLL VENOUS BLD VENIPUNCTURE: CPT | Mod: ORL | Performed by: NURSE PRACTITIONER

## 2024-07-11 PROCEDURE — 84443 ASSAY THYROID STIM HORMONE: CPT | Mod: ORL | Performed by: NURSE PRACTITIONER

## 2024-07-11 PROCEDURE — 82947 ASSAY GLUCOSE BLOOD QUANT: CPT | Mod: ORL | Performed by: NURSE PRACTITIONER

## 2024-07-11 PROCEDURE — 80048 BASIC METABOLIC PNL TOTAL CA: CPT | Mod: ORL | Performed by: NURSE PRACTITIONER

## 2024-10-07 ENCOUNTER — TRANSFERRED RECORDS (OUTPATIENT)
Dept: HEALTH INFORMATION MANAGEMENT | Facility: CLINIC | Age: 79
End: 2024-10-07
Payer: MEDICARE

## 2024-10-08 ENCOUNTER — APPOINTMENT (OUTPATIENT)
Dept: ULTRASOUND IMAGING | Facility: CLINIC | Age: 79
DRG: 603 | End: 2024-10-08
Attending: EMERGENCY MEDICINE
Payer: COMMERCIAL

## 2024-10-08 ENCOUNTER — APPOINTMENT (OUTPATIENT)
Dept: GENERAL RADIOLOGY | Facility: CLINIC | Age: 79
DRG: 603 | End: 2024-10-08
Attending: EMERGENCY MEDICINE
Payer: COMMERCIAL

## 2024-10-08 ENCOUNTER — HOSPITAL ENCOUNTER (INPATIENT)
Facility: CLINIC | Age: 79
LOS: 5 days | Discharge: NURSING, REHAB OR LONG-TERM CARE | DRG: 603 | End: 2024-10-13
Attending: EMERGENCY MEDICINE | Admitting: INTERNAL MEDICINE
Payer: COMMERCIAL

## 2024-10-08 ENCOUNTER — MEDICAL CORRESPONDENCE (OUTPATIENT)
Dept: HEALTH INFORMATION MANAGEMENT | Facility: CLINIC | Age: 79
End: 2024-10-08

## 2024-10-08 DIAGNOSIS — L97.919 VENOUS ULCERS OF BOTH LOWER EXTREMITIES (H): ICD-10-CM

## 2024-10-08 DIAGNOSIS — I83.019 VENOUS ULCERS OF BOTH LOWER EXTREMITIES (H): ICD-10-CM

## 2024-10-08 DIAGNOSIS — K59.09 CHRONIC CONSTIPATION: ICD-10-CM

## 2024-10-08 DIAGNOSIS — Z79.01 LONG TERM (CURRENT) USE OF ANTICOAGULANTS: ICD-10-CM

## 2024-10-08 DIAGNOSIS — M79.89 SWELLING OF LIMB: Primary | ICD-10-CM

## 2024-10-08 DIAGNOSIS — I83.029 VENOUS ULCERS OF BOTH LOWER EXTREMITIES (H): ICD-10-CM

## 2024-10-08 DIAGNOSIS — M79.605 BILATERAL LEG PAIN: ICD-10-CM

## 2024-10-08 DIAGNOSIS — L03.116 CELLULITIS OF LEFT LOWER EXTREMITY: ICD-10-CM

## 2024-10-08 DIAGNOSIS — M79.604 BILATERAL LEG PAIN: ICD-10-CM

## 2024-10-08 DIAGNOSIS — R00.1 BRADYCARDIA: ICD-10-CM

## 2024-10-08 DIAGNOSIS — L97.929 VENOUS ULCERS OF BOTH LOWER EXTREMITIES (H): ICD-10-CM

## 2024-10-08 LAB
ALBUMIN SERPL BCG-MCNC: 3.4 G/DL (ref 3.5–5.2)
ALP SERPL-CCNC: 129 U/L (ref 40–150)
ALT SERPL W P-5'-P-CCNC: 17 U/L (ref 0–70)
ANION GAP SERPL CALCULATED.3IONS-SCNC: 9 MMOL/L (ref 7–15)
AST SERPL W P-5'-P-CCNC: 22 U/L (ref 0–45)
BASOPHILS # BLD AUTO: 0 10E3/UL (ref 0–0.2)
BASOPHILS NFR BLD AUTO: 1 %
BILIRUB SERPL-MCNC: 0.6 MG/DL
BUN SERPL-MCNC: 30.1 MG/DL (ref 8–23)
CALCIUM SERPL-MCNC: 9.3 MG/DL (ref 8.8–10.4)
CHLORIDE SERPL-SCNC: 102 MMOL/L (ref 98–107)
CREAT SERPL-MCNC: 1.12 MG/DL (ref 0.67–1.17)
CRP SERPL-MCNC: 81.4 MG/L
EGFRCR SERPLBLD CKD-EPI 2021: 68 ML/MIN/1.73M2
EOSINOPHIL # BLD AUTO: 0.2 10E3/UL (ref 0–0.7)
EOSINOPHIL NFR BLD AUTO: 3 %
ERYTHROCYTE [DISTWIDTH] IN BLOOD BY AUTOMATED COUNT: 14.5 % (ref 10–15)
ERYTHROCYTE [SEDIMENTATION RATE] IN BLOOD BY WESTERGREN METHOD: 45 MM/HR (ref 0–20)
GLUCOSE SERPL-MCNC: 95 MG/DL (ref 70–99)
HCO3 SERPL-SCNC: 28 MMOL/L (ref 22–29)
HCT VFR BLD AUTO: 42.6 % (ref 40–53)
HGB BLD-MCNC: 13.8 G/DL (ref 13.3–17.7)
HOLD SPECIMEN: NORMAL
IMM GRANULOCYTES # BLD: 0.1 10E3/UL
IMM GRANULOCYTES NFR BLD: 1 %
LACTATE SERPL-SCNC: 0.9 MMOL/L (ref 0.7–2)
LYMPHOCYTES # BLD AUTO: 1.2 10E3/UL (ref 0.8–5.3)
LYMPHOCYTES NFR BLD AUTO: 22 %
MCH RBC QN AUTO: 31.7 PG (ref 26.5–33)
MCHC RBC AUTO-ENTMCNC: 32.4 G/DL (ref 31.5–36.5)
MCV RBC AUTO: 98 FL (ref 78–100)
MONOCYTES # BLD AUTO: 0.6 10E3/UL (ref 0–1.3)
MONOCYTES NFR BLD AUTO: 10 %
MRSA DNA SPEC QL NAA+PROBE: POSITIVE
NEUTROPHILS # BLD AUTO: 3.6 10E3/UL (ref 1.6–8.3)
NEUTROPHILS NFR BLD AUTO: 63 %
NRBC # BLD AUTO: 0 10E3/UL
NRBC BLD AUTO-RTO: 0 /100
NT-PROBNP SERPL-MCNC: 915 PG/ML (ref 0–1800)
PLATELET # BLD AUTO: 169 10E3/UL (ref 150–450)
POTASSIUM SERPL-SCNC: 4.2 MMOL/L (ref 3.4–5.3)
PROCALCITONIN SERPL IA-MCNC: 0.28 NG/ML
PROT SERPL-MCNC: 7.4 G/DL (ref 6.4–8.3)
RBC # BLD AUTO: 4.36 10E6/UL (ref 4.4–5.9)
SA TARGET DNA: POSITIVE
SODIUM SERPL-SCNC: 139 MMOL/L (ref 135–145)
TROPONIN T SERPL HS-MCNC: 41 NG/L
TROPONIN T SERPL HS-MCNC: 42 NG/L
WBC # BLD AUTO: 5.6 10E3/UL (ref 4–11)

## 2024-10-08 PROCEDURE — 250N000013 HC RX MED GY IP 250 OP 250 PS 637: Performed by: PHYSICIAN ASSISTANT

## 2024-10-08 PROCEDURE — 99222 1ST HOSP IP/OBS MODERATE 55: CPT | Performed by: PHYSICIAN ASSISTANT

## 2024-10-08 PROCEDURE — 73590 X-RAY EXAM OF LOWER LEG: CPT | Mod: LT

## 2024-10-08 PROCEDURE — 36415 COLL VENOUS BLD VENIPUNCTURE: CPT | Performed by: EMERGENCY MEDICINE

## 2024-10-08 PROCEDURE — 84145 PROCALCITONIN (PCT): CPT | Performed by: EMERGENCY MEDICINE

## 2024-10-08 PROCEDURE — 83880 ASSAY OF NATRIURETIC PEPTIDE: CPT | Performed by: EMERGENCY MEDICINE

## 2024-10-08 PROCEDURE — 87040 BLOOD CULTURE FOR BACTERIA: CPT | Performed by: EMERGENCY MEDICINE

## 2024-10-08 PROCEDURE — 99285 EMERGENCY DEPT VISIT HI MDM: CPT | Performed by: EMERGENCY MEDICINE

## 2024-10-08 PROCEDURE — 120N000002 HC R&B MED SURG/OB UMMC

## 2024-10-08 PROCEDURE — 73590 X-RAY EXAM OF LOWER LEG: CPT | Mod: 26 | Performed by: RADIOLOGY

## 2024-10-08 PROCEDURE — 258N000003 HC RX IP 258 OP 636: Performed by: EMERGENCY MEDICINE

## 2024-10-08 PROCEDURE — 84484 ASSAY OF TROPONIN QUANT: CPT | Performed by: EMERGENCY MEDICINE

## 2024-10-08 PROCEDURE — 87077 CULTURE AEROBIC IDENTIFY: CPT | Performed by: PHYSICIAN ASSISTANT

## 2024-10-08 PROCEDURE — 93970 EXTREMITY STUDY: CPT | Mod: 26 | Performed by: RADIOLOGY

## 2024-10-08 PROCEDURE — 73560 X-RAY EXAM OF KNEE 1 OR 2: CPT | Mod: LT

## 2024-10-08 PROCEDURE — 96365 THER/PROPH/DIAG IV INF INIT: CPT | Performed by: EMERGENCY MEDICINE

## 2024-10-08 PROCEDURE — 73560 X-RAY EXAM OF KNEE 1 OR 2: CPT | Mod: 26 | Performed by: RADIOLOGY

## 2024-10-08 PROCEDURE — 86140 C-REACTIVE PROTEIN: CPT | Performed by: EMERGENCY MEDICINE

## 2024-10-08 PROCEDURE — 83605 ASSAY OF LACTIC ACID: CPT | Performed by: EMERGENCY MEDICINE

## 2024-10-08 PROCEDURE — 93010 ELECTROCARDIOGRAM REPORT: CPT | Performed by: EMERGENCY MEDICINE

## 2024-10-08 PROCEDURE — 93005 ELECTROCARDIOGRAM TRACING: CPT | Performed by: EMERGENCY MEDICINE

## 2024-10-08 PROCEDURE — 250N000013 HC RX MED GY IP 250 OP 250 PS 637: Performed by: EMERGENCY MEDICINE

## 2024-10-08 PROCEDURE — 93970 EXTREMITY STUDY: CPT

## 2024-10-08 PROCEDURE — 250N000011 HC RX IP 250 OP 636: Performed by: EMERGENCY MEDICINE

## 2024-10-08 PROCEDURE — 87641 MR-STAPH DNA AMP PROBE: CPT | Performed by: PHYSICIAN ASSISTANT

## 2024-10-08 PROCEDURE — 85025 COMPLETE CBC W/AUTO DIFF WBC: CPT | Performed by: EMERGENCY MEDICINE

## 2024-10-08 PROCEDURE — 85652 RBC SED RATE AUTOMATED: CPT | Performed by: EMERGENCY MEDICINE

## 2024-10-08 PROCEDURE — 82947 ASSAY GLUCOSE BLOOD QUANT: CPT | Performed by: EMERGENCY MEDICINE

## 2024-10-08 PROCEDURE — 99285 EMERGENCY DEPT VISIT HI MDM: CPT | Mod: 25 | Performed by: EMERGENCY MEDICINE

## 2024-10-08 PROCEDURE — 250N000011 HC RX IP 250 OP 636: Performed by: PHYSICIAN ASSISTANT

## 2024-10-08 PROCEDURE — 99207 PR APP CREDIT; MD BILLING SHARED VISIT: CPT | Performed by: PHYSICIAN ASSISTANT

## 2024-10-08 RX ORDER — AMOXICILLIN 250 MG
2 CAPSULE ORAL 2 TIMES DAILY PRN
Status: DISCONTINUED | OUTPATIENT
Start: 2024-10-08 | End: 2024-10-13 | Stop reason: HOSPADM

## 2024-10-08 RX ORDER — AMIODARONE HYDROCHLORIDE 100 MG/1
100 TABLET ORAL DAILY
Status: DISCONTINUED | OUTPATIENT
Start: 2024-10-09 | End: 2024-10-13 | Stop reason: HOSPADM

## 2024-10-08 RX ORDER — LIDOCAINE 40 MG/G
CREAM TOPICAL
Status: DISCONTINUED | OUTPATIENT
Start: 2024-10-08 | End: 2024-10-13 | Stop reason: HOSPADM

## 2024-10-08 RX ORDER — HYDROMORPHONE HYDROCHLORIDE 1 MG/ML
0.5 INJECTION, SOLUTION INTRAMUSCULAR; INTRAVENOUS; SUBCUTANEOUS
Status: DISCONTINUED | OUTPATIENT
Start: 2024-10-08 | End: 2024-10-08

## 2024-10-08 RX ORDER — CEFAZOLIN SODIUM 1 G/50ML
1750 SOLUTION INTRAVENOUS EVERY 24 HOURS
Status: DISCONTINUED | OUTPATIENT
Start: 2024-10-09 | End: 2024-10-11

## 2024-10-08 RX ORDER — FERROUS SULFATE 325(65) MG
325 TABLET ORAL
Status: DISCONTINUED | OUTPATIENT
Start: 2024-10-09 | End: 2024-10-13 | Stop reason: HOSPADM

## 2024-10-08 RX ORDER — ACETAMINOPHEN 500 MG
1000 TABLET ORAL 3 TIMES DAILY
Status: DISCONTINUED | OUTPATIENT
Start: 2024-10-08 | End: 2024-10-13 | Stop reason: HOSPADM

## 2024-10-08 RX ORDER — ATORVASTATIN CALCIUM 40 MG/1
40 TABLET, FILM COATED ORAL AT BEDTIME
Status: DISCONTINUED | OUTPATIENT
Start: 2024-10-08 | End: 2024-10-13 | Stop reason: HOSPADM

## 2024-10-08 RX ORDER — POLYETHYLENE GLYCOL 3350 17 G/17G
17 POWDER, FOR SOLUTION ORAL DAILY PRN
Status: DISCONTINUED | OUTPATIENT
Start: 2024-10-08 | End: 2024-10-13 | Stop reason: HOSPADM

## 2024-10-08 RX ORDER — ACETAMINOPHEN 325 MG/1
650 TABLET ORAL EVERY 4 HOURS PRN
Status: DISCONTINUED | OUTPATIENT
Start: 2024-10-08 | End: 2024-10-13 | Stop reason: HOSPADM

## 2024-10-08 RX ORDER — HYDROMORPHONE HYDROCHLORIDE 1 MG/ML
0.5 INJECTION, SOLUTION INTRAMUSCULAR; INTRAVENOUS; SUBCUTANEOUS ONCE
Status: COMPLETED | OUTPATIENT
Start: 2024-10-08 | End: 2024-10-08

## 2024-10-08 RX ORDER — ONDANSETRON 2 MG/ML
4 INJECTION INTRAMUSCULAR; INTRAVENOUS EVERY 6 HOURS PRN
Status: DISCONTINUED | OUTPATIENT
Start: 2024-10-08 | End: 2024-10-13 | Stop reason: HOSPADM

## 2024-10-08 RX ORDER — CEFAZOLIN SODIUM 1 G/50ML
2000 SOLUTION INTRAVENOUS ONCE
Status: COMPLETED | OUTPATIENT
Start: 2024-10-08 | End: 2024-10-08

## 2024-10-08 RX ORDER — METOPROLOL SUCCINATE 25 MG/1
25 TABLET, EXTENDED RELEASE ORAL DAILY
Status: DISCONTINUED | OUTPATIENT
Start: 2024-10-09 | End: 2024-10-13 | Stop reason: HOSPADM

## 2024-10-08 RX ORDER — ACETAMINOPHEN 650 MG/1
650 SUPPOSITORY RECTAL EVERY 4 HOURS PRN
Status: DISCONTINUED | OUTPATIENT
Start: 2024-10-08 | End: 2024-10-13 | Stop reason: HOSPADM

## 2024-10-08 RX ORDER — OXYCODONE HYDROCHLORIDE 5 MG/1
5 TABLET ORAL EVERY 4 HOURS PRN
Status: DISCONTINUED | OUTPATIENT
Start: 2024-10-08 | End: 2024-10-13 | Stop reason: HOSPADM

## 2024-10-08 RX ORDER — ACETAMINOPHEN 325 MG/1
975 TABLET ORAL ONCE
Status: DISCONTINUED | OUTPATIENT
Start: 2024-10-08 | End: 2024-10-08

## 2024-10-08 RX ORDER — CIPROFLOXACIN 2 MG/ML
400 INJECTION, SOLUTION INTRAVENOUS ONCE
Status: COMPLETED | OUTPATIENT
Start: 2024-10-08 | End: 2024-10-08

## 2024-10-08 RX ORDER — CEPHALEXIN 500 MG/1
500 CAPSULE ORAL 2 TIMES DAILY
Status: ON HOLD | COMMUNITY
Start: 2024-10-03 | End: 2024-10-13

## 2024-10-08 RX ORDER — AMOXICILLIN 250 MG
1 CAPSULE ORAL 2 TIMES DAILY
Status: DISCONTINUED | OUTPATIENT
Start: 2024-10-08 | End: 2024-10-13 | Stop reason: HOSPADM

## 2024-10-08 RX ORDER — AMOXICILLIN 250 MG
1 CAPSULE ORAL 2 TIMES DAILY PRN
Status: DISCONTINUED | OUTPATIENT
Start: 2024-10-08 | End: 2024-10-13 | Stop reason: HOSPADM

## 2024-10-08 RX ORDER — CALCIUM CARBONATE 500 MG/1
1000 TABLET, CHEWABLE ORAL 4 TIMES DAILY PRN
Status: DISCONTINUED | OUTPATIENT
Start: 2024-10-08 | End: 2024-10-13 | Stop reason: HOSPADM

## 2024-10-08 RX ORDER — METHOCARBAMOL 500 MG/1
500 TABLET, FILM COATED ORAL 2 TIMES DAILY PRN
Status: DISCONTINUED | OUTPATIENT
Start: 2024-10-08 | End: 2024-10-13 | Stop reason: HOSPADM

## 2024-10-08 RX ORDER — GABAPENTIN 100 MG/1
200 CAPSULE ORAL AT BEDTIME
Status: DISCONTINUED | OUTPATIENT
Start: 2024-10-08 | End: 2024-10-13 | Stop reason: HOSPADM

## 2024-10-08 RX ORDER — PRAMIPEXOLE DIHYDROCHLORIDE 0.25 MG/1
0.25 TABLET ORAL AT BEDTIME
Status: DISCONTINUED | OUTPATIENT
Start: 2024-10-08 | End: 2024-10-13 | Stop reason: HOSPADM

## 2024-10-08 RX ORDER — ONDANSETRON 4 MG/1
4 TABLET, ORALLY DISINTEGRATING ORAL EVERY 6 HOURS PRN
Status: DISCONTINUED | OUTPATIENT
Start: 2024-10-08 | End: 2024-10-13 | Stop reason: HOSPADM

## 2024-10-08 RX ORDER — LIDOCAINE 4 G/G
1 PATCH TOPICAL EVERY 24 HOURS
Status: DISCONTINUED | OUTPATIENT
Start: 2024-10-09 | End: 2024-10-13 | Stop reason: HOSPADM

## 2024-10-08 RX ORDER — CIPROFLOXACIN 750 MG/1
750 TABLET, FILM COATED ORAL EVERY 12 HOURS
Status: DISCONTINUED | OUTPATIENT
Start: 2024-10-09 | End: 2024-10-13

## 2024-10-08 RX ORDER — BUPRENORPHINE 10 UG/H
1 PATCH TRANSDERMAL WEEKLY
Status: DISCONTINUED | OUTPATIENT
Start: 2024-10-09 | End: 2024-10-13 | Stop reason: HOSPADM

## 2024-10-08 RX ORDER — CIPROFLOXACIN 2 MG/ML
400 INJECTION, SOLUTION INTRAVENOUS EVERY 12 HOURS
Status: DISCONTINUED | OUTPATIENT
Start: 2024-10-08 | End: 2024-10-08

## 2024-10-08 RX ORDER — BUMETANIDE 1 MG/1
1 TABLET ORAL DAILY
Status: DISCONTINUED | OUTPATIENT
Start: 2024-10-09 | End: 2024-10-13 | Stop reason: HOSPADM

## 2024-10-08 RX ADMIN — CIPROFLOXACIN 400 MG: 2 INJECTION, SOLUTION INTRAVENOUS at 12:00

## 2024-10-08 RX ADMIN — ACETAMINOPHEN 1000 MG: 500 TABLET ORAL at 21:13

## 2024-10-08 RX ADMIN — PRAMIPEXOLE DIHYDROCHLORIDE 0.25 MG: 0.25 TABLET ORAL at 21:15

## 2024-10-08 RX ADMIN — GABAPENTIN 200 MG: 100 CAPSULE ORAL at 21:14

## 2024-10-08 RX ADMIN — APIXABAN 5 MG: 5 TABLET, FILM COATED ORAL at 21:14

## 2024-10-08 RX ADMIN — ACETAMINOPHEN 975 MG: 325 TABLET ORAL at 11:03

## 2024-10-08 RX ADMIN — ATORVASTATIN CALCIUM 40 MG: 40 TABLET, FILM COATED ORAL at 21:14

## 2024-10-08 RX ADMIN — HYDROMORPHONE HYDROCHLORIDE 0.5 MG: 1 INJECTION, SOLUTION INTRAMUSCULAR; INTRAVENOUS; SUBCUTANEOUS at 11:04

## 2024-10-08 RX ADMIN — HYDROMORPHONE HYDROCHLORIDE 0.5 MG: 1 INJECTION, SOLUTION INTRAMUSCULAR; INTRAVENOUS; SUBCUTANEOUS at 19:05

## 2024-10-08 RX ADMIN — VANCOMYCIN HYDROCHLORIDE 2000 MG: 10 INJECTION, POWDER, LYOPHILIZED, FOR SOLUTION INTRAVENOUS at 13:15

## 2024-10-08 ASSESSMENT — ACTIVITIES OF DAILY LIVING (ADL)
ADLS_ACUITY_SCORE: 33
ADLS_ACUITY_SCORE: 38
ADLS_ACUITY_SCORE: 38
ADLS_ACUITY_SCORE: 33
ADLS_ACUITY_SCORE: 44
ADLS_ACUITY_SCORE: 33
ADLS_ACUITY_SCORE: 33
ADLS_ACUITY_SCORE: 44
ADLS_ACUITY_SCORE: 44
ADLS_ACUITY_SCORE: 33
ADLS_ACUITY_SCORE: 38

## 2024-10-08 NOTE — ED PROVIDER NOTES
ED Provider Note  [facility]      History     Chief Complaint   Patient presents with    Leg Swelling    Leg Pain     L lower leg pain & swelling     The history is provided by the patient, medical records and the EMS personnel.     Gorge Berkowitz Jr. is a 79 year old male with history of HFrEF 2/2 ICM, CAD, Afib on Eliquis, chronic lymphedema, and bilateral osteoarthritis s/p left TKA who presents to the ED via EMS from long term care facility with bilateral leg pain and swelling. Patient reports that following 2 complicated left knee surgeries he has been wheelchair bound due to his pain. In the last 2-3 weeks he has had increased bilateral leg swelling, pain, and redness. They have been wrapping both legs and he states that he was put on over a week of antibiotics with no improvement. He also reports having an US of the left leg which was negative for DVT and was told that his symptoms are likely related to his inactivity as he spends most of his days sitting in his wheelchair coloring. In the past 3 days his pain, swelling, and redness has continued to worsen. He reports significant pain with bending the left knee which has been going on for about 2 months. He denies recent injury or fall. He has had no fevers, chest pain, nausea, vomiting, or shortness of breath. He is not currently on antibiotics.     Physical Exam   BP: 119/60  Pulse: 61  Temp: 97.6  F (36.4  C)  Resp: 18  SpO2: 95 %  Physical Exam  General: patient is alert and oriented and in no acute distress   Head: atraumatic and normocephalic   EENT: moist mucus membranes without tonsillar erythema or exudates, pupils round and reactive   Neck: supple   Cardiovascular: regular rate and rhythm, extremities warm and well perfused, no lower extremity edema  Pulmonary: lungs clear to auscultation bilaterally   Abdomen: soft, non-tender   Musculoskeletal: normal range of motion   Neurological: alert and oriented, moving all  extremities symmetrically, gait normal   Skin: warm, dry         ED Course, Procedures, & Data      Procedures            EKG Interpretation:      Interpreted by Andria Winchester MD  Time reviewed:1028   Symptoms at time of EKG: leg swelling   Rhythm: Normal sinus , Sinus bradycardia, and 1 degree AV block  Rate: Bradycardia  Axis: Left Axis Deviation  Ectopy: None  Conduction: Nonspecific interventricular conduction block  ST Segments/ T Waves: No ST-T wave changes and No acute ischemic changes  Q Waves: III and aVf  Comparison to prior: Unchanged    Clinical Impression: sinus bradycardia and 1st degree AV block       Results for orders placed or performed during the hospital encounter of 10/08/24   [facility] [facility]     Status: None ()    Narrative    The following orders were created for panel order [facility] [facility].  Procedure                               Abnormality         Status                     ---------                               -----------         ------                     Extra Blue Top Tube[[id number]]                                                         Extra Green Top (Lithium...[[id number]]                                                 Extra Purple Top Tube[[id number]]                                                       Extra Purple Top Tube[[id number]]                                                         Please view results for these tests on the individual orders.   Extra Tube ([facility] [facility])     Status: None ()    Narrative    The following orders were created for panel order Extra Tube ([facility] [facility]).  Procedure                               Abnormality         Status                     ---------                               -----------         ------                     Extra Red Top Tube[[id number]]                                                            Please view results for these tests on the individual orders.     Medications - No data to display  Labs Ordered and Resulted from  Time of ED Arrival to Time of ED Departure - No data to display  No orders to display          Critical care was not performed.     Medical Decision Making  The patient's presentation was of high complexity (an acute health issue posing potential threat to life or bodily function).    The patient's evaluation involved:  ordering and/or review of 3+ test(s) in this encounter (see separate area of note for details)  discussion of management or test interpretation with another health professional (orthopedic surgery, IM)    The patient's management necessitated moderate risk (prescription drug management including medications given in the ED), high risk (a parenteral controlled substance), and high risk (a decision regarding hospitalization).    Assessment & Plan    Mr. Berkowitz is a 79-year-old male with a history of bilateral osteoarthritis status post left Harth left knee arthroplasty, coronary artery disease, ischemic cardiomyopathy, chronic systolic heart failure, atrial flutter on Eliquis, lymphedema who presents to the emergency department with increasing left greater than right leg pain.  He is noted to be hemodynamically stable, afebrile and in no respiratory distress.  Differential diagnosis includes but is not limited to cellulitis, DVT, CHF exacerbation with dependent edema, septic arthritis.  On evaluation he does not appear toxic and no crepitus noted on exam or significant pain on palpation of the left leg.  Lower suspicion for necrotizing infection.  Laboratory evaluation shows no leukocytosis, CRP is elevated to 81, procalcitonin 0.28, renal function is at baseline, troponin slightly elevated at 41 with delta of 42, .  X-ray shows no gas within the tissue or joint effusion in the left knee.  Duplex ultrasound shows no obvious DVT though unable to visualize peroneal veins.  He was given IV vancomycin and ciprofloxacin.  He does have decreased range of motion of his left knee though notes that he  has decreased range at baseline due to osteoarthritis.  Given his associated infection however discussed with orthopedic surgery for potential joint aspiration and consult pending.  Patient will be admitted to medicine for further IV antibiotics.    This part of the medical record was transcribed by Lexie Rizzo, Medical Scribe, from a dictation done by Andria Vega MD.     I have reviewed the nursing notes. I have reviewed the findings, diagnosis, plan and need for follow up with the patient.    New Prescriptions    No medications on file       Final diagnoses:   Cellulitis of left lower extremity   I, Lexie Rizzo, am serving as a trained medical scribe to document services personally performed by Andria Vega MD, based on the provider's statements to me.     I, Andria Vega MD, was physically present and have reviewed and verified the accuracy of this note documented by Lexie Rizzo.      Andria Vega MD  [facility] EMERGENCY DEPARTMENT  10/8/2024              Andria Vega MD  10/08/24 1616       Andria Vega MD  10/08/24 1612

## 2024-10-08 NOTE — H&P
[facility]    History and Physical - Hospitalist Service, GOLD TEAM        Date of Admission:  10/8/2024    Assessment & Plan   Gorge Berkowitz Jr. is a 79 year old male admitted on 10/8/2024 for L leg cellulitis. He has history of HFrEF due to ICM, CAD, a fib on Eliquis, lymphedema.     # Cellulitis to LLE  Patient presents with 2 days of acute on chronic swelling and 1 day of acute pain to LLE. This is in context of chronic symptoms related to lymphedema of bilateral legs. No known trauma or injuries to the area, but areas of non-intact skin noted, likely related to chronic edema. He has been feeling feverish at his LTC where he stays, no report of documented fevers. Erythema noted (see photo below) that extends to upper leg, significantly increased erythema compared to right leg.   -- Xray of left knee and tib fib without abnromality.  -- ESR 42, CRP 81, non elevated WBC and procalcitonin  -- BLE duplex US with somewhat insufficient study with bilateral peroneal veins not visualized and patient unable to tolerate compression of femoral vein, but otherwise negative for DVT.   -- Hx of BLE cellulitis in November 2023 at that time wound cultures grew polymicrobia including serratia CFE, klebsiella, enterococcus, MSSA. He was treated with broad spectrum abx narrowed to levofloxacin per ID.   Plan:   - continue vanco and ciprofloxacin started in ED (10/8-  - PRN acetaminophen and oxycodone in addition to PTA buprenorphine    # Chronic HFrEF  # ICM  # CAD  # A fib on chronic Eliquis  # HTN  - continue PTA Eliquis 5 mg BID  - continue PTA amiodarone 100 mg daily  - continue PTA Bumex 1 mg daily  - continue PTA Jardiance 10 mg daily  - continue PTA metoprolol 25 mg daily    # OA  # S/p L TKA  # Chronic deconditioning  Resides at LTC facility. Wheelchair bound at baseline.   - cont PTA buprenorphine    # Lymphedema  Does not appear to be on any chronic interventions for  this. May need to investigate OP interventions and/or start wraps once cellulitis improves, to ameliorate risk for future infection.     # Dyslipidemia - cont PTA statin  # ROSALVA - has been sleeping well without CPAP for 2 years, states CPAP has not worked well for him in the past.   # RLS - cont PTA Mirapex          Diet: Combination Diet Regular Diet Adult    DVT Prophylaxis: DOAC  Levy Catheter: Not present  Lines: None     Cardiac Monitoring: None  Code Status: Full Code    Clinically Significant Risk Factors Present on Admission              # Hypoalbuminemia: Lowest albumin = 3.4 g/dL at 10/8/2024  9:52 AM, will monitor as appropriate    # Drug Induced Coagulation Defect: home medication list includes an anticoagulant medication    # Hypertension: Noted on problem list             # Financial/Environmental Concerns:           Disposition Plan     Medically Ready for Discharge: Anticipated Tomorrow         The patient's care was discussed with the Attending Physician, Dr. Sebastian, Bedside Nurse, and Patient.    Carmen Schneider PA-C  Hospitalist Service, [facility]  Securely message with [employer] (more info)  Text page via [facility] Paging/Directory   See signed in provider for up to date coverage information    ______________________________________________________________________    Chief Complaint   Left leg pain.     History is obtained from the patient    History of Present Illness   Gorge Berkowitz Jr. is a 79 year old male who is seen for a medical evaluation. Patient presents with 2 days of acute on chronic swelling and 1 day of acute pain to LLE. This is in context of chronic symptoms related to lymphedema of bilateral legs. No known trauma or injuries to the area, but areas of non-intact skin noted, likely related to chronic edema. He has been feeling feverish at his LTC where he stays, no report of documented fevers. Erythema noted (see photo below)  that extends to upper leg, significantly increased erythema compared to right leg. He denies any recent CP, SOB, abd pain, vomiting, diarrhea.       Past Medical History    Past Medical History:   Diagnosis Date    Atrial fibrillation (H)     Cardiomyopathy (H)     ef 20%    CHF (congestive heart failure) (H)     Morbid obesity (H)     ROSALVA (obstructive sleep apnea)        Past Surgical History   Past Surgical History:   Procedure Laterality Date    CARDIAC CATHETERIZATION  08/10/2018    and right heart cath    CARDIOVERSION  08/16/2018    successful    CV CORONARY ANGIOGRAM N/A 8/10/2018    Procedure: Coronary Angiogram;  Surgeon: Ralph Powers MD;  Location: [facility] Cath Lab;  Service:     CV LEFT HEART CATHETERIZATION WITHOUT LEFT VENTRICULOGRAM Left 8/10/2018    Procedure: Left Heart Catheterization Without Left Ventriculogram;  Surgeon: Ralph Powers MD;  Location: [facility] Cath Lab;  Service:     CV RIGHT HEART CATHETERIZATION N/A 8/10/2018    Procedure: Right Heart Catheterization;  Surgeon: Ralph Powers MD;  Location: [facility] Cath Lab;  Service:        Prior to Admission Medications   Prior to Admission Medications   Prescriptions Last Dose Informant Patient Reported? Taking?   Calcium Carbonate-Vitamin D 600-10 MG-MCG TABS   No No   Sig: Take 1 tablet by mouth daily   ELIQUIS 5 mg Tab tablet  Self No No   Sig: [ELIQUIS 5 MG TAB TABLET] Take 1 tablet (5 mg total) by mouth 2 (two) times a day.   Patient taking differently: Take 5 mg by mouth 2 times daily   Krill Oil Ultra Strength 1500 MG CAPS   Yes No   Sig: Take 3 capsules by mouth every evening   Lidocaine (LIDOCARE) 4 % Patch   No No   Sig: Place 1 patch onto the skin every 24 hours Place onto the left shoulder.   To prevent lidocaine toxicity, patient should be patch free for 12 hrs daily.   acetaminophen (TYLENOL) 325 MG tablet   Yes No   Sig: Take 650 mg by mouth every 4 hours as needed (mild pain)   acetaminophen  (TYLENOL) 500 MG tablet  Self Yes No   Sig: Take 1,000 mg by mouth 3 times daily   amiodarone (PACERONE) 200 MG tablet  Self No No   Sig: [AMIODARONE (PACERONE) 200 MG TABLET] Take 0.5 tablets (100 mg total) by mouth daily.   atorvastatin (LIPITOR) 40 MG tablet  Self No No   Sig: [ATORVASTATIN (LIPITOR) 40 MG TABLET] Take 1 tablet (40 mg total) by mouth at bedtime.   bumetanide (BUMEX) 1 MG tablet   No No   Sig: Take 1 tablet (1 mg) by mouth daily   buprenorphine (BUTRANS) 10 MCG/HR WK patch   Yes No   Sig: Place 1 patch onto the skin every 7 days Wednesdays   empagliflozin (JARDIANCE) 10 MG TABS tablet   No No   Sig: Take 1 tablet (10 mg) by mouth daily   ferrous sulfate (FEROSUL) 325 (65 Fe) MG tablet   No No   Sig: Take 1 tablet (325 mg) by mouth daily (with breakfast)   gabapentin (NEURONTIN) 100 MG capsule   No No   Sig: Take 2 capsules (200 mg) by mouth at bedtime   glucosamine-chondroitin 500-400 MG CAPS per capsule   Yes No   Sig: Take 1 capsule by mouth daily   melatonin 3 MG tablet   Yes No   Sig: Take 3 mg by mouth at bedtime   methocarbamol (ROBAXIN) 500 MG tablet   Yes No   Sig: Take 500 mg by mouth 2 times daily as needed for muscle spasms   metoprolol succinate ER (TOPROL XL) 25 MG 24 hr tablet   No No   Sig: Take 1 tablet (25 mg) by mouth daily   miconazole (MICATIN) 2 % external powder   No No   Sig: Apply topically 2 times daily To affected areas   polyethylene glycol (GLYCOLAX) 17 gram/dose powder  Self Yes No   Sig: Take 17 g by mouth daily as needed for constipation   pramipexole (MIRAPEX) 0.25 MG tablet  Self Yes No   Sig: Take 0.25 mg by mouth At Bedtime   senna-docusate (SENOKOT-S/PERICOLACE) 8.6-50 MG tablet   No No   Sig: Take 1 tablet by mouth 2 times daily   vitamin B-12 (CYANOCOBALAMIN) 500 MCG tablet  Self Yes No   Sig: Take 500 mcg by mouth daily      Facility-Administered Medications: None        Review of Systems    The 5 point Review of Systems is negative other than noted in the  HPI.     Physical Exam   Vital Signs: Temp: (!) 96.5  F (35.8  C) Temp src: Axillary BP: 114/70 Pulse: 54   Resp: 20 SpO2: 95 % O2 Device: None (Room air)    Weight: 0 lbs 0 oz    GENERAL: adult male seen sitting reclined in bed. NAD.   NEURO / PSYCH: Alert, converses appropriately. No focal deficits. Moves all extremities.   HEENT: Anicteric sclera.   CV: RRR. S1, S2. No murmurs appreciated.  2+ left radial pulse.  RESPIRATORY: Effort normal. Lungs CTAB with no wheezing, rales, rhonchi.   GI: Abdomen soft and non distended with bowel sounds rare. No tenderness or guarding to palpation.   MSK: no gross deformities  EXTREMITIES: BLE with significant edema and venous stasis changes. Left leg with increased erythema that is not well demarcated to lower and extended to the upper leg.   SKIN: No jaundice. No rashes or lesions to exposed areas.           Medical Decision Making       50 MINUTES SPENT BY ME on the date of service doing chart review, history, exam, documentation & further activities per the note.      Data     I have personally reviewed the following data over the past 24 hrs:    5.6  \   13.8   / 169     139 102 30.1 (H) /  95   4.2 28 1.12 \     ALT: 17 AST: 22 AP: 129 TBILI: 0.6   ALB: 3.4 (L) TOT PROTEIN: 7.4 LIPASE: N/A     Trop: 42 (H) BNP: 915     Procal: 0.28 CRP: 81.40 (H) Lactic Acid: 0.9

## 2024-10-08 NOTE — PROGRESS NOTES
Brief Medicine Cross Cover Note    Patient arrived from [place], currently admitted for suspected LLE cellulitis.  Evaluated patient at bedside, all questions answered.     Orders reviewed, continue vancomycin and ciprofloxacin. WOC consult in place.     Sunitha Jorge PA-C  Hospitalist Service  [facility]

## 2024-10-08 NOTE — PLAN OF CARE
Problem: Adult Inpatient Plan of Care  Goal: Readiness for Transition of Care  Intervention: Mutually Develop Transition Plan  Recent Flowsheet Documentation  Taken 10/8/2024 1840 by Donal Calvo, RN  Equipment Currently Used at Home: hospital bed     Problem: Skin or Soft Tissue Infection  Goal: Absence of Infection Signs and Symptoms  Intervention: Minimize and Manage Infection Progression  Recent Flowsheet Documentation  Taken 10/8/2024 1845 by Donal Calvo, RN  Isolation Precautions: contact precautions maintained    6MS ADMISSION    D: Patient admitted/transferred from ED [facility]  via stretcher  for cellulitis.     I: Upon arrival to the unit patient was oriented to room, unit, and call light. Patient s height, weight, and vital signs were obtained. Allergies reviewed and allergy band applied. Provider notified of patient s arrival on the unit (ELIZABETH GONZALES [id number]). Adult AVS completed. Head to toe assessment completed. Education assessment completed. Care plan initiated.    A: Vital signs stable upon admission. Patient rates pain at 8/10. Two RN skin assessment completed YES. Second RN was PANCHO Significant Skin Findings include Wound in the bottom    Cellulitis in lower extremities   Open wounds in both lower ex (photos ytaken)  . [facility] Nurse Consult Ordered Yes .     P: Continue to monitor patient s skin integrity and intervene as needed. Continue with plan of care. Notify provider with any concerns or changes in patient status.     VS:  Temp: 97.9  F (36.6  C) Temp src: Oral BP: 117/65 Pulse: 64   Resp: 16 SpO2: 97 % O2 Device: None (Room air)       O2: SpO2 > 97 and stable on RA.   Diminished L/S   Output: Voids spontaneously without difficulty to urinal   Last BM: 10/06/2024, denies abdominal discomfort. BS active / passing flatus.    Activity:  Ax2 , hernan steady    Skin:  Wound in the bottom    Cellulitis in lower extremities   Open wounds in both lower ex (photos ytaken)   Pain: Pain managed  with dilaudid    CMS: Intact, AOx4.    Dressing:  None    Diet: Combination Regular diet. Denies nausea/vomiting.    LDA: L PIV SL   Equipment: IV pole, personal belongings,    Plan: CONTACT precautions maintained / Continue with plan of care. Call light within reach, pt able to make needs known.   Plan: IV antibiotics   Additional Info:  Refused external catheter   Refused gown (will use later at night )  Refused oxycodone  1825 informed ELIZABETH GONZALES

## 2024-10-08 NOTE — PHARMACY-VANCOMYCIN DOSING SERVICE
Pharmacy Vancomycin Initial Note  Date of Service 2024  Patient's  1945  79 year old, male    Indication: Skin and Soft Tissue Infection    Current estimated CrCl = Estimated Creatinine Clearance: 67.2 mL/min (based on SCr of 1.12 mg/dL).    Creatinine for last 3 days  10/8/2024:  9:52 AM Creatinine 1.12 mg/dL    Recent Vancomycin Level(s) for last 3 days  No results found for requested labs within last 3 days.      Vancomycin IV Administrations (past 72 hours)        No vancomycin orders with administrations in past 72 hours.                    Nephrotoxins and other renal medications (From now, onward)      Start     Dose/Rate Route Frequency Ordered Stop    10/09/24 1200  vancomycin (VANCOCIN) 1,750 mg in sodium chloride 0.9 % 517.5 mL intermittent infusion         1,750 mg  over 2 Hours Intravenous EVERY 24 HOURS 10/08/24 1044      10/08/24 1100  vancomycin (VANCOCIN) 2,000 mg in sodium chloride 0.9 % 570 mL intermittent infusion         2,000 mg  over 2 Hours Intravenous ONCE 10/08/24 1015              Contrast Orders - past 72 hours (72h ago, onward)      None            [employer] Prediction of Planned Initial Vancomycin Regimen  Loading dose: 2000 mg at 11:30 10/08/2024.  Regimen: 1750 mg IV every 24 hours.  Start time: 11:30 on 10/09/2024  Exposure target: AUC24 (range)400-600 mg/L.hr   AUC24,ss: 504 mg/L.hr  Probability of AUC24 > 400: 74 %  Ctrough,ss: 14.7 mg/L  Probability of Ctrough,ss > 20: 26 %  Probability of nephrotoxicity (Lodise NICOLE ): 10 %        Plan:  Start vancomycin 2000 mg x1, followed by 1750 mg IV q24h.   Vancomycin monitoring method: AUC  Vancomycin therapeutic monitoring goal: 400-600 mg*h/L  Pharmacy will check vancomycin levels as appropriate in 3-5 Days.    Serum creatinine levels will be ordered daily for the first week of therapy and at least twice weekly for subsequent weeks.      Maria nIes Thomas, PharmD, BCEMP, BCPS

## 2024-10-08 NOTE — ED TRIAGE NOTES
BIBA per EMS patient from [facility]. Has been experiencing pain in his L lower leg. On Eliquis. Negative for DVT on ultrasound. No pre treatment en route. Hemodynamically stable.

## 2024-10-08 NOTE — PHARMACY-ADMISSION MEDICATION HISTORY
Pharmacist Admission Medication History    Admission medication history is complete. The information provided in this note is only as accurate as the sources available at the time of the update.    Information Source(s): Facility (U/NH/) medication list/MAR and CareEverywhere/SureScripts via N/A    Pertinent Information: Patient receiving 7 day course of keflex 500 bid currently for L leg cellulitis. Ordered 10/3pm-10/10am.    Changes made to PTA medication list:  Added: cephalexin  Deleted: krill oil, vitamin b12, miralax, miconazole  Changed: bumex 1 mg--> 2 mg qd    Allergies reviewed with patient and updates made in EHR: yes    Medication History Completed By: Maria Ines Thomas RPH 10/8/2024 3:37 PM    PTA Med List   Medication Sig Last Dose    acetaminophen (TYLENOL) 500 MG tablet Take 1,000 mg by mouth 3 times daily 10/8/2024 at am    Acetaminophen 325 MG CAPS Take 650 mg by mouth every 4 hours as needed (mild pain).     amiodarone (PACERONE) 200 MG tablet [AMIODARONE (PACERONE) 200 MG TABLET] Take 0.5 tablets (100 mg total) by mouth daily. 10/8/2024 at am    atorvastatin (LIPITOR) 40 MG tablet [ATORVASTATIN (LIPITOR) 40 MG TABLET] Take 1 tablet (40 mg total) by mouth at bedtime. 10/7/2024 at pm    bumetanide (BUMEX) 1 MG tablet Take 1 tablet (1 mg) by mouth daily (Patient taking differently: Take 2 mg by mouth daily.) 10/8/2024 at am    buprenorphine (BUTRANS) 10 MCG/HR WK patch Place 1 patch onto the skin every 7 days Wednesdays 10/2/2024 at pm    Calcium Carbonate-Vitamin D 600-10 MG-MCG TABS Take 1 tablet by mouth daily 10/8/2024 at am    cephALEXin (KEFLEX) 500 MG capsule Take 500 mg by mouth 2 times daily. 10/8/2024 at am    ELIQUIS 5 mg Tab tablet [ELIQUIS 5 MG TAB TABLET] Take 1 tablet (5 mg total) by mouth 2 (two) times a day. (Patient taking differently: Take 5 mg by mouth 2 times daily.) 10/8/2024 at am    empagliflozin (JARDIANCE) 10 MG TABS tablet Take 1 tablet (10 mg) by mouth daily 10/8/2024  at am    ferrous sulfate (FEROSUL) 325 (65 Fe) MG tablet Take 1 tablet (325 mg) by mouth daily (with breakfast) 10/8/2024 at am    gabapentin (NEURONTIN) 100 MG capsule Take 2 capsules (200 mg) by mouth at bedtime 10/7/2024 at pm    glucosamine-chondroitin 500-400 MG CAPS per capsule Take 1 capsule by mouth daily 10/8/2024 at am    Lidocaine (LIDOCARE) 4 % Patch Place 1 patch onto the skin every 24 hours Place onto the left shoulder.   To prevent lidocaine toxicity, patient should be patch free for 12 hrs daily. 10/8/2024 at am    melatonin 3 MG tablet Take 3 mg by mouth at bedtime 10/7/2024 at pm    metoprolol succinate ER (TOPROL XL) 25 MG 24 hr tablet Take 1 tablet (25 mg) by mouth daily 10/8/2024 at am    pramipexole (MIRAPEX) 0.25 MG tablet Take 0.25 mg by mouth At Bedtime 10/7/2024 at pm    senna-docusate (SENOKOT-S/PERICOLACE) 8.6-50 MG tablet Take 1 tablet by mouth 2 times daily 10/8/2024 at am

## 2024-10-08 NOTE — LETTER
Recipient: [facility]          Sender: SALVADOR Scott -  on Sunday  Phone: [phone] - unit   [facility] Unit Nurses Station Phone: [phone]  S Unit Nurs[facility] Station Fax: [phone]        Date: October 13, 2024  Patient Name:  Gorge Berkowitz Jr.  Patient YOB: 1945  Routing Message:  Discharge Orders    New Medication: Lizenzolid will be filled at discharge pharmacy here and sent with patient.        The documents accompanying this notice contain confidential information belonging to the sender.  This information is intended only for the use of the individual or entity named above.  The authorized recipient of this information is prohibited from disclosing this information to any other party and is required to destroy the information after its stated need has been fulfilled, unless otherwise required by state law.    If you are not the intended recipient, you are hereby notified that any disclosure, copy, distribution or action taken in reliance on the contents of these documents is strictly prohibited.  If you have received this document in error, please return it by fax to [phone] with a note on the cover sheet explaining why you are returning it (e.g. not your patient, not your provider, etc.).  If you need further assistance, please call [facility] Centralized Transcription at [phone].  Documents may also be returned by mail to [employer], , [address], [address], [place], [place] [address].

## 2024-10-09 ENCOUNTER — DOCUMENTATION ONLY (OUTPATIENT)
Dept: OTHER | Facility: CLINIC | Age: 79
End: 2024-10-09
Payer: COMMERCIAL

## 2024-10-09 LAB
ANION GAP SERPL CALCULATED.3IONS-SCNC: 10 MMOL/L (ref 7–15)
BUN SERPL-MCNC: 30.7 MG/DL (ref 8–23)
CALCIUM SERPL-MCNC: 8.9 MG/DL (ref 8.8–10.4)
CHLORIDE SERPL-SCNC: 102 MMOL/L (ref 98–107)
CREAT SERPL-MCNC: 1.16 MG/DL (ref 0.67–1.17)
EGFRCR SERPLBLD CKD-EPI 2021: 65 ML/MIN/1.73M2
ERYTHROCYTE [DISTWIDTH] IN BLOOD BY AUTOMATED COUNT: 14.2 % (ref 10–15)
GLUCOSE SERPL-MCNC: 94 MG/DL (ref 70–99)
HCO3 SERPL-SCNC: 26 MMOL/L (ref 22–29)
HCT VFR BLD AUTO: 41.7 % (ref 40–53)
HGB BLD-MCNC: 13.7 G/DL (ref 13.3–17.7)
MCH RBC QN AUTO: 32 PG (ref 26.5–33)
MCHC RBC AUTO-ENTMCNC: 32.9 G/DL (ref 31.5–36.5)
MCV RBC AUTO: 97 FL (ref 78–100)
PLATELET # BLD AUTO: 171 10E3/UL (ref 150–450)
POTASSIUM SERPL-SCNC: 4.3 MMOL/L (ref 3.4–5.3)
RBC # BLD AUTO: 4.28 10E6/UL (ref 4.4–5.9)
SODIUM SERPL-SCNC: 138 MMOL/L (ref 135–145)
WBC # BLD AUTO: 4.3 10E3/UL (ref 4–11)

## 2024-10-09 PROCEDURE — 250N000013 HC RX MED GY IP 250 OP 250 PS 637: Performed by: PHYSICIAN ASSISTANT

## 2024-10-09 PROCEDURE — 80048 BASIC METABOLIC PNL TOTAL CA: CPT | Performed by: PHYSICIAN ASSISTANT

## 2024-10-09 PROCEDURE — 36415 COLL VENOUS BLD VENIPUNCTURE: CPT | Performed by: PHYSICIAN ASSISTANT

## 2024-10-09 PROCEDURE — 85027 COMPLETE CBC AUTOMATED: CPT | Performed by: PHYSICIAN ASSISTANT

## 2024-10-09 PROCEDURE — G0463 HOSPITAL OUTPT CLINIC VISIT: HCPCS

## 2024-10-09 PROCEDURE — 120N000002 HC R&B MED SURG/OB UMMC

## 2024-10-09 PROCEDURE — 99232 SBSQ HOSP IP/OBS MODERATE 35: CPT | Performed by: STUDENT IN AN ORGANIZED HEALTH CARE EDUCATION/TRAINING PROGRAM

## 2024-10-09 PROCEDURE — 250N000011 HC RX IP 250 OP 636: Performed by: PHYSICIAN ASSISTANT

## 2024-10-09 PROCEDURE — 258N000003 HC RX IP 258 OP 636: Performed by: PHYSICIAN ASSISTANT

## 2024-10-09 RX ORDER — NALOXONE HYDROCHLORIDE 0.4 MG/ML
0.4 INJECTION, SOLUTION INTRAMUSCULAR; INTRAVENOUS; SUBCUTANEOUS
Status: DISCONTINUED | OUTPATIENT
Start: 2024-10-09 | End: 2024-10-13 | Stop reason: HOSPADM

## 2024-10-09 RX ORDER — NALOXONE HYDROCHLORIDE 0.4 MG/ML
0.2 INJECTION, SOLUTION INTRAMUSCULAR; INTRAVENOUS; SUBCUTANEOUS
Status: DISCONTINUED | OUTPATIENT
Start: 2024-10-09 | End: 2024-10-13 | Stop reason: HOSPADM

## 2024-10-09 RX ADMIN — FERROUS SULFATE TAB 325 MG (65 MG ELEMENTAL FE) 325 MG: 325 (65 FE) TAB at 08:22

## 2024-10-09 RX ADMIN — EMPAGLIFLOZIN 10 MG: 10 TABLET, FILM COATED ORAL at 08:21

## 2024-10-09 RX ADMIN — POLYETHYLENE GLYCOL 3350 17 G: 17 POWDER, FOR SOLUTION ORAL at 17:15

## 2024-10-09 RX ADMIN — AMIODARONE HYDROCHLORIDE 100 MG: 100 TABLET ORAL at 08:22

## 2024-10-09 RX ADMIN — SENNOSIDES AND DOCUSATE SODIUM 1 TABLET: 50; 8.6 TABLET ORAL at 20:58

## 2024-10-09 RX ADMIN — APIXABAN 5 MG: 5 TABLET, FILM COATED ORAL at 20:56

## 2024-10-09 RX ADMIN — ATORVASTATIN CALCIUM 40 MG: 40 TABLET, FILM COATED ORAL at 21:00

## 2024-10-09 RX ADMIN — VANCOMYCIN HYDROCHLORIDE 1750 MG: 10 INJECTION, POWDER, LYOPHILIZED, FOR SOLUTION INTRAVENOUS at 12:41

## 2024-10-09 RX ADMIN — GABAPENTIN 200 MG: 100 CAPSULE ORAL at 21:00

## 2024-10-09 RX ADMIN — SENNOSIDES AND DOCUSATE SODIUM 1 TABLET: 50; 8.6 TABLET ORAL at 08:22

## 2024-10-09 RX ADMIN — LIDOCAINE 1 PATCH: 4 PATCH TOPICAL at 08:24

## 2024-10-09 RX ADMIN — CIPROFLOXACIN HYDROCHLORIDE 750 MG: 750 TABLET, FILM COATED ORAL at 12:41

## 2024-10-09 RX ADMIN — ACETAMINOPHEN 1000 MG: 500 TABLET ORAL at 20:56

## 2024-10-09 RX ADMIN — BUPRENORPHINE 1 PATCH: 10 PATCH, EXTENDED RELEASE TRANSDERMAL at 11:13

## 2024-10-09 RX ADMIN — PRAMIPEXOLE DIHYDROCHLORIDE 0.25 MG: 0.25 TABLET ORAL at 21:00

## 2024-10-09 RX ADMIN — APIXABAN 5 MG: 5 TABLET, FILM COATED ORAL at 08:22

## 2024-10-09 RX ADMIN — CIPROFLOXACIN HYDROCHLORIDE 750 MG: 750 TABLET, FILM COATED ORAL at 23:12

## 2024-10-09 RX ADMIN — ACETAMINOPHEN 1000 MG: 500 TABLET ORAL at 16:18

## 2024-10-09 RX ADMIN — CIPROFLOXACIN HYDROCHLORIDE 750 MG: 750 TABLET, FILM COATED ORAL at 00:23

## 2024-10-09 RX ADMIN — BUMETANIDE 1 MG: 1 TABLET ORAL at 08:22

## 2024-10-09 RX ADMIN — METOPROLOL SUCCINATE 25 MG: 25 TABLET, FILM COATED, EXTENDED RELEASE ORAL at 08:22

## 2024-10-09 RX ADMIN — ACETAMINOPHEN 1000 MG: 500 TABLET ORAL at 08:22

## 2024-10-09 RX ADMIN — SENNOSIDES AND DOCUSATE SODIUM 2 TABLET: 50; 8.6 TABLET ORAL at 20:57

## 2024-10-09 ASSESSMENT — ACTIVITIES OF DAILY LIVING (ADL)
ADLS_ACUITY_SCORE: 45
DEPENDENT_IADLS:: CLEANING;COOKING;LAUNDRY;SHOPPING;MEAL PREPARATION;MEDICATION MANAGEMENT;MONEY MANAGEMENT;TRANSPORTATION;INCONTINENCE
ADLS_ACUITY_SCORE: 33
ADLS_ACUITY_SCORE: 45
ADLS_ACUITY_SCORE: 33
ADLS_ACUITY_SCORE: 45
ADLS_ACUITY_SCORE: 33
ADLS_ACUITY_SCORE: 45
ADLS_ACUITY_SCORE: 45

## 2024-10-09 NOTE — PLAN OF CARE
Goal Outcome Evaluation:      Plan of Care Reviewed With: patient    Overall Patient Progress: improvingOverall Patient Progress: improving    Outcome Evaluation: [id number] Pt alert and oriented x4, denied pain, uses urinal at bedside, requesting bariatric commode. Need sera steady for transfers.  Pt declined to change into gown. Pt uses call light appropriately, able to make needs known, continue POC

## 2024-10-09 NOTE — PROGRESS NOTES
[facility]    Medicine Progress Note - Hospitalist Service, GOLD TEAM 17    Date of Admission:  10/8/2024    Assessment & Plan    Gorge Berkowitz Jr. is a 79 year old male admitted on 10/8/2024 for L leg cellulitis. He has history of HFrEF due to ICM, CAD, a fib on Eliquis, lymphedema.     Interval change 10/9  - appreciate ortho consult - monitor vitals and pain at left knee. Consider arthrocentesis if worsening   - continue current  IV abx   - appreciate WOC  input   - ID consultation     # Cellulitis to LLE  # Left knee septic joint ?  Patient presents with 2 days of acute on chronic swelling and 1 day of acute pain to LLE. This is in context of chronic symptoms related to lymphedema of bilateral legs. No known trauma or injuries to the area, but areas of non-intact skin noted, likely related to chronic edema. He has been feeling feverish at his LTC where he stays, no report of documented fevers. Erythema noted (see photo below) that extends to upper leg, significantly increased erythema compared to right leg.   -- Xray of left knee and tib fib without abnromality.  -- ESR 42, CRP 81, non elevated WBC and procalcitonin  -- BLE duplex US with somewhat insufficient study with bilateral peroneal veins not visualized and patient unable to tolerate compression of femoral vein, but otherwise negative for DVT.   -- Hx of BLE cellulitis in November 2023 at that time wound cultures grew polymicrobia including serratia CFE, klebsiella, enterococcus, MSSA. He was treated with broad spectrum abx narrowed to levofloxacin per ID.   Plan:   - continue vanco and ciprofloxacin started in ED (10/8-  - PRN acetaminophen and oxycodone in addition to PTA buprenorphine  - Appreciate infectious disease consultation regarding antibiotic recommendation.  -Ortho consultation concern for left knee septic joint, versus hardware infection.  Plan:  Continue IV Vancomycin and ciprofloxacin  for   LLE cellulitis  Monitor for new or worsening left knee symptoms  Consider left knee aspiration in 1-2 days if left knee symptoms have not returned to baseline  Activity: As tolerated  Weight bearing status:Wheelchair and assist with transfer at baseline  Diet: okay for regular diet from ortho perspective  Wound Care: WOC consult recommended, BLE wounds appreciated on exam  Imaging: Left knee X-ray: completed. No obvious fluid collection. Ultrasound negative for DVT       # Chronic HFrEF  # ICM  # CAD  # A fib on chronic Eliquis  # HTN  - continue PTA Eliquis 5 mg BID  - continue PTA amiodarone 100 mg daily  - continue PTA Bumex 1 mg daily  - continue PTA Jardiance 10 mg daily  - continue PTA metoprolol 25 mg daily     # OA  # S/p L TKA  # Chronic deconditioning  Resides at [facility] facility. Wheelchair bound at baseline.   - cont PTA buprenorphine     # Lymphedema  Does not appear to be on any chronic interventions for this. May need to investigate OP interventions and/or start wraps once cellulitis improves, to ameliorate risk for future infection.     #Chronic wounds   #Venous dermis stasis   #Pressure ulcers   -- appreciate wound care consult   -- please see WOC note from 10/9         # Dyslipidemia - cont PTA statin  # ROSALVA - has been sleeping well without CPAP for 2 years, states CPAP has not worked well for him in the past.   # RLS - cont PTA Mirapex             Diet: Combination Diet Regular Diet Adult    DVT Prophylaxis: DOAC  Levy Catheter: Not present  Lines: None     Cardiac Monitoring: None  Code Status: Full Code      Clinically Significant Risk Factors Present on Admission               # Hypoalbuminemia: Lowest albumin = 3.4 g/dL at 10/8/2024  9:52 AM, will monitor as appropriate  # Drug Induced Coagulation Defect: home medication list includes an anticoagulant medication    # Hypertension: Noted on problem list         # Severe Obesity: Estimated body mass index is 43.29 kg/m  as calculated from the  "following:    Height as of this encounter: 1.803 m (5' 11\").    Weight as of this encounter: 140.8 kg (310 lb 6.5 oz).       # Financial/Environmental Concerns:           Disposition Plan     Medically Ready for Discharge: CAN Real MD  Hospitalist Service, GOLD TEAM 17  [facility]  Securely message with [employer] (more info)  Text page via [employer] Paging/Directory   See signed in provider for up to date coverage information  ______________________________________________________________________    Interval History   Patient seen and examined at bedside. Patient states he is comfortable but has been constipated for the past 3-4 days. Denies fevers chills.    Physical Exam   Vital Signs: Temp: 97.9  F (36.6  C) Temp src: Oral BP: 103/58 Pulse: 71   Resp: 17 SpO2: 98 % O2 Device: None (Room air)    Weight: 310 lbs 6.52 oz    General Appearance: Appears comfortable.  Respiratory: Without wheezes rhonchi or rales.  CTA  Cardiovascular: RRR, without murmurs  GI: Soft, nontender, plus BS  Skin: please see WOC note.  Chronic venous stasis bilateral lower extremity.  Erythematous left lower extremity.      Medical Decision Making       59 MINUTES SPENT BY ME on the date of service doing chart review, history, exam, documentation & further activities per the note.      Data   ------------------------- PAST 24 HR DATA REVIEWED -----------------------------------------------  "

## 2024-10-09 NOTE — CONSULTS
Care Management Initial Consult    General Information  Assessment completed with: Patient,    Type of CM/SW Visit: Initial Assessment    Primary Care Provider verified and updated as needed: No   Readmission within the last 30 days: no previous admission in last 30 days      Reason for Consult: discharge planning  Advance Care Planning: Advance Care Planning Reviewed: present on chart       Communication Assessment  Patient's communication style: spoken language (English or Bilingual)    Hearing Difficulty or Deaf: no   Wear Glasses or Blind: yes    Cognitive  Cognitive/Neuro/Behavioral: WDL                      Living Environment:   People in home: facility resident     Current living Arrangements: other (see comments) (Skilled Nursing Facility)  Name of Facility: [facility]   Able to return to prior arrangements: yes     Family/Social Support:  Care provided by: other (see comments) (Staff)  Provides care for: no one, unable/limited ability to care for self  Marital Status: Single  Support system: Sibling(s), Friend, Facility resident(s)/Staff          Description of Support System: Supportive, Involved    Support Assessment: Adequate family and caregiver support, Adequate social supports    Current Resources:   Patient receiving home care services: No     Community Resources: Skilled Nursing Facility  Equipment currently used at home: commode chair, grab bar, tub/shower, grab bar, toilet, lift device, hospital bed, raised toilet seat  Supplies currently used at home: Wipes, Gloves    Employment/Financial:  Employment Status: retired        Financial Concerns: none   Referral to Financial Worker: No     Does the patient's insurance plan have a 3 day qualifying hospital stay waiver?  No    Lifestyle & Psychosocial Needs:  Social Determinants of Health     Food Insecurity: Low Risk  (10/8/2024)    Food Insecurity     Within the past 12 months, did you worry that your food would run out before you  got money to buy more?: No     Within the past 12 months, did the food you bought just not last and you didn t have money to get more?: No   Depression: Not at risk (6/19/2023)    [facility]    PHQ-2     PHQ-2 TOTAL SCORE: 0   Housing Stability: High Risk (10/8/2024)    Housing Stability     Do you have housing? : No     Are you worried about losing your housing?: No   Tobacco Use: Medium Risk (11/10/2023)    Patient History     Smoking Tobacco Use: Former     Smokeless Tobacco Use: Never     Passive Exposure: Not on file   Financial Resource Strain: Low Risk  (10/8/2024)    Financial Resource Strain     Within the past 12 months, have you or your family members you live with been unable to get utilities (heat, electricity) when it was really needed?: No   Alcohol Use: Not on file   Transportation Needs: Low Risk  (10/8/2024)    Transportation Needs     Within the past 12 months, has lack of transportation kept you from medical appointments, getting your medicines, non-medical meetings or appointments, work, or from getting things that you need?: No   Physical Activity: Not on file   Interpersonal Safety: Low Risk  (10/8/2024)    Interpersonal Safety     Do you feel physically and emotionally safe where you currently live?: Yes     Within the past 12 months, have you been hit, slapped, kicked or otherwise physically hurt by someone?: No     Within the past 12 months, have you been humiliated or emotionally abused in other ways by your partner or ex-partner?: No   Stress: Not on file   Social Connections: Unknown (4/21/2023)    Received from [facility], [facility]    Social Connections     Frequency of Communication with Friends and Family: Not on file   Health Literacy: Not on file     Functional Status:  Prior to admission patient needed assistance:    Dependent ADLs:: Bathing, Dressing, Positioning, Transfers, Wheelchair-with assist, Toileting  Dependent IADLs:: Cleaning, Cooking, Laundry, Shopping, Meal Preparation, Medication Management, Money Management, Transportation, Incontinence  Assesssment of Functional Status: At functional baseline    Mental Health Status:  Mental Health Status: No Current Concerns       Chemical Dependency Status:  Chemical Dependency Status: Past Concern  Chemical Dependency Management: Other (see comment), Sponsor, 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 He has history of HFrEF due to ICM, CAD, a fib on Eliquis, lymphedema.     Writer completed initial assessment due to discharge planning. Writer introduced self, role and duties to patient. Patient resides at [facility]. He receives full assistance with all ADLs and IADLs. They provide all necessary equipment and supplies needed for his care. He has a strong support system of brothers and friends. He denies any financial. Mental health or chemical dependency concerns. Patient has been sober for 21 years.    Patient reports that he wishes to return to [facility] when able. Writer left message with admissions asking for a call back to confirm he can return and that he has a bed hold. Writer discussed transportation back to facility with patient. He reports that he has only ever transported in an ambulance, but would consider a wheelchair if appropriate, as he doesn't have insurance coverage. SW to continue to follow for support and discharge planning needs that arise.     [facility]  [address]   [place]  [address]  [phone]     Next Steps:     Medical Transportation  Possible PCS Form  IMM    SALVADOR Garcia, MSW  6th Floor Medical Surgical Unit  Phone [phone]      Message me securely in [facility]

## 2024-10-09 NOTE — CONSULTS
Orthopaedic Surgery Consultation Note    Gorge Berkowitz Jr. MRN# [id number]   Age: 79 year old YOB: 1945     Date of Admission:  10/8/2024    Reason for consult: Left lower extremity pain and swelling       Requesting physician: Andria Winchester MD         Assessment and Plan:   Assessment:  Pt is a 79 year old male with:  Left lower extremity cellulitis from foot to groin and   2.   Left knee pain   Ddx:L knee septic arthritis, pain d/t cellulitis, hardware complication  3.  H/O Left total knee arthroplasty completed in 2020 and left total knee retinacular repair in 2021   4.  Venous stasis, with ulcers bilateral lower extremities      Plan:  Continue IV Vancomycin and ciprofloxacin  for  LLE cellulitis  Monitor for new or worsening left knee symptoms  Consider left knee aspiration in 1-2 days if left knee symptoms have not returned to baseline  Activity: As tolerated  Weight bearing status:Wheelchair and assist with transfer at baseline  Diet: okay for regular diet from ortho perspective  Wound Care: WOC consult recommended, BLE wounds appreciated on exam  Imaging: Left knee X-ray: completed.  No obvious fluid collection.  Ultrasound negative for DVT          History of Present Illness:   Patient was seen and examined by me. History, PMH, Meds, SH, complete ROS (10 organ systems) and PE reviewed with patient and prior medical records.      Gorge is a 79 year old male with history of HFrEF, CAD, A-fib on Eliquis, chronic venous insufficiency with history of left TKA in 2020 who presents to the ED from LTC facility with left lower extremity pain swelling and redness.  Gorge reports symptoms started with bilateral lower extremity swelling 3 weeks ago but in the past 3 days, the left lower extremity pain, swelling and redness progressively worsened. His symptoms are aggravated by LLE ROM.  He was started on PO antibiotics at the LTC facility without improvements in LLE symptoms.  Gorge  reports chronic bilateral knee pain with multiple left knee surgeries and  ambulates using a wheelchair and requires assist with transfers.  Denies recent injury or fall no fevers chills chest pain shortness of breath.         Past Medical History:     Past Medical History:   Diagnosis Date    Atrial fibrillation (H)     Cardiomyopathy (H)     ef 20%    CHF (congestive heart failure) (H)     Morbid obesity (H)     ROSALVA (obstructive sleep apnea)              Past Surgical History:     Past Surgical History:   Procedure Laterality Date    CARDIAC CATHETERIZATION  08/10/2018    and right heart cath    CARDIOVERSION  2018    successful    CV CORONARY ANGIOGRAM N/A 8/10/2018    Procedure: Coronary Angiogram;  Surgeon: Ralph Powers MD;  Location: [facility];  Service:     CV LEFT HEART CATHETERIZATION WITHOUT LEFT VENTRICULOGRAM Left 8/10/2018    Procedure: Left Heart Catheterization Without Left Ventriculogram;  Surgeon: Ralph Powers MD;  Location: [facility];  Service:     CV RIGHT HEART CATHETERIZATION N/A 8/10/2018    Procedure: Right Heart Catheterization;  Surgeon: Ralph Powers MD;  Location: [facility];  Service:              Social History:     Social History     Socioeconomic History    Marital status: Single   Tobacco Use    Smoking status: Former     Current packs/day: 0.00     Types: Cigarettes     Quit date: 2005     Years since quittin.7    Smokeless tobacco: Never   Substance and Sexual Activity    Alcohol use: No     Comment: Alcoholic Drinks/day: former alcoholic- quit     Drug use: No     Social Determinants of Health     Financial Resource Strain: Low Risk  (10/8/2024)    Financial Resource Strain     Within the past 12 months, have you or your family members you live with been unable to get utilities (heat, electricity) when it was really needed?: No   Food Insecurity: Low Risk  (10/8/2024)    Food Insecurity     Within the  past 12 months, did you worry that your food would run out before you got money to buy more?: No     Within the past 12 months, did the food you bought just not last and you didn t have money to get more?: No   Transportation Needs: Low Risk  (10/8/2024)    Transportation Needs     Within the past 12 months, has lack of transportation kept you from medical appointments, getting your medicines, non-medical meetings or appointments, work, or from getting things that you need?: No    Received from [facility] [place]    Social Connections   Interpersonal Safety: Low Risk  (10/8/2024)    Interpersonal Safety     Do you feel physically and emotionally safe where you currently live?: Yes     Within the past 12 months, have you been hit, slapped, kicked or otherwise physically hurt by someone?: No     Within the past 12 months, have you been humiliated or emotionally abused in other ways by your partner or ex-partner?: No   Housing Stability: High Risk (10/8/2024)    Housing Stability     Do you have housing? : No     Are you worried about losing your housing?: No             Family History:   No family history on file.           Medications:     Current Facility-Administered Medications   Medication Dose Route Frequency Provider Last Rate Last Admin    acetaminophen (TYLENOL) tablet 650 mg  650 mg Oral Q4H PRN Carmen Schneider PA-C        Or    acetaminophen (TYLENOL) Suppository 650 mg  650 mg Rectal Q4H PRN Carmen Schneider PA-C        acetaminophen (TYLENOL) tablet 1,000 mg  1,000 mg Oral TID Carmen Schneider PA-C        [START ON 10/9/2024] amiodarone (PACERONE) tablet 100 mg  100 mg Oral Daily Carmen Schneider PA-C        apixaban ANTICOAGULANT (ELIQUIS) tablet 5 mg  5 mg Oral BID Carmen Schneider PA-C        atorvastatin (LIPITOR) tablet 40 mg  40 mg Oral At Bedtime Carmen Schneider PA-C        [START ON 10/9/2024] bumetanide (BUMEX) tablet 1 mg  1 mg Oral Daily Wyatt  Carmen ARGUELLO PA-C        [START ON 10/9/2024] buprenorphine (BUTRANS) 10 MCG/HR WK patch 1 patch  1 patch Transdermal Weekly Carmen Schneider PA-C        And    buprenorphine (BUTRANS) Patch in Place   Transdermal Q8H MARLENI Carmen Schneider PA-C        calcium carbonate (TUMS) chewable tablet 1,000 mg  1,000 mg Oral 4x Daily PRN Carmen Schneider PA-C        [START ON 10/9/2024] ciprofloxacin (CIPRO) tablet 750 mg  750 mg Oral Q12H Carmen Schneider PA-C        [START ON 10/9/2024] empagliflozin (JARDIANCE) tablet 10 mg  10 mg Oral Daily Carmen Schneider PA-C        [START ON 10/9/2024] ferrous sulfate (FEROSUL) tablet 325 mg  325 mg Oral Daily with breakfast Carmen Schneider PA-C        gabapentin (NEURONTIN) capsule 200 mg  200 mg Oral At Bedtime Carmen Schneider PA-C        [START ON 10/9/2024] Lidocaine (LIDOCARE) 4 % Patch 1 patch  1 patch Transdermal Q24H Carmen Schneider PA-C        lidocaine (LMX4) cream   Topical Q1H PRN Carmen Schneider PA-C        lidocaine 1 % 0.1-1 mL  0.1-1 mL Other Q1H PRN Carmen Schneider PA-C        methocarbamol (ROBAXIN) tablet 500 mg  500 mg Oral BID PRN Carmen Schneider PA-C        [START ON 10/9/2024] metoprolol succinate ER (TOPROL XL) 24 hr tablet 25 mg  25 mg Oral Daily Carmen Schneider PA-C        ondansetron (ZOFRAN ODT) ODT tab 4 mg  4 mg Oral Q6H PRN Carmen Schneider PA-C        Or    ondansetron (ZOFRAN) injection 4 mg  4 mg Intravenous Q6H PRN Carmen Schneider PA-C        oxyCODONE (ROXICODONE) tablet 5 mg  5 mg Oral Q4H PRN Carmen Schneider PA-C        Patient is already receiving anticoagulation with heparin, enoxaparin (LOVENOX), warfarin (COUMADIN)  or other anticoagulant medication   Does not apply Continuous PRN Carmen Schneider PA-C        polyethylene glycol (MIRALAX) Packet 17 g  17 g Oral Daily PRN Carmen Schneider PA-C        pramipexole (MIRAPEX) tablet 0.25 mg  0.25 mg Oral At Bedtime Carmen Schneider PA-C        senna-docusate (SENOKOT-S/PERICOLACE) 8.6-50 MG per tablet 1 tablet  1 tablet Oral BID Carmen Schneider PA-C         "senna-docusate (SENOKOT-S/PERICOLACE) 8.6-50 MG per tablet 1 tablet  1 tablet Oral BID PRN Carmen Schneider PA-C        Or    senna-docusate (SENOKOT-S/PERICOLACE) 8.6-50 MG per tablet 2 tablet  2 tablet Oral BID PRN Carmen Schneider PA-C        sodium chloride (PF) 0.9% PF flush 3 mL  3 mL Intracatheter Q8H Carmen Schneider PA-C        sodium chloride (PF) 0.9% PF flush 3 mL  3 mL Intracatheter q1 min prn Carmen Schneider PA-C        [START ON 10/9/2024] vancomycin (VANCOCIN) 1,750 mg in sodium chloride 0.9 % 567.5 mL intermittent infusion  1,750 mg Intravenous Q24H Carmen Schneider PA-C                 Allergies:    No Known Allergies         Review of Systems:   A comprehensive 10 point review of systems (constitutional, ENT, cardiac, peripheral vascular, respiratory, GI, , Musculoskeletal, skin, Neurological) was performed and found to be negative except as described in this note.           Physical Exam:   COMPLETE EXAMINATION:   VITAL SIGNS: /65 (BP Location: Right arm, Patient Position: Semi-Goldman's, Cuff Size: Adult Regular)   Pulse 64   Temp 97.9  F (36.6  C) (Oral)   Resp 16   Ht 1.803 m (5' 11\")   Wt 140.8 kg (310 lb 6.5 oz)   SpO2 97%   BMI 43.29 kg/m    GENERAL:  No acute distress, calm and cooperative   RESP: Non labored breathing  MSK    Right Lower Extremity  - skin is rough, dry with purplish coloration and Stasis dermatitis plaques distally with dry appearing ulcer at medial distal tibia.   - All compartments soft and compressible  - assisted SLR without reproduction of pain.  -Fires quad, TA, gastroc/soleus, EHL, FHL, wiggles toes.   - SILT to superficial peroneal, deep peroneal, saphenous, sural, and tibial nerve distributions  - 2+ DP and PT, foot warm and well perfused    Left Lower Extremity  - skin with significant erythema and warmth extending from foot to groin. Venous stasis ulcer at posterior calf, and distal tibia with drainage,  SD Plaques    -Significant pain with passive ROM in " "knee  -Unable to perform SLR  -Fires TA, gastroc/soleus, EHL, FHL, wiggles toes.   -SILT to superficial peroneal, deep peroneal, saphenous, sural, and tibial nerve distributions  - 2+ DP and PT, foot warm and well perfused            Data:   All pertinent laboratory data reviewed  Recent Labs   Lab Test 10/08/24  1124 10/08/24  0952 07/11/24  0839 03/19/24  0515 11/11/23  0642 11/10/23  1333   HGB  --  13.8 14.6 12.7*   < > 12.7*   SED 45*  --   --   --   --  41*   WBC  --  5.6 4.3 5.0   < > 6.6    < > = values in this interval not displayed.     No results for input(s): \"FTYP\", \"FNEU\", \"FOTH\", \"FCOL\", \"FAPR\", \"FWBC\" in the last 24019 hours.    Imaging:   Narrative & Impression   EXAM: XR KNEE PORT LEFT 1/2 VIEWS, XR TIBIA & FIBULA PORT LEFT 2  VIEWS  10/8/2024 12:21 PM       HISTORY: left knee pain     COMPARISON: Outside radiographs 9/4/2023     FINDINGS: AP and crosstable lateral views of the left knee. AP and  lateral views of the left leg.     No acute osseous abnormality. Total left knee arthroplasty without  evidence of complication. No substantial left knee effusion. The ankle  appears congruent. Vascular calcifications.                                                                        IMPRESSION: No acute osseous abnormality. Total left knee  arthroplasty.     Narrative & Impression   ULTRASOUND LOWER EXTREMITY VENOUS DUPLEX BILATERAL 10/8/2024 10:55 AM     CLINICAL HISTORY: bilat leg swelling     COMPARISONS: None available.     REFERRING PROVIDER: Andria Winchester MD     TECHNIQUE: Grayscale, color Doppler, Doppler waveform ultrasound  evaluation was performed through the bilateral common femoral,  femoral, and popliteal veins. Bilateral posterior tibial and peroneal  veins were evaluated with grayscale imaging and compression.     FINDINGS:   Right common femoral, and popliteal veins are fully compressible,  patent, and demonstrate normal phasic Doppler waveforms.     Right femoral veins appear patent " "with normal phasic Doppler  waveforms. Patient unable to tolerate compression.     Right posterior tibial veins are fully compressible to the ankle.     Right peroneal veins not visualized.     Left common femoral, femoral, and popliteal veins are fully  compressible, patent, and demonstrate normal phasic Doppler waveforms.     Left posterior tibial veins are fully compressible to the ankle.     Left peroneal veins not visualized.     Subcutaneous edema left calf.                                                                      IMPRESSION: Bilateral peroneal veins are not visualized and patient  unable to tolerate right femoral vein compression. Otherwise, no deep  venous thrombosis demonstrated in either leg.     Reference: \"Duplex Ultrasound in the Diagnosis of Lower-Extremity Deep  Venous Thrombosis\"- Yudy Garibay MD, S; Sudarshan Dolan MD  (Circulation. 2014;129:917-921. http://circ.ahajournals.org)     I have personally reviewed the examination and initial interpretation  and I agree with the findings.       This consultation has been discussed with PRINCE Bray. Orthopedic staff for this patient is Dr. Asaf Nice PA-C  "

## 2024-10-09 NOTE — PLAN OF CARE
"  Problem: Adult Inpatient Plan of Care  Goal: Plan of Care Review  Description: The Plan of Care Review/Shift note should be completed every shift.  The Outcome Evaluation is a brief statement about your assessment that the patient is improving, declining, or no change.  This information will be displayed automatically on your shift  note.  Outcome: Progressing  Flowsheets (Taken 10/9/2024 1557)  Outcome Evaluation: Patient to return to LTC SNF.  Plan of Care Reviewed With: patient  Overall Patient Progress: no change     Problem: Adult Inpatient Plan of Care  Goal: Plan of Care Review  Description: The Plan of Care Review/Shift note should be completed every shift.  The Outcome Evaluation is a brief statement about your assessment that the patient is improving, declining, or no change.  This information will be displayed automatically on your shift  note.  Outcome: Progressing  Flowsheets (Taken 10/9/2024 1557)  Outcome Evaluation: Patient to return to LTC SNF.  Plan of Care Reviewed With: patient  Overall Patient Progress: no change  Goal: Patient-Specific Goal (Individualized)  Description: You can add care plan individualizations to a care plan. Examples of Individualization might be:  \"Parent requests to be called daily at 9am for status\", \"I have a hard time hearing out of my right ear\", or \"Do not touch me to wake me up as it startles  me\".  Outcome: Progressing  Goal: Absence of Hospital-Acquired Illness or Injury  Outcome: Progressing  Goal: Optimal Comfort and Wellbeing  Outcome: Progressing  Goal: Readiness for Transition of Care  Outcome: Progressing     Problem: Risk for Delirium  Goal: Optimal Coping  Outcome: Progressing  Goal: Improved Behavioral Control  Outcome: Progressing  Goal: Improved Attention and Thought Clarity  Outcome: Progressing  Goal: Improved Sleep  Outcome: Progressing     Problem: Skin or Soft Tissue Infection  Goal: Absence of Infection Signs and Symptoms  Outcome: Progressing   "

## 2024-10-09 NOTE — CONSULTS
[facility]  WO Nurse Inpatient Assessment     Consulted for: BLE and buttock    Summary: all wounds POA    Patient History (according to provider note(s):      Gorge Berkowitz Jr. is a 79 year old male with PMH of  HFrEF due to ICM, CAD, a fib on Eliquis, lymphedema , chronic venous stasis dermatitis with wounds on the left lower leg an LEFT TKA in 2020 admitted for left lower extremity cellulitis.      Orthopedic surgery consulted to evaluate for left knee prosthetic joint infection.    Assessment:      Areas visualized during today's visit: Focused: buttock and BLE    Wound location: bilateral legs    Right anterior and medial     Left posterior and lateral     Last photo: 10/9  Wound due to: Mixed Etiology: venous and arterial ulcers   Wound history/plan of care: POA, pt states wound appear worse   Wound base: 40 % Superficial scab and Dry drainage, 60 % Granulation tissue and Slough     Palpation of the wound bed: normal      Drainage: moderate     Description of drainage: serous     Measurements (length x width x depth, in cm): right 3.5 x 1.5 x 0.1 cm and left 3.8 x 2 x 0.2 cm      Tunneling: N/A     Undermining: N/A  Periwound skin: Dry/scaly and Hyperkeratosis      Color: purple and red      Temperature: normal   Odor: none  Pain: moderate, tender  Pain interventions prior to dressing change: slow and gentle cares   Treatment goal: Drainage control and Increase granulation  STATUS: initial assessment  Supplies ordered: at bedside    Pressure Injury Location: bilateral buttock    Last photo: 10/9  Wound type: Pressure Injury     Pressure Injury Stage: either Stage 2 or 3 deferring definitive staging until wound base has evolved, present on admission   Wound history/plan of care:   pt states having wound for ~2 weeks     Wound base: 40 % Fibrin vs slough, 60 % deep Dermis vs granulation tissue      Palpation of the wound bed: normal      Drainage: scant      Description of drainage: serosanguinous     Measurements (length x width x depth, in cm) Right 3.5 x 3 x 0.2 cm and left 1.5 x 2 x 0.2 cm       Tunneling N/A     Undermining N/A  Periwound skin: Dry/scaly and brawny       Color:  brawny       Temperature: normal   Odor: none  Pain: no grimacing or signs of discomfort, none  Pain intervention prior to dressing change: no significant pain present   Treatment goal: Remove necrotic tissue  STATUS: initial assessment  Supplies ordered: at bedside    My PI Risk Assessment     Sensory Perception: 4 - No impairment     Moisture: 2 - Very moist      Activity: 1 - Bedfast      Mobility: 2 - Very limited     Nutrition: 3 - Adequate     Friction/Shear: 1 - Problem     TOTAL: 13    Treatment Plan:     Bilateral legs wound(s): Every other day and PRN cleanse with wound cleanser and pat dry. Apply Aquacel Ag(order#[id number]) to open wound. Cover with mepilex or ABD and kerlix. Sween cream to bilateral legs AVOID between toes. Wrap per lymphedema.      Bilateral buttock wound(s): Daily  and PRN cleanse with wound cleanser and pat dry. Paint estefanía wound skin with no sting. Place Triad paste(order#[id number]) to open wound and conform mepilex over wound. AVOID supine positioning. AVOID brief in bed. 2      Orders: Written    RECOMMEND PRIMARY TEAM ORDER: Lymphedema consult  Education provided: plan of care and wound progress  Discussed plan of care with: Patient, Nurse, and Physician  WOC nurse follow-up plan: weekly  Notify WOC if wound(s) deteriorate.  Nursing to notify the Provider(s) and re-consult the WOC Nurse if new skin concern.    DATA:     Current support surface: Standard  Low air loss (NERY pump, Isolibrium, Pulsate)  Containment of urine/stool: Incontinent pad in bed  BMI: Body mass index is 43.29 kg/m .   Active diet order: Orders Placed This Encounter      Combination Diet Regular Diet Adult     Output: I/O last 3 completed shifts:  In: 900 [P.O.:900]  Out: 3540 [Urine:3540]      Labs:   Recent Labs   Lab 10/09/24  0542 10/08/24  0952   ALBUMIN  --  3.4*   HGB 13.7 13.8   WBC 4.3 5.6     Pressure injury risk assessment:   Sensory Perception: 4-->no impairment  Moisture: 3-->occasionally moist  Activity: 1-->bedfast  Mobility: 2-->very limited  Nutrition: 3-->adequate  Friction and Shear: 2-->potential problem  Josue Score: 15    Lakia Peters RN CWOCN  Pager no longer is use, please contact through [employer] group: [facility] [place]  Dept. Office Number: [phone]

## 2024-10-09 NOTE — PROGRESS NOTES
"  Orthopaedic Surgery Daily Progress Note     Subjective: Seen on morning rounds. No acute events overnight. Discussed symptoms r/t knee. Knee has been painful since surgery in 2020 -- required revision with retinacular repair. He reports that he mobilizes, minimally, mainly stand pivot transfers. Thinks that it may be more painful in the knee itself within the last 3-7 days when the erythema started in the leg.     Physical Exam   /67 (BP Location: Right arm)   Pulse 67   Temp 97.9  F (36.6  C) (Oral)   Resp 17   Ht 1.803 m (5' 11\")   Wt 140.8 kg (310 lb 6.5 oz)   SpO2 98%   BMI 43.29 kg/m      Intake/Output Summary (Last 24 hours) at 10/9/2024 0745  Last data filed at 10/9/2024 0700  Gross per 24 hour   Intake 900 ml   Output 3740 ml   Net -2840 ml     General: Alert, well-appearing  in no acute distress.  Respiratory: Non-labored breathing.   Cardiovascular: Extremities warm and well perfused   Extremities: moving all four extremities.   LLE:  -struggles with active hip flexion and knee flexion due to weakness  -small knee joint effusion  -Erythema from foot up to mid thigh, slight improvement of thigh compared to pictures from presentation. TTP over erythematous skin  -Sens: SILT throughout grossly  -Motor: fires EHL/FHL/TA/GSc  -Vasc: palpable DP pulses, wwp, brisk cap refill    Skin: severe venous stasis changes, dermatitis, weeping onto chux pad on left leg > right leg    Labs    Complete Blood Count   Recent Labs   Lab 10/09/24  0542 10/08/24  0952   WBC 4.3 5.6   HGB 13.7 13.8    169     Basic Metabolic Panel  Recent Labs   Lab 10/09/24  0542 10/08/24  0952    139   POTASSIUM 4.3 4.2   CHLORIDE 102 102   CO2 26 28   BUN 30.7* 30.1*   CR 1.16 1.12   GLC 94 95     Coagulation Profile  No lab results found in last 7 days.    Assessment/Plan:  Assessment and Plan:  Gorge Campbellcodey Perez is a 79 year old male with PMH of  HFrEF due to ICM, CAD, a fib on Eliquis, lymphedema , chronic " "venous stasis dermatitis with wounds on the left lower leg an LEFT TKA in 2020 admitted for left lower extremity cellulitis.     Orthopedic surgery consulted to evaluate for left knee prosthetic joint infection.    Today's plan:  Discussed options for work up with patient. It is clear that he has a cellulitis from his lower leg dermatitis, that warrants antibiotics, but the only way to know if knee joint is infected would be to aspirate. This is not without risk, given risk of seeding prosthetic joint with infection if not already infected due to aspirating through cellulitic skin, which would be a devastating complication.    Currently he has received antibiotics, which may skew aspiration. He also reports less than 1 week of acute worsening of his chronic left knee pain, which remains within window of \"acute\" prosthetic joint infection, if present. Thus, would favor allowing time for IV antibiotics to see if his symptoms resolve to baseline, and if they do not, then would consider aspiration. At that point, hopefully the cellulitis would have resolved to the point of allowing a clear window for knee joint aspiration.    Patient was in favor of the above plan.     Orthopedic Surgery Primary  Activity: Up with assist until independent. Knee ROM as tolerated.   Weight bearing status: WBAT.  Pain management: per primary service  Antibiotics: Ancef x 24 hours  Diet: ok for diet from ortho perspective.   DVT prophylaxis: per primary team  Imaging: none indicated  Labs: Monitor Hgb, trend CRP  Dressings: per WOC  Elevation: Elevate LLE on pillows to keep above the level of the heart as much as possible  Follow-up: to be determined  Disposition: anticipate back to care facility pending clinical improvement and antibiotic plan    --  Ramses Cevallos MD   Orthopedic Surgery PGY-4    Case discussed with Dr. Aguayo    Please page me directly via amcom with any questions/concerns during regular weekday hours before 7pm. If " there is no response, if it is a weekend, or if it is during evening hours, then please page the orthopedic surgery resident on call.

## 2024-10-09 NOTE — PROGRESS NOTES
Shift [id number]    Goal Outcome Evaluation:       Plan of Care Reviewed With: patient     Overall Patient Progress: no change     Outcome Evaluation:     VS: Check Flowsheets.   O2: Stable on RA. Denies chest pain and SOB.    Output: Uses urinal @ bedside, Uses bariatric commode.    Last BM: 10/07   Activity: Ax2 w/ hernan steady.    Skin: Wounds on BLE extremities and wounds on the both buttocks.   Pain: Report pain with movement but declines pain medications.   CMS: Intact, AOx4. Baseline neuropathy.   Dressing: Dressing on the buttocks CDI. BLE wounds open to air waiting for WOC nurse.    Diet: Regular diet. Denies nausea/vomiting.    LDA: L PIV SL and patent.    Equipment: IV pole, personal belongings, and call light in reach.    Plan: Contact precautions maintained / Continue with plan of care. Call light within reach, pt able to make needs known.    Additional Info:

## 2024-10-09 NOTE — PLAN OF CARE
Problem: Skin or Soft Tissue Infection  Goal: Absence of Infection Signs and Symptoms  Intervention: Minimize and Manage Infection Progression  Recent Flowsheet Documentation  Taken 10/9/2024 1119 by Donal Calvo, RN  Isolation Precautions: contact precautions maintained        VS:  Temp: 97.9  F (36.6  C) Temp src: Oral BP: 103/58 Pulse: 71   Resp: 17 SpO2: 98 % O2 Device: None (Room air)     O2: SpO2 > 98 and stable on RA.   Diminished L/S   Output: Voids spontaneously without difficulty to urinal   Last BM: 10/06/2024, denies abdominal discomfort. BS active / passing flatus.    Activity:  Ax2 , hernan steady in the LTC   Skin:  Wound in the bottom    Cellulitis in lower extremities   Open wounds in both lower ex (with photos)   Pain: Pain managed with dilaudid    CMS: Intact, AOx4.    Dressing:  COD done by the WOC nurse, mepilex on bottom and bilateral legs    Diet: Combination Regular diet. Denies nausea/vomiting.    LDA: L PIV SL   Equipment: IV pole, personal belongings,    Plan: CONTACT precautions maintained / Continue with plan of care. Call light within reach, pt able to make needs known.   Plan: IV antibiotics, PT, lymphedema   Additional Info:  Refused external catheter (applied promofit but after a few, RN removed it)   Refused gown   1123 refused the patch           1613 was able to put the patch on back  1625 refused oxycodone, able to use the bedpan instead  1815 RN insisted on removing the bed pan, but patient decided to stay on bed pan

## 2024-10-10 LAB
ATRIAL RATE - MUSE: 58 BPM
BASOPHILS # BLD AUTO: 0 10E3/UL (ref 0–0.2)
BASOPHILS NFR BLD AUTO: 1 %
CRP SERPL-MCNC: 35.04 MG/L
DIASTOLIC BLOOD PRESSURE - MUSE: NORMAL MMHG
EOSINOPHIL # BLD AUTO: 0.1 10E3/UL (ref 0–0.7)
EOSINOPHIL NFR BLD AUTO: 2 %
ERYTHROCYTE [DISTWIDTH] IN BLOOD BY AUTOMATED COUNT: 14.2 % (ref 10–15)
HCT VFR BLD AUTO: 40.1 % (ref 40–53)
HGB BLD-MCNC: 13.3 G/DL (ref 13.3–17.7)
IMM GRANULOCYTES # BLD: 0.1 10E3/UL
IMM GRANULOCYTES NFR BLD: 1 %
INTERPRETATION ECG - MUSE: NORMAL
LYMPHOCYTES # BLD AUTO: 1.1 10E3/UL (ref 0.8–5.3)
LYMPHOCYTES NFR BLD AUTO: 19 %
MCH RBC QN AUTO: 32.3 PG (ref 26.5–33)
MCHC RBC AUTO-ENTMCNC: 33.2 G/DL (ref 31.5–36.5)
MCV RBC AUTO: 97 FL (ref 78–100)
MONOCYTES # BLD AUTO: 0.6 10E3/UL (ref 0–1.3)
MONOCYTES NFR BLD AUTO: 10 %
NEUTROPHILS # BLD AUTO: 3.9 10E3/UL (ref 1.6–8.3)
NEUTROPHILS NFR BLD AUTO: 67 %
NRBC # BLD AUTO: 0 10E3/UL
NRBC BLD AUTO-RTO: 0 /100
P AXIS - MUSE: 58 DEGREES
PLATELET # BLD AUTO: 187 10E3/UL (ref 150–450)
PR INTERVAL - MUSE: 228 MS
QRS DURATION - MUSE: 126 MS
QT - MUSE: 464 MS
QTC - MUSE: 455 MS
R AXIS - MUSE: -29 DEGREES
RBC # BLD AUTO: 4.12 10E6/UL (ref 4.4–5.9)
SYSTOLIC BLOOD PRESSURE - MUSE: NORMAL MMHG
T AXIS - MUSE: -23 DEGREES
VENTRICULAR RATE- MUSE: 58 BPM
WBC # BLD AUTO: 5.9 10E3/UL (ref 4–11)

## 2024-10-10 PROCEDURE — 99232 SBSQ HOSP IP/OBS MODERATE 35: CPT | Performed by: STUDENT IN AN ORGANIZED HEALTH CARE EDUCATION/TRAINING PROGRAM

## 2024-10-10 PROCEDURE — 250N000011 HC RX IP 250 OP 636: Performed by: PHYSICIAN ASSISTANT

## 2024-10-10 PROCEDURE — 85004 AUTOMATED DIFF WBC COUNT: CPT | Performed by: STUDENT IN AN ORGANIZED HEALTH CARE EDUCATION/TRAINING PROGRAM

## 2024-10-10 PROCEDURE — 120N000002 HC R&B MED SURG/OB UMMC

## 2024-10-10 PROCEDURE — 250N000013 HC RX MED GY IP 250 OP 250 PS 637: Performed by: PHYSICIAN ASSISTANT

## 2024-10-10 PROCEDURE — 86140 C-REACTIVE PROTEIN: CPT | Performed by: STUDENT IN AN ORGANIZED HEALTH CARE EDUCATION/TRAINING PROGRAM

## 2024-10-10 PROCEDURE — 258N000003 HC RX IP 258 OP 636: Performed by: PHYSICIAN ASSISTANT

## 2024-10-10 PROCEDURE — 36415 COLL VENOUS BLD VENIPUNCTURE: CPT | Performed by: STUDENT IN AN ORGANIZED HEALTH CARE EDUCATION/TRAINING PROGRAM

## 2024-10-10 RX ADMIN — ACETAMINOPHEN 1000 MG: 500 TABLET ORAL at 21:33

## 2024-10-10 RX ADMIN — LIDOCAINE 1 PATCH: 4 PATCH TOPICAL at 09:16

## 2024-10-10 RX ADMIN — CIPROFLOXACIN HYDROCHLORIDE 750 MG: 750 TABLET, FILM COATED ORAL at 13:14

## 2024-10-10 RX ADMIN — GABAPENTIN 200 MG: 100 CAPSULE ORAL at 21:34

## 2024-10-10 RX ADMIN — VANCOMYCIN HYDROCHLORIDE 1750 MG: 10 INJECTION, POWDER, LYOPHILIZED, FOR SOLUTION INTRAVENOUS at 13:37

## 2024-10-10 RX ADMIN — SENNOSIDES AND DOCUSATE SODIUM 1 TABLET: 50; 8.6 TABLET ORAL at 09:14

## 2024-10-10 RX ADMIN — BUMETANIDE 1 MG: 1 TABLET ORAL at 09:14

## 2024-10-10 RX ADMIN — ACETAMINOPHEN 1000 MG: 500 TABLET ORAL at 09:13

## 2024-10-10 RX ADMIN — FERROUS SULFATE TAB 325 MG (65 MG ELEMENTAL FE) 325 MG: 325 (65 FE) TAB at 09:15

## 2024-10-10 RX ADMIN — METOPROLOL SUCCINATE 25 MG: 25 TABLET, FILM COATED, EXTENDED RELEASE ORAL at 09:14

## 2024-10-10 RX ADMIN — ATORVASTATIN CALCIUM 40 MG: 40 TABLET, FILM COATED ORAL at 21:34

## 2024-10-10 RX ADMIN — POLYETHYLENE GLYCOL 3350 17 G: 17 POWDER, FOR SOLUTION ORAL at 09:15

## 2024-10-10 RX ADMIN — AMIODARONE HYDROCHLORIDE 100 MG: 100 TABLET ORAL at 09:15

## 2024-10-10 RX ADMIN — SENNOSIDES AND DOCUSATE SODIUM 1 TABLET: 50; 8.6 TABLET ORAL at 21:33

## 2024-10-10 RX ADMIN — EMPAGLIFLOZIN 10 MG: 10 TABLET, FILM COATED ORAL at 09:13

## 2024-10-10 RX ADMIN — APIXABAN 5 MG: 5 TABLET, FILM COATED ORAL at 09:13

## 2024-10-10 RX ADMIN — APIXABAN 5 MG: 5 TABLET, FILM COATED ORAL at 21:34

## 2024-10-10 RX ADMIN — ACETAMINOPHEN 1000 MG: 500 TABLET ORAL at 13:14

## 2024-10-10 RX ADMIN — PRAMIPEXOLE DIHYDROCHLORIDE 0.25 MG: 0.25 TABLET ORAL at 21:34

## 2024-10-10 ASSESSMENT — ACTIVITIES OF DAILY LIVING (ADL)
ADLS_ACUITY_SCORE: 45

## 2024-10-10 NOTE — PROGRESS NOTES
"  Orthopaedic Surgery Daily Progress Note     Subjective: Seen on morning rounds. No acute events overnight. Pain in knee improved -- states it is \"as good as it has ever been.\" Frustrated with not being able to adjust to the hospital bed and not get up and out of bed to the commode.    Physical Exam   /63 (BP Location: Right arm)   Pulse 73   Temp 97.7  F (36.5  C) (Oral)   Resp 16   Ht 1.803 m (5' 11\")   Wt 140.8 kg (310 lb 6.5 oz)   SpO2 93%   BMI 43.29 kg/m      Intake/Output Summary (Last 24 hours) at 10/9/2024 0745  Last data filed at 10/9/2024 0700  Gross per 24 hour   Intake 900 ml   Output 3740 ml   Net -2840 ml     General: Alert, well-appearing  in no acute distress.  Respiratory: Non-labored breathing.   Cardiovascular: Extremities warm and well perfused   Extremities: moving all four extremities.   LLE:  -Fires quad and able to range his knee in short arc of motion with minimal pain  -small knee joint effusion  -Erythema from foot up to proximal shin and up on medial thigh, much improved from presentation  -Sens: SILT throughout grossly  -Motor: fires EHL/FHL/TA/GSc  -Vasc: palpable DP pulses, wwp, brisk cap refill    Skin: severe venous stasis changes, dermatitis, weeping onto chux pad on left leg > right leg    Labs    Complete Blood Count   Recent Labs   Lab 10/10/24  0540 10/09/24  0542 10/08/24  0952   WBC 5.9 4.3 5.6   HGB 13.3 13.7 13.8    171 169     Basic Metabolic Panel  Recent Labs   Lab 10/09/24  0542 10/08/24  0952    139   POTASSIUM 4.3 4.2   CHLORIDE 102 102   CO2 26 28   BUN 30.7* 30.1*   CR 1.16 1.12   GLC 94 95     Coagulation Profile  No lab results found in last 7 days.    Assessment/Plan:  Assessment and Plan:  Gorge Berkowitz  is a 79 year old male with PMH of  HFrEF due to ICM, CAD, a fib on Eliquis, lymphedema , chronic venous stasis dermatitis with wounds on the left lower leg an LEFT TKA in 2020 admitted for left lower extremity cellulitis. "     Orthopedic surgery consulted to evaluate for left knee prosthetic joint infection.    Today's plan:  Patient states his knee is feeling much better -- essentially back to his baseline symptoms. In light of this, would again defer left knee aspiration to limit rist of iatrogenic seeding of his knee prosthesis. Will re-evaluate in the morning tomorrow 10/11, if he remains at his baseline symptoms of his knee that he has been having since surgery in 2020 then would again defer aspiration.  Continue treatment for cellulitis.  Mobilize able.     Orthopedic Surgery Primary  Activity: Up with assist until independent. Knee ROM as tolerated.   Weight bearing status: WBAT.  Pain management: per primary service  Antibiotics: Ancef x 24 hours  Diet: ok for diet from ortho perspective.   DVT prophylaxis: per primary team  Imaging: none indicated  Labs: Monitor Hgb, trend CRP  Dressings: per WOC  Elevation: Elevate LLE on pillows to keep above the level of the heart as much as possible  Follow-up: if no aspiration performed this admission, can follow up 1-2 weeks post discharge with ortho to again re-evaluate his knee  Disposition: anticipate back to care facility pending clinical improvement and antibiotic plan    --  Ramses Cevallos MD   Orthopedic Surgery PGY-4      Please page me directly via [facility] with any questions/concerns during regular weekday hours before 7pm. If there is no response, if it is a weekend, or if it is during evening hours, then please page the orthopedic surgery resident on call.

## 2024-10-10 NOTE — PROGRESS NOTES
Care Management Follow Up    Length of Stay (days): 2    Expected Discharge Date: 10/10/2024     Concerns to be Addressed: Discharge Planning       Patient plan of care discussed at interdisciplinary rounds: Yes    Anticipated Discharge Disposition: Long Term Care, Skilled Nursing Facility     Anticipated Discharge Services: Transportation Services, Skilled Nursing Facility    Anticipated Discharge DME: None    Patient/family educated on Medicare website which has current facility and service quality ratings: N/A    Education Provided on the Discharge Plan: Yes    Patient/Family in Agreement with the Plan: Yes      Referrals Placed by CM/SW: Not at this time    Private pay costs discussed: Not applicable    Discussed  Partnership in Safe Discharge Planning  document with patient/family: No     Handoff Completed: No, handoff not indicated or clinically appropriate    Additional Information:    Writer spoke with Eneida in admissions and confirmed that patient resides in Long Term Care and has a bed hold. She reported that he can return when he is medically stable.    [facility], [facility]  [address]   [place] [address]  Main Phone [phone]  Admissions Phone [phone]    Next Steps:     Keep Admissions updated on patient progress  Coordinate return to LTC SNF when medically ready.  [facility]  RCIHY Andujar, LICSW, MSW  6th Floor Medical Surgical Unit  Phone [phone]      Message me securely in [employer]

## 2024-10-10 NOTE — PLAN OF CARE
Goal Outcome Evaluation:      Plan of Care Reviewed With: patient    Overall Patient Progress: no change      Patient is alert and oriented x4, uses call light appropriately. Up with assist x2. Wound to bottom and bilateral legs covered with mepilex by wound care nurse. L PIV SL. Pt denies abdominal discomfort, nausea/vomiting. Last Bm 10/10/24. Voids spontaneously without difficulty to urinal. CONTACT precautions maintained. Call light within reach, Continue with plan of care.

## 2024-10-10 NOTE — PLAN OF CARE
"Goal Outcome Evaluation:  /63   Pulse 67   Temp 97.7  F (36.5  C) (Oral)   Resp 16   Ht 1.803 m (5' 11\")   Wt 140.8 kg (310 lb 6.5 oz)   SpO2 93%   BMI 43.29 kg/m    Alert and oriented, able to make needs known, denies CP, SOB, VSS. Currently continues on ABX d/t cellulitis, refused to get out of bed to chair, Mixed continence of bladder, continent of bowel-LBM 10/10/2024.  Continues on reg diet, pills whole with water, thin liquids. No acute changes this shift, safety measures in place, call light within reach, plan of care continues.     Problem: Adult Inpatient Plan of Care  Goal: Plan of Care Review  Description: The Plan of Care Review/Shift note should be completed every shift.  The Outcome Evaluation is a brief statement about your assessment that the patient is improving, declining, or no change.  This information will be displayed automatically on your shift  note.  Outcome: Progressing  Flowsheets (Taken 10/10/2024 1452)  Plan of Care Reviewed With: patient  Overall Patient Progress: no change  Goal: Patient-Specific Goal (Individualized)  Description: You can add care plan individualizations to a care plan. Examples of Individualization might be:  \"Parent requests to be called daily at 9am for status\", \"I have a hard time hearing out of my right ear\", or \"Do not touch me to wake me up as it startles  me\".  Outcome: Progressing  Goal: Absence of Hospital-Acquired Illness or Injury  Outcome: Progressing  Intervention: Identify and Manage Fall Risk  Recent Flowsheet Documentation  Taken 10/10/2024 1251 by Lindsay Dooley RN  Safety Promotion/Fall Prevention:   activity supervised   room near nurse's station   safety round/check completed  Intervention: Prevent Skin Injury  Recent Flowsheet Documentation  Taken 10/10/2024 1251 by Lindsay Dooley RN  Body Position: position changed independently  Skin Protection: incontinence pads utilized  Device Skin Pressure Protection: absorbent pad " utilized/changed  Intervention: Prevent and Manage VTE (Venous Thromboembolism) Risk  Recent Flowsheet Documentation  Taken 10/10/2024 1251 by Lindsay Dooley RN  VTE Prevention/Management: (BLE wounds) compression stockings off  Intervention: Prevent Infection  Recent Flowsheet Documentation  Taken 10/10/2024 1251 by Lindsay Dooley RN  Infection Prevention:   hand hygiene promoted   rest/sleep promoted   single patient room provided  Goal: Optimal Comfort and Wellbeing  Outcome: Progressing  Goal: Readiness for Transition of Care  Outcome: Progressing     Problem: Risk for Delirium  Goal: Optimal Coping  Outcome: Progressing  Intervention: Optimize Psychosocial Adjustment to Delirium  Recent Flowsheet Documentation  Taken 10/10/2024 1251 by Lindsay Dooley RN  Supportive Measures: active listening utilized  Goal: Improved Behavioral Control  Outcome: Progressing  Intervention: Prevent and Manage Agitation  Recent Flowsheet Documentation  Taken 10/10/2024 1251 by Lindsay Dooley RN  Environment Familiarity/Consistency: daily routine followed  Intervention: Minimize Safety Risk  Recent Flowsheet Documentation  Taken 10/10/2024 1251 by Lindsay Dooley RN  Communication Enhancement Strategies:   call light answered in person   extra time allowed for response  Goal: Improved Attention and Thought Clarity  Outcome: Progressing  Intervention: Maximize Cognitive Function  Recent Flowsheet Documentation  Taken 10/10/2024 1251 by Lindsay Dooley RN  Sensory Stimulation Regulation:   care clustered   television on   quiet environment promoted  Reorientation Measures:   calendar in view   reorientation provided  Goal: Improved Sleep  Outcome: Progressing     Problem: Skin or Soft Tissue Infection  Goal: Absence of Infection Signs and Symptoms  Outcome: Progressing  Intervention: Minimize and Manage Infection Progression  Recent Flowsheet Documentation  Taken 10/10/2024 1251 by Lindsay Dooley  YOUSIF, RN  Infection Prevention:   hand hygiene promoted   rest/sleep promoted   single patient room provided  Infection Management: aseptic technique maintained  Isolation Precautions: contact precautions maintained     Problem: Pain Acute  Goal: Optimal Pain Control and Function  Outcome: Progressing  Intervention: Prevent or Manage Pain  Recent Flowsheet Documentation  Taken 10/10/2024 1251 by Lindsay Dooley RN  Sensory Stimulation Regulation:   care clustered   television on   quiet environment promoted  Medication Review/Management: medications reviewed  Intervention: Optimize Psychosocial Wellbeing  Recent Flowsheet Documentation  Taken 10/10/2024 1251 by Lindsay Dooley RN  Supportive Measures: active listening utilized     Problem: Fall Injury Risk  Goal: Absence of Fall and Fall-Related Injury  Outcome: Progressing  Intervention: Identify and Manage Contributors  Recent Flowsheet Documentation  Taken 10/10/2024 1251 by Lindsay Dooley RN  Medication Review/Management: medications reviewed  Intervention: Promote Injury-Free Environment  Recent Flowsheet Documentation  Taken 10/10/2024 1251 by Lindsay Dooley RN  Safety Promotion/Fall Prevention:   activity supervised   room near nurse's station   safety round/check completed     Problem: Infection  Goal: Absence of Infection Signs and Symptoms  Outcome: Progressing  Intervention: Prevent or Manage Infection  Recent Flowsheet Documentation  Taken 10/10/2024 1251 by Lindsay Dooley RN  Infection Management: aseptic technique maintained  Isolation Precautions: contact precautions maintained         Plan of Care Reviewed With: patient    Overall Patient Progress: no changeOverall Patient Progress: no change

## 2024-10-10 NOTE — PROGRESS NOTES
[facility]    Medicine Progress Note - Hospitalist Service, GOLD TEAM 17    Date of Admission:  10/8/2024    Assessment & Plan    Gorge Berkowitz Jr. is a 79 year old male admitted on 10/8/2024 for L leg cellulitis. He has history of HFrEF due to ICM, CAD, a fib on Eliquis, lymphedema.     Interval change 10/10  -Remains afebrile, pain and erythema improving.  -Continue IV antibiotic therapy at this time.  -Ortho deferring arthrocentesis at this point given improvement.  - continue current  IV abx   - appreciate WOC  input       # Cellulitis to LLE  # Left knee septic joint ?  Patient presents with 2 days of acute on chronic swelling and 1 day of acute pain to LLE. This is in context of chronic symptoms related to lymphedema of bilateral legs. No known trauma or injuries to the area, but areas of non-intact skin noted, likely related to chronic edema. He has been feeling feverish at his LTC where he stays, no report of documented fevers. Erythema noted (see photo below) that extends to upper leg, significantly increased erythema compared to right leg.   -- Xray of left knee and tib fib without abnromality.  -- ESR 42, CRP 81, non elevated WBC and procalcitonin  -- BLE duplex US with somewhat insufficient study with bilateral peroneal veins not visualized and patient unable to tolerate compression of femoral vein, but otherwise negative for DVT.   -- Hx of BLE cellulitis in November 2023 at that time wound cultures grew polymicrobia including serratia CFE, klebsiella, enterococcus, MSSA. He was treated with broad spectrum abx narrowed to levofloxacin per ID.   Plan:   - continue vanco and ciprofloxacin started in ED (10/8-  - PRN acetaminophen and oxycodone in addition to PTA buprenorphine  - Appreciate infectious disease consultation regarding antibiotic recommendation.  -Ortho consultation concern for left knee septic joint, versus hardware infection.  Plan:  Continue  IV Vancomycin and ciprofloxacin  for  LLE cellulitis  Monitor for new or worsening left knee symptoms  Consider left knee aspiration in 1-2 days if left knee symptoms have not returned to baseline  Activity: As tolerated  Weight bearing status:Wheelchair and assist with transfer at baseline  Diet: okay for regular diet from ortho perspective  Wound Care: WOC consult recommended, BLE wounds appreciated on exam  Imaging: Left knee X-ray: completed. No obvious fluid collection. Ultrasound negative for DVT     # Chronic wounds   # Bilateral buttock  # Venous and arterial ulcers: bilateral legs  -- Wound care consult on admission, please see WOC interventions  -- Maintain healing  -- dressing changes as indicated     # Chronic HFrEF  # ICM  # CAD  # A fib on chronic Eliquis  # HTN  - continue PTA Eliquis 5 mg BID  - continue PTA amiodarone 100 mg daily  - continue PTA Bumex 1 mg daily  - continue PTA Jardiance 10 mg daily  - continue PTA metoprolol 25 mg daily     # OA  # S/p L TKA  # Chronic deconditioning  Resides at [facility] facility. Wheelchair bound at baseline.   - cont PTA buprenorphine     # Lymphedema  Does not appear to be on any chronic interventions for this. May need to investigate OP interventions and/or start wraps once cellulitis improves, to ameliorate risk for future infection.     #Chronic wounds   #Venous dermis stasis   #Pressure ulcers   -- appreciate wound care consult   -- please see WOC note from 10/9         # Dyslipidemia - cont PTA statin  # ROSALVA - has been sleeping well without CPAP for 2 years, states CPAP has not worked well for him in the past.   # RLS - cont PTA Mirapex             Diet: Combination Diet Regular Diet Adult    DVT Prophylaxis: DOAC  Levy Catheter: Not present  Lines: None     Cardiac Monitoring: None  Code Status: Full Code      Clinically Significant Risk Factors               # Hypoalbuminemia: Lowest albumin = 3.4 g/dL at 10/8/2024  9:52 AM, will monitor as appropriate      "  # Hypertension: Noted on problem list           # Severe Obesity: Estimated body mass index is 43.29 kg/m  as calculated from the following:    Height as of this encounter: 1.803 m (5' 11\").    Weight as of this encounter: 140.8 kg (310 lb 6.5 oz)., PRESENT ON ADMISSION       # Financial/Environmental Concerns: none         Disposition Plan     Medically Ready for Discharge: [facility]             Pao Real MD  Hospitalist Service, GOLD TEAM 17  [facility]  Securely message with [employer] (more info)  Text page via [employer] Paging/Directory   See signed in provider for up to date coverage information  ______________________________________________________________________    Interval History   Patient comfortable this morning.  States his left knee is improving from a pain standpoint.  Also noticing redness improved some.  Denies any fevers or chills overnight.  No acute overnight events.  Denies any headache or lightheadedness or dizziness.  Physical Exam   Vital Signs: Temp: 97.7  F (36.5  C) Temp src: Oral BP: 104/63 Pulse: 67   Resp: 16 SpO2: 93 % O2 Device: None (Room air)    Weight: 310 lbs 6.52 oz    General Appearance: Appears comfortable.  Respiratory: Without wheezes rhonchi or rales.  CTA  Cardiovascular: RRR, without murmurs  GI: Soft, nontender, plus BS  Skin: please see WOC note.  Chronic venous stasis bilateral lower extremity.  Erythematous left lower extremity. Improving erythema LLE      Medical Decision Making       59 MINUTES SPENT BY ME on the date of service doing chart review, history, exam, documentation & further activities per the note.      Data   ------------------------- PAST 24 HR DATA REVIEWED -----------------------------------------------  "

## 2024-10-11 LAB
BACTERIA SPEC CULT: ABNORMAL
BASOPHILS # BLD AUTO: 0 10E3/UL (ref 0–0.2)
BASOPHILS NFR BLD AUTO: 1 %
CREAT SERPL-MCNC: 1.15 MG/DL (ref 0.67–1.17)
CRP SERPL-MCNC: 31.52 MG/L
EGFRCR SERPLBLD CKD-EPI 2021: 66 ML/MIN/1.73M2
EOSINOPHIL # BLD AUTO: 0.2 10E3/UL (ref 0–0.7)
EOSINOPHIL NFR BLD AUTO: 3 %
ERYTHROCYTE [DISTWIDTH] IN BLOOD BY AUTOMATED COUNT: 14.2 % (ref 10–15)
HCT VFR BLD AUTO: 42.1 % (ref 40–53)
HGB BLD-MCNC: 13.7 G/DL (ref 13.3–17.7)
IMM GRANULOCYTES # BLD: 0 10E3/UL
IMM GRANULOCYTES NFR BLD: 1 %
LYMPHOCYTES # BLD AUTO: 1 10E3/UL (ref 0.8–5.3)
LYMPHOCYTES NFR BLD AUTO: 19 %
MCH RBC QN AUTO: 31.6 PG (ref 26.5–33)
MCHC RBC AUTO-ENTMCNC: 32.5 G/DL (ref 31.5–36.5)
MCV RBC AUTO: 97 FL (ref 78–100)
MONOCYTES # BLD AUTO: 0.4 10E3/UL (ref 0–1.3)
MONOCYTES NFR BLD AUTO: 8 %
NEUTROPHILS # BLD AUTO: 3.7 10E3/UL (ref 1.6–8.3)
NEUTROPHILS NFR BLD AUTO: 69 %
NRBC # BLD AUTO: 0 10E3/UL
NRBC BLD AUTO-RTO: 0 /100
PLATELET # BLD AUTO: 186 10E3/UL (ref 150–450)
RBC # BLD AUTO: 4.34 10E6/UL (ref 4.4–5.9)
VANCOMYCIN SERPL-MCNC: 10.5 UG/ML
WBC # BLD AUTO: 5.4 10E3/UL (ref 4–11)

## 2024-10-11 PROCEDURE — 85025 COMPLETE CBC W/AUTO DIFF WBC: CPT | Performed by: STUDENT IN AN ORGANIZED HEALTH CARE EDUCATION/TRAINING PROGRAM

## 2024-10-11 PROCEDURE — 999N000147 HC STATISTIC PT IP EVAL DEFER

## 2024-10-11 PROCEDURE — 82565 ASSAY OF CREATININE: CPT | Performed by: STUDENT IN AN ORGANIZED HEALTH CARE EDUCATION/TRAINING PROGRAM

## 2024-10-11 PROCEDURE — 250N000013 HC RX MED GY IP 250 OP 250 PS 637: Performed by: PHYSICIAN ASSISTANT

## 2024-10-11 PROCEDURE — 250N000011 HC RX IP 250 OP 636: Performed by: STUDENT IN AN ORGANIZED HEALTH CARE EDUCATION/TRAINING PROGRAM

## 2024-10-11 PROCEDURE — 120N000002 HC R&B MED SURG/OB UMMC

## 2024-10-11 PROCEDURE — 258N000003 HC RX IP 258 OP 636: Performed by: STUDENT IN AN ORGANIZED HEALTH CARE EDUCATION/TRAINING PROGRAM

## 2024-10-11 PROCEDURE — 80202 ASSAY OF VANCOMYCIN: CPT | Performed by: STUDENT IN AN ORGANIZED HEALTH CARE EDUCATION/TRAINING PROGRAM

## 2024-10-11 PROCEDURE — 86140 C-REACTIVE PROTEIN: CPT | Performed by: STUDENT IN AN ORGANIZED HEALTH CARE EDUCATION/TRAINING PROGRAM

## 2024-10-11 PROCEDURE — 99232 SBSQ HOSP IP/OBS MODERATE 35: CPT | Performed by: STUDENT IN AN ORGANIZED HEALTH CARE EDUCATION/TRAINING PROGRAM

## 2024-10-11 PROCEDURE — 36415 COLL VENOUS BLD VENIPUNCTURE: CPT | Performed by: STUDENT IN AN ORGANIZED HEALTH CARE EDUCATION/TRAINING PROGRAM

## 2024-10-11 RX ORDER — CEFAZOLIN SODIUM 1 G/50ML
2000 SOLUTION INTRAVENOUS EVERY 24 HOURS
Status: DISCONTINUED | OUTPATIENT
Start: 2024-10-11 | End: 2024-10-13

## 2024-10-11 RX ADMIN — EMPAGLIFLOZIN 10 MG: 10 TABLET, FILM COATED ORAL at 09:05

## 2024-10-11 RX ADMIN — ACETAMINOPHEN 1000 MG: 500 TABLET ORAL at 20:49

## 2024-10-11 RX ADMIN — AMIODARONE HYDROCHLORIDE 100 MG: 100 TABLET ORAL at 09:06

## 2024-10-11 RX ADMIN — SENNOSIDES AND DOCUSATE SODIUM 1 TABLET: 50; 8.6 TABLET ORAL at 20:49

## 2024-10-11 RX ADMIN — LIDOCAINE 1 PATCH: 4 PATCH TOPICAL at 09:08

## 2024-10-11 RX ADMIN — FERROUS SULFATE TAB 325 MG (65 MG ELEMENTAL FE) 325 MG: 325 (65 FE) TAB at 09:06

## 2024-10-11 RX ADMIN — APIXABAN 5 MG: 5 TABLET, FILM COATED ORAL at 20:50

## 2024-10-11 RX ADMIN — CIPROFLOXACIN HYDROCHLORIDE 750 MG: 750 TABLET, FILM COATED ORAL at 11:47

## 2024-10-11 RX ADMIN — SENNOSIDES AND DOCUSATE SODIUM 1 TABLET: 50; 8.6 TABLET ORAL at 09:06

## 2024-10-11 RX ADMIN — ATORVASTATIN CALCIUM 40 MG: 40 TABLET, FILM COATED ORAL at 22:17

## 2024-10-11 RX ADMIN — METOPROLOL SUCCINATE 25 MG: 25 TABLET, FILM COATED, EXTENDED RELEASE ORAL at 09:04

## 2024-10-11 RX ADMIN — CIPROFLOXACIN HYDROCHLORIDE 750 MG: 750 TABLET, FILM COATED ORAL at 23:49

## 2024-10-11 RX ADMIN — GABAPENTIN 200 MG: 100 CAPSULE ORAL at 22:17

## 2024-10-11 RX ADMIN — BUMETANIDE 1 MG: 1 TABLET ORAL at 09:05

## 2024-10-11 RX ADMIN — PRAMIPEXOLE DIHYDROCHLORIDE 0.25 MG: 0.25 TABLET ORAL at 22:17

## 2024-10-11 RX ADMIN — ACETAMINOPHEN 1000 MG: 500 TABLET ORAL at 13:50

## 2024-10-11 RX ADMIN — ACETAMINOPHEN 1000 MG: 500 TABLET ORAL at 09:01

## 2024-10-11 RX ADMIN — VANCOMYCIN HYDROCHLORIDE 2000 MG: 1 INJECTION, POWDER, LYOPHILIZED, FOR SOLUTION INTRAVENOUS at 13:52

## 2024-10-11 RX ADMIN — CIPROFLOXACIN HYDROCHLORIDE 750 MG: 750 TABLET, FILM COATED ORAL at 01:07

## 2024-10-11 RX ADMIN — APIXABAN 5 MG: 5 TABLET, FILM COATED ORAL at 09:04

## 2024-10-11 ASSESSMENT — ACTIVITIES OF DAILY LIVING (ADL)
ADLS_ACUITY_SCORE: 45

## 2024-10-11 NOTE — PROGRESS NOTES
"  Orthopaedic Surgery Daily Progress Note     Subjective: Seen on morning rounds. No acute events overnight.  Reports pain is better than when he came in.  States pain in his left knee is similar to her at the baseline chronic knee pain that has had since surgery.    Physical Exam   /71 (BP Location: Right arm)   Pulse 57   Temp 97.8  F (36.6  C) (Oral)   Resp 18   Ht 1.803 m (5' 11\")   Wt 140.8 kg (310 lb 6.5 oz)   SpO2 94%   BMI 43.29 kg/m      Intake/Output Summary (Last 24 hours) at 10/9/2024 0745  Last data filed at 10/9/2024 0700  Gross per 24 hour   Intake 900 ml   Output 3740 ml   Net -2840 ml     General: Alert, well-appearing  in no acute distress.  Respiratory: Non-labored breathing.   Cardiovascular: Extremities warm and well perfused   Extremities: moving all four extremities.   LLE:  -Fires quad and able to range his knee in short arc of motion with minimal pain  -small knee joint effusion  -Erythema on lower leg and foot  -Sens: SILT throughout grossly  -Motor: fires EHL/FHL/TA/GSc  -Vasc: palpable DP pulses, wwp, brisk cap refill    Skin: severe venous stasis changes, dermatitis, weeping onto chux pad on left leg > right leg    Labs    Complete Blood Count   Recent Labs   Lab 10/10/24  0540 10/09/24  0542 10/08/24  0952   WBC 5.9 4.3 5.6   HGB 13.3 13.7 13.8    171 169     Basic Metabolic Panel  Recent Labs   Lab 10/09/24  0542 10/08/24  0952    139   POTASSIUM 4.3 4.2   CHLORIDE 102 102   CO2 26 28   BUN 30.7* 30.1*   CR 1.16 1.12   GLC 94 95     Coagulation Profile  No lab results found in last 7 days.    Assessment/Plan:  Assessment and Plan:  Gorge RONNELL Berkowitz Jr. is a 79 year old male with PMH of  HFrEF due to ICM, CAD, a fib on Eliquis, lymphedema , chronic venous stasis dermatitis with wounds on the left lower leg an LEFT TKA in 2020 admitted for left lower extremity cellulitis.     Orthopedic surgery consulted to evaluate for left knee prosthetic joint " infection.    Today's plan:  The cellulitis on his left lower extremity is improving much compared to his presentation.  Today again, he states his knee pain is at his baseline chronic knee pain that he has been experiencing for the last 4 years since his initial surgery.  Since it does not appear acutely worse, would defer left knee arthrocentesis at this time.    I will reach out to our orthopedic surgery clinic schedulers to arrange for follow-up in 1 week to ensure stability of symptoms and again reevaluate for the need for diagnostic arthrocentesis.     Orthopedic Surgery Primary  Activity: Up with assist until independent. Knee ROM as tolerated.   Weight bearing status: WBAT.  Pain management: per primary service  Antibiotics: per primary service versus infectious disease  Diet: ok for diet from ortho perspective.   DVT prophylaxis: per primary team  Imaging: none indicated  Labs: Monitor Hgb, trend CRP  Dressings: per WOC  Elevation: Elevate LLE on pillows to keep above the level of the heart as much as possible  Follow-up: if no aspiration performed this admission, can follow up 1-2 weeks post discharge with ortho to again re-evaluate his knee  Disposition: anticipate back to care facility pending clinical improvement and antibiotic plan    --  Ramses Cevallos MD   Orthopedic Surgery PGY-4      Please page me directly via amcom with any questions/concerns during regular weekday hours before 7pm. If there is no response, if it is a weekend, or if it is during evening hours, then please page the orthopedic surgery resident on call.

## 2024-10-11 NOTE — PLAN OF CARE
Goal Outcome Evaluation:      Plan of Care Reviewed With: patient    Overall Patient Progress: no change     Patient is alert and oriented x4, uses call light appropriately. Up with assist x2. Wound to bottom and bilateral legs covered with mepilex. L PIV SL. Pt denies abdominal discomfort, nausea/vomiting. Voids spontaneously without difficulty to urinal. Contact precautions maintained. Call light within reach, Continue with plan of care.

## 2024-10-11 NOTE — PLAN OF CARE
Physical Therapy: Orders received. Chart reviewed and discussed with care team.? Physical Therapy not indicated due to PT approached pt to discuss any PT needs, pt very pleasantly participated in conversation but does not believe PT can do anything for him. Spent time discussing mobility at baseline and pt very accurately describes what chart review reports and that he is EZ stand Ax2 baseline, sleeps in electric lift recliner, and is able to use electric lift to assist w/ the  addition to EZ stand support. Pt reports that our set up here is not ideal for him and that he does not want to work on hernan steady or hernan plus which could simulate his baseline mobilization as best we can. Declines all devices here. PT then encouraged that pt use OH lift chair<>bed<>commode for duration of stay and he states he hates the kip lift at home but has to use it every Wednesday so could be agreeable to use this w/ nursing. PT educated on use of OH lift on initial discharge home as may feel weaker and was agreeable, agreeable to home PT on discharge in case hospital acquired weakness occurs as pt refusing mobility w/ PT. Pt did accept and complete HEP of AP, QS, and GS to maintain some degree of strength in lower extremities. PT did then complete brief edema screen and note 1+ pitting edema on L>R but reports of notable improvement since admission. Pt has weeping from L LE so likely not appropriate for wraps/increased compression regardless at this point and feels progress has already been substantial so does not desire/need edema therapy while IP.? Defer discharge recommendations to medical team.? Will complete orders.

## 2024-10-11 NOTE — PHARMACY-VANCOMYCIN DOSING SERVICE
Pharmacy Vancomycin Note  Date of Service 2024  Patient's  1945   79 year old, male    Indication: Skin and Soft Tissue Infection  Day of Therapy: Started 10/8/24  Current vancomycin regimen:  1750 mg IV q24h  Current vancomycin monitoring method: AUC  Current vancomycin therapeutic monitoring goal: 400-600 mg*h/L    InsightRX Prediction of Current Vancomycin Regimen  Regimen: 1750 mg IV every 24 hours.  Start time: 13:37 on 10/11/2024  Exposure target: AUC24 (range)400-600 mg/L.hr   AUC24,ss: 455 mg/L.hr  Probability of AUC24 > 400: 73 %  Ctrough,ss: 6.6 mg/L  Probability of Ctrough,ss > 20: 0 %  Probability of nephrotoxicity (Lodise NICOLE ): 4 %      Current estimated CrCl = Estimated Creatinine Clearance: 77.2 mL/min (based on SCr of 1.15 mg/dL).    Creatinine for last 3 days  10/9/2024:  5:42 AM Creatinine 1.16 mg/dL  10/11/2024:  7:47 AM Creatinine 1.15 mg/dL    Recent Vancomycin Levels (past 3 days)  10/11/2024:  7:47 AM Vancomycin 10.5 ug/mL    Vancomycin IV Administrations (past 72 hours)                     vancomycin (VANCOCIN) 1,750 mg in sodium chloride 0.9 % 567.5 mL intermittent infusion (mg) 1,750 mg Given 10/10/24 1337     1,750 mg Given 10/09/24 1241    vancomycin (VANCOCIN) 2,000 mg in sodium chloride 0.9 % 570 mL intermittent infusion (mg) 2,000 mg New Bag 10/08/24 1315                    Nephrotoxins and other renal medications (From now, onward)      Start     Dose/Rate Route Frequency Ordered Stop    10/11/24 1300  vancomycin (VANCOCIN) 2,000 mg in sodium chloride 0.9 % 570 mL intermittent infusion         2,000 mg  over 2 Hours Intravenous EVERY 24 HOURS 10/11/24 1133      10/09/24 0800  bumetanide (BUMEX) tablet 1 mg        Note to Pharmacy: PTA Sig:Take 1 tablet (1 mg) by mouth daily      1 mg Oral DAILY 10/08/24 1434      10/09/24 0800  empagliflozin (JARDIANCE) tablet 10 mg        Note to Pharmacy: PTA Sig:Take 1 tablet (10 mg) by mouth daily      10 mg Oral DAILY  10/08/24 1434                 Contrast Orders - past 72 hours (72h ago, onward)      None            Interpretation of levels and current regimen:  Vancomycin level is reflective of -600    Has serum creatinine changed greater than 50% in last 72 hours: No    Urine output:  good urine output    Renal Function: Stable    InsightRX Prediction of Planned New Vancomycin Regimen  Regimen: 2000 mg IV every 24 hours.  Start time: 13:37 on 10/11/2024  Exposure target: AUC24 (range)400-600 mg/L.hr   AUC24,ss: 520 mg/L.hr  Probability of AUC24 > 400: 89 %  Ctrough,ss: 7.2 mg/L  Probability of Ctrough,ss > 20: 0 %  Probability of nephrotoxicity (Lodise NICOLE 2009): 4 %      Plan:  Increase Dose to 2000mg (~14.3mg/kg ) IV q24hr.    Vancomycin monitoring method: AUC  Vancomycin therapeutic monitoring goal: 400-600 mg*h/L  Pharmacy will check vancomycin levels as appropriate in 1-3 Days.  Serum creatinine levels will be ordered daily for the first week of therapy and at least twice weekly for subsequent weeks.    Kedar Maldonado [facility]

## 2024-10-11 NOTE — PLAN OF CARE
Patient is alert and oriented x4. Med-compliant. Wound care performed this shift on legs. Left PIV is patent and saline-locked. Contact precautions maintained. Endorsed pain 4/10 for left knee. Received scheduled pain medication, upon re-check, pain was 4/10. Patient refused to be boosted in bed. Patient endorsed wanting to try using the bedside commode the next time he needs to have a bowel movement.   Problem: Adult Inpatient Plan of Care  Goal: Optimal Comfort and Wellbeing  Outcome: Progressing  Intervention: Monitor Pain and Promote Comfort  Recent Flowsheet Documentation  Taken 10/11/2024 0901 by Jessie Maldonado RN  Pain Management Interventions: medication (see MAR)     Problem: Skin or Soft Tissue Infection  Goal: Absence of Infection Signs and Symptoms  Outcome: Progressing  Intervention: Minimize and Manage Infection Progression  Recent Flowsheet Documentation  Taken 10/11/2024 1505 by Jessie Maldonado RN  Isolation Precautions: contact precautions maintained     Problem: Pain Acute  Goal: Optimal Pain Control and Function  Outcome: Progressing  Intervention: Develop Pain Management Plan  Recent Flowsheet Documentation  Taken 10/11/2024 0901 by Jessie Maldonado RN  Pain Management Interventions: medication (see MAR)  Intervention: Optimize Psychosocial Wellbeing  Recent Flowsheet Documentation  Taken 10/11/2024 1400 by Jessie Maldonado RN  Supportive Measures: active listening utilized   Goal Outcome Evaluation:

## 2024-10-11 NOTE — PROGRESS NOTES
Care Management Follow Up    Length of Stay (days): 3    Expected Discharge Date: 10/10/2024     Concerns to be Addressed: discharge planning     Patient plan of care discussed at interdisciplinary rounds: Yes    Anticipated Discharge Disposition: Long Term Care, Skilled Nursing Facility     [facility]  [address] [place]  [address]  P: [phone]  Admissions Meg: [phone]  F: [phone]         Anticipated Discharge Services: Transportation Services, Other (see comment) (Skilled Nursing Facility)  Anticipated Discharge DME: None    Patient/family educated on Medicare website which has current facility and service quality ratings: no  Education Provided on the Discharge Plan: Yes  Patient/Family in Agreement with the Plan:      Referrals Placed by CM/SW:    Private pay costs discussed: Not applicable    Discussed  Partnership in Safe Discharge Planning  document with patient/family: No     Handoff Completed: No, handoff not indicated or clinically appropriate    Additional Information:  Per provider, pt will be medically ready for discharge tomorrow or Sunday (10/12 or 10/13). ROSINA called admissions ([phone]) with this update. ROSINA confirmed with admissions that pt is able to return over the weekend.     Next Steps:   -Coordinate return to LTC SNF when medically ready.  IMM  PCS  Stretcher  Orders    ARMEN Cuadra  [place]   [facility]  Available via IRI  Covering 6MS ROSINA, ph: [phone]

## 2024-10-11 NOTE — PROGRESS NOTES
[facility]    Medicine Progress Note - Hospitalist Service, GOLD TEAM 17    Date of Admission:  10/8/2024    Assessment & Plan    Gorge Berkowitz Jr. is a 79 year old male admitted on 10/8/2024 for L leg cellulitis. He has history of HFrEF due to ICM, CAD, a fib on Eliquis, lymphedema.     Interval change 10/11  -Remains afebrile.  -Continue IV antibiotic therapy, broad-spectrum for lower extremity cellulitis  -MRSA nasal swab positive  -Likely discharge normal oral doxycycline, will touch base with infectious disease for consultation regarding appropriate oral regimen      # Cellulitis to LLE  # Left knee septic joint ?  Patient presents with 2 days of acute on chronic swelling and 1 day of acute pain to LLE. This is in context of chronic symptoms related to lymphedema of bilateral legs. No known trauma or injuries to the area, but areas of non-intact skin noted, likely related to chronic edema. He has been feeling feverish at his LTC where he stays, no report of documented fevers. Erythema noted (see photo below) that extends to upper leg, significantly increased erythema compared to right leg.   -- Xray of left knee and tib fib without abnromality.  -- ESR 42, CRP 81, non elevated WBC and procalcitonin  -- BLE duplex US with somewhat insufficient study with bilateral peroneal veins not visualized and patient unable to tolerate compression of femoral vein, but otherwise negative for DVT.   -- Hx of BLE cellulitis in November 2023 at that time wound cultures grew polymicrobia including serratia CFE, klebsiella, enterococcus, MSSA. He was treated with broad spectrum abx narrowed to levofloxacin per ID.   Plan:   - continue vanco and ciprofloxacin started in ED (10/8-  - PRN acetaminophen and oxycodone in addition to PTA buprenorphine  - Appreciate infectious disease consultation regarding antibiotic recommendation.  -Ortho consultation concern for left knee septic  joint, versus hardware infection.  Plan:  Continue IV Vancomycin and ciprofloxacin  for  LLE cellulitis  Monitor for new or worsening left knee symptoms  Consider left knee aspiration in 1-2 days if left knee symptoms have not returned to baseline  Activity: As tolerated  Weight bearing status:Wheelchair and assist with transfer at baseline  Diet: okay for regular diet from ortho perspective  Wound Care: WOC consult recommended, BLE wounds appreciated on exam  Imaging: Left knee X-ray: completed. No obvious fluid collection. Ultrasound negative for DVT       # Chronic HFrEF  # ICM  # CAD  # A fib on chronic Eliquis  # HTN  - continue PTA Eliquis 5 mg BID  - continue PTA amiodarone 100 mg daily  - continue PTA Bumex 1 mg daily  - continue PTA Jardiance 10 mg daily  - continue PTA metoprolol 25 mg daily     # OA  # S/p L TKA  # Chronic deconditioning  Resides at [facility] facility. Wheelchair bound at baseline.   - cont PTA buprenorphine     # Lymphedema  Does not appear to be on any chronic interventions for this. May need to investigate OP interventions and/or start wraps once cellulitis improves, to ameliorate risk for future infection.     #Chronic wounds   #Venous dermis stasis   #Pressure ulcers   -- appreciate wound care consult   -- please see WOC note from 10/9         # Dyslipidemia - cont PTA statin  # ROSALVA - has been sleeping well without CPAP for 2 years, states CPAP has not worked well for him in the past.   # RLS - cont PTA Mirapex             Diet: Combination Diet Regular Diet Adult    DVT Prophylaxis: DOAC  Levy Catheter: Not present  Lines: None     Cardiac Monitoring: None  Code Status: Full Code      Clinically Significant Risk Factors               # Hypoalbuminemia: Lowest albumin = 3.4 g/dL at 10/8/2024  9:52 AM, will monitor as appropriate       # Hypertension: Noted on problem list           # Severe Obesity: Estimated body mass index is 43.29 kg/m  as calculated from the following:    Height as of  "this encounter: 1.803 m (5' 11\").    Weight as of this encounter: 140.8 kg (310 lb 6.5 oz)., PRESENT ON ADMISSION       # Financial/Environmental Concerns: none         Disposition Plan     Medically Ready for Discharge: CAN Real MD  Hospitalist Service, GOLD TEAM 17  [facility]  Securely message with [employer] (more info)  Text page via [employer] Paging/Directory   See signed in provider for up to date coverage information  ______________________________________________________________________    Interval History   Patient a lot more comfortable this morning.  He states his left lower extremity significantly improved.    Physical Exam   Vital Signs: Temp: 98.1  F (36.7  C) Temp src: Oral BP: 117/69 Pulse: 58   Resp: 16 SpO2: 95 % O2 Device: None (Room air)    Weight: 310 lbs 6.52 oz    General Appearance: Appears comfortable.  Respiratory: Without wheezes rhonchi or rales.  CTA  Cardiovascular: RRR, without murmurs  GI: Soft, nontender, plus BS  Skin: please see WOC note.  Chronic venous stasis bilateral lower extremity.  Erythematous left lower extremity. Improving erythema LLE      Medical Decision Making       59 MINUTES SPENT BY ME on the date of service doing chart review, history, exam, documentation & further activities per the note.      Data   ------------------------- PAST 24 HR DATA REVIEWED -----------------------------------------------  "

## 2024-10-12 LAB
BASOPHILS # BLD AUTO: 0 10E3/UL (ref 0–0.2)
BASOPHILS NFR BLD AUTO: 1 %
CRP SERPL-MCNC: 24.9 MG/L
EOSINOPHIL # BLD AUTO: 0.2 10E3/UL (ref 0–0.7)
EOSINOPHIL NFR BLD AUTO: 3 %
ERYTHROCYTE [DISTWIDTH] IN BLOOD BY AUTOMATED COUNT: 14.3 % (ref 10–15)
HCT VFR BLD AUTO: 40.9 % (ref 40–53)
HGB BLD-MCNC: 13.8 G/DL (ref 13.3–17.7)
IMM GRANULOCYTES # BLD: 0 10E3/UL
IMM GRANULOCYTES NFR BLD: 1 %
LYMPHOCYTES # BLD AUTO: 1 10E3/UL (ref 0.8–5.3)
LYMPHOCYTES NFR BLD AUTO: 16 %
MCH RBC QN AUTO: 32.9 PG (ref 26.5–33)
MCHC RBC AUTO-ENTMCNC: 33.7 G/DL (ref 31.5–36.5)
MCV RBC AUTO: 97 FL (ref 78–100)
MONOCYTES # BLD AUTO: 0.5 10E3/UL (ref 0–1.3)
MONOCYTES NFR BLD AUTO: 9 %
NEUTROPHILS # BLD AUTO: 4.2 10E3/UL (ref 1.6–8.3)
NEUTROPHILS NFR BLD AUTO: 71 %
NRBC # BLD AUTO: 0 10E3/UL
NRBC BLD AUTO-RTO: 0 /100
PLATELET # BLD AUTO: 188 10E3/UL (ref 150–450)
RBC # BLD AUTO: 4.2 10E6/UL (ref 4.4–5.9)
WBC # BLD AUTO: 5.9 10E3/UL (ref 4–11)

## 2024-10-12 PROCEDURE — 85025 COMPLETE CBC W/AUTO DIFF WBC: CPT | Performed by: STUDENT IN AN ORGANIZED HEALTH CARE EDUCATION/TRAINING PROGRAM

## 2024-10-12 PROCEDURE — 250N000013 HC RX MED GY IP 250 OP 250 PS 637: Performed by: PHYSICIAN ASSISTANT

## 2024-10-12 PROCEDURE — 86140 C-REACTIVE PROTEIN: CPT | Performed by: STUDENT IN AN ORGANIZED HEALTH CARE EDUCATION/TRAINING PROGRAM

## 2024-10-12 PROCEDURE — 120N000002 HC R&B MED SURG/OB UMMC

## 2024-10-12 PROCEDURE — 250N000011 HC RX IP 250 OP 636: Performed by: STUDENT IN AN ORGANIZED HEALTH CARE EDUCATION/TRAINING PROGRAM

## 2024-10-12 PROCEDURE — 99223 1ST HOSP IP/OBS HIGH 75: CPT | Performed by: STUDENT IN AN ORGANIZED HEALTH CARE EDUCATION/TRAINING PROGRAM

## 2024-10-12 PROCEDURE — 36415 COLL VENOUS BLD VENIPUNCTURE: CPT | Performed by: STUDENT IN AN ORGANIZED HEALTH CARE EDUCATION/TRAINING PROGRAM

## 2024-10-12 PROCEDURE — 258N000003 HC RX IP 258 OP 636: Performed by: STUDENT IN AN ORGANIZED HEALTH CARE EDUCATION/TRAINING PROGRAM

## 2024-10-12 RX ADMIN — LIDOCAINE 1 PATCH: 4 PATCH TOPICAL at 08:57

## 2024-10-12 RX ADMIN — ATORVASTATIN CALCIUM 40 MG: 40 TABLET, FILM COATED ORAL at 21:56

## 2024-10-12 RX ADMIN — APIXABAN 5 MG: 5 TABLET, FILM COATED ORAL at 08:56

## 2024-10-12 RX ADMIN — SENNOSIDES AND DOCUSATE SODIUM 1 TABLET: 50; 8.6 TABLET ORAL at 08:56

## 2024-10-12 RX ADMIN — BUMETANIDE 1 MG: 1 TABLET ORAL at 08:56

## 2024-10-12 RX ADMIN — SENNOSIDES AND DOCUSATE SODIUM 1 TABLET: 50; 8.6 TABLET ORAL at 20:18

## 2024-10-12 RX ADMIN — EMPAGLIFLOZIN 10 MG: 10 TABLET, FILM COATED ORAL at 08:56

## 2024-10-12 RX ADMIN — VANCOMYCIN HYDROCHLORIDE 2000 MG: 1 INJECTION, POWDER, LYOPHILIZED, FOR SOLUTION INTRAVENOUS at 13:17

## 2024-10-12 RX ADMIN — GABAPENTIN 200 MG: 100 CAPSULE ORAL at 21:57

## 2024-10-12 RX ADMIN — APIXABAN 5 MG: 5 TABLET, FILM COATED ORAL at 20:18

## 2024-10-12 RX ADMIN — METOPROLOL SUCCINATE 25 MG: 25 TABLET, FILM COATED, EXTENDED RELEASE ORAL at 08:56

## 2024-10-12 RX ADMIN — CIPROFLOXACIN HYDROCHLORIDE 750 MG: 750 TABLET, FILM COATED ORAL at 13:17

## 2024-10-12 RX ADMIN — PRAMIPEXOLE DIHYDROCHLORIDE 0.25 MG: 0.25 TABLET ORAL at 21:57

## 2024-10-12 RX ADMIN — ACETAMINOPHEN 1000 MG: 500 TABLET ORAL at 13:17

## 2024-10-12 RX ADMIN — FERROUS SULFATE TAB 325 MG (65 MG ELEMENTAL FE) 325 MG: 325 (65 FE) TAB at 08:56

## 2024-10-12 RX ADMIN — ACETAMINOPHEN 1000 MG: 500 TABLET ORAL at 08:55

## 2024-10-12 RX ADMIN — ACETAMINOPHEN 1000 MG: 500 TABLET ORAL at 21:56

## 2024-10-12 RX ADMIN — AMIODARONE HYDROCHLORIDE 100 MG: 100 TABLET ORAL at 08:56

## 2024-10-12 ASSESSMENT — ACTIVITIES OF DAILY LIVING (ADL)
ADLS_ACUITY_SCORE: 45

## 2024-10-12 NOTE — PLAN OF CARE
Patient A&O x4 and able to make his needs known. Denied CP, lightheadedness, dizziness, numbness or tingling. Denied SOB/DEY. Eating and drinking well and voiding spontaneously without difficulties. CMS intact and denied pain tolerable and taking incentive spirometer encouraged and done several times, repositioned and turned in bed, heels elevated off bed, demonstrates the ability to use call light appropriately. Will continue with POC.

## 2024-10-12 NOTE — PLAN OF CARE
0417-1951 Goal Outcome Evaluation:      Plan of Care Reviewed With: patient    Overall Patient Progress: no changeOverall Patient Progress: no change           VS: BP 96/49 HR 64 Temp 98.1 orally RR 17   O2: 96% on RA   Output: Patient voids spontaneously with mild straining, appreciates assist in using urinal and preventing leakage onto incontinence pad    Last BM: 10/11 - stool was rock hard and bloody - pt states this is normal d/t hemorrhoids   Activity: Pt refuses activity, resists repositioning d/t increased pain in extremities r/t cellulitis. Wheelchair-bound at baseline. Not able to ambulate. Assist x2 for repositioning    Up for meals? Yes    Skin: Cellulitis in LLE, blanchable erythema bilaterally, dependent edema +3 on LLE and +2 on RLE, flaking/peeling of skin bilaterally on shins, painful bilaterally to touch and movement. Pedal pulses normal and palpable.   Sacral pressure wound - dressing changed at 2100, barrier cream noted on posterior left thigh   Pain: 4/10 w/o movt. 7/10 w/ movement    CMS: A&Ox4 able to make needs known, denies numbness or tingling in extremities    Dressing: Mepilex bilaterally on lower extremities - wound care performed on 10/11   Mepilex Dressing on sacral pressure injury - changed 10/11 at 2100   Diet: Regular    LDA: L PIV patent and saline locked    Equipment: Isoflex is on to prevent worsening of pressure injury, bedside urinal, IV pole    Plan: Contact precautions maintained for MRSA, continue to monitor left knee s/x, waiting for improvement of cellulitis before initiating wraps for lymphedema   Pt likely to discharge on 10/12 or 10/13 on oral doxycycline   SW confirmed with admissions at LT facility - they are prepared to receive pt over this weekend   Additional Info: Pt informed on the importance of repositioning to prevent worsening of sacral pressure injury - pt refuses repositioning d/t pain with movement. Frustrated with the pain resulting from turning patient  "onto side for changing of incontinence pad, replacement of sacral dressing. Threw med cup towards nurse, apologized for being \"uncooperative.\"       "

## 2024-10-12 NOTE — CONSULTS
Infectious Disease     New Inpatient Consult   Patient: Gorge Berkowitz Jr., Date of birth 1945, Medical record number [id number].     Assessment and Recommendations     Impression: Resolving cellulitis of the left greater than right lower extremity    Recommendations  Would stop ciprofloxacin, low suspicion for gram negative infection  Can continue vancomycin while inpatient, oral linezolid 600 mg twice daily is an option for outpatient completion of therapy  Would plan for 10 days of therapy completing 10/17/2024, could extend to 14 days if tolerating well and residual clinical infection is above baseline venous stasis.  Risk of serotonin syndrome with buprenorphine is possible but very low, even lower with a brief course of linezolid (references 1&2)- monitor clinically stop linezolid if develops.  Low suspicion of prosthetic joint infection, agree with orthopedics on deferring aspiration however if cellulitis flares again emanating from the knee or patient develops worsening or change in knee pain then PJI remains a consideration.  Would continue lymphedema management including fluid optimization, limb elevation, wrapping etc.    ID will continue to follow, please contact (page if time sensitive, vocera message if non urgent) with questions or if any situation arises where we may be of addional assistance.    Signed:  Pilo Guerrero MD, 10/12/2024   Staff Physician, Transplant and General Infectious Diseases  Pager [phone] if urgent or Vocera if non time sensitive  For coverage and paging info see [facility] Infectious Disease Medicine Adult/[facility]        Patient Summary:     79-year-old male with chronic recent stasis and lymphedema and history of left TKA presented with a lower extremity cellulitis.  He has improved on vancomycin.  Due to the proximity of the cellulitis to the prior TKA orthopedics was involved and they are not particularly concerned for active septic joint.        ID Problem  List and Discussion:     # Lower extremity cellulitis  # Venous stasis dermatitis  # Lymphedema   # History of TKA  # Chronic Pain on buprenorphine    The current episode of cellulitis has responded well to vancomycin.  His colonizing strain of MRSA is unfortunately resistant to doxycycline and Bactrim which are traditional first-line oral agents for MRSA.  However, linezolid is a option to complete therapy-risk of serotonin syndrome overall low although interaction with buprenorphine noted.  Recent literature suggests overall safety of linezolid with buprenorphine and very low rates of true serotonin syndrome.  This theoretical risk must be weighed against the significant complications that are known to occur with vancomycin including nephrotoxicity, prolonged hospitalized stay and hospital-acquired complications if patient remains on vancomycin to complete the course inpatient or from PICC line if getting outpatient infusion.    Regarding the TKA, while it is possible it may have become infected during this cellulitis episode it seems less likely.  The patient reports his symptoms are at baseline.  If this were to change or he were to have a clinical evidence of any infection then he would definitely benefit from aspiration and consideration for prosthetic joint infection.    We can also continue efforts on managing his lymphedema and venous stasis including fluid optimization, limb elevation and compression.    (1) Eleanor AD, Omer S, Gil H, Sarah M, Chante SS. Association of Linezolid With Risk of Serotonin Syndrome in Patients Receiving Antidepressants. ENDY Netw Open. 2022;5(12):k2489978. doi:10.1001/jamanetworkopen.2022.24727   (2) Jannie EC, Boo EL, Yolis SA. A Cross-sectional Analysis of Linezolid in Combination with Methadone or Buprenorphine as a Cause of Serotonin Toxicity. Open Forum Infect Dis. 2022 Jul 1;9(7):qltt120. doi: 10.1093/ofid/jvoi463. PMID: 34846020; PMCID: TYD1297043.     Selected Labs,  Micro, Imaging Results:     Above shows susceptibility results from MRSA nares swab collected 10/8/2024 which can be generalized to his colonizing MRSA strain.    History of Presenting Illness:     Gorge Berkowitz Jr. is a 79 year old patient who presented on 10/8/2024 for lower extremity pain and swelling.  He does have baseline lower extremity lymphedema and felt like he was having asymmetric pain and swelling extending up into his knee.  He was noted to have cellulitis in the emergency room and was started on vancomycin and ciprofloxacin.  There was a concern that perhaps his prosthetic joint could have been involved and orthopedics was consulted however they are planning for antibiotic management.    The patient has generally improved and the redness has moved on the patient's leg.  The patient reports his knee is at his baseline and never significantly deviated from the baseline.    The patient does not have other infectious signs or symptoms like fevers, chills, none of his other joints are bothering him.  He does have baseline swelling which she tries to manage with limb elevation is much as possible.      Review of Symptoms:  A comprehensive 14 system review of symptoms was conducted and was otherwise negative (unless mentioned above)       Other Medical History:     An attempt was made to collect past medical surgical, family and social history during this encounter,  this information was reviewed with the patient and updated, documented where most relevant in the HPI       Physical Exam:     Temp: 97.5  F (36.4  C) Temp src: Oral BP: 112/72 Pulse: 85   Resp: 17 SpO2: 96 % O2 Device: None (Room air)       GENERAL APPEARANCE: Not in acute distress    PHYSICAL EXAM:  Eyes:     Extraocular eye movements grossly intact, no ptosis, no discharge, no scleral icterus.  Mouth, Throat:     Mucous membranes moist  Cardiovascular:    S1, S2 normal, regular rate and rhythm.  Respiratory:     Inspection: Not in  respiratory distress, chest expansion symmetrical.   Auscultation: 4 point auscultation done clear to auscultation bilaterally  Gastrointestinal:  Soft, non-tender; bowel sounds normal; no palpable masses.  Musculoskeletal:     No major deformity  Neurologic:     Higher Mental Function: Conversant, AOx4   Motor: Moving all 4 limbs  Psychiatric: Appropriate  Skin: Lower extremity lymphedema and bilateral venous stasis dermatitis with superficial not acutely infected ulceration.  There is resolving erythema on the left lower extremity below the knee.  Reviewed images where it was above the knee.      MDM   I reviewed recent lab data, notes from referring physicians, as well as other available imaging and diagnostic testing and incorporated these into my discussion as above.  The specific values when most notable are documented above but are otherwise available in the medical record for review.        Medical Decision Making  I saw and evaluated this patient on todays date as part of an E&M Encounter     1- Number and Complexity of Problems Addressed as part the Encounter High    I addressed 1 or more chronic illnesses with severe exacerbation, progression, or side effects of treatment lower extremity venous stasis and lymphedema with progression to superimposed cellulitis potentially involving prosthetic joint, also addressed chronic pain syndrome with buprenorphine, lymphedema and venous stasis management.  2- Amount and/or Complexity of Data to Be Reviewed and Analyzed High   I reviewed the medical records for notes from other providers, and recent lab and imaging results   I independently interpreted the following results lower extremity x-ray demonstrating hardware in the tibial plateau and distal femoral condyle in the left lower extremity which appears to be well-seated within the bone.  3- Risk of Complications and/or Morbidity or Mortality of Patient Management:  was high due to my recommendation for drug  therapy requiring intensive monitoring for toxicity clinical suspicion / monitoring for serotonin syndrome with linezolid.

## 2024-10-13 VITALS
HEART RATE: 52 BPM | TEMPERATURE: 97.4 F | SYSTOLIC BLOOD PRESSURE: 117 MMHG | BODY MASS INDEX: 43.46 KG/M2 | OXYGEN SATURATION: 97 % | WEIGHT: 310.41 LBS | RESPIRATION RATE: 16 BRPM | DIASTOLIC BLOOD PRESSURE: 73 MMHG | HEIGHT: 71 IN

## 2024-10-13 LAB
BACTERIA BLD CULT: NO GROWTH
BASOPHILS # BLD AUTO: 0 10E3/UL (ref 0–0.2)
BASOPHILS NFR BLD AUTO: 1 %
CREAT SERPL-MCNC: 1.22 MG/DL (ref 0.67–1.17)
CRP SERPL-MCNC: 23.37 MG/L
EGFRCR SERPLBLD CKD-EPI 2021: 61 ML/MIN/1.73M2
EOSINOPHIL # BLD AUTO: 0.2 10E3/UL (ref 0–0.7)
EOSINOPHIL NFR BLD AUTO: 3 %
ERYTHROCYTE [DISTWIDTH] IN BLOOD BY AUTOMATED COUNT: 14.3 % (ref 10–15)
HCT VFR BLD AUTO: 40.8 % (ref 40–53)
HGB BLD-MCNC: 13.5 G/DL (ref 13.3–17.7)
IMM GRANULOCYTES # BLD: 0 10E3/UL
IMM GRANULOCYTES NFR BLD: 1 %
LYMPHOCYTES # BLD AUTO: 1.1 10E3/UL (ref 0.8–5.3)
LYMPHOCYTES NFR BLD AUTO: 17 %
MCH RBC QN AUTO: 32.9 PG (ref 26.5–33)
MCHC RBC AUTO-ENTMCNC: 33.1 G/DL (ref 31.5–36.5)
MCV RBC AUTO: 100 FL (ref 78–100)
MONOCYTES # BLD AUTO: 0.7 10E3/UL (ref 0–1.3)
MONOCYTES NFR BLD AUTO: 10 %
NEUTROPHILS # BLD AUTO: 4.4 10E3/UL (ref 1.6–8.3)
NEUTROPHILS NFR BLD AUTO: 69 %
NRBC # BLD AUTO: 0 10E3/UL
NRBC BLD AUTO-RTO: 0 /100
PLATELET # BLD AUTO: 193 10E3/UL (ref 150–450)
RBC # BLD AUTO: 4.1 10E6/UL (ref 4.4–5.9)
VANCOMYCIN SERPL-MCNC: 19 UG/ML
WBC # BLD AUTO: 6.4 10E3/UL (ref 4–11)

## 2024-10-13 PROCEDURE — 250N000011 HC RX IP 250 OP 636: Performed by: STUDENT IN AN ORGANIZED HEALTH CARE EDUCATION/TRAINING PROGRAM

## 2024-10-13 PROCEDURE — 36415 COLL VENOUS BLD VENIPUNCTURE: CPT | Performed by: STUDENT IN AN ORGANIZED HEALTH CARE EDUCATION/TRAINING PROGRAM

## 2024-10-13 PROCEDURE — 99239 HOSP IP/OBS DSCHRG MGMT >30: CPT | Performed by: STUDENT IN AN ORGANIZED HEALTH CARE EDUCATION/TRAINING PROGRAM

## 2024-10-13 PROCEDURE — 80202 ASSAY OF VANCOMYCIN: CPT | Performed by: STUDENT IN AN ORGANIZED HEALTH CARE EDUCATION/TRAINING PROGRAM

## 2024-10-13 PROCEDURE — 85025 COMPLETE CBC W/AUTO DIFF WBC: CPT | Performed by: STUDENT IN AN ORGANIZED HEALTH CARE EDUCATION/TRAINING PROGRAM

## 2024-10-13 PROCEDURE — 82565 ASSAY OF CREATININE: CPT | Performed by: STUDENT IN AN ORGANIZED HEALTH CARE EDUCATION/TRAINING PROGRAM

## 2024-10-13 PROCEDURE — 86140 C-REACTIVE PROTEIN: CPT | Performed by: STUDENT IN AN ORGANIZED HEALTH CARE EDUCATION/TRAINING PROGRAM

## 2024-10-13 PROCEDURE — 258N000003 HC RX IP 258 OP 636: Performed by: STUDENT IN AN ORGANIZED HEALTH CARE EDUCATION/TRAINING PROGRAM

## 2024-10-13 PROCEDURE — 99232 SBSQ HOSP IP/OBS MODERATE 35: CPT | Performed by: STUDENT IN AN ORGANIZED HEALTH CARE EDUCATION/TRAINING PROGRAM

## 2024-10-13 PROCEDURE — 250N000013 HC RX MED GY IP 250 OP 250 PS 637: Performed by: PHYSICIAN ASSISTANT

## 2024-10-13 RX ORDER — ACETAMINOPHEN 325 MG/1
650 TABLET ORAL EVERY 4 HOURS PRN
Qty: 30 TABLET | Refills: 0 | Status: SHIPPED | OUTPATIENT
Start: 2024-10-13

## 2024-10-13 RX ORDER — LINEZOLID 600 MG/1
600 TABLET, FILM COATED ORAL 2 TIMES DAILY
Qty: 8 TABLET | Refills: 0 | Status: SHIPPED | OUTPATIENT
Start: 2024-10-14 | End: 2024-10-18

## 2024-10-13 RX ORDER — POLYETHYLENE GLYCOL 3350 17 G/17G
17 POWDER, FOR SOLUTION ORAL DAILY
Qty: 510 G | Refills: 0 | Status: SHIPPED | OUTPATIENT
Start: 2024-10-13

## 2024-10-13 RX ADMIN — ACETAMINOPHEN 1000 MG: 500 TABLET ORAL at 14:07

## 2024-10-13 RX ADMIN — EMPAGLIFLOZIN 10 MG: 10 TABLET, FILM COATED ORAL at 08:29

## 2024-10-13 RX ADMIN — FERROUS SULFATE TAB 325 MG (65 MG ELEMENTAL FE) 325 MG: 325 (65 FE) TAB at 08:34

## 2024-10-13 RX ADMIN — BUMETANIDE 1 MG: 1 TABLET ORAL at 08:29

## 2024-10-13 RX ADMIN — VANCOMYCIN HYDROCHLORIDE 750 MG: 1 INJECTION, POWDER, LYOPHILIZED, FOR SOLUTION INTRAVENOUS at 11:44

## 2024-10-13 RX ADMIN — SENNOSIDES AND DOCUSATE SODIUM 1 TABLET: 50; 8.6 TABLET ORAL at 08:33

## 2024-10-13 RX ADMIN — LIDOCAINE 1 PATCH: 4 PATCH TOPICAL at 08:36

## 2024-10-13 RX ADMIN — APIXABAN 5 MG: 5 TABLET, FILM COATED ORAL at 08:29

## 2024-10-13 RX ADMIN — ACETAMINOPHEN 1000 MG: 500 TABLET ORAL at 08:28

## 2024-10-13 RX ADMIN — CIPROFLOXACIN HYDROCHLORIDE 750 MG: 750 TABLET, FILM COATED ORAL at 00:33

## 2024-10-13 RX ADMIN — AMIODARONE HYDROCHLORIDE 100 MG: 100 TABLET ORAL at 09:54

## 2024-10-13 RX ADMIN — CIPROFLOXACIN HYDROCHLORIDE 750 MG: 750 TABLET, FILM COATED ORAL at 11:44

## 2024-10-13 ASSESSMENT — ACTIVITIES OF DAILY LIVING (ADL)
ADLS_ACUITY_SCORE: 45

## 2024-10-13 NOTE — PROGRESS NOTES
Care Management Discharge Note    Discharge Date: 10/13/2024       Discharge Disposition: Long Term Care    [facility]  [address] [place]  [address]  P: [phone]  Admissions Meg: [phone]  F: [phone]     Discharge Services: Transportation Services    Discharge DME: None    Discharge Transportation:  [facility]retcher [employer]    Private pay costs discussed: potential transportation costs    Does the patient's insurance plan have a 3 day qualifying hospital stay waiver?  No    PAS Confirmation Code:  NA - return to [facility]  Patient/family educated on Medicare website which has current facility and service quality ratings: no    Education Provided on the Discharge Plan: Yes  Persons Notified of Discharge Plans: Patient, Bedside RN, Charge Nurse, [facility] at [facility], [facility], Dr. Real (primary provider)  Patient/Family in Agreement with the Plan: yes    Handoff Referral Completed: No, handoff not indicated or clinically appropriate    Additional Information:  ROSINA messaged provider via Hotlist to discuss ISABELLA. Provider stated patient could discharge today. ROSINA informed bedside and charge RN who stated he was just started on IV vanco. Bedside RN messaged provider and stated this will be his last dose of IV abx. Patient should finish dose at 130PM.  ROSINA spoke with [facility] at [facility] to confirm if they can take patient back and of discharge. [facility] reports they are able to and he can return any time today. She requested orders be faxed to [phone]. ROSINA called back and spoke with RN on [address] at [facility] and asked if they needed linezolid filled at discharge pharmacy at hospital or if they will get. RN asked that it be filled at hospital discharge pharmacy as they didn't have and unsure when could get. They have other two medications at facility (tylenol and miralax).     ROSINA met with patient at bedside to discuss discharge  today. Patient thought he would go tomorrow and was surprised, but agreeable to returning today. Patient in need of stretcher transportation d/t cellulitis, lower pain in extremities and chronic wounds/pressure ulcers. He is Ax2 with gait belt and not mobile. Patient aware of the potential cost for transportation and okay with it. ROSINA scheduled stretcher transport with [email or web address] [phone] and spoke with Bryon.  time 3423-2314. [facility] bedside RN [facility].     SW reviewed IMM. Patient unable to sign and gave verbal consent for signature as he is left handed and was receiving IV abx in that arm at the time.     PCS form completed and faxed to  [employer] [facility] and placed in paper chart.     Orders and discharge summary faxed to [facility] at [phone].    SW will remain available and assist with any other needs that arise.        Carlee Bonilla, JOSE M, LICSW  10/13/2024       Social Work and Care Management Department       SEARCHABLE in [facility] - search SOCIAL WORK       [place] (0800 - 1630) Saturday and Sunday     Units: 4A Vocera, 4C Vocera, & 4E Vocera        Units: 5A [phone] Vocera, 5A 7052-1260 Vocera , BMT SW 1 BMT SW 2, BMT SW 3 & BMT SW 4  5C Off Service 5401 - 5416  5C Off Service [id number]     Units: 6A Vocera & 6B Vocera      Units: 6C Vocera     Units: 7A Vocera & 7B Vocera      Units: 7C Med Surg 7401 thru 7418 and 7C Med Surg 7502 thru 7521      Unit: [place] ED Vocera & [place] Obs Vocera     [facility] (2289-5939) Saturday and Sunday      Units: 5 Ortho Vocera, 5 Med Surg Vocera & WB ED Vocera     Units: 6 Med Surg Vocera, 8 Med Surg Vocera, & 10 ICU Vocera      After hours Vocera [facility] and After Hours Vocera [place]     Please NOTE changes to times below:    **Saturday & Sunday (1630 - 2030)    **Mon-Fri (3733-0411)     **[facility] Recognized Holidays  (4776-1780)    Units: ALL   - see above  DAVID links to units  \

## 2024-10-13 NOTE — PLAN OF CARE
"Alert and oriented x4. Med-compliant except held blood pressure medication due to low heart rate (as per order). Calm and cooperative. Endorsed knee pain 3/10, received scheduled pain medication. Upon re-check, endorsed pain 7/10, he said \"I just moved my legs and it always hurts more when I move them.\" Uses bedside urinal. Last bowel movement was 10/12/24. Offered patient PRN Miralax, he refused, saying \"I usually only go every 3 days or so.\" Assessed for flu vaccine, patient would like one so ordered for tomorrow morning. Wound care performed this shift. Assist of 2, patient did not want to be re-positioned this shift. Uses bedside urinal. Contact precautions maintained.   Problem: Adult Inpatient Plan of Care  Goal: Optimal Comfort and Wellbeing  Outcome: Progressing     Problem: Pain Acute  Goal: Optimal Pain Control and Function  Outcome: Progressing  Intervention: Optimize Psychosocial Wellbeing  Recent Flowsheet Documentation  Taken 10/13/2024 1000 by Jessie Maldnoado RN  Supportive Measures: active listening utilized     Problem: Fall Injury Risk  Goal: Absence of Fall and Fall-Related Injury  Outcome: Progressing  Intervention: Promote Injury-Free Environment  Recent Flowsheet Documentation  Taken 10/13/2024 1000 by Jessie Maldonado RN  Safety Promotion/Fall Prevention: activity supervised     Problem: Infection  Goal: Absence of Infection Signs and Symptoms  Outcome: Progressing  Intervention: Prevent or Manage Infection  Recent Flowsheet Documentation  Taken 10/13/2024 1000 by Jessie Maldonado RN  Isolation Precautions: contact precautions maintained   Goal Outcome Evaluation:                        "

## 2024-10-13 NOTE — PLAN OF CARE
Orientation: A/Ox4. Able to make needs known     Activity: A2 assist with walker and gait belt/. Stayed in bed through this shift    Diet/BS Checks:  Regular diet. Medication whole with regular thin liquid    Tele:  None     IV Access/Drains: Left PIV SL. Patent and flushes well    Pain Management:  Denied pain or any discomfort    Bowel/Bladder: Continent of bowel and bladder. Utilizing the urinal and bed pan in bed. X 1BM this shift    Skin/Wounds: Redness at groin area.Mepilex at the sacrum and bilateral lower extremity cellulitis    Consults:     D/C Disposition: pending clinical improvement/workup/ Long Term Care.    Patient slept through this shift. Denied SOB or chest pain. All safety measures in place. No acute distress noted this shift. Call light within reach. Continue with plan of care

## 2024-10-13 NOTE — PHARMACY-VANCOMYCIN DOSING SERVICE
Pharmacy Vancomycin Note  Date of Service 2024  Patient's  1945   79 year old, male    Indication: Skin and Soft Tissue Infection  Day of Therapy: started 10/8/24  Current vancomycin regimen:  2000 mg IV q24h  Current vancomycin monitoring method: AUC  Current vancomycin therapeutic monitoring goal: 400-600 mg*h/L    InsightRX Prediction of Current Vancomycin Regimen  Regimen: 2000 mg IV every 24 hours.  Exposure target: AUC24 (range)400-600 mg/L.hr   AUC24,ss: 701 mg/L.hr  Probability of AUC24 > 400: 100 %  Ctrough,ss: 8.4 mg/L  Probability of Ctrough,ss > 20: 0 %  Probability of nephrotoxicity (Lodise NICOLE ): 5 %    Current estimated CrCl = Estimated Creatinine Clearance: 72.8 mL/min (A) (based on SCr of 1.22 mg/dL (H)).    Creatinine for last 3 days  10/11/2024:  7:47 AM Creatinine 1.15 mg/dL  10/13/2024:  5:20 AM Creatinine 1.22 mg/dL    Recent Vancomycin Levels (past 3 days)  10/11/2024:  7:47 AM Vancomycin 10.5 ug/mL  10/13/2024:  5:20 AM Vancomycin 19.0 ug/mL    Vancomycin IV Administrations (past 72 hours)                     vancomycin (VANCOCIN) 2,000 mg in sodium chloride 0.9 % 570 mL intermittent infusion (mg) 2,000 mg New Bag 10/12/24 1317     2,000 mg New Bag 10/11/24 1352    vancomycin (VANCOCIN) 1,750 mg in sodium chloride 0.9 % 567.5 mL intermittent infusion (mg) 1,750 mg Given 10/10/24 1337                    Nephrotoxins and other renal medications (From now, onward)      Start     Dose/Rate Route Frequency Ordered Stop    10/11/24 1300  vancomycin (VANCOCIN) 2,000 mg in sodium chloride 0.9 % 570 mL intermittent infusion         2,000 mg  over 2 Hours Intravenous EVERY 24 HOURS 10/11/24 1133      10/09/24 0800  bumetanide (BUMEX) tablet 1 mg        Note to Pharmacy: PTA Sig:Take 1 tablet (1 mg) by mouth daily      1 mg Oral DAILY 10/08/24 1434      10/09/24 0800  empagliflozin (JARDIANCE) tablet 10 mg        Note to Pharmacy: PTA Sig:Take 1 tablet (10 mg) by mouth daily       10 mg Oral DAILY 10/08/24 1434                 Contrast Orders - past 72 hours (72h ago, onward)      None            Interpretation of levels and current regimen:  Vancomycin level is reflective of AUC greater than 600    Has serum creatinine changed greater than 50% in last 72 hours: No    Urine output:  unable to determine    Renal Function: Worsening (slightly)     InsightRX Prediction of Planned New Vancomycin Regimen  Regimen: 750 mg IV every 12 hours.  Exposure target: AUC24 (range)400-600 mg/L.hr   AUC24,ss: 526 mg/L.hr  Probability of AUC24 > 400: 96 %  Ctrough,ss: 12.2 mg/L  Probability of Ctrough,ss > 20: 0 %  Probability of nephrotoxicity (Lodise NICOLE 2009): 7 %    Plan:  Decrease Dose to 750 mg IV every 12 hours.   Vancomycin monitoring method: AUC  Vancomycin therapeutic monitoring goal: 400-600 mg*h/L  Pharmacy will check vancomycin levels as appropriate in 1-3 Days.  Serum creatinine levels will be ordered daily for the first week of therapy and at least twice weekly for subsequent weeks.    SUZANNE LOPEZ, [facility]

## 2024-10-13 NOTE — DISCHARGE SUMMARY
"[facility]  Hospitalist Discharge Summary      Date of Admission:  10/8/2024  Date of Discharge:  10/13/2024  Discharging Provider: Pao Real MD  Discharge Service: Hospitalist Service, GOLD TEAM 17    Discharge Diagnoses     # Cellulitis to LLE   # Chronic HFrEF  # ICM  # CAD  # A fib on chronic Eliquis  # HTN  # OA  # S/p L TKA  # Chronic deconditioning  #Chronic wounds   #Venous dermis stasis   #Pressure ulcers   # Dyslipidemia # ROSALVA    # RLS  Clinically Significant Risk Factors     # Severe Obesity: Estimated body mass index is 43.29 kg/m  as calculated from the following:    Height as of this encounter: 1.803 m (5' 11\").    Weight as of this encounter: 140.8 kg (310 lb 6.5 oz).       Follow-ups Needed After Discharge     Unresulted Labs Ordered in the Past 30 Days of this Admission       Date and Time Order Name Status Description    10/8/2024 10:06 AM Blood Culture Peripheral Blood Preliminary           Discharge Disposition   Discharged to home  Condition at discharge: Stable    Hospital Course   Gorge Berkowitz JrLinda is a 79 year old male admitted on 10/8/2024 for L leg cellulitis. He has history of HFrEF due to ICM, CAD, a fib on Eliquis, lymphedema.  Today in the hospital for management of left lower extremity cellulitis initially was concern for left knee septic joint therefore orthopedic surgery was consulted.  After evaluation and monitoring they recommended weightbearing require any surgical intervention.  Patient's knee pain had improved.  His ESR trending downwards, bilateral lower extremity duplex was ordered and did not show any signs of DVT.  Patient was placed on broad-spectrum antibiotic therapy.  We did consult infectious disease recommended transition to oral linezolid on discharge.  Patient will complete antibiotic therapy on 10/17/2024.  He will follow-up with primary care physician at that time.  He does reside in a long-term care " facility who is able to accept him back to the facility today.  I have spoken with coordinated care will arrange for patient to return to his prior living situation.  Patient Willing to go back.  On arrival this morning patient sleeping comfortably.  There is no acute complaints there was no overnight events.  He remains afebrile.        # Cellulitis to LLE  # Left knee septic joint ?  Patient presents with 2 days of acute on chronic swelling and 1 day of acute pain to LLE. This is in context of chronic symptoms related to lymphedema of bilateral legs. No known trauma or injuries to the area, but areas of non-intact skin noted, likely related to chronic edema. He has been feeling feverish at his LTC where he stays, no report of documented fevers. Erythema noted (see photo below) that extends to upper leg, significantly increased erythema compared to right leg.   -- Xray of left knee and tib fib without abnromality.  -- ESR 42, CRP 81, non elevated WBC and procalcitonin  -- BLE duplex US with somewhat insufficient study with bilateral peroneal veins not visualized and patient unable to tolerate compression of femoral vein, but otherwise negative for DVT.   -- Hx of BLE cellulitis in November 2023 at that time wound cultures grew polymicrobia including serratia CFE, klebsiella, enterococcus, MSSA. He was treated with broad spectrum abx narrowed to levofloxacin per ID.   Plan:   - continue vanco and ciprofloxacin started in ED (10/8-  - PRN acetaminophen and oxycodone in addition to PTA buprenorphine  - Appreciate infectious disease consultation regarding antibiotic recommendation.  -Ortho consultation concern for left knee septic joint, versus hardware infection.  Plan:  Continue IV Vancomycin and ciprofloxacin  for  LLE cellulitis  Monitor for new or worsening left knee symptoms  Consider left knee aspiration in 1-2 days if left knee symptoms have not returned to baseline  Activity: As tolerated  Weight bearing  status:Wheelchair and assist with transfer at baseline  Diet: okay for regular diet from ortho perspective  Wound Care: WOC consult recommended, BLE wounds appreciated on exam  Imaging: Left knee X-ray: completed. No obvious fluid collection. Ultrasound negative for DVT       # Chronic HFrEF  # ICM  # CAD  # A fib on chronic Eliquis  # HTN  - continue PTA Eliquis 5 mg BID  - continue PTA amiodarone 100 mg daily  - continue PTA Bumex 1 mg daily  - continue PTA Jardiance 10 mg daily  - continue PTA metoprolol 25 mg daily     # OA  # S/p L TKA  # Chronic deconditioning  Resides at [facility] facility. Wheelchair bound at baseline.   - cont PTA buprenorphine     # Lymphedema  Does not appear to be on any chronic interventions for this. May need to investigate OP interventions and/or start wraps once cellulitis improves, to ameliorate risk for future infection.     #Chronic wounds   #Venous dermis stasis   #Pressure ulcers   -- appreciate wound care consult   -- please see WOC note from 10/9         # Dyslipidemia - cont PTA statin  # ROSALVA - has been sleeping well without CPAP for 2 years, states CPAP has not worked well for him in the past.   # RLS - cont PTA Mirapex       Consultations This Hospital Stay   PHARMACY TO DOSE VANCO  WOUND OSTOMY CONTINENCE NURSE  IP CONSULT  PHYSICAL THERAPY ADULT IP CONSULT  LYMPHEDEMA THERAPY IP CONSULT  WOUND OSTOMY CONTINENCE NURSE  IP CONSULT  CARE MANAGEMENT / SOCIAL WORK IP CONSULT  INFECTIOUS DISEASE [facility] ADULT IP CONSULT    Code Status   Full Code    Time Spent on this Encounter   I, Pao Real MD, personally saw the patient today and spent greater than 30 minutes discharging this patient.       Pao Real MD  [facility] MED SURG  [address] [place] [id number]  Phone: [phone]  Fax: [phone]  ______________________________________________________________________    Physical Exam   Vital Signs: Temp: 97.4  F (36.3  C) Temp src:  Oral BP: 117/73 Pulse: 52   Resp: 16 SpO2: 97 % O2 Device: None (Room air)    Weight: 310 lbs 6.52 oz    General Appearance: Appears comfortable.  Respiratory: Without wheezes rhonchi or rales.  CTA  Cardiovascular: RRR, without murmurs  GI: Soft, nontender, plus BS  Skin: Without obvious bleeding, bruising or excoriations        Primary Care Physician   Physician No Ref-Primary    Discharge Orders      Reason for your hospital stay    LE cellulitis     Activity    Your activity upon discharge: activity as tolerated     Adult [facility] Follow-up and recommended labs and tests    Follow up with primary care provider, Physician No Ref-Primary, within 7 days to evaluate medication change, to evaluate treatment change, and for hospital follow- up.  No follow up labs or test are needed.      Appointments on [place] and/or [facility] (with [facility] or [facility] provider or service). Call [phone] if you haven't heard regarding these appointments within 7 days of discharge.     Diet    Follow this diet upon discharge: Current Diet:Orders Placed This Encounter      Combination Diet Regular Diet Adult       Significant Results and Procedures   Results for orders placed or performed during the hospital encounter of 10/08/24   US Lower Extremity Venous Duplex Bilateral    Narrative    ULTRASOUND LOWER EXTREMITY VENOUS DUPLEX BILATERAL 10/8/2024 10:55 AM    CLINICAL HISTORY: bilat leg swelling    COMPARISONS: None available.    REFERRING PROVIDER: Andria Winchester MD    TECHNIQUE: Grayscale, color Doppler, Doppler waveform ultrasound  evaluation was performed through the bilateral common femoral,  femoral, and popliteal veins. Bilateral posterior tibial and peroneal  veins were evaluated with grayscale imaging and compression.    FINDINGS:   Right common femoral, and popliteal veins are fully compressible,  patent, and demonstrate normal phasic Doppler waveforms.    Right femoral veins appear patent with normal phasic  "Doppler  waveforms. Patient unable to tolerate compression.    Right posterior tibial veins are fully compressible to the ankle.    Right peroneal veins not visualized.    Left common femoral, femoral, and popliteal veins are fully  compressible, patent, and demonstrate normal phasic Doppler waveforms.    Left posterior tibial veins are fully compressible to the ankle.    Left peroneal veins not visualized.    Subcutaneous edema left calf.      Impression    IMPRESSION: Bilateral peroneal veins are not visualized and patient  unable to tolerate right femoral vein compression. Otherwise, no deep  venous thrombosis demonstrated in either leg.    Reference: \"Duplex Ultrasound in the Diagnosis of Lower-Extremity Deep  Venous Thrombosis\"- Yudy Garibay MD, S; Sudarshan Dolan MD  (Circulation. 2014;129:917-921. http://circ.ahajournals.org)    I have personally reviewed the examination and initial interpretation  and I agree with the findings.    RAMOS DUMONT         SYSTEM ID:  [id number]   XR Knee Port Left 1/2 Views    Narrative    EXAM: XR KNEE PORT LEFT 1/2 VIEWS, XR TIBIA & FIBULA PORT LEFT 2  VIEWS  10/8/2024 12:21 PM      HISTORY: left knee pain    COMPARISON: Outside radiographs 9/4/2023    FINDINGS: AP and crosstable lateral views of the left knee. AP and  lateral views of the left leg.    No acute osseous abnormality. Total left knee arthroplasty without  evidence of complication. No substantial left knee effusion. The ankle  appears congruent. Vascular calcifications.        Impression    IMPRESSION: No acute osseous abnormality. Total left knee  arthroplasty.         TIFFANY MICHELLE MD (Joe)         SYSTEM ID:  [id number]   XR Tibia & Fibula Port Left 2 Views    Narrative    EXAM: XR KNEE PORT LEFT 1/2 VIEWS, XR TIBIA & FIBULA PORT LEFT 2  VIEWS  10/8/2024 12:21 PM      HISTORY: left knee pain    COMPARISON: Outside radiographs 9/4/2023    FINDINGS: AP and crosstable lateral views of the left " knee. AP and  lateral views of the left leg.    No acute osseous abnormality. Total left knee arthroplasty without  evidence of complication. No substantial left knee effusion. The ankle  appears congruent. Vascular calcifications.        Impression    IMPRESSION: No acute osseous abnormality. Total left knee  arthroplasty.         TIFFANY MICHELLE MD (Joe)         SYSTEM ID:  [id number]       Discharge Medications   Current Discharge Medication List        START taking these medications    Details   !! acetaminophen (TYLENOL) 325 MG tablet Take 2 tablets (650 mg) by mouth every 4 hours as needed for mild pain or other (and adjunct with moderate or severe pain or per patient request).  Qty: 30 tablet, Refills: 0    Associated Diagnoses: Swelling of limb      linezolid (ZYVOX) 600 MG tablet Take 1 tablet (600 mg) by mouth 2 times daily for 4 days.  Qty: 8 tablet, Refills: 0    Associated Diagnoses: Swelling of limb       !! - Potential duplicate medications found. Please discuss with provider.        CONTINUE these medications which have CHANGED    Details   polyethylene glycol (MIRALAX) 17 GM/Dose powder Take 17 g by mouth daily.  Qty: 510 g, Refills: 0    Associated Diagnoses: Chronic constipation           CONTINUE these medications which have NOT CHANGED    Details   !! acetaminophen (TYLENOL) 500 MG tablet Take 1,000 mg by mouth 3 times daily      Acetaminophen 325 MG CAPS Take 650 mg by mouth every 4 hours as needed (mild pain).      amiodarone (PACERONE) 200 MG tablet [AMIODARONE (PACERONE) 200 MG TABLET] Take 0.5 tablets (100 mg total) by mouth daily.  Qty: 45 tablet, Refills: 0    Associated Diagnoses: A-fib (H)      atorvastatin (LIPITOR) 40 MG tablet [ATORVASTATIN (LIPITOR) 40 MG TABLET] Take 1 tablet (40 mg total) by mouth at bedtime.  Qty: 90 tablet, Refills: 1    Associated Diagnoses: Coronary artery disease involving native coronary artery of native heart without angina pectoris      bumetanide  (BUMEX) 1 MG tablet Take 1 tablet (1 mg) by mouth daily    Associated Diagnoses: Chronic systolic congestive heart failure (H)      buprenorphine (BUTRANS) 10 MCG/HR WK patch Place 1 patch onto the skin every 7 days Wednesdays      Calcium Carbonate-Vitamin D 600-10 MG-MCG TABS Take 1 tablet by mouth daily    Associated Diagnoses: History of revision of total knee arthroplasty      ELIQUIS 5 mg Tab tablet [ELIQUIS 5 MG TAB TABLET] Take 1 tablet (5 mg total) by mouth 2 (two) times a day.  Qty: 180 tablet, Refills: 0    Associated Diagnoses: Paroxysmal atrial flutter (H)      empagliflozin (JARDIANCE) 10 MG TABS tablet Take 1 tablet (10 mg) by mouth daily    Associated Diagnoses: Chronic systolic congestive heart failure (H)      ferrous sulfate (FEROSUL) 325 (65 Fe) MG tablet Take 1 tablet (325 mg) by mouth daily (with breakfast)    Associated Diagnoses: Macrocytic anemia      gabapentin (NEURONTIN) 100 MG capsule Take 2 capsules (200 mg) by mouth at bedtime    Associated Diagnoses: Chronic pain disorder      glucosamine-chondroitin 500-400 MG CAPS per capsule Take 1 capsule by mouth daily      Lidocaine (LIDOCARE) 4 % Patch Place 1 patch onto the skin every 24 hours Place onto the left shoulder.   To prevent lidocaine toxicity, patient should be patch free for 12 hrs daily.    Associated Diagnoses: Acute pain of left shoulder      melatonin 3 MG tablet Take 3 mg by mouth at bedtime      metoprolol succinate ER (TOPROL XL) 25 MG 24 hr tablet Take 1 tablet (25 mg) by mouth daily    Associated Diagnoses: Atrial flutter, unspecified type (H)      pramipexole (MIRAPEX) 0.25 MG tablet Take 0.25 mg by mouth At Bedtime      senna-docusate (SENOKOT-S/PERICOLACE) 8.6-50 MG tablet Take 1 tablet by mouth 2 times daily    Associated Diagnoses: Slow transit constipation      methocarbamol (ROBAXIN) 500 MG tablet Take 500 mg by mouth 2 times daily as needed for muscle spasms       !! - Potential duplicate medications found. Please  discuss with provider.        STOP taking these medications       cephALEXin (KEFLEX) 500 MG capsule Comments:   Reason for Stopping:             Allergies   No Known Allergies

## 2024-10-13 NOTE — PROGRESS NOTES
6MS DISCHARGE    D: Patient discharged to LTC at 1620. Patient accompanied by transport personnel .    I: Discharge prescriptions given to patient. All discharge medications and instructions reviewed with patient. PIV removed. Education completed.    A: Patient verbalized understanding of discharge medications and instructions. Prescribed home medications given to patient.  Belongings returned to patient.

## 2024-10-13 NOTE — PROGRESS NOTES
Infectious Disease     Follow Up and Sign off Note   Patient: Gorge Berkowitz Jr., Date of birth 1945, Medical record number [id number].     Assessment and Recommendations     Impression: Resolving cellulitis of the left greater than right lower extremity    Recommendations  Can continue vancomycin while inpatient, oral linezolid 600 mg twice daily is an option for outpatient completion of therapy  Would plan for 10 days of therapy completing 10/17/2024, could extend to 14 days if needed but given the degree of improvement doubtful to be necessary.  Risk of serotonin syndrome with buprenorphine is possible but very low, even lower with a brief course of linezolid (references 1&2)- monitor clinically stop linezolid if develops- discussed with patient.  Low suspicion of prosthetic joint infection, agree with orthopedics on deferring aspiration however if cellulitis flares again emanating from the knee or patient develops worsening or change in knee pain then PJI remains a consideration.  Would continue lymphedema management including fluid optimization, limb elevation, wrapping etc.    ID will sign off, please contact (page if time sensitive, vocera message if non urgent) with questions or if any situation arises where we may be of addional assistance.    Signed:  Pilo Guerrero MD, 10/13/2024   Staff Physician, Transplant and General Infectious Diseases  Pager [phone] if urgent or Vocera if non time sensitive  For coverage and paging info see [facility]-> Infectious Disease Medicine Adult/[facility]        Patient Summary:     79-year-old male with chronic recent stasis and lymphedema and history of left TKA presented with a lower extremity cellulitis.  He has improved on vancomycin.  Due to the proximity of the cellulitis to the prior TKA orthopedics was involved and they are not particularly concerned for active septic joint.        ID Problem List and Discussion:     # Lower extremity cellulitis  # Venous  stasis dermatitis  # Lymphedema   # History of TKA  # Chronic Pain on buprenorphine    The current episode of cellulitis has responded well to vancomycin.  His colonizing strain of MRSA is unfortunately resistant to doxycycline and Bactrim which are traditional first-line oral agents for MRSA.  However, linezolid is a option to complete therapy-risk of serotonin syndrome overall low although interaction with buprenorphine noted.  Recent literature suggests overall safety of linezolid with buprenorphine and very low rates of true serotonin syndrome.  This theoretical risk must be weighed against the significant complications that are known to occur with vancomycin including nephrotoxicity, prolonged hospitalized stay and hospital-acquired complications if patient remains on vancomycin to complete the course inpatient or from PICC line if getting outpatient infusion.    Regarding the TKA, while it is possible it may have become infected during this cellulitis episode it seems less likely.  The patient reports his symptoms are at baseline.  If this were to change or he were to have a clinical evidence of any infection then he would definitely benefit from aspiration and consideration for prosthetic joint infection.    We can also continue efforts on managing his lymphedema and venous stasis including fluid optimization, limb elevation and compression.    (1) Eleanor AD, Omer S, Gil H, Sarah M, Chante SS. Association of Linezolid With Risk of Serotonin Syndrome in Patients Receiving Antidepressants. ENYD Netw Open. 2022;5(12):m0906610. doi:10.1001/jamanetworkopen.2022.42853   (2) Jannie EC, Boo EL, Yolis SA. A Cross-sectional Analysis of Linezolid in Combination with Methadone or Buprenorphine as a Cause of Serotonin Toxicity. Open Forum Infect Dis. 2022 Jul 1;9(7):ediq094. doi: 10.1093/ofid/zljo652. PMID: 76145048; PMCID: QCG3762327.     Selected Labs, Micro, Imaging Results:     Above shows susceptibility results  from MRSA nares swab collected 10/8/2024 which can be generalized to his colonizing MRSA strain.    Interval/Subjective     He is continuing to improve.  He may be discharged this afternoon depending on logistics and transportation.  He thinks he will improve better and his knee will be more comfortable outside of the hospital.  I discussed the risk of serotonin syndrome with him including muscle rigidity, tremors, salivation, sweating-he understands to watch out for these clinical signs.      His lower extremities feel the same, somewhat stable, left knee still very painful-this is his baseline.    Review of Symptoms.  A comprehensive 14 system review of symptoms was conducted and was otherwise negative (unless mentioned above)       History of Presenting Illness:     Gorge Berkowitz Jr. is a 79 year old patient who presented on 10/8/2024 for lower extremity pain and swelling.  He does have baseline lower extremity lymphedema and felt like he was having asymmetric pain and swelling extending up into his knee.  He was noted to have cellulitis in the emergency room and was started on vancomycin and ciprofloxacin.  There was a concern that perhaps his prosthetic joint could have been involved and orthopedics was consulted however they are planning for antibiotic management.    The patient has generally improved and the redness has moved on the patient's leg.  The patient reports his knee is at his baseline and never significantly deviated from the baseline.    The patient does not have other infectious signs or symptoms like fevers, chills, none of his other joints are bothering him.  He does have baseline swelling which she tries to manage with limb elevation is much as possible.      Review of Symptoms:  A comprehensive 14 system review of symptoms was conducted and was otherwise negative (unless mentioned above)       Physical Exam:     Temp: 97.4  F (36.3  C) Temp src: Oral BP: 117/73 Pulse: 52   Resp: 16 SpO2:  97 % O2 Device: None (Room air)       GENERAL APPEARANCE: Not in acute distress    PHYSICAL EXAM:  Eyes:     Extraocular eye movements grossly intact, no ptosis, no discharge, no scleral icterus.  Mouth, Throat:     Mucous membranes moist  Cardiovascular:    S1, S2 normal, regular rate and rhythm.  Respiratory:     Inspection: Not in respiratory distress, chest expansion symmetrical.   Auscultation: 4 point auscultation done clear to auscultation bilaterally  Gastrointestinal:  Soft, non-tender; bowel sounds normal; no palpable masses.  Musculoskeletal:     No major deformity-the left knee has a vertical TKA incision-no erythema, drainage,point tenderness.  Neurologic:     Higher Mental Function: Conversant, AOx4   Motor: Moving all 4 limbs  Psychiatric: Appropriate  Skin: Lower extremity lymphedema and bilateral venous stasis dermatitis with superficial not acutely infected ulceration.  There is resolving (c/w 10/12/24)  erythema on the left lower extremity below the knee.  Reviewed images where it was above the knee.      MDM   I reviewed recent lab data, notes from referring physicians, as well as other available imaging and diagnostic testing and incorporated these into my discussion as above.  The specific values when most notable are documented above but are otherwise available in the medical record for review.        Medical Decision Making  I saw and evaluated this patient on todays date as part of an E&M Encounter     1- Number and Complexity of Problems Addressed as part the Encounter Moderate  I addressed 1 or more chronic illnesses with severe exacerbation, progression, or side effects of treatment lower extremity venous stasis and lymphedema with progression to superimposed cellulitis potentially involving prosthetic joint, also addressed chronic pain syndrome with buprenorphine, lymphedema and venous stasis management.  2- Amount and/or Complexity of Data to Be Reviewed and Analyzed Moderate  I  reviewed the medical records for notes from other providers, and recent lab and imaging results   3- Risk of Complications and/or Morbidity or Mortality of Patient Management:  was high due to my recommendation for drug therapy requiring intensive monitoring for toxicity clinical suspicion / monitoring for serotonin syndrome with linezolid.

## 2024-10-15 NOTE — TELEPHONE ENCOUNTER
Action October 14, 2024 10:54 PM MT   Action Taken Sent a request to [facility] for imaging.       DIAGNOSIS: ED FOLLOW-UP - LEFT KNEE   APPOINTMENT DATE: 10/17/2024   NOTES STATUS DETAILS   DISCHARGE SUMMARY from hospital Internal 10/8/2024 - 10/13/2024 (5 days)  [facility]      11/10/2023 - 12/4/2023 (24 days) [facility]     OPERATIVE REPORT Care Everywhere 04/21/2021 - LEFT KNEE RETINACULAR REPAIR.  10/13/2020 - LEFT TKA   ULTRASOUND PACS Internal   XRAYS (IMAGES & REPORTS) PACS Internal    [facility]:  04/19/2021, 10/13/2020, 06/23/2020 - LT Knee  11/25/2020, 07/01/2020 - Bilateral Knee  06/23/2020 - RT Knee

## 2024-10-17 ENCOUNTER — PRE VISIT (OUTPATIENT)
Dept: ORTHOPEDICS | Facility: CLINIC | Age: 79
End: 2024-10-17

## 2024-10-22 ENCOUNTER — LAB REQUISITION (OUTPATIENT)
Dept: LAB | Facility: CLINIC | Age: 79
End: 2024-10-22
Payer: COMMERCIAL

## 2024-10-22 DIAGNOSIS — E08.9 DIABETES MELLITUS DUE TO UNDERLYING CONDITION WITHOUT COMPLICATIONS (H): ICD-10-CM

## 2024-10-23 LAB
ALBUMIN SERPL BCG-MCNC: 3.3 G/DL (ref 3.5–5.2)
ALP SERPL-CCNC: 111 U/L (ref 40–150)
ALT SERPL W P-5'-P-CCNC: 14 U/L (ref 0–70)
AST SERPL W P-5'-P-CCNC: 25 U/L (ref 0–45)
BILIRUB DIRECT SERPL-MCNC: <0.2 MG/DL (ref 0–0.3)
BILIRUB SERPL-MCNC: 0.6 MG/DL
PROT SERPL-MCNC: 7.1 G/DL (ref 6.4–8.3)

## 2024-10-23 PROCEDURE — 36415 COLL VENOUS BLD VENIPUNCTURE: CPT | Mod: ORL | Performed by: NURSE PRACTITIONER

## 2024-10-23 PROCEDURE — 80076 HEPATIC FUNCTION PANEL: CPT | Mod: ORL | Performed by: NURSE PRACTITIONER

## 2024-10-23 PROCEDURE — P9604 ONE-WAY ALLOW PRORATED TRIP: HCPCS | Mod: ORL | Performed by: NURSE PRACTITIONER

## 2024-12-27 ENCOUNTER — HOSPITAL ENCOUNTER (EMERGENCY)
Facility: HOSPITAL | Age: 79
Discharge: HOME | End: 2024-12-27
Attending: STUDENT IN AN ORGANIZED HEALTH CARE EDUCATION/TRAINING PROGRAM | Admitting: STUDENT IN AN ORGANIZED HEALTH CARE EDUCATION/TRAINING PROGRAM
Payer: COMMERCIAL

## 2024-12-27 VITALS
SYSTOLIC BLOOD PRESSURE: 137 MMHG | RESPIRATION RATE: 16 BRPM | DIASTOLIC BLOOD PRESSURE: 79 MMHG | OXYGEN SATURATION: 95 % | WEIGHT: 315 LBS | BODY MASS INDEX: 46.65 KG/M2 | TEMPERATURE: 97.6 F | HEIGHT: 69 IN | HEART RATE: 67 BPM

## 2024-12-27 DIAGNOSIS — M79.89 LEG SWELLING: ICD-10-CM

## 2024-12-27 PROCEDURE — 250N000013 HC RX MED GY IP 250 OP 250 PS 637: Performed by: STUDENT IN AN ORGANIZED HEALTH CARE EDUCATION/TRAINING PROGRAM

## 2024-12-27 PROCEDURE — 99283 EMERGENCY DEPT VISIT LOW MDM: CPT

## 2024-12-27 RX ORDER — CEPHALEXIN 500 MG/1
500 CAPSULE ORAL ONCE
Status: COMPLETED | OUTPATIENT
Start: 2024-12-27 | End: 2024-12-27

## 2024-12-27 RX ORDER — CEPHALEXIN 500 MG/1
500 CAPSULE ORAL 4 TIMES DAILY
Qty: 28 CAPSULE | Refills: 0 | Status: SHIPPED | OUTPATIENT
Start: 2024-12-27 | End: 2025-01-03

## 2024-12-27 RX ADMIN — CEPHALEXIN 500 MG: 500 CAPSULE ORAL at 16:55

## 2024-12-27 ASSESSMENT — ACTIVITIES OF DAILY LIVING (ADL)
ADLS_ACUITY_SCORE: 61

## 2024-12-27 ASSESSMENT — COLUMBIA-SUICIDE SEVERITY RATING SCALE - C-SSRS
1. IN THE PAST MONTH, HAVE YOU WISHED YOU WERE DEAD OR WISHED YOU COULD GO TO SLEEP AND NOT WAKE UP?: NO
2. HAVE YOU ACTUALLY HAD ANY THOUGHTS OF KILLING YOURSELF IN THE PAST MONTH?: NO
6. HAVE YOU EVER DONE ANYTHING, STARTED TO DO ANYTHING, OR PREPARED TO DO ANYTHING TO END YOUR LIFE?: NO

## 2024-12-27 NOTE — DISCHARGE INSTRUCTIONS
Please keep your legs and ankles elevated is much as you can throughout the day.    Take antibiotics as prescribed.    Return for any fevers, chills, chest pain, shortness of breath while lying down, increased weight gain, night sweats, or any other concerning symptoms.    We placed a referral for wound care who should be calling you for follow-up as an outpatient regarding your legs.

## 2024-12-27 NOTE — ED PROVIDER NOTES
"  Emergency Department Encounter         FINAL IMPRESSION:  Bilateral leg edema          ED COURSE AND MEDICAL DECISION MAKING   4:38 PM I met with the patient, obtained history, performed an initial exam, and discussed options and plan for diagnostics and treatment here in the ED.    ED Course as of 12/27/24 1711   Fri Dec 27, 2024   1649 Morbidly obese 79-year-old who is coming from assisted living for chronic bilateral lower extremity edema.  Patient reports that he is unhappy with the amount of support he gets when transitioning/transferring from his wheelchair to a liner.  States because of this, he is been sleeping in a wheelchair for 4 days.  States that his legs have become more swollen.  Apparently a nurse took a look at them today and was quite concerned and sent him in because \"I was going to have a heart attack or lose the leg.\"  Patient states he is very annoyed and irritated and he did not want to come to the hospital and did not think he needed to come to the hospital.  Denies any fevers, chills, nausea, vomiting, chest pain, shortness of breath, abdominal pain, night sweats, weight gain or weight loss.  States he has chronic ulcerations on his lower extremities.  States he has never had a history of DVT and is currently on Eliquis.  Arrival here his vitals are stable.  He looks well clinically.  Heart and lungs are normal.  He has bilateral symmetrical lower extremity edema with venous static changes as well as some erythema.  He has 1 small open ulceration on the right medial distal calf.  There is no crepitance.  No pain or proportion.  The compartments are soft.  He has normal pulses distally as well as normal cap refill.  Denies any other symptoms of suggest ACS or heart failure.  No signs of necrotizing fasciitis or deep space infection.  No trauma history which would go against any bone issue.  There is some erythema there that would suggest potentially an early cellulitis.  Because of his " history of lymphedema we will start him on a small dose of Keflex for the next 7 days.  There is no lymphangitic spreading.  No purulence.  No foul smells.  Full range of motion although limited because of swelling.  I suspect patient exacerbated his known chronic lymphedema/venous stasis due to sleeping in a wheelchair for 4 straight days.  No systemic symptoms suggesting infection.    1650 Reviewed hospital admission note 10/8-2024   1652 No other symptomatology that suggest heart failure.  No history of DVT and is currently on Eliquis.  Unlikely bilateral symmetrical DVTs.                          Medical Decision Making  Obtained supplemental history:Supplemental history obtained?: EMS  Reviewed external records: External records reviewed?: Other: Documented in chart, if applicable  Care impacted by chronic illness:Documented in Chart  Did you consider but not order tests?: Work up considered but not performed and documented in chart, if applicable  Did you interpret images independently?: Independent interpretation of ECG and images noted in documentation, when applicable.  Consultation discussion with other provider:Did you involve another provider (consultant, , pharmacy, etc.)?: No  Discharge. No recommendations on prescription strength medication(s). See documentation for any additional details.    MIPS: Not Applicable                EKG  N/A          MEDICATIONS GIVEN IN THE EMERGENCY DEPARTMENT:  Medications   cephALEXin (KEFLEX) capsule 500 mg (500 mg Oral $Given 12/27/24 1655)       NEW PRESCRIPTIONS STARTED AT TODAY'S ED VISIT:  New Prescriptions    CEPHALEXIN (KEFLEX) 500 MG CAPSULE    Take 1 capsule (500 mg) by mouth 4 times daily for 7 days.       HPI     Patient information obtained from: EMS, patient    Use of : N/A    Gorge Berkowitz Jr. is a 79 year old male with a pertinent history of HFrEF due to ICM, CAD, A-fib on Eliquis, lymphedema, obesity, chronic edema, and wheel chair  "bound for months in [facility], who presents to this ED via ambulance for evaluation of bilateral leg swelling and increased redness.    Per EMS, reports the patient arrives from his assisted living facility for chronic BLE edema that has gotten worse. He has a new open area weeping on LLE. His right leg is wrapped. He also has an open area on RLE as well. Patient was bradycardic, 56 normal per pt. SBP stable 126. 96% on RA.    Patient reports he has some slight shortness of breath, but mentions he has a history of COPD. States he is staying at the [facility]] and is wheelchair bound. Notes the head nurse at the facility he resides in was concerned that his BLE may be \"amputated\" (due to the state his BLE is in now), or have a \"heart attack\". Patient states he's been sleeping in a recliner for a while. He's slept in his wheelchair 4 nights straight, but last night he slept on his recliner. Mentions he rubbed his lower extremity against the stand of his wheelchair. He denies having any chest pain, vomiting, or nausea currently. He denies any changes or issues with his urine habits. He is currently on a diuretic. He does have wound care at the facility his lives at, but notes they haven't been around the last few days because of the holiday. He is on Eliquis. Denies a previous history of blood clots. He denies having any fevers, new night sweats or sick symptoms.    Patient states if the head nurse at his facility didn't recommend he go to the ED, he wouldn't have come here.    MEDICAL HISTORY     Past Medical History:   Diagnosis Date    Atrial fibrillation (H)     Cardiomyopathy (H)     CHF (congestive heart failure) (H)     Morbid obesity (H)     ROSALVA (obstructive sleep apnea)        Past Surgical History:   Procedure Laterality Date    CARDIAC CATHETERIZATION  08/10/2018    and right heart cath    CARDIOVERSION  08/16/2018    successful    CV CORONARY ANGIOGRAM N/A 8/10/2018    Procedure: " "Coronary Angiogram;  Surgeon: Ralph Powers MD;  Location: [facility] Cath Lab;  Service:     CV LEFT HEART CATHETERIZATION WITHOUT LEFT VENTRICULOGRAM Left 8/10/2018    Procedure: Left Heart Catheterization Without Left Ventriculogram;  Surgeon: Ralph Powers MD;  Location: [facility] Cath Lab;  Service:     CV RIGHT HEART CATHETERIZATION N/A 8/10/2018    Procedure: Right Heart Catheterization;  Surgeon: Ralph Powers MD;  Location: [facility] Cath Lab;  Service:        Social History     Tobacco Use    Smoking status: Former     Current packs/day: 0.00     Types: Cigarettes     Quit date: 2005     Years since quittin.0    Smokeless tobacco: Never   Substance Use Topics    Alcohol use: No     Comment: Alcoholic Drinks/day: former alcoholic- quit     Drug use: No       cephALEXin (KEFLEX) 500 MG capsule  acetaminophen (TYLENOL) 325 MG tablet  acetaminophen (TYLENOL) 500 MG tablet  Acetaminophen 325 MG CAPS  amiodarone (PACERONE) 200 MG tablet  atorvastatin (LIPITOR) 40 MG tablet  bumetanide (BUMEX) 1 MG tablet  buprenorphine (BUTRANS) 10 MCG/HR WK patch  Calcium Carbonate-Vitamin D 600-10 MG-MCG TABS  ELIQUIS 5 mg Tab tablet  empagliflozin (JARDIANCE) 10 MG TABS tablet  ferrous sulfate (FEROSUL) 325 (65 Fe) MG tablet  gabapentin (NEURONTIN) 100 MG capsule  glucosamine-chondroitin 500-400 MG CAPS per capsule  Lidocaine (LIDOCARE) 4 % Patch  melatonin 3 MG tablet  methocarbamol (ROBAXIN) 500 MG tablet  metoprolol succinate ER (TOPROL XL) 25 MG 24 hr tablet  polyethylene glycol (MIRALAX) 17 GM/Dose powder  pramipexole (MIRAPEX) 0.25 MG tablet  senna-docusate (SENOKOT-S/PERICOLACE) 8.6-50 MG tablet            PHYSICAL EXAM     /56   Pulse 59   Temp 97.6  F (36.4  C) (Oral)   Resp 16   Ht 1.753 m (5' 9\")   Wt 143.8 kg (317 lb)   SpO2 98%   BMI 46.81 kg/m        PHYSICAL EXAM:     General: Patient appears well, nontoxic, comfortable  HEENT: Moist mucous membranes,  " No head trauma.    Cardiovascular: Normal rate, normal rhythm, no extremity edema.  No appreciable murmur.  Respiratory: No signs of respiratory distress, lungs are clear to auscultation bilaterally with no wheezes rhonchi or rales.  Abdominal: Soft, nontender, nondistended, no palpable masses, no guarding, no rebound  Musculoskeletal: Full range of motion of joints, no deformities appreciated.  Bilateral lower extremity symmetrical edema with venous stasis changes.  Small ulceration noted to the right medial distal calf.  Normal pulses.  Soft compartments.  No bullae.  No purulence.  No foul smells.  Neurological: Alert and oriented, grossly neurologically intact.  Psychological: Normal affect and mood.  Integument: No rashes appreciated          RESULTS       Labs Ordered and Resulted from Time of ED Arrival to Time of ED Departure - No data to display    No orders to display         PROCEDURES:  Procedures:  Procedures       IKaren am serving as a scribe to document services personally performed by Luis Palm DO, based on my observations and the provider's statements to me.  I, Luis Palm DO, attest that Karen Gil is acting in a scribe capacity, has observed my performance of the services and has documented them in accordance with my direction.    Luis Palm DO  Emergency Medicine  [facility] EMERGENCY DEPARTMENT      Luis Palm DO  12/27/24 7144

## 2024-12-27 NOTE — ED NOTES
Bed: [facility]  Expected date: 12/27/24  Expected time:   Means of arrival:   Comments:  HEMS 449; 79M, BED BOUND, LOWERLEG EDEMA

## 2024-12-27 NOTE — ED TRIAGE NOTES
Pt arrived via [facility] from assisted living for chronic BLE edema that has gotten worse. New open area weeping on LLE. R leg wrapped, has open area on RLE as well. Pt faisal, 56 normal per pt. BP stable 126 systolic, 96% RA.      Triage Assessment (Adult)       Row Name 12/27/24 1552          Triage Assessment    Airway WDL WDL        Respiratory WDL    Respiratory WDL X        Cardiac WDL    Cardiac WDL X  pulse 56, pt's baseline per pt        Peripheral/Neurovascular WDL    Peripheral Neurovascular WDL X;neurovascular assessment lower  BLE reddened, weeping. Denies pain LLE, pain 6/10 RLE        Cognitive/Neuro/Behavioral WDL    Cognitive/Neuro/Behavioral WDL WDL

## 2024-12-30 ENCOUNTER — TELEPHONE (OUTPATIENT)
Dept: WOUND CARE | Facility: CLINIC | Age: 79
End: 2024-12-30
Payer: COMMERCIAL

## 2024-12-30 NOTE — TELEPHONE ENCOUNTER
Vascular Referral Intake    Appointment note (to be pasted into appt note. Also add where additional info is located ie: outside images pushed to PACS, in Epic, sent to HIM, etc):   Referred by Néstor, Luis Atkins, DO  for; DENNIS/TRANSFER STATUS? FROM [facility]; Wheelchair bound; right LE wounds, chronic edema/lymphedema, sleeping in chair    Specialty: Wound Clinic    Specific Provider if Necessary:  HENRIETTA Rodriguez, MD Augustin Ardon, or NP Linda Del Castillo    Visit Type: New    Time Frame: Next Available    Testing/Imaging Needed Before Consult: none    *Schedulers: Please send welcome letter to patient after appointment(s) have been scheduled*

## 2024-12-30 NOTE — TELEPHONE ENCOUNTER
Writer attempted to call patient's home to schedule wound consult- person who answered states patient is in a care facility but does not have their name or contact information; they provided writer with pt cell ([phone], updated in reg).   When writer called patient, he states he does not need a wound consult in-clinic as his wound is being managed at his facility.   Referral cancelled, letter sent to referring.

## 2025-04-17 ENCOUNTER — LAB REQUISITION (OUTPATIENT)
Dept: LAB | Facility: CLINIC | Age: 80
End: 2025-04-17
Payer: COMMERCIAL

## 2025-04-17 DIAGNOSIS — I15.2 HYPERTENSION SECONDARY TO ENDOCRINE DISORDERS: ICD-10-CM

## 2025-04-18 LAB
ANION GAP SERPL CALCULATED.3IONS-SCNC: 10 MMOL/L (ref 7–15)
BUN SERPL-MCNC: 26.5 MG/DL (ref 8–23)
CALCIUM SERPL-MCNC: 9 MG/DL (ref 8.8–10.4)
CHLORIDE SERPL-SCNC: 101 MMOL/L (ref 98–107)
CHOLEST SERPL-MCNC: 99 MG/DL
CREAT SERPL-MCNC: 1.12 MG/DL (ref 0.67–1.17)
EGFRCR SERPLBLD CKD-EPI 2021: 67 ML/MIN/1.73M2
ERYTHROCYTE [DISTWIDTH] IN BLOOD BY AUTOMATED COUNT: 15.9 % (ref 10–15)
EST. AVERAGE GLUCOSE BLD GHB EST-MCNC: 114 MG/DL
FASTING STATUS PATIENT QL REPORTED: NORMAL
GLUCOSE SERPL-MCNC: 76 MG/DL (ref 70–99)
HBA1C MFR BLD: 5.6 %
HCO3 SERPL-SCNC: 25 MMOL/L (ref 22–29)
HCT VFR BLD AUTO: 39.8 % (ref 40–53)
HDLC SERPL-MCNC: 59 MG/DL
HGB BLD-MCNC: 13 G/DL (ref 13.3–17.7)
LDLC SERPL CALC-MCNC: 32 MG/DL
MCH RBC QN AUTO: 30.8 PG (ref 26.5–33)
MCHC RBC AUTO-ENTMCNC: 32.7 G/DL (ref 31.5–36.5)
MCV RBC AUTO: 94 FL (ref 78–100)
NONHDLC SERPL-MCNC: 40 MG/DL
PLATELET # BLD AUTO: 192 10E3/UL (ref 150–450)
POTASSIUM SERPL-SCNC: 4.2 MMOL/L (ref 3.4–5.3)
RBC # BLD AUTO: 4.22 10E6/UL (ref 4.4–5.9)
SODIUM SERPL-SCNC: 136 MMOL/L (ref 135–145)
TRIGL SERPL-MCNC: 38 MG/DL
TSH SERPL DL<=0.005 MIU/L-ACNC: 1.21 UIU/ML (ref 0.3–4.2)
WBC # BLD AUTO: 6.5 10E3/UL (ref 4–11)

## 2025-04-18 PROCEDURE — 80061 LIPID PANEL: CPT | Mod: ORL | Performed by: EMERGENCY MEDICINE

## 2025-04-18 PROCEDURE — 85027 COMPLETE CBC AUTOMATED: CPT | Mod: ORL | Performed by: EMERGENCY MEDICINE

## 2025-04-18 PROCEDURE — 36415 COLL VENOUS BLD VENIPUNCTURE: CPT | Mod: ORL | Performed by: EMERGENCY MEDICINE

## 2025-04-18 PROCEDURE — 83036 HEMOGLOBIN GLYCOSYLATED A1C: CPT | Mod: ORL | Performed by: EMERGENCY MEDICINE

## 2025-04-18 PROCEDURE — 80048 BASIC METABOLIC PNL TOTAL CA: CPT | Mod: ORL | Performed by: EMERGENCY MEDICINE

## 2025-04-18 PROCEDURE — 84443 ASSAY THYROID STIM HORMONE: CPT | Mod: ORL | Performed by: EMERGENCY MEDICINE

## 2025-04-18 PROCEDURE — P9604 ONE-WAY ALLOW PRORATED TRIP: HCPCS | Mod: ORL | Performed by: EMERGENCY MEDICINE
